# Patient Record
Sex: FEMALE | Race: BLACK OR AFRICAN AMERICAN | NOT HISPANIC OR LATINO
[De-identification: names, ages, dates, MRNs, and addresses within clinical notes are randomized per-mention and may not be internally consistent; named-entity substitution may affect disease eponyms.]

---

## 2024-05-13 ENCOUNTER — APPOINTMENT (OUTPATIENT)
Dept: NEUROSURGERY | Facility: HOSPITAL | Age: 64
End: 2024-05-13

## 2024-05-14 ENCOUNTER — EMERGENCY (EMERGENCY)
Facility: HOSPITAL | Age: 64
LOS: 1 days | Discharge: ANOTHER HOSPITAL | End: 2024-05-14
Attending: STUDENT IN AN ORGANIZED HEALTH CARE EDUCATION/TRAINING PROGRAM
Payer: MEDICAID

## 2024-05-14 VITALS
DIASTOLIC BLOOD PRESSURE: 90 MMHG | TEMPERATURE: 98 F | HEART RATE: 112 BPM | SYSTOLIC BLOOD PRESSURE: 149 MMHG | RESPIRATION RATE: 16 BRPM | OXYGEN SATURATION: 98 % | WEIGHT: 192.9 LBS | HEIGHT: 66 IN

## 2024-05-14 LAB
ALBUMIN SERPL ELPH-MCNC: 3.2 G/DL — LOW (ref 3.5–5)
ALP SERPL-CCNC: 133 U/L — HIGH (ref 40–120)
ALT FLD-CCNC: 104 U/L DA — HIGH (ref 10–60)
AMMONIA BLD-MCNC: 36 UMOL/L — HIGH (ref 11–32)
ANION GAP SERPL CALC-SCNC: 7 MMOL/L — SIGNIFICANT CHANGE UP (ref 5–17)
ANISOCYTOSIS BLD QL: SLIGHT — SIGNIFICANT CHANGE UP
APPEARANCE UR: ABNORMAL
APTT BLD: 31.4 SEC — SIGNIFICANT CHANGE UP (ref 24.5–35.6)
AST SERPL-CCNC: 38 U/L — SIGNIFICANT CHANGE UP (ref 10–40)
BACTERIA # UR AUTO: ABNORMAL /HPF
BASOPHILS # BLD AUTO: 0 K/UL — SIGNIFICANT CHANGE UP (ref 0–0.2)
BASOPHILS NFR BLD AUTO: 0 % — SIGNIFICANT CHANGE UP (ref 0–2)
BILIRUB SERPL-MCNC: 0.7 MG/DL — SIGNIFICANT CHANGE UP (ref 0.2–1.2)
BILIRUB UR-MCNC: NEGATIVE — SIGNIFICANT CHANGE UP
BUN SERPL-MCNC: 13 MG/DL — SIGNIFICANT CHANGE UP (ref 7–18)
CALCIUM SERPL-MCNC: 9.8 MG/DL — SIGNIFICANT CHANGE UP (ref 8.4–10.5)
CHLORIDE SERPL-SCNC: 98 MMOL/L — SIGNIFICANT CHANGE UP (ref 96–108)
CO2 SERPL-SCNC: 23 MMOL/L — SIGNIFICANT CHANGE UP (ref 22–31)
COLOR SPEC: YELLOW — SIGNIFICANT CHANGE UP
CREAT SERPL-MCNC: 0.54 MG/DL — SIGNIFICANT CHANGE UP (ref 0.5–1.3)
DIFF PNL FLD: NEGATIVE — SIGNIFICANT CHANGE UP
EGFR: 104 ML/MIN/1.73M2 — SIGNIFICANT CHANGE UP
EOSINOPHIL # BLD AUTO: 0 K/UL — SIGNIFICANT CHANGE UP (ref 0–0.5)
EOSINOPHIL NFR BLD AUTO: 0 % — SIGNIFICANT CHANGE UP (ref 0–6)
EPI CELLS # UR: PRESENT
GLUCOSE SERPL-MCNC: 113 MG/DL — HIGH (ref 70–99)
GLUCOSE UR QL: NEGATIVE MG/DL — SIGNIFICANT CHANGE UP
HCT VFR BLD CALC: 40.5 % — SIGNIFICANT CHANGE UP (ref 34.5–45)
HGB BLD-MCNC: 13.9 G/DL — SIGNIFICANT CHANGE UP (ref 11.5–15.5)
HYPOCHROMIA BLD QL: SLIGHT — SIGNIFICANT CHANGE UP
INR BLD: 1.23 RATIO — HIGH (ref 0.85–1.18)
KETONES UR-MCNC: 15 MG/DL
LACTATE SERPL-SCNC: 1 MMOL/L — SIGNIFICANT CHANGE UP (ref 0.7–2)
LEUKOCYTE ESTERASE UR-ACNC: NEGATIVE — SIGNIFICANT CHANGE UP
LYMPHOCYTES # BLD AUTO: 2.63 K/UL — SIGNIFICANT CHANGE UP (ref 1–3.3)
LYMPHOCYTES # BLD AUTO: 20 % — SIGNIFICANT CHANGE UP (ref 13–44)
MANUAL SMEAR VERIFICATION: SIGNIFICANT CHANGE UP
MCHC RBC-ENTMCNC: 27.6 PG — SIGNIFICANT CHANGE UP (ref 27–34)
MCHC RBC-ENTMCNC: 34.3 GM/DL — SIGNIFICANT CHANGE UP (ref 32–36)
MCV RBC AUTO: 80.4 FL — SIGNIFICANT CHANGE UP (ref 80–100)
MICROCYTES BLD QL: SLIGHT — SIGNIFICANT CHANGE UP
MONOCYTES # BLD AUTO: 1.05 K/UL — HIGH (ref 0–0.9)
MONOCYTES NFR BLD AUTO: 8 % — SIGNIFICANT CHANGE UP (ref 2–14)
NEUTROPHILS # BLD AUTO: 9.47 K/UL — HIGH (ref 1.8–7.4)
NEUTROPHILS NFR BLD AUTO: 72 % — SIGNIFICANT CHANGE UP (ref 43–77)
NITRITE UR-MCNC: NEGATIVE — SIGNIFICANT CHANGE UP
NRBC # BLD: 0 /100 WBCS — SIGNIFICANT CHANGE UP (ref 0–0)
PH UR: 6.5 — SIGNIFICANT CHANGE UP (ref 5–8)
PLAT MORPH BLD: NORMAL — SIGNIFICANT CHANGE UP
PLATELET # BLD AUTO: 402 K/UL — HIGH (ref 150–400)
POIKILOCYTOSIS BLD QL AUTO: SLIGHT — SIGNIFICANT CHANGE UP
POTASSIUM SERPL-MCNC: 3.6 MMOL/L — SIGNIFICANT CHANGE UP (ref 3.5–5.3)
POTASSIUM SERPL-SCNC: 3.6 MMOL/L — SIGNIFICANT CHANGE UP (ref 3.5–5.3)
PROT SERPL-MCNC: 8 G/DL — SIGNIFICANT CHANGE UP (ref 6–8.3)
PROT UR-MCNC: ABNORMAL MG/DL
PROTHROM AB SERPL-ACNC: 13.9 SEC — HIGH (ref 9.5–13)
RBC # BLD: 5.04 M/UL — SIGNIFICANT CHANGE UP (ref 3.8–5.2)
RBC # FLD: 11.9 % — SIGNIFICANT CHANGE UP (ref 10.3–14.5)
RBC BLD AUTO: ABNORMAL
RBC CASTS # UR COMP ASSIST: 1 /HPF — SIGNIFICANT CHANGE UP (ref 0–4)
SODIUM SERPL-SCNC: 128 MMOL/L — LOW (ref 135–145)
SP GR SPEC: 1.02 — SIGNIFICANT CHANGE UP (ref 1–1.03)
SPHEROCYTES BLD QL SMEAR: SLIGHT — SIGNIFICANT CHANGE UP
UROBILINOGEN FLD QL: 4 MG/DL (ref 0.2–1)
WBC # BLD: 13.15 K/UL — HIGH (ref 3.8–10.5)
WBC # FLD AUTO: 13.15 K/UL — HIGH (ref 3.8–10.5)
WBC UR QL: 3 /HPF — SIGNIFICANT CHANGE UP (ref 0–5)

## 2024-05-14 PROCEDURE — 71045 X-RAY EXAM CHEST 1 VIEW: CPT | Mod: 26

## 2024-05-14 PROCEDURE — 99285 EMERGENCY DEPT VISIT HI MDM: CPT

## 2024-05-14 PROCEDURE — 70450 CT HEAD/BRAIN W/O DYE: CPT | Mod: 26,MC

## 2024-05-14 PROCEDURE — 70450 CT HEAD/BRAIN W/O DYE: CPT | Mod: 26,MC,77

## 2024-05-14 PROCEDURE — 70498 CT ANGIOGRAPHY NECK: CPT | Mod: 26,MC

## 2024-05-14 PROCEDURE — 70496 CT ANGIOGRAPHY HEAD: CPT | Mod: 26,MC

## 2024-05-14 RX ORDER — HALOPERIDOL DECANOATE 100 MG/ML
2.5 INJECTION INTRAMUSCULAR ONCE
Refills: 0 | Status: COMPLETED | OUTPATIENT
Start: 2024-05-14 | End: 2024-05-14

## 2024-05-14 RX ORDER — SODIUM CHLORIDE 9 MG/ML
1000 INJECTION, SOLUTION INTRAVENOUS ONCE
Refills: 0 | Status: COMPLETED | OUTPATIENT
Start: 2024-05-14 | End: 2024-05-14

## 2024-05-14 RX ORDER — LEVETIRACETAM 250 MG/1
1000 TABLET, FILM COATED ORAL ONCE
Refills: 0 | Status: COMPLETED | OUTPATIENT
Start: 2024-05-14 | End: 2024-05-14

## 2024-05-14 RX ORDER — CEFTRIAXONE 500 MG/1
1000 INJECTION, POWDER, FOR SOLUTION INTRAMUSCULAR; INTRAVENOUS ONCE
Refills: 0 | Status: COMPLETED | OUTPATIENT
Start: 2024-05-14 | End: 2024-05-14

## 2024-05-14 RX ADMIN — Medication 1 MILLIGRAM(S): at 17:12

## 2024-05-14 RX ADMIN — HALOPERIDOL DECANOATE 2.5 MILLIGRAM(S): 100 INJECTION INTRAMUSCULAR at 18:22

## 2024-05-14 RX ADMIN — Medication 1 MILLIGRAM(S): at 20:32

## 2024-05-14 RX ADMIN — LEVETIRACETAM 400 MILLIGRAM(S): 250 TABLET, FILM COATED ORAL at 20:05

## 2024-05-14 RX ADMIN — SODIUM CHLORIDE 1000 MILLILITER(S): 9 INJECTION, SOLUTION INTRAVENOUS at 15:17

## 2024-05-14 RX ADMIN — Medication 1 MILLIGRAM(S): at 17:47

## 2024-05-14 RX ADMIN — CEFTRIAXONE 100 MILLIGRAM(S): 500 INJECTION, POWDER, FOR SOLUTION INTRAMUSCULAR; INTRAVENOUS at 16:04

## 2024-05-14 NOTE — ED ADULT NURSE NOTE - CHIEF COMPLAINT QUOTE
sent from [facility] for generalized weakness with decrease in PO intake, taking cephalexin for UTI.

## 2024-05-14 NOTE — ED PROVIDER NOTE - PHYSICAL EXAMINATION
CONSTITUTIONAL: non-toxic, well appearing  SKIN: no rash, no petechiae.  EYES: PERRL, EOMI, pink conjunctiva, anicteric  ENT: tongue and uvular midline, no exudates, moist mucous membranes  NECK: Supple; no meningismus, no JVD  CARD: RRR, no murmurs, equal radial pulses bilaterally 2+  RESP: CTAB, no respiratory distress  ABD: Soft, non-tender, non-distended, no peritoneal signs, no CVA tenderness  EXT: Normal ROM x4. No edema.   NEURO: Alert, oriented to person only. Neuro exam nonfocal, equal strength bilaterally, resting tremor present.   PSYCH: Cooperative, appropriate.

## 2024-05-14 NOTE — ED ADULT NURSE NOTE - OBJECTIVE STATEMENT
Patient present from facility for generalized weakness and ability to do ADLS. Patient alert and awake but confused

## 2024-05-14 NOTE — ED PROVIDER NOTE - OBJECTIVE STATEMENT
History limited from patient's secondary to mental status, history obtained from chart review and collateral.    64-year-old female with past medical history hypertension, seizure disorder on Keppra, alcoholic cirrhosis, tremors, osteoarthritis presents with generalized weakness.  Per nursing facility notes, patient with generalized weakness, currently on cephalexin for urinary tract infection, patient declining in ADLs, no abdominal pain, no shortness of breath, no difficulty breathing, no fever, no chills, no headache, no seizure, sent to the emergency department for further evaluation.  Patient denies any complaints at this time, currently alert and oriented to person only.  Unable to obtain further history.

## 2024-05-14 NOTE — ED PROVIDER NOTE - CLINICAL SUMMARY MEDICAL DECISION MAKING FREE TEXT BOX
Amadou: 64-year-old female with past medical history hypertension, seizure disorder on Keppra, alcoholic cirrhosis, tremors, osteoarthritis presents with generalized weakness.  Per nursing facility notes, patient with generalized weakness, currently on cephalexin for urinary tract infection, patient declining in ADLs, no abdominal pain, no shortness of breath, no difficulty breathing, no fever, no chills, no headache, no seizure, sent to the emergency department for further evaluation.  Patient denies any complaints at this time, currently alert and oriented to person only.  Unable to obtain further history. Physical exam per above. No focal neuro deficits appreciated. Abdomen nontender. Suspect infectious/metabolic etiology, will obtain labs, imaging, provide supportive treatment, may require admission.

## 2024-05-14 NOTE — ED ADULT NURSE NOTE - NSFALLHARMRISKINTERV_ED_ALL_ED
Assistance OOB with selected safe patient handling equipment if applicable/Assistance with ambulation/Communicate risk of Fall with Harm to all staff, patient, and family/Monitor gait and stability/Provide visual cue: red socks, yellow wristband, yellow gown, etc/Reinforce activity limits and safety measures with patient and family/Bed in lowest position, wheels locked, appropriate side rails in place/Call bell, personal items and telephone in reach/Instruct patient to call for assistance before getting out of bed/chair/stretcher/Non-slip footwear applied when patient is off stretcher/[place] to call system/Physically safe environment - no spills, clutter or unnecessary equipment/Purposeful Proactive Rounding/Room/bathroom lighting operational, light cord in reach

## 2024-05-15 ENCOUNTER — INPATIENT (INPATIENT)
Facility: HOSPITAL | Age: 64
LOS: 18 days | Discharge: NURSING, REHAB OR LONG-TERM CARE | DRG: 66 | End: 2024-06-03
Attending: INTERNAL MEDICINE | Admitting: STUDENT IN AN ORGANIZED HEALTH CARE EDUCATION/TRAINING PROGRAM
Payer: MEDICAID

## 2024-05-15 VITALS
SYSTOLIC BLOOD PRESSURE: 149 MMHG | HEART RATE: 88 BPM | RESPIRATION RATE: 16 BRPM | DIASTOLIC BLOOD PRESSURE: 105 MMHG | TEMPERATURE: 99 F | OXYGEN SATURATION: 96 %

## 2024-05-15 VITALS — SYSTOLIC BLOOD PRESSURE: 150 MMHG | DIASTOLIC BLOOD PRESSURE: 83 MMHG

## 2024-05-15 DIAGNOSIS — I60.9 NONTRAUMATIC SUBARACHNOID HEMORRHAGE, UNSPECIFIED: ICD-10-CM

## 2024-05-15 DIAGNOSIS — R13.10 DYSPHAGIA, UNSPECIFIED: ICD-10-CM

## 2024-05-15 LAB
A1C WITH ESTIMATED AVERAGE GLUCOSE RESULT: 6.1 % — HIGH (ref 4–5.6)
ALBUMIN SERPL ELPH-MCNC: 3.3 G/DL — SIGNIFICANT CHANGE UP (ref 3.3–5)
ALP SERPL-CCNC: 117 U/L — SIGNIFICANT CHANGE UP (ref 40–120)
ALT FLD-CCNC: 64 U/L — HIGH (ref 10–45)
ANION GAP SERPL CALC-SCNC: 14 MMOL/L — SIGNIFICANT CHANGE UP (ref 5–17)
ANION GAP SERPL CALC-SCNC: 9 MMOL/L — SIGNIFICANT CHANGE UP (ref 5–17)
APPEARANCE UR: CLEAR — SIGNIFICANT CHANGE UP
APTT BLD: 30.5 SEC — SIGNIFICANT CHANGE UP (ref 24.5–35.6)
AST SERPL-CCNC: 31 U/L — SIGNIFICANT CHANGE UP (ref 10–40)
BACTERIA # UR AUTO: NEGATIVE /HPF — SIGNIFICANT CHANGE UP
BILIRUB SERPL-MCNC: 0.5 MG/DL — SIGNIFICANT CHANGE UP (ref 0.2–1.2)
BILIRUB UR-MCNC: NEGATIVE — SIGNIFICANT CHANGE UP
BUN SERPL-MCNC: 12 MG/DL — SIGNIFICANT CHANGE UP (ref 7–23)
BUN SERPL-MCNC: 13 MG/DL — SIGNIFICANT CHANGE UP (ref 7–23)
CALCIUM SERPL-MCNC: 8.6 MG/DL — SIGNIFICANT CHANGE UP (ref 8.4–10.5)
CALCIUM SERPL-MCNC: 9.2 MG/DL — SIGNIFICANT CHANGE UP (ref 8.4–10.5)
CAST: 3 /LPF — SIGNIFICANT CHANGE UP (ref 0–4)
CHLORIDE SERPL-SCNC: 102 MMOL/L — SIGNIFICANT CHANGE UP (ref 96–108)
CHLORIDE SERPL-SCNC: 96 MMOL/L — SIGNIFICANT CHANGE UP (ref 96–108)
CHLORIDE UR-SCNC: 190 MMOL/L — SIGNIFICANT CHANGE UP
CHOLEST SERPL-MCNC: 119 MG/DL — SIGNIFICANT CHANGE UP
CO2 SERPL-SCNC: 21 MMOL/L — LOW (ref 22–31)
CO2 SERPL-SCNC: 22 MMOL/L — SIGNIFICANT CHANGE UP (ref 22–31)
COLOR SPEC: YELLOW — SIGNIFICANT CHANGE UP
CREAT ?TM UR-MCNC: 36 MG/DL — SIGNIFICANT CHANGE UP
CREAT SERPL-MCNC: 0.46 MG/DL — LOW (ref 0.5–1.3)
CREAT SERPL-MCNC: 0.55 MG/DL — SIGNIFICANT CHANGE UP (ref 0.5–1.3)
DIFF PNL FLD: ABNORMAL
EGFR: 104 ML/MIN/1.73M2 — SIGNIFICANT CHANGE UP
EGFR: 108 ML/MIN/1.73M2 — SIGNIFICANT CHANGE UP
ERYTHROCYTE [SEDIMENTATION RATE] IN BLOOD: 120 MM/HR — HIGH (ref 0–20)
ESTIMATED AVERAGE GLUCOSE: 128 MG/DL — HIGH (ref 68–114)
GLUCOSE SERPL-MCNC: 111 MG/DL — HIGH (ref 70–99)
GLUCOSE SERPL-MCNC: 121 MG/DL — HIGH (ref 70–99)
GLUCOSE UR QL: NEGATIVE MG/DL — SIGNIFICANT CHANGE UP
HCT VFR BLD CALC: 38.6 % — SIGNIFICANT CHANGE UP (ref 34.5–45)
HDLC SERPL-MCNC: 28 MG/DL — LOW
HGB BLD-MCNC: 13.4 G/DL — SIGNIFICANT CHANGE UP (ref 11.5–15.5)
INR BLD: 1.33 RATIO — HIGH (ref 0.85–1.18)
KETONES UR-MCNC: ABNORMAL MG/DL
LEUKOCYTE ESTERASE UR-ACNC: ABNORMAL
LIPID PNL WITH DIRECT LDL SERPL: 74 MG/DL — SIGNIFICANT CHANGE UP
MAGNESIUM SERPL-MCNC: 2 MG/DL — SIGNIFICANT CHANGE UP (ref 1.6–2.6)
MAGNESIUM SERPL-MCNC: 2.1 MG/DL — SIGNIFICANT CHANGE UP (ref 1.6–2.6)
MCHC RBC-ENTMCNC: 27.9 PG — SIGNIFICANT CHANGE UP (ref 27–34)
MCHC RBC-ENTMCNC: 34.7 GM/DL — SIGNIFICANT CHANGE UP (ref 32–36)
MCV RBC AUTO: 80.2 FL — SIGNIFICANT CHANGE UP (ref 80–100)
NITRITE UR-MCNC: NEGATIVE — SIGNIFICANT CHANGE UP
NON HDL CHOLESTEROL: 91 MG/DL — SIGNIFICANT CHANGE UP
NRBC # BLD: 0 /100 WBCS — SIGNIFICANT CHANGE UP (ref 0–0)
OSMOLALITY SERPL: 275 MOSMOL/KG — LOW (ref 280–301)
PA ADP PRP-ACNC: 271 PRU — SIGNIFICANT CHANGE UP (ref 194–417)
PH UR: 6.5 — SIGNIFICANT CHANGE UP (ref 5–8)
PHOSPHATE SERPL-MCNC: 2.5 MG/DL — SIGNIFICANT CHANGE UP (ref 2.5–4.5)
PHOSPHATE SERPL-MCNC: 3.3 MG/DL — SIGNIFICANT CHANGE UP (ref 2.5–4.5)
PLATELET # BLD AUTO: 392 K/UL — SIGNIFICANT CHANGE UP (ref 150–400)
PLATELET MAPPING ACTF MAX AMPLITUDE: >30 MM — HIGH (ref 2–19)
PLATELET MAPPING ADP MAXIMUM AMPLITUDE: 58.2 MM — SIGNIFICANT CHANGE UP (ref 45–69)
PLATELET MAPPING ADP PERCENT INHIBITION: SIGNIFICANT CHANGE UP % (ref 0–17)
PLATELET MAPPING ARACHIDONIC ACID INHIBITION: SIGNIFICANT CHANGE UP % (ref 0–11)
PLATELET MAPPING HKH MAXIMUM AMPLITUDE: >71 MM — HIGH (ref 53–68)
PLATELET RESPONSE ASPIRIN RESULT: 652 ARU — SIGNIFICANT CHANGE UP
POTASSIUM SERPL-MCNC: 3.7 MMOL/L — SIGNIFICANT CHANGE UP (ref 3.5–5.3)
POTASSIUM SERPL-MCNC: 4.4 MMOL/L — SIGNIFICANT CHANGE UP (ref 3.5–5.3)
POTASSIUM SERPL-SCNC: 3.7 MMOL/L — SIGNIFICANT CHANGE UP (ref 3.5–5.3)
POTASSIUM SERPL-SCNC: 4.4 MMOL/L — SIGNIFICANT CHANGE UP (ref 3.5–5.3)
POTASSIUM UR-SCNC: 26 MMOL/L — SIGNIFICANT CHANGE UP
PROT SERPL-MCNC: 7.2 G/DL — SIGNIFICANT CHANGE UP (ref 6–8.3)
PROT UR-MCNC: SIGNIFICANT CHANGE UP MG/DL
PROTHROM AB SERPL-ACNC: 13.9 SEC — HIGH (ref 9.5–13)
RAPIDTEG MAXIMUM AMPLITUDE: 73.5 MM — HIGH (ref 52–70)
RBC # BLD: 4.81 M/UL — SIGNIFICANT CHANGE UP (ref 3.8–5.2)
RBC # FLD: 12.2 % — SIGNIFICANT CHANGE UP (ref 10.3–14.5)
RBC CASTS # UR COMP ASSIST: 40 /HPF — HIGH (ref 0–4)
SODIUM SERPL-SCNC: 132 MMOL/L — LOW (ref 135–145)
SODIUM SERPL-SCNC: 132 MMOL/L — LOW (ref 135–145)
SODIUM UR-SCNC: 196 MMOL/L — SIGNIFICANT CHANGE UP
SP GR SPEC: 1.02 — SIGNIFICANT CHANGE UP (ref 1–1.03)
SQUAMOUS # UR AUTO: 1 /HPF — SIGNIFICANT CHANGE UP (ref 0–5)
TEG FUNCTIONAL FIBRINOGEN: 47.6 MM — HIGH (ref 15–32)
TEG LY30 (LYSIS): 0.3 % — SIGNIFICANT CHANGE UP (ref 0–2.6)
TEG REACTION TIME: 6.2 MIN — SIGNIFICANT CHANGE UP (ref 4.6–9.1)
TRIGL SERPL-MCNC: 81 MG/DL — SIGNIFICANT CHANGE UP
TSH SERPL-MCNC: 2.8 UIU/ML — SIGNIFICANT CHANGE UP (ref 0.27–4.2)
UROBILINOGEN FLD QL: 4 MG/DL (ref 0.2–1)
WBC # BLD: 12.81 K/UL — HIGH (ref 3.8–10.5)
WBC # FLD AUTO: 12.81 K/UL — HIGH (ref 3.8–10.5)
WBC UR QL: 7 /HPF — HIGH (ref 0–5)

## 2024-05-15 PROCEDURE — 93005 ELECTROCARDIOGRAM TRACING: CPT

## 2024-05-15 PROCEDURE — 99291 CRITICAL CARE FIRST HOUR: CPT

## 2024-05-15 PROCEDURE — 36620 INSERTION CATHETER ARTERY: CPT

## 2024-05-15 PROCEDURE — 73590 X-RAY EXAM OF LOWER LEG: CPT | Mod: 26,50

## 2024-05-15 PROCEDURE — 71045 X-RAY EXAM CHEST 1 VIEW: CPT | Mod: 26,77

## 2024-05-15 PROCEDURE — 82962 GLUCOSE BLOOD TEST: CPT

## 2024-05-15 PROCEDURE — 96375 TX/PRO/DX INJ NEW DRUG ADDON: CPT | Mod: XU

## 2024-05-15 PROCEDURE — 85730 THROMBOPLASTIN TIME PARTIAL: CPT

## 2024-05-15 PROCEDURE — 87040 BLOOD CULTURE FOR BACTERIA: CPT

## 2024-05-15 PROCEDURE — 70496 CT ANGIOGRAPHY HEAD: CPT | Mod: MC

## 2024-05-15 PROCEDURE — 72170 X-RAY EXAM OF PELVIS: CPT | Mod: 26

## 2024-05-15 PROCEDURE — 74018 RADEX ABDOMEN 1 VIEW: CPT | Mod: 26

## 2024-05-15 PROCEDURE — 73090 X-RAY EXAM OF FOREARM: CPT | Mod: 26,50

## 2024-05-15 PROCEDURE — ZZZZZ: CPT

## 2024-05-15 PROCEDURE — 70450 CT HEAD/BRAIN W/O DYE: CPT | Mod: MC

## 2024-05-15 PROCEDURE — 70498 CT ANGIOGRAPHY NECK: CPT | Mod: MC

## 2024-05-15 PROCEDURE — 85610 PROTHROMBIN TIME: CPT

## 2024-05-15 PROCEDURE — 93010 ELECTROCARDIOGRAM REPORT: CPT

## 2024-05-15 PROCEDURE — 71045 X-RAY EXAM CHEST 1 VIEW: CPT

## 2024-05-15 PROCEDURE — 70450 CT HEAD/BRAIN W/O DYE: CPT | Mod: 26,77

## 2024-05-15 PROCEDURE — 96372 THER/PROPH/DIAG INJ SC/IM: CPT | Mod: XU

## 2024-05-15 PROCEDURE — 71045 X-RAY EXAM CHEST 1 VIEW: CPT | Mod: 26

## 2024-05-15 PROCEDURE — 36415 COLL VENOUS BLD VENIPUNCTURE: CPT

## 2024-05-15 PROCEDURE — 73551 X-RAY EXAM OF FEMUR 1: CPT | Mod: 26,50

## 2024-05-15 PROCEDURE — 83605 ASSAY OF LACTIC ACID: CPT

## 2024-05-15 PROCEDURE — 80053 COMPREHEN METABOLIC PANEL: CPT

## 2024-05-15 PROCEDURE — 96374 THER/PROPH/DIAG INJ IV PUSH: CPT | Mod: XU

## 2024-05-15 PROCEDURE — 73060 X-RAY EXAM OF HUMERUS: CPT | Mod: 26

## 2024-05-15 PROCEDURE — 70450 CT HEAD/BRAIN W/O DYE: CPT | Mod: 26

## 2024-05-15 PROCEDURE — 96376 TX/PRO/DX INJ SAME DRUG ADON: CPT | Mod: XU

## 2024-05-15 PROCEDURE — 93970 EXTREMITY STUDY: CPT | Mod: 26

## 2024-05-15 PROCEDURE — 82140 ASSAY OF AMMONIA: CPT

## 2024-05-15 PROCEDURE — 85025 COMPLETE CBC W/AUTO DIFF WBC: CPT

## 2024-05-15 PROCEDURE — 81001 URINALYSIS AUTO W/SCOPE: CPT

## 2024-05-15 PROCEDURE — 99285 EMERGENCY DEPT VISIT HI MDM: CPT | Mod: 25

## 2024-05-15 RX ORDER — CHLORHEXIDINE GLUCONATE 213 G/1000ML
1 SOLUTION TOPICAL DAILY
Refills: 0 | Status: DISCONTINUED | OUTPATIENT
Start: 2024-05-15 | End: 2024-05-18

## 2024-05-15 RX ORDER — ACETAMINOPHEN 500 MG
1000 TABLET ORAL ONCE
Refills: 0 | Status: COMPLETED | OUTPATIENT
Start: 2024-05-15 | End: 2024-05-15

## 2024-05-15 RX ORDER — FOLIC ACID 0.8 MG
1 TABLET ORAL DAILY
Refills: 0 | Status: DISCONTINUED | OUTPATIENT
Start: 2024-05-15 | End: 2024-06-02

## 2024-05-15 RX ORDER — METOPROLOL TARTRATE 50 MG
5 TABLET ORAL EVERY 6 HOURS
Refills: 0 | Status: DISCONTINUED | OUTPATIENT
Start: 2024-05-15 | End: 2024-05-18

## 2024-05-15 RX ORDER — SODIUM CHLORIDE 5 G/100ML
1000 INJECTION, SOLUTION INTRAVENOUS
Refills: 0 | Status: DISCONTINUED | OUTPATIENT
Start: 2024-05-15 | End: 2024-05-17

## 2024-05-15 RX ORDER — SODIUM CHLORIDE 9 MG/ML
1000 INJECTION INTRAMUSCULAR; INTRAVENOUS; SUBCUTANEOUS
Refills: 0 | Status: DISCONTINUED | OUTPATIENT
Start: 2024-05-15 | End: 2024-05-15

## 2024-05-15 RX ORDER — ATORVASTATIN CALCIUM 80 MG/1
40 TABLET, FILM COATED ORAL AT BEDTIME
Refills: 0 | Status: DISCONTINUED | OUTPATIENT
Start: 2024-05-15 | End: 2024-05-17

## 2024-05-15 RX ORDER — NALTREXONE HYDROCHLORIDE 50 MG/1
1 TABLET, FILM COATED ORAL
Refills: 0 | DISCHARGE

## 2024-05-15 RX ORDER — NICARDIPINE HYDROCHLORIDE 30 MG/1
5 CAPSULE, EXTENDED RELEASE ORAL
Qty: 40 | Refills: 0 | Status: DISCONTINUED | OUTPATIENT
Start: 2024-05-15 | End: 2024-05-16

## 2024-05-15 RX ORDER — SENNA PLUS 8.6 MG/1
2 TABLET ORAL AT BEDTIME
Refills: 0 | Status: DISCONTINUED | OUTPATIENT
Start: 2024-05-15 | End: 2024-06-02

## 2024-05-15 RX ORDER — LATANOPROST 0.05 MG/ML
1 SOLUTION/ DROPS OPHTHALMIC; TOPICAL AT BEDTIME
Refills: 0 | Status: DISCONTINUED | OUTPATIENT
Start: 2024-05-15 | End: 2024-06-03

## 2024-05-15 RX ORDER — THIAMINE MONONITRATE (VIT B1) 100 MG
500 TABLET ORAL EVERY 8 HOURS
Refills: 0 | Status: DISCONTINUED | OUTPATIENT
Start: 2024-05-15 | End: 2024-05-16

## 2024-05-15 RX ORDER — NIMODIPINE 60 MG/10ML
60 SOLUTION ORAL EVERY 4 HOURS
Refills: 0 | Status: DISCONTINUED | OUTPATIENT
Start: 2024-05-15 | End: 2024-05-17

## 2024-05-15 RX ORDER — MIRTAZAPINE 45 MG/1
1 TABLET, ORALLY DISINTEGRATING ORAL
Refills: 0 | DISCHARGE

## 2024-05-15 RX ORDER — POTASSIUM CHLORIDE 20 MEQ
10 PACKET (EA) ORAL
Refills: 0 | Status: DISCONTINUED | OUTPATIENT
Start: 2024-05-15 | End: 2024-05-15

## 2024-05-15 RX ORDER — LEVETIRACETAM 250 MG/1
1 TABLET, FILM COATED ORAL
Refills: 0 | DISCHARGE

## 2024-05-15 RX ORDER — ATORVASTATIN CALCIUM 80 MG/1
1 TABLET, FILM COATED ORAL
Refills: 0 | DISCHARGE

## 2024-05-15 RX ORDER — LEVETIRACETAM 250 MG/1
750 TABLET, FILM COATED ORAL EVERY 12 HOURS
Refills: 0 | Status: DISCONTINUED | OUTPATIENT
Start: 2024-05-15 | End: 2024-05-22

## 2024-05-15 RX ORDER — DEXMEDETOMIDINE HYDROCHLORIDE IN 0.9% SODIUM CHLORIDE 4 UG/ML
0.3 INJECTION INTRAVENOUS
Qty: 200 | Refills: 0 | Status: DISCONTINUED | OUTPATIENT
Start: 2024-05-15 | End: 2024-05-15

## 2024-05-15 RX ORDER — LEVETIRACETAM 250 MG/1
750 TABLET, FILM COATED ORAL EVERY 12 HOURS
Refills: 0 | Status: DISCONTINUED | OUTPATIENT
Start: 2024-05-15 | End: 2024-05-15

## 2024-05-15 RX ORDER — POLYETHYLENE GLYCOL 3350 17 G/17G
17 POWDER, FOR SOLUTION ORAL EVERY 12 HOURS
Refills: 0 | Status: DISCONTINUED | OUTPATIENT
Start: 2024-05-15 | End: 2024-06-02

## 2024-05-15 RX ORDER — SODIUM CHLORIDE 9 MG/ML
1000 INJECTION INTRAMUSCULAR; INTRAVENOUS; SUBCUTANEOUS ONCE
Refills: 0 | Status: COMPLETED | OUTPATIENT
Start: 2024-05-15 | End: 2024-05-15

## 2024-05-15 RX ORDER — NICARDIPINE HYDROCHLORIDE 30 MG/1
5 CAPSULE, EXTENDED RELEASE ORAL
Qty: 40 | Refills: 0 | Status: DISCONTINUED | OUTPATIENT
Start: 2024-05-15 | End: 2024-05-15

## 2024-05-15 RX ORDER — BRIMONIDINE TARTRATE 2 MG/MG
1 SOLUTION/ DROPS OPHTHALMIC EVERY 12 HOURS
Refills: 0 | Status: DISCONTINUED | OUTPATIENT
Start: 2024-05-15 | End: 2024-06-03

## 2024-05-15 RX ORDER — LABETALOL HCL 100 MG
20 TABLET ORAL ONCE
Refills: 0 | Status: COMPLETED | OUTPATIENT
Start: 2024-05-15 | End: 2024-05-15

## 2024-05-15 RX ORDER — SODIUM CHLORIDE 5 G/100ML
1000 INJECTION, SOLUTION INTRAVENOUS
Refills: 0 | Status: DISCONTINUED | OUTPATIENT
Start: 2024-05-15 | End: 2024-05-15

## 2024-05-15 RX ORDER — POTASSIUM CHLORIDE 20 MEQ
10 PACKET (EA) ORAL
Refills: 0 | Status: COMPLETED | OUTPATIENT
Start: 2024-05-15 | End: 2024-05-15

## 2024-05-15 RX ADMIN — Medication 105 MILLIGRAM(S): at 21:07

## 2024-05-15 RX ADMIN — NICARDIPINE HYDROCHLORIDE 25 MG/HR: 30 CAPSULE, EXTENDED RELEASE ORAL at 07:22

## 2024-05-15 RX ADMIN — Medication 100 MILLIEQUIVALENT(S): at 05:16

## 2024-05-15 RX ADMIN — Medication 105 MILLIGRAM(S): at 05:19

## 2024-05-15 RX ADMIN — Medication 100 MILLIEQUIVALENT(S): at 06:26

## 2024-05-15 RX ADMIN — Medication 5 MILLIGRAM(S): at 22:07

## 2024-05-15 RX ADMIN — Medication 5 MILLIGRAM(S): at 10:18

## 2024-05-15 RX ADMIN — LEVETIRACETAM 400 MILLIGRAM(S): 250 TABLET, FILM COATED ORAL at 05:19

## 2024-05-15 RX ADMIN — Medication 5 MILLIGRAM(S): at 15:24

## 2024-05-15 RX ADMIN — Medication 100 MILLIEQUIVALENT(S): at 08:49

## 2024-05-15 RX ADMIN — LATANOPROST 1 DROP(S): 0.05 SOLUTION/ DROPS OPHTHALMIC; TOPICAL at 21:14

## 2024-05-15 RX ADMIN — Medication 105 MILLIGRAM(S): at 13:20

## 2024-05-15 RX ADMIN — DEXMEDETOMIDINE HYDROCHLORIDE IN 0.9% SODIUM CHLORIDE 5.6 MICROGRAM(S)/KG/HR: 4 INJECTION INTRAVENOUS at 13:46

## 2024-05-15 RX ADMIN — SODIUM CHLORIDE 1000 MILLILITER(S): 9 INJECTION INTRAMUSCULAR; INTRAVENOUS; SUBCUTANEOUS at 17:26

## 2024-05-15 RX ADMIN — BRIMONIDINE TARTRATE 1 DROP(S): 2 SOLUTION/ DROPS OPHTHALMIC at 17:14

## 2024-05-15 RX ADMIN — Medication 20 MILLIGRAM(S): at 01:30

## 2024-05-15 RX ADMIN — Medication 400 MILLIGRAM(S): at 20:25

## 2024-05-15 RX ADMIN — SODIUM CHLORIDE 75 MILLILITER(S): 9 INJECTION INTRAMUSCULAR; INTRAVENOUS; SUBCUTANEOUS at 20:46

## 2024-05-15 RX ADMIN — Medication 5 MILLIGRAM(S): at 03:47

## 2024-05-15 RX ADMIN — BRIMONIDINE TARTRATE 1 DROP(S): 2 SOLUTION/ DROPS OPHTHALMIC at 05:23

## 2024-05-15 RX ADMIN — CHLORHEXIDINE GLUCONATE 1 APPLICATION(S): 213 SOLUTION TOPICAL at 12:26

## 2024-05-15 RX ADMIN — SODIUM CHLORIDE 75 MILLILITER(S): 9 INJECTION INTRAMUSCULAR; INTRAVENOUS; SUBCUTANEOUS at 04:47

## 2024-05-15 RX ADMIN — SODIUM CHLORIDE 75 MILLILITER(S): 9 INJECTION INTRAMUSCULAR; INTRAVENOUS; SUBCUTANEOUS at 12:25

## 2024-05-15 RX ADMIN — LEVETIRACETAM 400 MILLIGRAM(S): 250 TABLET, FILM COATED ORAL at 17:14

## 2024-05-15 RX ADMIN — NICARDIPINE HYDROCHLORIDE 25 MG/HR: 30 CAPSULE, EXTENDED RELEASE ORAL at 05:12

## 2024-05-15 NOTE — PATIENT PROFILE ADULT - FUNCTIONAL SCREEN CURRENT LEVEL: SWALLOWING (IF SCORE 2 OR MORE FOR ANY ITEM, CONSULT REHAB SERVICES), MLM)
unable to assess due to neuro exam unable to assess due to neuro exam/2 = difficulty swallowing liquids/foods

## 2024-05-15 NOTE — OCCUPATIONAL THERAPY INITIAL EVALUATION ADULT - RANGE OF MOTION EXAMINATION, UPPER EXTREMITY
bilateral UE Passive ROM was WFL  (within functional limits) pt with +b/l tremors of BUE and head/neck/bilateral UE Active ROM was WFL  (within functional limits)/bilateral UE Passive ROM was WFL  (within functional limits)

## 2024-05-15 NOTE — PATIENT PROFILE ADULT - FUNCTIONAL ASSESSMENT - BASIC MOBILITY 6.
Not able to assess (calculate score using AMPAC averaging method) 1-calculated by average /Not able to assess (calculate score using [facility] averaging method)

## 2024-05-15 NOTE — PROGRESS NOTE ADULT - ASSESSMENT
ASSESSMENT/PLAN: 64 YOF, admitted with increase lethargy from her baseline (wheelchair bound), with bilateral convexity SAH and IVH.  Imaging thus ar revealed right vertebral artery aneurysm and diffuse narrowing in the anterior and posterior vasculature    NEURO:  Neurocheck q1  MRI/MRA with and without contrast today - rule out RCVS, rule out vasculitis  Possible diagnostic cerebral angiogram  Hx of Seizure: Levetiracetam 750mg BID  Pain: PRN analgesics  Activity: [] OOB as tolerated [X] Bedrest [] PT [] OT [] PMNR    PULM:  Incentive spirometry, Room air    CV:  SBP goal 100 - 140  On cardene drip  Right radial all  TTE-p    GI  NPO  Bowel regimen    RENAL:  Fluids: 2% @ 50ml/hRENAL  #HypoNa  Likely Chronic +/- CSW/HypoVolemia  - Maintain Normothermia with hypertonic IVF, q8h BMP    ID  #?UTI  - OSH UA clear  - Trend Fever/WBC  - Continue Keflex for 3 day course    DVT Ppx: SCD. HOLD SQL     ASSESSMENT/PLAN: 64 YOF, admitted with increase lethargy from her baseline (wheelchair bound), with bilateral convexity SAH and IVH.  Imaging thus ar revealed right vertebral artery aneurysm and diffuse narrowing in the anterior and posterior vasculature    NEURO:  Neurocheck q1  MRI/MRA with and without contrast today - rule out RCVS, rule out vasculitis  Possible diagnostic cerebral angiogram  Hx of Seizure: Levetiracetam 750mg BID  Pain: PRN analgesics  Activity: [] OOB as tolerated [X] Bedrest [] PT [] OT [] PMNR    PULM:  Incentive spirometry, Room air    CV:  SBP goal 100 - 140  On cardene drip  Right radial all  TTE-p    GI  NPO  Bowel regimen    RENAL:  Fluids: 2% @ 50ml/hRENAL  #HypoNa  Likely Chronic +/- CSW/HypoVolemia  - Maintain Normothermia with hypertonic IVF, q8h BMP    ID  #?UTI  - OSH UA clear  - Trend Fever/WBC  - Send blood and Urine clx. Rule out mycotic aneurysm-related SAH    DVT Ppx: SCD. HOLD SQL     ASSESSMENT/PLAN: 64 YOF, admitted with increase lethargy from her baseline (wheelchair bound), with bilateral convexity SAH and IVH.  Imaging thus ar revealed right vertebral artery aneurysm and diffuse narrowing in the anterior and posterior vasculature    NEURO:  Neurocheck q1  MRI/MRA with and without contrast today - rule out RCVS, rule out vasculitis  Possible diagnostic cerebral angiogram  Hx of Seizure: Levetiracetam 750mg BID  Pain: PRN analgesics  Activity: [] OOB as tolerated [X] Bedrest [] PT [] OT [] PMNR    PULM:  Incentive spirometry, NC 2 L    CV:  SBP goal 100 - 140  On cardene drip  TTE-p, EKG-p    GI  NPO  Bowel regimen    RENAL:  NS @ 50. Na goal 135 - 140  BMP at 4 PM  #HypoNa  Likely Chronic +/- HypoVolemia  - Maintain Normothermia with hypertonic IVF, q8h BMP    ID  #?UTI  - OSH UA clear  - Trend Fever/WBC  - Stop Ancef. monitor off Abx  - Send blood and Urine clx. Rule out mycotic aneurysm-related SAH    DVT Ppx: SCD. HOLD SQL  LED-p

## 2024-05-15 NOTE — OCCUPATIONAL THERAPY INITIAL EVALUATION ADULT - BED MOBILITY TRAINING, PT EVAL
GOAL: Pt will require mod A with all bed mobility tasks within 4 weeks to increase ability to engage in functional mobility tasks.

## 2024-05-15 NOTE — PROGRESS NOTE ADULT - ASSESSMENT
64F vasculopath with sudden nontraumatic SAH iso transient AMS, now at apparent baseline    Patient is critically ill due to the following diagnoses:  aneurysmal SAH  Cerebral Vasculopathy  Vertebrobasilar Insufficiency  Encephalopathy    PLAN  NEURO  #SAH  Unclear etiology, aneurysmal vasculopathy v inflammatory vasculitis/PRES  - DSA today  - SBP <140 until DSA given possible unsecured az  - Vasospasm management (Nimodipine, TCDs) if aneurysm identified    #AMS  Seizure v subclinical infarcts from vasculopathy (vertebrobasilar insufficiency v vasculitis) v Metabolic  - Home Keppra, Naltrexone  - HOLD home Mirtazepine  - MRI, MRA  - ESR/CRP  - Lipid Panel, A1c  - Consider LP if Imaging and/or Inflammatory markers consistent with Vasculitis  - Home Thiamine    SBP <140, Cont home Metoprolol  SpO2 >94%  NPO for DSA  Glucose <180    #?UTI  - OSH UA clear  - Trend Fever/WBC  - Continue Keflex for 3 day course    DVT Ppx: SCD. HOLD SQL    My full attention was spent providing medically necessary critical care to the patient with details documented in my note above.   Critical care time spent examining patient, reviewing vitals, labs, medications, imaging and discussing with the team goals of care   The combined critical care time provided to the patient was 60 minutes  This time does not include bedside procedures that are documented separately.   64F vasculopath with sudden nontraumatic SAH iso transient AMS, now at apparent baseline    Patient is critically ill due to the following diagnoses:  aneurysmal SAH  Cerebral Vasculopathy  Vertebrobasilar Insufficiency  Encephalopathy    PLAN  NEURO  #SAH  Unclear etiology, aneurysmal vasculopathy v inflammatory vasculitis/PRES  - DSA today  - SBP <140 until DSA given possible unsecured az  - Vasospasm management (Nimodipine, TCDs) if aneurysm identified    #AMS  Seizure v subclinical infarcts from vasculopathy (vertebrobasilar insufficiency v vasculitis) v Metabolic  - Home Keppra, Naltrexone  - HOLD home Mirtazepine  - MRI, MRA  - ESR/CRP  - Ammonia  - Lipid Panel, A1c  - Consider LP if Imaging and/or Inflammatory markers consistent with Vasculitis  - Home Thiamine    SBP <140, Cont home Metoprolol  SpO2 >94%  NPO for DSA  Glucose <180    RENAL  #HypoNa  Likely Chronic +/- CSW/HypoVolemia  - Maintain Normothermia with hypertonic IVF, q8h BMP    ID  #?UTI  - OSH UA clear  - Trend Fever/WBC  - Continue Keflex for 3 day course    DVT Ppx: SCD. HOLD SQL    My full attention was spent providing medically necessary critical care to the patient with details documented in my note above.   Critical care time spent examining patient, reviewing vitals, labs, medications, imaging and discussing with the team goals of care   The combined critical care time provided to the patient was 60 minutes  This time does not include bedside procedures that are documented separately.

## 2024-05-15 NOTE — CHART NOTE - NSCHARTNOTEFT_GEN_A_CORE
CAPRINI SCORE [CLOT] Score on Admission for     AGE RELATED RISK FACTORS                                                       MOBILITY RELATED FACTORS  [ ] Age 41-60 years                                            (1 Point)                  [ ] Bed rest                                                        (1 Point)  [x ] Age: 61-74 years                                           (2 Points)                 [ ] Plaster cast                                                   (2 Points)  [ ] Age= 75 years                                              (3 Points)                 [ ] Bed bound for more than 72 hours                 (2 Points)    DISEASE RELATED RISK FACTORS                                               GENDER SPECIFIC FACTORS  [ ] Edema in the lower extremities                       (1 Point)                  [ ] Pregnancy                                                     (1 Point)  [ ] Varicose veins                                               (1 Point)                  [ ] Post-partum < 6 weeks                                   (1 Point)             [ ] BMI > 25 Kg/m2                                            (1 Point)                  [ ] Hormonal therapy  or oral contraception          (1 Point)                 [ ] Sepsis (in the previous month)                        (1 Point)                  [ ] History of pregnancy complications                 (1 point)  [ ] Pneumonia or serious lung disease                                               [ ] Unexplained or recurrent                     (1 Point)           (in the previous month)                               (1 Point)  [ ] Abnormal pulmonary function test                     (1 Point)                 SURGERY RELATED RISK FACTORS (include planned surgeries)  [ ] Acute myocardial infarction                              (1 Point)                 [ ]  Section                                             (1 Point)  [ ] Congestive heart failure (in the previous month)  (1 Point)         [ ] Minor surgery                                                  (1 Point)   [ ] Inflammatory bowel disease                             (1 Point)                 [ ] Arthroscopic surgery                                        (2 Points)  [ ] Central venous access                                      (2 Points)                [ ] General surgery lasting more than 45 minutes   (2 Points)       [ ] Stroke (in the previous month)                          (5 Points)               [ ] Elective arthroplasty                                         (5 Points)            [ ] current or past malignancy                              (2 Points)                                                                                                       HEMATOLOGY RELATED FACTORS                                                 TRAUMA RELATED RISK FACTORS  [ ] Prior episodes of VTE                                     (3 Points)                [ ] Fracture of the hip, pelvis, or leg                       (5 Points)  [ ] Positive family history for VTE                         (3 Points)                 [ ] Acute spinal cord injury (in the previous month)  (5 Points)  [ ] Prothrombin 48135 A                                     (3 Points)                 [ ] Paralysis  (less than 1 month)                             (5 Points)  [ ] Factor V Leiden                                             (3 Points)                  [ ] Multiple Trauma within 1 month                        (5 Points)  [ ] Lupus anticoagulants                                     (3 Points)                                                           [ ] Anticardiolipin antibodies                               (3 Points)                                                       [ ] High homocysteine in the blood                      (3 Points)                                             [ ] Other congenital or acquired thrombophilia      (3 Points)                                                [ ] Heparin induced thrombocytopenia                  (3 Points)                                          Total Score [     2     ]    Risk:  Very low 0   Low 1 to 2   Moderate 3 to 4   High =5       VTE Prophylasix Recommednations:  [x ] mechanical pneumatic compression devices                                      [ ] contraindicated: _____________________  [ ] chemo prophylasix                                                                                   [x ] contraindicated _____________________    **** HIGH LIKELIHOOD DVT PRESENT ON ADMISSION  [ ] (please order LE dopplers within 24 hours of admission)

## 2024-05-15 NOTE — PROGRESS NOTE ADULT - SUBJECTIVE AND OBJECTIVE BOX
64F pmh Dementia (Polysubstance Abuse, resides in NH) with dyskinesias, HTN, szs on Keppra, alcoholic cirrhosis, tremors, OA presented from [facility] to  w/ AMS & generalized weakness. 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 Started on Keflex for UTI at Nursing Home. Per NH [place] independent in ADLs, conversant but forgetful and confused, wheelchair bound.    24hr EVENTS:  5/15 - Admitted to NSICU    ROS: [ ]  Unable to assess due to mental status   All other systems negative    -----------------------------------------------------------------------------------------------------------------------------------------------------------------------------------    PHYSICAL EXAM:  General: Calm, oriented to person, hospital, and purpose  HEENT: MMM  Neuro:  -Mental status- Fluent, follows commands  -CN- PERRL 3mm, EOMI, tongue midline, face symmetric  - Extremities equal in strength and sensation    CV: RRR  Pulm: clear to auscultation  Abd: Soft, nontender, nondistended  Ext: no noted edema in lower ext  Skin: warm, dry    -----------------------------------------------------------------------------------------------------------------------------------------------------------------------------------  ICU Vital Signs Last 24 Hrs  T(C): 37.1 (15 May 2024 03:02), Max: 37.1 (15 May 2024 03:02)  T(F): 98.7 (15 May 2024 03:02), Max: 98.7 (15 May 2024 03:02)  HR: 91 (15 May 2024 03:02) (88 - 98)  BP: 176/96 (15 May 2024 03:02) (145/107 - 176/96)  BP(mean): 130 (15 May 2024 03:02) (120 - 130)  ABP: --  ABP(mean): --  RR: 19 (15 May 2024 03:02) (16 - 19)  SpO2: 96% (15 May 2024 03:02) (96% - 98%)    O2 Parameters below as of 15 May 2024 03:02  Patient On (Oxygen Delivery Method): room air            I&O's Summary      MEDICATIONS  (STANDING):  atorvastatin 40 milliGRAM(s) Oral at bedtime  brimonidine 0.2% Ophthalmic Solution 1 Drop(s) Both EYES every 12 hours  chlorhexidine 4% Liquid 1 Application(s) Topical daily  folic acid 1 milliGRAM(s) Oral daily  latanoprost 0.005% Ophthalmic Solution 1 Drop(s) Both EYES at bedtime  levETIRAcetam   Injectable 750 milliGRAM(s) IV Push every 12 hours  metoprolol tartrate Injectable 5 milliGRAM(s) IV Push every 6 hours  niMODipine 60 milliGRAM(s) Oral every 4 hours  polyethylene glycol 3350 17 Gram(s) Oral every 12 hours  senna 2 Tablet(s) Oral at bedtime  sodium chloride 0.9%. 1000 milliLiter(s) (75 mL/Hr) IV Continuous <Continuous>  thiamine IVPB 500 milliGRAM(s) IV Intermittent every 8 hours      RESPIRATORY:        IMAGING:   Recent imaging studies were reviewed.    LAB RESULTS:

## 2024-05-15 NOTE — OCCUPATIONAL THERAPY INITIAL EVALUATION ADULT - GENERAL OBSERVATIONS, REHAB EVAL
Anamaria Salgado MD at 1/31/2018 12:05 PM     Status: Signed      Blood pressure is good. Continue her amlodipine.   Â   Also please notify that her stress test this morning was normal. Thank you pt received supine in bed +ICU monitor, +a-line, +BUE wrist restraints, +[place], +IV

## 2024-05-15 NOTE — ED PROVIDER NOTE - CLINICAL SUMMARY MEDICAL DECISION MAKING FREE TEXT BOX
see neurosurgical note - transferred for Neurosx for FH Cintia Ugarte MD - Attending Physician: Pt here with progressive weakness, presented from NH to [facility] and found to have bilateral SAH. Transferred here for NS eval. Awake, interactive, slowed but following commands. D/w NSG re: plan for admission

## 2024-05-15 NOTE — OCCUPATIONAL THERAPY INITIAL EVALUATION ADULT - MANUAL MUSCLE TESTING RESULTS, REHAB EVAL
not formally assessed 2* poor command follow however pt spontaneously moving all extremities/not tested due to

## 2024-05-15 NOTE — PROGRESS NOTE ADULT - SUBJECTIVE AND OBJECTIVE BOX
NSICU NIGHT PROGRESS NOTE     Please see neurosurgery resident H&P for HPI.     12h events   not able to place ngtube with ENT scope     Exam   sleepy, oriented on self, PEERL, L facial UMN, BUE 5/5, BLE 2/2    VITALS:   Vital Signs Last 24 Hrs  T(C): 36.7 (15 May 2024 19:00), Max: 37.7 (15 May 2024 01:14)  T(F): 98.1 (15 May 2024 19:00), Max: 99.9 (15 May 2024 01:14)  HR: 99 (15 May 2024 21:00) (73 - 139)  BP: 112/72 (15 May 2024 21:00) (112/70 - 176/96)  BP(mean): 88 (15 May 2024 21:00) (85 - 130)  RR: 15 (15 May 2024 21:00) (15 - 47)  SpO2: 98% (15 May 2024 21:00) (94% - 100%)    Parameters below as of 15 May 2024 19:00  Patient On (Oxygen Delivery Method): room air      CAPILLARY BLOOD GLUCOSE        I&O's Summary    14 May 2024 07:01  -  15 May 2024 07:00  --------------------------------------------------------  IN: 637.5 mL / OUT: 200 mL / NET: 437.5 mL    15 May 2024 07:01  -  15 May 2024 21:20  --------------------------------------------------------  IN: 2604.3 mL / OUT: 450 mL / NET: 2154.3 mL        Respiratory:        LABS:                        13.4   12.81 )-----------( 392      ( 15 May 2024 03:53 )             38.6     05-15    132<L>  |  102  |  13  ----------------------------<  111<H>  4.4   |  21<L>  |  0.55        MEDICATION LEVELS:   Ammonia, Serum: 36 umol/L (05-14 @ 13:30)    IVF FLUIDS/MEDICATIONS:   MEDICATIONS  (STANDING):  atorvastatin 40 milliGRAM(s) Oral at bedtime  brimonidine 0.2% Ophthalmic Solution 1 Drop(s) Both EYES every 12 hours  chlorhexidine 4% Liquid 1 Application(s) Topical daily  folic acid 1 milliGRAM(s) Oral daily  latanoprost 0.005% Ophthalmic Solution 1 Drop(s) Both EYES at bedtime  levETIRAcetam  IVPB 750 milliGRAM(s) IV Intermittent every 12 hours  metoprolol tartrate Injectable 5 milliGRAM(s) IV Push every 6 hours  niCARdipine Infusion 5 mG/Hr (25 mL/Hr) IV Continuous <Continuous>  niMODipine 60 milliGRAM(s) Oral every 4 hours  polyethylene glycol 3350 17 Gram(s) Oral every 12 hours  senna 2 Tablet(s) Oral at bedtime  sodium chloride 0.9%. 1000 milliLiter(s) (75 mL/Hr) IV Continuous <Continuous>  thiamine IVPB 500 milliGRAM(s) IV Intermittent every 8 hours    MEDICATIONS  (PRN):

## 2024-05-15 NOTE — OCCUPATIONAL THERAPY INITIAL EVALUATION ADULT - RANGE OF MOTION EXAMINATION, LOWER EXTREMITY
bilateral LE Passive ROM was WFL  (within functional limits) bilateral LE Active ROM was WFL  (within functional limits)/bilateral LE Passive ROM was WFL  (within functional limits)

## 2024-05-15 NOTE — PHYSICAL THERAPY INITIAL EVALUATION ADULT - PERTINENT HX OF CURRENT PROBLEM, REHAB EVAL
64F, pmh HTN, szs on Keppra, alcoholic cirrhosis, tremors, OA presented from [facility] to OSH w/ generalized weakness found to have b/l sulcal SAH and small occipital IVH. 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 She was recently started on Keflex for UTI. Plts 400, Coags wnl. , AST/ALT = 38/104. WBC 13. Afebrile. Per NH Baseline independent in ADLs, conversant but forgetful and confused, wheelchair bound.  -5/15 CT HEAD: Grossly stable biparietal subarachnoid and bilateral posterior horn intraventricular hemorrhage

## 2024-05-15 NOTE — ED ADULT TRIAGE NOTE - SPO2 (%)
Refill request for mortazapine and generic namenda received via escribe. Chart reviewed, and noted that patient was in patient 3/24/17, and upon discharge placed in rehab facility. Called daughter Andre, who stated that patient only was at rehab facility for a short time, and is now at home with them. Daughter states patient doing well at home. Instructed daughter to schedule a follow up appointment with Dr Wells at earliest convenience,call office with further questions or concerns.   96

## 2024-05-15 NOTE — PATIENT PROFILE ADULT - NSPROHMSYMPCOND_GEN_A_NUR
unable to assess due to neuro exam
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  Patient states that he had a seton placed which was supposed to be removed before New Year's [place] however had follow-up with his colorectal surgeon (associated with [facility]) who states it was not ready to come out.  Patient states for the past 3 days he has had increased purulent discharge and increased malodor.  Also reports some chills.  Concerned that there is a developing infection or abscess recurrence.  States he is having normal output into his colostomy.  No fever.

## 2024-05-15 NOTE — ED PROVIDER NOTE - OBJECTIVE STATEMENT
64-year-old female with past medical history hypertension, seizure disorder on Keppra, alcoholic cirrhosis, tremors, osteoarthritis presents with generalized weakness.  Per nursing facility notes, patient with generalized weakness, currently on cephalexin for urinary tract infection, patient declining in ADLs, no abdominal pain, no shortness of breath, no difficulty breathing, no fever, no chills, no headache, no seizure, sent to the emergency department for further evaluation.  Patient denies any complaints at this time, currently alert and oriented to person only.  Unable to obtain further history. 65yo F with PMH of HTN, seizure disorder on Keppra, alcoholic cirrhosis, tremors, osteoarthritis transferred for SAH from . Presented from [facility] to  for generalized weakness.  Per nursing facility notes, patient with generalized weakness, currently on cephalexin for urinary tract infection, patient declining in ADLs. Pt found to have SAH with R 5mm anerusym transferred for [facility] eval. Pt has no acute complaints but states she feels 'off' but cannot explain. States no HA, CP, abd pain or diff breathing. Unable to obtain further history. 63yo F with PMH of HTN, seizure disorder on Keppra, alcoholic cirrhosis, tremors, osteoarthritis transferred for SAH from . Presented from [facility] to  for generalized weakness.  Per nursing facility notes, patient with generalized weakness, currently on cephalexin for urinary tract infection, patient declining in ADLs. Pt found to have SAH with R 5mm aneurusym transferred for [facility] eval. Pt has no acute complaints but states she feels 'off' but cannot explain. States no HA, CP, abd pain or diff breathing. Unable to obtain further history.

## 2024-05-15 NOTE — CONSULT NOTE ADULT - ASSESSMENT
64F, pmh HTN, szs on Keppra, alcoholic cirrhosis, tremors, OA presented from [facility] to OSH w/ generalized weakness found to have b/l sulcal SAH and small occipital IVH. 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 She was recently started on Keflex for UTI. ENT consulted for ng tube placement. on exam, pt is agitated, right nare with friable mucosa but no active bleeding or discharge. clementofotto and taran jarquin attempted to be placed under visualization using indirect laryngoscope. however both met resistance at around 50cm, unable to advance further.

## 2024-05-15 NOTE — H&P ADULT - ASSESSMENT
Mera Burton  64F, pmh HTN, szs on Keppra, alcoholic cirrhosis, tremors, OA presented from [facility] [place] to OSH w/ generalized weakness found to have b/l sulcal SAH and small occipital IVH. 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 She was recently started on Keflex for UTI. Plts 400, Coags wnl. , AST/ALT = 38/104. WBC 13. Afebrile. Exam: lethargic appearing, EOV, FC, AO3, PERRL, not compliant with EOMs, BUE 5/5, BLE 2/5.    -Admit to NSCU for SAH protocol  -Pre op/NPO for possible angio/embo  -Repeat CT now  -A-line, -140  -Keppra 500 BID  -Electrolyte correction  -TEG  -MRI/MRA/NOVA if possible before angio    Nursing facility wasn't able to give much information but sounds like today she was more confused/lethargic than usual  Baseline: wheelchair bound, intermittently confused, can feed herself   -Emergency contact: Baljinder Barkley (Sister, [phone]) - did not answer phone-

## 2024-05-15 NOTE — PROGRESS NOTE ADULT - SUBJECTIVE AND OBJECTIVE BOX
64F pmh Dementia (Polysubstance Abuse, resides in NH) with dyskinesias, HTN, szs on Keppra, alcoholic cirrhosis, tremors, OA presented from [facility] to  w/ AMS & generalized weakness. 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 Started on Keflex for UTI at Nursing Home. Per NH Basline independent in ADLs, conversant but forgetful and confused, wheelchair bound.    HOSPITAL COURSE:  5/15 transferred to [facility] for workup of nontraumatic convexity SAH    Admission Scores  GCS: 14 HH: 2 MF: 2 NIHSS: RASS: CAM-ICU: ICH:    24 hour Events:    Allergies    No Known Allergies    Intolerances        REVIEW OF SYSTEMS: [X] Unable to Assess due to neurologic exam [ ] All ROS addressed below are non-contributory, except:  Neuro: [ ] Headache [ ] Back pain [ ] Numbness [ ] Weakness [ ] Ataxia [ ] Dizziness [ ] Aphasia [ ] Dysarthria [ ] Visual disturbance  Resp: [ ] Shortness of breath/dyspnea, [ ] Orthopnea [ ] Cough  CV: [ ] Chest pain [ ] Palpitation [ ] Lightheadedness [ ] Syncope  Renal: [ ] Thirst [ ] Edema  GI: [ ] Nausea [ ] Emesis [ ] Abdominal pain [ ] Constipation [ ] Diarrhea  Hem: [ ] Hematemesis [ ] bright red blood per rectum  ID: [ ] Fever [ ] Chills [ ] Dysuria  ENT: [ ] Rhinorrhea      DEVICES:  [ ] Restraints [ ] ET tube [ ] central line [ ] arterial line [ ] lang [ ] NGT/OGT [ ] EVD [ ] LD [ ] STANISLAV/HMV [ ] Trach [ ] PEG [ ] Chest Tube    VITALS:  Vital Signs Last 24 Hrs  T(C): 37.7 (15 May 2024 01:14), Max: 37.7 (15 May 2024 01:14)  T(F): 99.9 (15 May 2024 01:14), Max: 99.9 (15 May 2024 01:14)  HR: 116 (15 May 2024 01:14) (73 - 116)  BP: 150/83 (15 May 2024 01:45) (149/90 - 164/109)  BP(mean): 105 (15 May 2024 01:45) (105 - 122)  RR: 16 (15 May 2024 01:14) (16 - 16)  SpO2: 98% (15 May 2024 01:14) (98% - 100%)    Parameters below as of 15 May 2024 01:14  Patient On (Oxygen Delivery Method): room air      CAPILLARY BLOOD GLUCOSE      POCT Blood Glucose.: 125 mg/dL (14 May 2024 12:41)      LABS:    13.9  13.15 )-----------( 402 ( 14 May 2024 13:30 )  40.5    05-14    128<L> | 98 | 13  ----------------------------< 113<H>  3.6 | 23 | 0.54      MEDICATION LEVELS:  Ammonia, Serum: 36 umol/L (05-14 @ 13:30)    IMAGING:  < from: CT Head No Cont (05.14.24 @ 19:08) >  Similar scattered bilateral sulcal subarachnoid hemorrhage.    Similar small hemorrhage layering in the occipital horns.    No parenchymal hemorrhage or brain edema.    < end of copied text >    < from: CT Angio Head w/ IV Cont (05.14.24 @ 20:58) >  Fusiform aneurysm involving the right intracranial V4 segment measuring  up to 5 mm in diameter.    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    Symmetric severe stenosis involving the bilateral middle cerebral  arteries, and to a lesser extent the anterior cerebral arteries. Findings  are indeterminate in etiology, although may represent underlying CNS  vasculitis/vasculopathy. Further evaluation by MRI brain/MRA recommended.    Stable appearance of subarachnoid hemorrhage with intraventricular  extension, suboptimally evaluated on the current exam. Repeat noncontrast  head CT recommended to assess stability.    < end of copied text >      EXAMINATION:  PHYSICAL EXAM:    Neurological: Opens eyes to voice, alert oriented to person, place and time, Following Simple Commands, PERRL, EOMI, No Gaze Preference, BUE 5/5, BLE 2/5    Pulmonary: Clear to Auscultation, No rales, No rhonchi, No wheezes    Cardiovascular: S1, S2, Regular rate and rhythm    Gastrointestinal: Soft, Non-tender, Non-distended    Extremities: No calf tenderness    Incision: dry and clean 64F pmh Dementia (Polysubstance Abuse, resides in NH) with dyskinesias, HTN, szs on Keppra, alcoholic cirrhosis, tremors, OA presented from [facility] to  w/ AMS & generalized weakness. 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 Started on Keflex for UTI at Nursing Home. Per NH Basline independent in ADLs, conversant but forgetful and confused, wheelchair bound.    HOSPITAL COURSE:  5/15 transferred to [facility] for workup of nontraumatic convexity SAH    Admission Scores  GCS: 14 HH: 2 MF: 2 NIHSS: RASS: CAM-ICU: ICH:    24 hour Events:    Allergies    No Known Allergies    Intolerances        REVIEW OF SYSTEMS: [X] Unable to Assess due to neurologic exam [ ] All ROS addressed below are non-contributory, except:  Neuro: [ ] Headache [ ] Back pain [ ] Numbness [ ] Weakness [ ] Ataxia [ ] Dizziness [ ] Aphasia [ ] Dysarthria [ ] Visual disturbance  Resp: [ ] Shortness of breath/dyspnea, [ ] Orthopnea [ ] Cough  CV: [ ] Chest pain [ ] Palpitation [ ] Lightheadedness [ ] Syncope  Renal: [ ] Thirst [ ] Edema  GI: [ ] Nausea [ ] Emesis [ ] Abdominal pain [ ] Constipation [ ] Diarrhea  Hem: [ ] Hematemesis [ ] bright red blood per rectum  ID: [ ] Fever [ ] Chills [ ] Dysuria  ENT: [ ] Rhinorrhea      DEVICES:  [ ] Restraints [ ] ET tube [ ] central line [ ] arterial line [ ] lang [ ] NGT/OGT [ ] EVD [ ] LD [ ] STANISLAV/HMV [ ] Trach [ ] PEG [ ] Chest Tube    VITALS:  Vital Signs Last 24 Hrs  T(C): 37.7 (15 May 2024 01:14), Max: 37.7 (15 May 2024 01:14)  T(F): 99.9 (15 May 2024 01:14), Max: 99.9 (15 May 2024 01:14)  HR: 116 (15 May 2024 01:14) (73 - 116)  BP: 150/83 (15 May 2024 01:45) (149/90 - 164/109)  BP(mean): 105 (15 May 2024 01:45) (105 - 122)  RR: 16 (15 May 2024 01:14) (16 - 16)  SpO2: 98% (15 May 2024 01:14) (98% - 100%)    Parameters below as of 15 May 2024 01:14  Patient On (Oxygen Delivery Method): room air      CAPILLARY BLOOD GLUCOSE      POCT Blood Glucose.: 125 mg/dL (14 May 2024 12:41)      LABS:                          13.4   12.81 )-----------( 392      ( 15 May 2024 03:53 )             38.6     05-14    128<L> | 98 | 13  ----------------------------< 113<H>  3.6 | 23 | 0.54      MEDICATION LEVELS:  Ammonia, Serum: 36 umol/L (05-14 @ 13:30)    IMAGING:  < from: CT Head No Cont (05.14.24 @ 19:08) >  Similar scattered bilateral sulcal subarachnoid hemorrhage.    Similar small hemorrhage layering in the occipital horns.    No parenchymal hemorrhage or brain edema.    < end of copied text >    < from: CT Angio Head w/ IV Cont (05.14.24 @ 20:58) >  Fusiform aneurysm involving the right intracranial V4 segment measuring  up to 5 mm in diameter.    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    Symmetric severe stenosis involving the bilateral middle cerebral  arteries, and to a lesser extent the anterior cerebral arteries. Findings  are indeterminate in etiology, although may represent underlying CNS  vasculitis/vasculopathy. Further evaluation by MRI brain/MRA recommended.    Stable appearance of subarachnoid hemorrhage with intraventricular  extension, suboptimally evaluated on the current exam. Repeat noncontrast  head CT recommended to assess stability.    < end of copied text >      EXAMINATION:  PHYSICAL EXAM:    Neurological: Opens eyes to voice, alert oriented to person, place and time, Following Simple Commands, PERRL, EOMI, No Gaze Preference, BUE 5/5, BLE 2/5    Pulmonary: Clear to Auscultation, No rales, No rhonchi, No wheezes    Cardiovascular: S1, S2, Regular rate and rhythm    Gastrointestinal: Soft, Non-tender, Non-distended    Extremities: No calf tenderness    Incision: dry and clean

## 2024-05-15 NOTE — PHYSICAL THERAPY INITIAL EVALUATION ADULT - GENERAL OBSERVATIONS, REHAB EVAL
Patient encountered supine in bed, NAD, +restraints. +all, +PIV, +tele and +pulse ox No assistance needed

## 2024-05-15 NOTE — PATIENT PROFILE ADULT - FUNCTIONAL SCREEN CURRENT LEVEL: COMMUNICATION, MLM
unable to assess due to neuro exam unable to assess due to neuro exam/2 = difficulty understanding and speaking (not related to language barrier)

## 2024-05-15 NOTE — ED PROVIDER NOTE - PHYSICAL EXAMINATION
CONSTITUTIONAL: NAD  SKIN: Warm dry  HEAD: NCAT  ENT: MMM  NECK: Supple  CARD: RRR  RESP: CTAB  ABD: S/NT no R/G  EXT: no LE edema  NEURO: poor follow to instructions but moving extremities, pupils equal, sensory grossly intact, some lethargy noted but protecting airway and easily arousible   PSYCH: unclear baseline but AAOx3 and cooperative    *** Please see neurosurgical note for formal eval CONSTITUTIONAL: NAD  SKIN: Warm dry  HEAD: NCAT  ENT: MMM  NECK: Supple  CARD: RRR  RESP: CTAB  ABD: S/NT no R/G  EXT: no LE edema  NEURO: poor follow to instructions but moving extremities, pupils equal, sensory grossly intact, some lethargy noted but protecting airway and easily arousible   PSYCH: unclear baseline but AAOx3 and cooperative

## 2024-05-15 NOTE — OCCUPATIONAL THERAPY INITIAL EVALUATION ADULT - TRANSFER TRAINING, PT EVAL
GOAL: Pt will require mod A with all functional transfers in 4 weeks to increase participation in functional mobility tasks.

## 2024-05-15 NOTE — OCCUPATIONAL THERAPY INITIAL EVALUATION ADULT - BALANCE TRAINING, PT EVAL
GOAL: pt will increase sitting/standing balance by 1/2 grade within 4 weeks in order to increase ability to participate in ADLs and functional tasks.

## 2024-05-15 NOTE — PATIENT PROFILE ADULT - FALL HARM RISK - HARM RISK INTERVENTIONS

## 2024-05-15 NOTE — OCCUPATIONAL THERAPY INITIAL EVALUATION ADULT - FINE MOTOR COORDINATION, RIGHT HAND THUMB/FINGER OPPOSITION SKILLS, OT EVAL
Contacted [employer] and informed that pt's application is being processed and a determination should be made in 24-48 hours.  Contacted Marisol, pt's daughter to update.  Marisol, expressed many concerns she has had in the last week.  Marisol stated she asked for a home care referral a week ago and still has not heard from home care and was told today that home care was not ordered.  Marisol stated she feels the clinic's \"responsiveness and communication is terrible.\"  She continued to state that she feels the clinic is \"annoyed\" with her calling, but she is very concerned about her father's health.  He has really declined. Marisol said she had a virtual visit last Wednesday as well as today and still has many unanswered questions. Marisol bought a hospital bed herself for the patient as he is unable to get up without Marisol and her sister assisting him. Marisol asked if Xgeva could be given at home since patient is not able to ambulate.  If Marisol has to, they will pay for pt to transfer to the clinic via ambulance for his Xgeva. Marisol is frustrated with how things have been handled for the last week as well as with the social issues with patient and his spouse.    not appropriate to test

## 2024-05-15 NOTE — CONSULT NOTE ADULT - PROBLEM SELECTOR RECOMMENDATION 9
unable to place ng tube under visualization with laryngoscope  consider abdominal imaging/GI consult r/o obstruction  Call with questions or concerns [phone]

## 2024-05-15 NOTE — PATIENT PROFILE ADULT - NSPROGENPREVTRANSF_GEN_A_NUR
unable to assess due to neuro exam unable to assess due to neuro exam/no history of blood product transfusion

## 2024-05-15 NOTE — ED ADULT NURSE NOTE - OBJECTIVE STATEMENT
64YF PMHX of HTN, seizure disorder on keppra, alcoholic cirrhosis, tremors, RAMSEY presents to the ED as a transfer from [place] for SAH. per EMS, pt resides at the [facility] for UTI (on cephalexin) and had decreased PO intake so EMS was called. at [place], CT H resulted with 64YF PMHX of HTN, seizure disorder on keppra, alcoholic cirrhosis, tremors, RAMSEY presents to the ED as a transfer from [place] for SAH. per EMS, pt resides at the [facility] and was getting treated for UTI (on cephalexin) and due to decreased PO intake (no recent falls or trauma reported), EMS was called. at [place], CT resulted with subarachnoid hemorrhage w/ R 5mm aneurysm. per EMS, pt was given haloperidol, ativan, and labetalol

## 2024-05-15 NOTE — PATIENT PROFILE ADULT - FUNCTIONAL ASSESSMENT - DAILY ACTIVITY 3.
Per Dr. Keshawn aviles to dispense Doxazosin 2 mg.  Pharmacy called and notified.  Spoke with pt advised monitor BP.     1 = Total assistance

## 2024-05-15 NOTE — PROGRESS NOTE ADULT - ASSESSMENT
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    PLAN     N  # SAH: plan for angio Friday   nimodipine, TCD  #pmhx seizure cont home keppra, fu serum keppra (compliance)   #Pain: Tylenol and oxycodone  #Activity: [] OOB as tolerated [x] Bedrest [] PT [] OT [] PMNR    CV   #-140mmHg off nicardipine gtt   #TTE     P   RA     R   #Strict I+Os   #hyponatremia unkown chronicity, cont 2%, fu serum osm, bmp, ulystes     GI   #Diet: npo failed slp   #Senna miralax  Last BM pta     ID  afebrile  hx from outside hospital with possible UTI: bcx, ucx pending, UA unremarkable, monitor off antibiotics    E  Goal euglycemia (-180)  #a1c 6.1, ISS     H  #DVT ppx:   SCDs  Chemoppx held for now  LED no dvt      #CODE STATUS: FULL CODE     #DISPOSITION: ICU    #CRITICAL

## 2024-05-15 NOTE — ED ADULT NURSE NOTE - NSSEPSISNEWALTERMENTAL_ED_A_ED
COVID testing ordered today. You will receive a call to schedule that appointment.     Please quarantine until you receive your results.     If the results are negative, you need to be symptom free for 24 hours before ending quarantine.     If results are positive, you need to quarantine for 10 days from start of symptoms AND you need to be fever free (without the use of fever reducing medications) for 24 hours before ending quarantine.     If results are positive, asymptomatic household contacts will need to quarantine for 14 days.     If you develop symptoms such as difficulty breathing, please go to ER.     No

## 2024-05-15 NOTE — OCCUPATIONAL THERAPY INITIAL EVALUATION ADULT - LIVES WITH, PROFILE
pt poor historian however as per chart review, pt comes from nursing home and was wheelchair bound at baseline

## 2024-05-15 NOTE — OCCUPATIONAL THERAPY INITIAL EVALUATION ADULT - PERTINENT HX OF CURRENT PROBLEM, REHAB EVAL
64F, pmh HTN, szs on Keppra, alcoholic cirrhosis, tremors, OA presented from [facility] to OSH w/ generalized weakness found to have b/l sulcal SAH and small occipital IVH. 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 She was recently started on Keflex for UTI. Plts 400, Coags wnl. , AST/ALT = 38/104. WBC 13. Afebrile. Per NH Baseline independent in ADLs, conversant but forgetful and confused, wheelchair bound.  -5/15 CT HEAD: Grossly stable biparietal subarachnoid and bilateral posterior horn intraventricular hemorrhage

## 2024-05-15 NOTE — H&P ADULT - HISTORY OF PRESENT ILLNESS
Mera Burton  64F, pmh HTN, szs on Keppra, alcoholic cirrhosis, tremors, OA presented from [facility] to OSH w/ generalized weakness found to have b/l sulcal SAH and small occipital IVH. 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 She was recently started on Keflex for UTI. Plts 400, Coags wnl. , AST/ALT = 38/104. WBC 13. Afebrile. Exam: lethargic appearing, EOV, FC, AO3, PERRL, not compliant with EOMs, BUE 5/5, BLE 2/5.

## 2024-05-15 NOTE — SPEECH LANGUAGE PATHOLOGY EVALUATION - SLP DIAGNOSIS
Consult received and appreciated. Chart reviewed. x2 attempts made for speech-language evaluation, pt unavailable. D/w ACP Jayashree, will f/u 5/16 as medically appropriate.

## 2024-05-15 NOTE — PHYSICAL THERAPY INITIAL EVALUATION ADULT - ADDITIONAL COMMENTS
Patient Education        DASH Diet: Care Instructions  Your Care Instructions     The DASH diet is an eating plan that can help lower your blood pressure. DASH stands for Dietary Approaches to Stop Hypertension. Hypertension is high blood pressure. The DASH diet focuses on eating foods that are high in calcium, potassium, and magnesium. These nutrients can lower blood pressure. The foods that are highest in these nutrients are fruits, vegetables, low-fat dairy products, nuts, seeds, and legumes. But taking calcium, potassium, and magnesium supplements instead of eating foods that are high in those nutrients does not have the same effect. The DASH diet also includes whole grains, fish, and poultry. The DASH diet is one of several lifestyle changes your doctor may recommend to lower your high blood pressure. Your doctor may also want you to decrease the amount of sodium in your diet. Lowering sodium while following the DASH diet can lower blood pressure even further than just the DASH diet alone. Follow-up care is a key part of your treatment and safety. Be sure to make and go to all appointments, and call your doctor if you are having problems. It's also a good idea to know your test results and keep a list of the medicines you take. How can you care for yourself at home? Following the DASH diet  · Eat 4 to 5 servings of fruit each day. A serving is 1 medium-sized piece of fruit, ½ cup chopped or canned fruit, 1/4 cup dried fruit, or 4 ounces (½ cup) of fruit juice. Choose fruit more often than fruit juice. · Eat 4 to 5 servings of vegetables each day. A serving is 1 cup of lettuce or raw leafy vegetables, ½ cup of chopped or cooked vegetables, or 4 ounces (½ cup) of vegetable juice. Choose vegetables more often than vegetable juice. · Get 2 to 3 servings of low-fat and fat-free dairy each day. A serving is 8 ounces of milk, 1 cup of yogurt, or 1 ½ ounces of cheese. · Eat 6 to 8 servings of grains each day. A serving is 1 slice of bread, 1 ounce of dry cereal, or ½ cup of cooked rice, pasta, or cooked cereal. Try to choose whole-grain products as much as possible. · Limit lean meat, poultry, and fish to 2 servings each day. A serving is 3 ounces, about the size of a deck of cards. · Eat 4 to 5 servings of nuts, seeds, and legumes (cooked dried beans, lentils, and split peas) each week. A serving is 1/3 cup of nuts, 2 tablespoons of seeds, or ½ cup of cooked beans or peas. · Limit fats and oils to 2 to 3 servings each day. A serving is 1 teaspoon of vegetable oil or 2 tablespoons of salad dressing. · Limit sweets and added sugars to 5 servings or less a week. A serving is 1 tablespoon jelly or jam, ½ cup sorbet, or 1 cup of lemonade. · Eat less than 2,300 milligrams (mg) of sodium a day. If you limit your sodium to 1,500 mg a day, you can lower your blood pressure even more. · Be aware that all of these are the suggested number of servings for people who eat 1,800 to 2,000 calories a day. Your recommended number of servings may be different if you need more or fewer calories. Tips for success  · Start small. Do not try to make dramatic changes to your diet all at once. You might feel that you are missing out on your favorite foods and then be more likely to not follow the plan. Make small changes, and stick with them. Once those changes become habit, add a few more changes. · Try some of the following:  ? Make it a goal to eat a fruit or vegetable at every meal and at snacks. This will make it easy to get the recommended amount of fruits and vegetables each day. ? Try yogurt topped with fruit and nuts for a snack or healthy dessert. ? Add lettuce, tomato, cucumber, and onion to sandwiches. ? Combine a ready-made pizza crust with low-fat mozzarella cheese and lots of vegetable toppings. Try using tomatoes, squash, spinach, broccoli, carrots, cauliflower, and onions. ?  Have a variety of cut-up vegetables with a low-fat dip as an appetizer instead of chips and dip. ? Sprinkle sunflower seeds or chopped almonds over salads. Or try adding chopped walnuts or almonds to cooked vegetables. ? Try some vegetarian meals using beans and peas. Add garbanzo or kidney beans to salads. Make burritos and tacos with mashed harper beans or black beans. Where can you learn more? Go to https://Multistat.[employer]. org and sign in to your [employer] account. Enter B563 in the GIS Cloud box to learn more about \"DASH Diet: Care Instructions. \"     If you do not have an account, please click on the \"Sign Up Now\" link. Current as of: April 29, 2021               Content Version: 13.0  © 3697-3840 Healthwise, Incorporated. Care instructions adapted under license by [facility] ([facility]). If you have questions about a medical condition or this instruction, always ask your healthcare professional. [address] any warranty or liability for your use of this information. Patient is poor historian, lethargic however per EMR patient is from a nursing home and wheelchair bound.

## 2024-05-15 NOTE — CONSULT NOTE ADULT - SUBJECTIVE AND OBJECTIVE BOX
CC: ng tube placement    HPI: 64F, pmh HTN, szs on Keppra, alcoholic cirrhosis, tremors, OA presented from [facility] to OSH w/ generalized weakness found to have b/l sulcal SAH and small occipital IVH. 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 She was recently started on Keflex for UTI. ENT consulted for ng tube placement. Unable to obtain further history to due pt's clinical condition.       PAST MEDICAL & SURGICAL HISTORY:  Cirrhosis      Seizure        Allergies    No Known Allergies    Intolerances      MEDICATIONS  (STANDING):  atorvastatin 40 milliGRAM(s) Oral at bedtime  brimonidine 0.2% Ophthalmic Solution 1 Drop(s) Both EYES every 12 hours  chlorhexidine 4% Liquid 1 Application(s) Topical daily  folic acid 1 milliGRAM(s) Oral daily  latanoprost 0.005% Ophthalmic Solution 1 Drop(s) Both EYES at bedtime  levETIRAcetam  IVPB 750 milliGRAM(s) IV Intermittent every 12 hours  metoprolol tartrate Injectable 5 milliGRAM(s) IV Push every 6 hours  niCARdipine Infusion 5 mG/Hr (25 mL/Hr) IV Continuous <Continuous>  niMODipine 60 milliGRAM(s) Oral every 4 hours  polyethylene glycol 3350 17 Gram(s) Oral every 12 hours  senna 2 Tablet(s) Oral at bedtime  sodium chloride 2% + sodium acetate 50:50 1000 milliLiter(s) (50 mL/Hr) IV Continuous <Continuous>  thiamine IVPB 500 milliGRAM(s) IV Intermittent every 8 hours    MEDICATIONS  (PRN):      Social History:  · Substance use  No    Tobacco Screening:  · Core Measure Site  No      Family history: unknown    ROS:   Unable to obtain due to pts clinical condition.     Vital Signs Last 24 Hrs  T(C): 36.3 (15 May 2024 23:00), Max: 37.7 (15 May 2024 01:14)  T(F): 97.4 (15 May 2024 23:00), Max: 99.9 (15 May 2024 01:14)  HR: 79 (15 May 2024 23:00) (79 - 139)  BP: 112/74 (15 May 2024 23:00) (112/70 - 176/96)  BP(mean): 88 (15 May 2024 23:00) (85 - 130)  RR: 12 (15 May 2024 23:00) (12 - 47)  SpO2: 98% (15 May 2024 23:00) (94% - 100%)    Parameters below as of 15 May 2024 23:00  Patient On (Oxygen Delivery Method): room air                              13.4   12.81 )-----------( 392      ( 15 May 2024 03:53 )             38.6    05-15    132<L>  |  102  |  13  ----------------------------<  111<H>  4.4   |  21<L>  |  0.55    Ca    8.6      15 May 2024 15:18  Phos  2.5     05-15  Mg     2.0     05-15    TPro  7.2  /  Alb  3.3  /  TBili  0.5  /  DBili  x   /  AST  31  /  ALT  64<H>  /  AlkPhos  117  05-15   PT/INR - ( 15 May 2024 03:53 )   PT: 13.9 sec;   INR: 1.33 ratio         PTT - ( 15 May 2024 03:53 )  PTT:30.5 sec    PHYSICAL EXAM:  Gen: NAD, agitated  Skin: No rashes, bruises, or lesions  Head: Normocephalic, Atraumatic  Face: no edema, erythema, or fluctuance. Parotid glands soft without mass  Eyes: no scleral injection  Nose: b/l nares no active bleeding or discharge.  Mouth: No Stridor / Drooling / Trismus.  Mucosa moist, tongue/uvula midline, oropharynx clear  Neck: Flat, supple, no lymphadenopathy, trachea midline, no masses  Lymphatic: No lymphadenopathy  Resp: breathing easily, no stridor  CV: no peripheral edema/cyanosis  GI: nondistended   Peripheral vascular: no JVD or edema  Neuro: unable to assess        Procedure: NGT placement  Diagnosis: dysphagia  Keofeed and salem sump attempted to be advanced through left nare under visualization using indirect laryngoscope. Tip of tube visualized in pyriform sinus. Pt asked to swallow. Tube advanced through esophageal inlet however both met resistance at around 50cm, unable to advance further.

## 2024-05-16 LAB
ALBUMIN SERPL ELPH-MCNC: 3.3 G/DL — SIGNIFICANT CHANGE UP (ref 3.3–5)
ALBUMIN SERPL ELPH-MCNC: 3.4 G/DL — SIGNIFICANT CHANGE UP (ref 3.3–5)
ALBUMIN SERPL ELPH-MCNC: 3.5 G/DL — SIGNIFICANT CHANGE UP (ref 3.3–5)
ALP SERPL-CCNC: 113 U/L — SIGNIFICANT CHANGE UP (ref 40–120)
ALP SERPL-CCNC: 114 U/L — SIGNIFICANT CHANGE UP (ref 40–120)
ALP SERPL-CCNC: 124 U/L — HIGH (ref 40–120)
ALT FLD-CCNC: 50 U/L — HIGH (ref 10–45)
ALT FLD-CCNC: 54 U/L — HIGH (ref 10–45)
ALT FLD-CCNC: 56 U/L — HIGH (ref 10–45)
AMMONIA BLD-MCNC: 26 UMOL/L — SIGNIFICANT CHANGE UP (ref 11–55)
ANION GAP SERPL CALC-SCNC: 11 MMOL/L — SIGNIFICANT CHANGE UP (ref 5–17)
ANION GAP SERPL CALC-SCNC: 13 MMOL/L — SIGNIFICANT CHANGE UP (ref 5–17)
ANION GAP SERPL CALC-SCNC: 13 MMOL/L — SIGNIFICANT CHANGE UP (ref 5–17)
AST SERPL-CCNC: 22 U/L — SIGNIFICANT CHANGE UP (ref 10–40)
AST SERPL-CCNC: 24 U/L — SIGNIFICANT CHANGE UP (ref 10–40)
AST SERPL-CCNC: 26 U/L — SIGNIFICANT CHANGE UP (ref 10–40)
BILIRUB SERPL-MCNC: 0.5 MG/DL — SIGNIFICANT CHANGE UP (ref 0.2–1.2)
BILIRUB SERPL-MCNC: 0.5 MG/DL — SIGNIFICANT CHANGE UP (ref 0.2–1.2)
BILIRUB SERPL-MCNC: 0.6 MG/DL — SIGNIFICANT CHANGE UP (ref 0.2–1.2)
BILIRUB SERPL-MCNC: 0.6 MG/DL — SIGNIFICANT CHANGE UP (ref 0.2–1.2)
BUN SERPL-MCNC: 7 MG/DL — SIGNIFICANT CHANGE UP (ref 7–23)
BUN SERPL-MCNC: 7 MG/DL — SIGNIFICANT CHANGE UP (ref 7–23)
BUN SERPL-MCNC: 8 MG/DL — SIGNIFICANT CHANGE UP (ref 7–23)
CALCIUM SERPL-MCNC: 8.3 MG/DL — LOW (ref 8.4–10.5)
CALCIUM SERPL-MCNC: 8.6 MG/DL — SIGNIFICANT CHANGE UP (ref 8.4–10.5)
CALCIUM SERPL-MCNC: 8.7 MG/DL — SIGNIFICANT CHANGE UP (ref 8.4–10.5)
CHLORIDE SERPL-SCNC: 100 MMOL/L — SIGNIFICANT CHANGE UP (ref 96–108)
CHLORIDE SERPL-SCNC: 102 MMOL/L — SIGNIFICANT CHANGE UP (ref 96–108)
CHLORIDE SERPL-SCNC: 99 MMOL/L — SIGNIFICANT CHANGE UP (ref 96–108)
CO2 SERPL-SCNC: 21 MMOL/L — LOW (ref 22–31)
CO2 SERPL-SCNC: 21 MMOL/L — LOW (ref 22–31)
CO2 SERPL-SCNC: 24 MMOL/L — SIGNIFICANT CHANGE UP (ref 22–31)
CREAT SERPL-MCNC: 0.39 MG/DL — LOW (ref 0.5–1.3)
CREAT SERPL-MCNC: 0.39 MG/DL — LOW (ref 0.5–1.3)
CREAT SERPL-MCNC: 0.46 MG/DL — LOW (ref 0.5–1.3)
CREAT SERPL-MCNC: 0.47 MG/DL — LOW (ref 0.5–1.3)
CULTURE RESULTS: SIGNIFICANT CHANGE UP
EGFR: 108 ML/MIN/1.73M2 — SIGNIFICANT CHANGE UP
EGFR: 108 ML/MIN/1.73M2 — SIGNIFICANT CHANGE UP
EGFR: 113 ML/MIN/1.73M2 — SIGNIFICANT CHANGE UP
EGFR: 113 ML/MIN/1.73M2 — SIGNIFICANT CHANGE UP
GAS PNL BLDA: SIGNIFICANT CHANGE UP
GLUCOSE SERPL-MCNC: 102 MG/DL — HIGH (ref 70–99)
GLUCOSE SERPL-MCNC: 109 MG/DL — HIGH (ref 70–99)
GLUCOSE SERPL-MCNC: 99 MG/DL — SIGNIFICANT CHANGE UP (ref 70–99)
INR BLD: 1.42 RATIO — HIGH (ref 0.85–1.18)
MAGNESIUM SERPL-MCNC: 1.9 MG/DL — SIGNIFICANT CHANGE UP (ref 1.6–2.6)
MAGNESIUM SERPL-MCNC: 2.1 MG/DL — SIGNIFICANT CHANGE UP (ref 1.6–2.6)
MAGNESIUM SERPL-MCNC: 2.2 MG/DL — SIGNIFICANT CHANGE UP (ref 1.6–2.6)
MAGNESIUM UR-MCNC: 3.6 MG/DL — SIGNIFICANT CHANGE UP
MELD SCORE WITH DIALYSIS: 26 POINTS — SIGNIFICANT CHANGE UP
MELD SCORE WITHOUT DIALYSIS: 10 POINTS — SIGNIFICANT CHANGE UP
MRSA PCR RESULT.: SIGNIFICANT CHANGE UP
PHOSPHATE 24H UR-MCNC: 36.8 MG/DL — SIGNIFICANT CHANGE UP
PHOSPHATE SERPL-MCNC: 2.1 MG/DL — LOW (ref 2.5–4.5)
PHOSPHATE SERPL-MCNC: 2.1 MG/DL — LOW (ref 2.5–4.5)
PHOSPHATE SERPL-MCNC: 2.3 MG/DL — LOW (ref 2.5–4.5)
POTASSIUM SERPL-MCNC: 3.4 MMOL/L — LOW (ref 3.5–5.3)
POTASSIUM SERPL-MCNC: 3.6 MMOL/L — SIGNIFICANT CHANGE UP (ref 3.5–5.3)
POTASSIUM SERPL-MCNC: 3.9 MMOL/L — SIGNIFICANT CHANGE UP (ref 3.5–5.3)
POTASSIUM SERPL-SCNC: 3.4 MMOL/L — LOW (ref 3.5–5.3)
POTASSIUM SERPL-SCNC: 3.6 MMOL/L — SIGNIFICANT CHANGE UP (ref 3.5–5.3)
POTASSIUM SERPL-SCNC: 3.9 MMOL/L — SIGNIFICANT CHANGE UP (ref 3.5–5.3)
PROT SERPL-MCNC: 6.9 G/DL — SIGNIFICANT CHANGE UP (ref 6–8.3)
PROT SERPL-MCNC: 6.9 G/DL — SIGNIFICANT CHANGE UP (ref 6–8.3)
PROT SERPL-MCNC: 7.4 G/DL — SIGNIFICANT CHANGE UP (ref 6–8.3)
PROTHROM AB SERPL-ACNC: 15.5 SEC — HIGH (ref 9.5–13)
S AUREUS DNA NOSE QL NAA+PROBE: SIGNIFICANT CHANGE UP
SODIUM SERPL-SCNC: 132 MMOL/L — LOW (ref 135–145)
SODIUM SERPL-SCNC: 134 MMOL/L — LOW (ref 135–145)
SODIUM SERPL-SCNC: 136 MMOL/L — SIGNIFICANT CHANGE UP (ref 135–145)
SODIUM SERPL-SCNC: 136 MMOL/L — SIGNIFICANT CHANGE UP (ref 135–145)
SPECIMEN SOURCE: SIGNIFICANT CHANGE UP
UUN UR-MCNC: 399 MG/DL — SIGNIFICANT CHANGE UP

## 2024-05-16 PROCEDURE — 70551 MRI BRAIN STEM W/O DYE: CPT | Mod: 26

## 2024-05-16 PROCEDURE — 95720 EEG PHY/QHP EA INCR W/VEEG: CPT

## 2024-05-16 PROCEDURE — 99291 CRITICAL CARE FIRST HOUR: CPT

## 2024-05-16 PROCEDURE — 71250 CT THORAX DX C-: CPT | Mod: 26

## 2024-05-16 PROCEDURE — 74176 CT ABD & PELVIS W/O CONTRAST: CPT | Mod: 26

## 2024-05-16 PROCEDURE — 70544 MR ANGIOGRAPHY HEAD W/O DYE: CPT | Mod: 26,59

## 2024-05-16 RX ORDER — FENTANYL CITRATE 50 UG/ML
25 INJECTION INTRAVENOUS ONCE
Refills: 0 | Status: COMPLETED | OUTPATIENT
Start: 2024-05-16 | End: 2024-05-16

## 2024-05-16 RX ORDER — PHENOBARBITAL 60 MG
135 TABLET ORAL ONCE
Refills: 0 | Status: DISCONTINUED | OUTPATIENT
Start: 2024-05-16 | End: 2024-05-16

## 2024-05-16 RX ORDER — NALTREXONE HYDROCHLORIDE 50 MG/1
50 TABLET, FILM COATED ORAL DAILY
Refills: 0 | Status: DISCONTINUED | OUTPATIENT
Start: 2024-05-16 | End: 2024-05-16

## 2024-05-16 RX ORDER — MAGNESIUM SULFATE 500 MG/ML
1 VIAL (ML) INJECTION ONCE
Refills: 0 | Status: COMPLETED | OUTPATIENT
Start: 2024-05-16 | End: 2024-05-16

## 2024-05-16 RX ORDER — POTASSIUM CHLORIDE 20 MEQ
10 PACKET (EA) ORAL
Refills: 0 | Status: COMPLETED | OUTPATIENT
Start: 2024-05-16 | End: 2024-05-16

## 2024-05-16 RX ORDER — HYDRALAZINE HCL 50 MG
10 TABLET ORAL ONCE
Refills: 0 | Status: COMPLETED | OUTPATIENT
Start: 2024-05-16 | End: 2024-05-16

## 2024-05-16 RX ORDER — THIAMINE MONONITRATE (VIT B1) 100 MG
500 TABLET ORAL DAILY
Refills: 0 | Status: COMPLETED | OUTPATIENT
Start: 2024-05-17 | End: 2024-05-19

## 2024-05-16 RX ORDER — DEXMEDETOMIDINE HYDROCHLORIDE IN 0.9% SODIUM CHLORIDE 4 UG/ML
0.2 INJECTION INTRAVENOUS
Qty: 200 | Refills: 0 | Status: DISCONTINUED | OUTPATIENT
Start: 2024-05-16 | End: 2024-05-19

## 2024-05-16 RX ORDER — MIRTAZAPINE 45 MG/1
15 TABLET, ORALLY DISINTEGRATING ORAL AT BEDTIME
Refills: 0 | Status: DISCONTINUED | OUTPATIENT
Start: 2024-05-16 | End: 2024-06-02

## 2024-05-16 RX ORDER — PHENOBARBITAL 60 MG
130 TABLET ORAL ONCE
Refills: 0 | Status: DISCONTINUED | OUTPATIENT
Start: 2024-05-16 | End: 2024-05-16

## 2024-05-16 RX ORDER — MAGNESIUM SULFATE 500 MG/ML
2 VIAL (ML) INJECTION ONCE
Refills: 0 | Status: DISCONTINUED | OUTPATIENT
Start: 2024-05-16 | End: 2024-05-16

## 2024-05-16 RX ADMIN — Medication 105 MILLIGRAM(S): at 05:12

## 2024-05-16 RX ADMIN — SODIUM CHLORIDE 50 MILLILITER(S): 5 INJECTION, SOLUTION INTRAVENOUS at 07:05

## 2024-05-16 RX ADMIN — Medication 5 MILLIGRAM(S): at 04:10

## 2024-05-16 RX ADMIN — Medication 100 MILLIEQUIVALENT(S): at 22:43

## 2024-05-16 RX ADMIN — Medication 100 MILLIEQUIVALENT(S): at 21:41

## 2024-05-16 RX ADMIN — Medication 100 GRAM(S): at 11:00

## 2024-05-16 RX ADMIN — BRIMONIDINE TARTRATE 1 DROP(S): 2 SOLUTION/ DROPS OPHTHALMIC at 17:34

## 2024-05-16 RX ADMIN — Medication 100 MILLIEQUIVALENT(S): at 17:43

## 2024-05-16 RX ADMIN — Medication 5 MILLIGRAM(S): at 09:44

## 2024-05-16 RX ADMIN — Medication 10 MILLIGRAM(S): at 10:21

## 2024-05-16 RX ADMIN — BRIMONIDINE TARTRATE 1 DROP(S): 2 SOLUTION/ DROPS OPHTHALMIC at 05:12

## 2024-05-16 RX ADMIN — Medication 1 MILLIGRAM(S): at 14:35

## 2024-05-16 RX ADMIN — SODIUM CHLORIDE 70 MILLILITER(S): 5 INJECTION, SOLUTION INTRAVENOUS at 22:42

## 2024-05-16 RX ADMIN — DEXMEDETOMIDINE HYDROCHLORIDE IN 0.9% SODIUM CHLORIDE 3.74 MICROGRAM(S)/KG/HR: 4 INJECTION INTRAVENOUS at 22:41

## 2024-05-16 RX ADMIN — Medication 5 MILLIGRAM(S): at 22:41

## 2024-05-16 RX ADMIN — SODIUM CHLORIDE 70 MILLILITER(S): 5 INJECTION, SOLUTION INTRAVENOUS at 17:34

## 2024-05-16 RX ADMIN — LEVETIRACETAM 400 MILLIGRAM(S): 250 TABLET, FILM COATED ORAL at 17:34

## 2024-05-16 RX ADMIN — Medication 130 MILLIGRAM(S): at 11:25

## 2024-05-16 RX ADMIN — LATANOPROST 1 DROP(S): 0.05 SOLUTION/ DROPS OPHTHALMIC; TOPICAL at 22:42

## 2024-05-16 RX ADMIN — Medication 85 MILLIMOLE(S): at 12:03

## 2024-05-16 RX ADMIN — CHLORHEXIDINE GLUCONATE 1 APPLICATION(S): 213 SOLUTION TOPICAL at 12:26

## 2024-05-16 RX ADMIN — LEVETIRACETAM 400 MILLIGRAM(S): 250 TABLET, FILM COATED ORAL at 05:11

## 2024-05-16 RX ADMIN — SODIUM CHLORIDE 50 MILLILITER(S): 5 INJECTION, SOLUTION INTRAVENOUS at 01:04

## 2024-05-16 RX ADMIN — DEXMEDETOMIDINE HYDROCHLORIDE IN 0.9% SODIUM CHLORIDE 3.74 MICROGRAM(S)/KG/HR: 4 INJECTION INTRAVENOUS at 13:16

## 2024-05-16 NOTE — CONSULT NOTE ADULT - TIME BILLING
- Review of records, telemetry, vital signs and daily labs.   - General and cardiovascular physical examination.  - Generation of cardiovascular treatment plan.  - Coordination of care.      Patient was seen and examined by me on 5/16/24,interim events noted,labs and radiology studies reviewed.  Jayy Rubin MD,FACC.  [address] [place], [place] [id number].  [phone]

## 2024-05-16 NOTE — DIETITIAN INITIAL EVALUATION ADULT - ADD RECOMMEND
1) Will continue to monitor weight, labs, skin, GI status and diet 2) Continue thiamin, Multivitamin and folic acid due to PMHx of alcoholic cirrhosis

## 2024-05-16 NOTE — DIETITIAN INITIAL EVALUATION ADULT - REASON INDICATOR FOR ASSESSMENT
length of stay. information obtained from electronic medical record, team during interdisciplinary rounds

## 2024-05-16 NOTE — PROGRESS NOTE ADULT - ASSESSMENT
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    PLAN     N  # SAH: plan for angio Friday   nimodipine, TCD  #pmhx seizure cont home keppra, fu serum keppra (compliance)   #Pain: Tylenol and oxycodone  #Activity: [] OOB as tolerated [x] Bedrest [] PT [] OT [] PMNR    CV   #-140mmHg off nicardipine gtt   #TTE     P   RA     R   #Strict I+Os   #hyponatremia unkown chronicity, cont 2%, fu serum osm, bmp, ulystes     GI   #Diet: npo failed slp   #Senna miralax  Last BM pta     ID  afebrile  hx from outside hospital with possible UTI: bcx, ucx pending, UA unremarkable, monitor off antibiotics    E  Goal euglycemia (-180)  #a1c 6.1, ISS     H  #DVT ppx:   SCDs  Chemoppx held for now  LED no dvt      #CODE STATUS: FULL CODE     #DISPOSITION: ICU    #CRITICAL  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    PLAN     N  # SAH: plan for angio Friday   nimodipine, TCD  #pmhx seizure cont home keppra  #Pain: Tylenol and oxycodone  #Activity: [] OOB as tolerated [x] Bedrest [] PT [] OT [] PMNR    CV   #-140mmHg off nicardipine gtt   #TTE     P   RA     R   #Strict I+Os   #hyponatremia unkown chronicity, cont 2%, fu serum osm, bmp, ulystes     GI   #Diet: npo failed slp   #Senna miralax  Last BM pta     ID  afebrile  hx from outside hospital with possible UTI: bcx, ucx pending, UA unremarkable, monitor off antibiotics    E  Goal euglycemia (-180)  #a1c 6.1, ISS     H  #DVT ppx:   SCDs  Chemoppx held for now  LED no dvt      #CODE STATUS: FULL CODE     #DISPOSITION: ICU    #CRITICAL

## 2024-05-16 NOTE — CONSULT NOTE ADULT - SUBJECTIVE AND OBJECTIVE BOX
PATIENT SEEN AND EXAMINED BY MYNOR GUAN M.D. ON :- 5/16/24  DATE OF SERVICE:       5/16/24      Interim events noted,Labs ,Radiological studies and Cardiology tests reviewed .    Patient is a 64y old  Female who presents with a chief complaint of SAH (16 May 2024 21:18)      HPI:  Mera Burton  64F, pmh HTN, szs on Keppra, alcoholic cirrhosis, tremors, OA presented from [facility] to OSH w/ generalized weakness found to have b/l sulcal SAH and small occipital IVH. 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 She was recently started on Keflex for UTI. Plts 400, Coags wnl. , AST/ALT = 38/104. WBC 13. Afebrile. Exam: lethargic appearing, EOV, FC, AO3, PERRL, not compliant with EOMs, BUE 5/5, BLE 2/5. (15 May 2024 03:22)      PAST MEDICAL & SURGICAL HISTORY:  Cirrhosis      Seizure          PREVIOUS DIAGNOSTIC TESTING:      ECHO  FINDINGS:    STRESS  FINDINGS:    CATHETERIZATION  FINDINGS:    MEDICATIONS  (STANDING):  atorvastatin 40 milliGRAM(s) Oral at bedtime  brimonidine 0.2% Ophthalmic Solution 1 Drop(s) Both EYES every 12 hours  chlorhexidine 4% Liquid 1 Application(s) Topical daily  dexMEDEtomidine Infusion 0.2 MICROgram(s)/kG/Hr (3.74 mL/Hr) IV Continuous <Continuous>  folic acid 1 milliGRAM(s) Oral daily  latanoprost 0.005% Ophthalmic Solution 1 Drop(s) Both EYES at bedtime  levETIRAcetam  IVPB 750 milliGRAM(s) IV Intermittent every 12 hours  metoprolol tartrate Injectable 5 milliGRAM(s) IV Push every 6 hours  mirtazapine 15 milliGRAM(s) Oral at bedtime  multivitamin 1 Tablet(s) Oral daily  niMODipine 60 milliGRAM(s) Oral every 4 hours  polyethylene glycol 3350 17 Gram(s) Oral every 12 hours  potassium chloride  10 mEq/100 mL IVPB 10 milliEquivalent(s) IV Intermittent every 1 hour  senna 2 Tablet(s) Oral at bedtime  sodium chloride 2% + sodium acetate 50:50 1000 milliLiter(s) (70 mL/Hr) IV Continuous <Continuous>  thiamine IVPB 500 milliGRAM(s) IV Intermittent daily    MEDICATIONS  (PRN):      FAMILY HISTORY:      SOCIAL HISTORY:    CIGARETTES:    ALCOHOL:    REVIEW OF SYSTEMS:  CONSTITUTIONAL: No fever, weight loss, or fatigue  EYES: No eye pain, visual disturbances, or discharge  ENMT:  No difficulty hearing, tinnitus, vertigo; No sinus or throat pain  NECK: No pain or stiffness  RESPIRATORY: No cough, wheezing, chills or hemoptysis; No shortness of breath  CARDIOVASCULAR: No chest pain, palpitations, dizziness, or leg swelling  GASTROINTESTINAL: No abdominal or epigastric pain. No nausea, vomiting, or hematemesis; No diarrhea or constipation. No melena or hematochezia.  GENITOURINARY: No dysuria, frequency, hematuria, or incontinence  NEUROLOGICAL: No headaches, memory loss, loss of strength, numbness, or tremors  SKIN: No itching, burning, rashes, or lesions   LYMPH NODES: No enlarged glands  ENDOCRINE: No heat or cold intolerance; No hair loss  MUSCULOSKELETAL: No joint pain or swelling; No muscle, back, or extremity pain  PSYCHIATRIC: No depression, anxiety, mood swings, or difficulty sleeping  HEME/LYMPH: No easy bruising, or bleeding gums  ALLERY AND IMMUNOLOGIC: No hives or eczema    Vital Signs Last 24 Hrs  T(C): 36.7 (16 May 2024 19:00), Max: 36.7 (16 May 2024 03:00)  T(F): 98.1 (16 May 2024 19:00), Max: 98.1 (16 May 2024 11:00)  HR: 95 (16 May 2024 21:00) (78 - 122)  BP: 116/77 (16 May 2024 21:00) (103/69 - 167/88)  BP(mean): 90 (16 May 2024 21:00) (77 - 118)  RR: 12 (16 May 2024 21:00) (12 - 61)  SpO2: 98% (16 May 2024 21:00) (97% - 100%)    Parameters below as of 16 May 2024 19:00  Patient On (Oxygen Delivery Method): room air          PHYSICAL EXAM:  GENERAL: NAD, well-groomed, well-developed  HEAD:  Atraumatic, Normocephalic  EYES: EOMI, PERRLA, conjunctiva and sclera clear  ENMT: No tonsillar erythema, exudates, or enlargement; Moist mucous membranes, Good dentition, No lesions  NECK: Supple, No JVD, Normal thyroid  NERVOUS SYSTEM:  Alert & Oriented X3, Good concentration; Motor Strength 5/5 B/L upper and lower extremities; DTRs 2+ intact and symmetric  CHEST/LUNG: Clear to percussion bilaterally; No rales, rhonchi, wheezing, or rubs  HEART: Regular rate and rhythm; No murmurs, rubs, or gallops  ABDOMEN: Soft, Nontender, Nondistended; Bowel sounds present  EXTREMITIES:  2+ Peripheral Pulses, No clubbing, cyanosis, or edema  LYMPH: No lymphadenopathy noted  SKIN: No rashes or lesions      INTERPRETATION OF TELEMETRY:    ECG:    [facility]:     LABS:                        13.4   12.81 )-----------( 392      ( 15 May 2024 03:53 )             38.6     05-16    136  |  102  |  7   ----------------------------<  99  3.9   |  21<L>  |  0.47<L>    Ca    8.6      16 May 2024 21:25  Phos  2.1     05-16  Mg     2.1     05-16    TPro  6.9  /  Alb  3.4  /  TBili  0.5  /  DBili  x   /  AST  22  /  ALT  50<H>  /  AlkPhos  114  05-16        PT/INR - ( 16 May 2024 06:09 )   PT: 15.5 sec;   INR: 1.42 ratio         PTT - ( 15 May 2024 03:53 )  PTT:30.5 sec  Urinalysis Basic - ( 16 May 2024 21:25 )    Color: x / Appearance: x / SG: x / pH: x  Gluc: 99 mg/dL / Ketone: x  / Bili: x / Urobili: x   Blood: x / Protein: x / Nitrite: x   Leuk Esterase: x / RBC: x / WBC x   Sq Epi: x / Non Sq Epi: x / Bacteria: x      Lipid Panel:   I&O's Summary    15 May 2024 07:01  -  16 May 2024 07:00  --------------------------------------------------------  IN: 3229.3 mL / OUT: 1200 mL / NET: 2029.3 mL    16 May 2024 07:01  -  16 May 2024 22:09  --------------------------------------------------------  IN: 1775.1 mL / OUT: 1400 mL / NET: 375.1 mL        RADIOLOGY & ADDITIONAL STUDIES:

## 2024-05-16 NOTE — DIETITIAN INITIAL EVALUATION ADULT - OTHER INFO
-phosphorus low this morning and being replenished   -Hgb A1c 6.1%, pre-DM level. no noted history of DM.

## 2024-05-16 NOTE — DIETITIAN INITIAL EVALUATION ADULT - PERTINENT LABORATORY DATA
05-16    134<L>  |  100  |  8   ----------------------------<  102<H>  3.6   |  21<L>  |  0.39<L>    Ca    8.3<L>      16 May 2024 06:09  Phos  2.1     05-16  Mg     1.9     05-16    TPro  6.9  /  Alb  3.3  /  TBili  0.6  /  DBili  x   /  AST  24  /  ALT  54<H>  /  AlkPhos  113  05-16  A1C with Estimated Average Glucose Result: 6.1 % (05-15-24 @ 03:51)   05-16    134<L>  |  100  |  8   ----------------------------<  102<H>  3.6   |  21<L>  |  0.39<L>    Ca    8.3<L>      16 May 2024 06:09  Phos  2.1     05-16  Mg     1.9     05-16    TPro  6.9  /  Alb  3.3  /  TBili  0.6  /  DBili  x   /  AST  24  /  ALT  54<H>  /  AlkPhos  113  05-16  A1C with Estimated Average Glucose Result: 6.1 % (05-15-24 @ 03:51)

## 2024-05-16 NOTE — PROGRESS NOTE ADULT - ASSESSMENT
ASSESSMENT/PLAN: 64 YOF, admitted with increase lethargy from her baseline (wheelchair bound), with bilateral convexity SAH and IVH.  Imaging thus ar revealed right vertebral artery aneurysm and diffuse narrowing in the anterior and posterior vasculature    NEURO:  Neurocheck q1  Waiting radiology clearance for MRI/MRA (bilateral femoral stents) - rule out RCVS, rule out vasculitis  Possible diagnostic cerebral angiogram  Hx of Seizure: Levetiracetam 750mg BID  Pain: PRN analgesics  Activity: [] OOB as tolerated [X] Bedrest [] PT [] OT [] PMNR    PULM:  Incentive spirometry, NC 2 L    CV:  SBP goal 100 - 140  On cardene drip  TTE-p, EKG-p    GI  NPO  Bowel regimen    RENAL:  2% @ 50. Na goal 135 - 140  BMP at 4 PM  #HypoNa  Likely Chronic +/- HypoVolemia  - Maintain Normothermia with hypertonic IVF, q8h BMP    ID  #?UTI  - OSH UA clear  - Trend Fever/WBC  - Stop Ancef. monitor off Abx  - Send blood and Urine clx. Rule out mycotic aneurysm-related SAH    DVT Ppx: SCD. HOLD SQL  5/15 LED-negative   Consider starting chemoprophylaxis     ASSESSMENT/PLAN: 64 YOF, admitted with increase lethargy from her baseline (wheelchair bound), with bilateral convexity SAH and IVH.  Imaging thus ar revealed right vertebral artery aneurysm and diffuse narrowing in the anterior and posterior vasculature    NEURO:  Neurocheck q1  Waiting radiology clearance for MRI/MRA (bilateral femoral stents) - rule out RCVS, rule out vasculitis  Possible diagnostic cerebral angiogram  Hx of Seizure: Levetiracetam 750mg BID  Pain: PRN analgesics  Activity: [] OOB as tolerated [X] Bedrest [] PT [] OT [] PMNR    PULM:  Incentive spirometry, NC 2 L    CV:  SBP goal 100 - 140  On cardene drip  TTE-p, EKG-p    GI  NPO  Unable to insert NG tube. Consult GI for endoscopy guided placement  Bowel regimen    RENAL:  2% @ 70. Na goal 135 - 140  BMP at 4 PM  #HypoNa  Likely Chronic +/- HypoVolemia  - Maintain Normothermia with hypertonic IVF, q8h BMP    ID  #?UTI  - OSH UA clear  - Trend Fever/WBC  - Stop Ancef. monitor off Abx  - Send blood and Urine clx. Rule out mycotic aneurysm-related SAH    DVT Ppx: SCD. HOLD SQL  5/15 LED-negative   Consider starting chemoprophylaxis

## 2024-05-16 NOTE — PROGRESS NOTE ADULT - SUBJECTIVE AND OBJECTIVE BOX
Patient seen and examined at bedside.    --Anticoagulation--    T(C): 36.3 (05-15-24 @ 23:00), Max: 37.7 (05-15-24 @ 01:14)  HR: 79 (05-15-24 @ 23:00) (79 - 139)  BP: 112/74 (05-15-24 @ 23:00) (112/70 - 176/96)  RR: 12 (05-15-24 @ 23:00) (12 - 47)  SpO2: 98% (05-15-24 @ 23:00) (94% - 100%)  Wt(kg): --    Exam: Arousable, Ox1 (self), L facial, PERRL, BUE 5/5, BLE 2/5, intermittently follows commands, baseline tremor

## 2024-05-16 NOTE — DIETITIAN INITIAL EVALUATION ADULT - PERTINENT MEDS FT
MEDICATIONS  (STANDING):  atorvastatin 40 milliGRAM(s) Oral at bedtime  brimonidine 0.2% Ophthalmic Solution 1 Drop(s) Both EYES every 12 hours  chlorhexidine 4% Liquid 1 Application(s) Topical daily  folic acid 1 milliGRAM(s) Oral daily  latanoprost 0.005% Ophthalmic Solution 1 Drop(s) Both EYES at bedtime  levETIRAcetam  IVPB 750 milliGRAM(s) IV Intermittent every 12 hours  metoprolol tartrate Injectable 5 milliGRAM(s) IV Push every 6 hours  multivitamin 1 Tablet(s) Oral daily  niMODipine 60 milliGRAM(s) Oral every 4 hours  PHENobarbital Injectable 130 milliGRAM(s) IV Push once  polyethylene glycol 3350 17 Gram(s) Oral every 12 hours  senna 2 Tablet(s) Oral at bedtime  sodium chloride 2% + sodium acetate 50:50 1000 milliLiter(s) (70 mL/Hr) IV Continuous <Continuous>  sodium phosphate 30 milliMole(s)/500 mL IVPB 30 milliMole(s) IV Intermittent once    MEDICATIONS  (PRN):

## 2024-05-16 NOTE — PROGRESS NOTE ADULT - ASSESSMENT
Mera Burton  64F, pmh HTN, szs on Keppra, alcoholic cirrhosis, tremors, OA presented from [facility] to OSH w/ generalized weakness found to have b/l sulcal SAH and small occipital IVH. 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 She was recently started on Keflex for UTI. Plts 400, Coags wnl. , AST/ALT = 38/104. WBC 13. Afebrile. Exam: lethargic appearing, EOV, FC, AO3, PERRL, not compliant with EOMs, BUE 5/5, BLE 2/5.    Angio Fri 5/17 with Dr. Arceo  TTE-p  BMP q 6   S/S-p  Monitor off ABx        Nursing facility wasn't able to give much information but sounds like today she was more confused/lethargic than usual  Baseline: wheelchair bound, intermittently confused, can feed herself   -Emergency contact: Baljinder Barkley (Sister, [phone]) - did not answer phone-

## 2024-05-16 NOTE — DIETITIAN INITIAL EVALUATION ADULT - ORAL INTAKE PTA/DIET HISTORY
visited pt at bedside this morning. lethargic and disoriented. unable to obtain diet recall at this time. From [facility]. no known allergies noted in electronic medical record.

## 2024-05-16 NOTE — PROGRESS NOTE ADULT - SUBJECTIVE AND OBJECTIVE BOX
64F pmh Dementia (Polysubstance Abuse, resides in NH) with dyskinesias, HTN, szs on Keppra, alcoholic cirrhosis, tremors, OA presented from [facility] to  w/ AMS & generalized weakness. 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 Started on Keflex for UTI at Nursing Home. Per NH [facility] independent in ADLs, conversant but forgetful and confused, wheelchair bound.    HOSPITAL COURSE:  5/15 transferred to [facility] for workup of nontraumatic convexity SAH  5/16 Stable exam. Possible MRI today    Admission Scores  GCS: 14 HH: 2 MF: 2 NIHSS: RASS: CAM-ICU: ICH:    24 hour Events:    Allergies    No Known Allergies    Intolerances        REVIEW OF SYSTEMS: [X] Unable to Assess due to neurologic exam [ ] All ROS addressed below are non-contributory, except:  Neuro: [ ] Headache [ ] Back pain [ ] Numbness [ ] Weakness [ ] Ataxia [ ] Dizziness [ ] Aphasia [ ] Dysarthria [ ] Visual disturbance  Resp: [ ] Shortness of breath/dyspnea, [ ] Orthopnea [ ] Cough  CV: [ ] Chest pain [ ] Palpitation [ ] Lightheadedness [ ] Syncope  Renal: [ ] Thirst [ ] Edema  GI: [ ] Nausea [ ] Emesis [ ] Abdominal pain [ ] Constipation [ ] Diarrhea  Hem: [ ] Hematemesis [ ] bright red blood per rectum  ID: [ ] Fever [ ] Chills [ ] Dysuria  ENT: [ ] Rhinorrhea      DEVICES:  [ ] Restraints [ ] ET tube [ ] central line [ ] arterial line [ ] lang [ ] NGT/OGT [ ] EVD [ ] LD [ ] STANISLAV/HMV [ ] Trach [ ] PEG [ ] Chest Tube    VITALS:  Vital Signs Last 24 Hrs  T(C): 36.7 (05-16-24 @ 03:00), Max: 37 (05-15-24 @ 15:00)  HR: 98 (05-16-24 @ 06:00) (78 - 139)  BP: 135/81 (05-16-24 @ 06:00) (110/72 - 157/76)  BP(mean): 97 (05-16-24 @ 06:00) (83 - 113)  RR: 16 (05-16-24 @ 06:00) (12 - 47)  SpO2: 97% (05-16-24 @ 06:00) (94% - 100%)    atorvastatin 40 milliGRAM(s) Oral at bedtime  brimonidine 0.2% Ophthalmic Solution 1 Drop(s) Both EYES every 12 hours  chlorhexidine 4% Liquid 1 Application(s) Topical daily  folic acid 1 milliGRAM(s) Oral daily  latanoprost 0.005% Ophthalmic Solution 1 Drop(s) Both EYES at bedtime  levETIRAcetam  IVPB 750 milliGRAM(s) IV Intermittent every 12 hours  metoprolol tartrate Injectable 5 milliGRAM(s) IV Push every 6 hours  niCARdipine Infusion 5 mG/Hr IV Continuous <Continuous>  niMODipine 60 milliGRAM(s) Oral every 4 hours  polyethylene glycol 3350 17 Gram(s) Oral every 12 hours  senna 2 Tablet(s) Oral at bedtime  sodium chloride 2% + sodium acetate 50:50 1000 milliLiter(s) IV Continuous <Continuous>  thiamine IVPB 500 milliGRAM(s) IV Intermittent every 8 hours        05-14-24 @ 07:01  -  05-15-24 @ 07:00  --------------------------------------------------------  IN: 637.5 mL / OUT: 200 mL / NET: 437.5 mL    05-15-24 @ 07:01  -  05-16-24 @ 06:57  --------------------------------------------------------  IN: 3179.3 mL / OUT: 1200 mL / NET: 1979.3 mL                          13.4   12.81 )-----------( 392      ( 15 May 2024 03:53 )             38.6   05-16    134<L>  |  100  |  8   ----------------------------<  102<H>  3.6   |  21<L>  |  0.39<L>      MEDICATION LEVELS:  Ammonia, Serum: 36 umol/L (05-14 @ 13:30)    IMAGING:  < from: CT Head No Cont (05.14.24 @ 19:08) >  Similar scattered bilateral sulcal subarachnoid hemorrhage.    Similar small hemorrhage layering in the occipital horns.    No parenchymal hemorrhage or brain edema.    < end of copied text >    < from: CT Angio Head w/ IV Cont (05.14.24 @ 20:58) >  Fusiform aneurysm involving the right intracranial V4 segment measuring  up to 5 mm in diameter.    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    Symmetric severe stenosis involving the bilateral middle cerebral  arteries, and to a lesser extent the anterior cerebral arteries. Findings  are indeterminate in etiology, although may represent underlying CNS  vasculitis/vasculopathy. Further evaluation by MRI brain/MRA recommended.    Stable appearance of subarachnoid hemorrhage with intraventricular  extension, suboptimally evaluated on the current exam. Repeat noncontrast  head CT recommended to assess stability.    < end of copied text >      EXAMINATION:  PHYSICAL EXAM:    Neurological: Opens eyes to voice, alert oriented to person, place and time, Following Simple Commands, PERRL, EOMI, No Gaze Preference, BUE 5/5, BLE 2/5    Pulmonary: Clear to Auscultation, No rales, No rhonchi, No wheezes    Cardiovascular: S1, S2, Regular rate and rhythm    Gastrointestinal: Soft, Non-tender, Non-distended    Extremities: No calf tenderness    Incision: dry and clean

## 2024-05-16 NOTE — SPEECH LANGUAGE PATHOLOGY EVALUATION - SLP DIAGNOSIS
SLP attempted to see Pt for speech-language evaluation. Per d/w RN, Pt is lethargy 2/2 recent administration of phenobarbital. This service will follow-up at a later time. SLP attempted to see Pt for speech-language evaluation. Per d/w RN, Pt is lethargic 2/2 recent administration of phenobarbital. This service will follow-up at a later time.

## 2024-05-16 NOTE — PROGRESS NOTE ADULT - SUBJECTIVE AND OBJECTIVE BOX
NSICU NIGHT PROGRESS NOTE     Please see neurosurgery resident H&P for HPI.     12h events   pending CTabdomen     Exam   sleepy, oriented on self, PEERL, L facial UMN, BUE 5/5, BLE 2/2    VITALS:   Vital Signs Last 24 Hrs  T(C): 36.7 (16 May 2024 19:00), Max: 36.7 (16 May 2024 03:00)  T(F): 98.1 (16 May 2024 19:00), Max: 98.1 (16 May 2024 11:00)  HR: 95 (16 May 2024 21:00) (78 - 122)  BP: 116/77 (16 May 2024 21:00) (103/69 - 167/88)  BP(mean): 90 (16 May 2024 21:00) (77 - 118)  RR: 12 (16 May 2024 21:00) (12 - 61)  SpO2: 98% (16 May 2024 21:00) (97% - 100%)    Parameters below as of 16 May 2024 19:00  Patient On (Oxygen Delivery Method): room air      CAPILLARY BLOOD GLUCOSE      POCT Blood Glucose.: 100 mg/dL (16 May 2024 13:55)    I&O's Summary    15 May 2024 07:01  -  16 May 2024 07:00  --------------------------------------------------------  IN: 3229.3 mL / OUT: 1200 mL / NET: 2029.3 mL    16 May 2024 07:01  -  16 May 2024 21:23  --------------------------------------------------------  IN: 1775.1 mL / OUT: 1400 mL / NET: 375.1 mL        Respiratory:    ABG - ( 16 May 2024 14:13 )  pH, Arterial: 7.47  pH, Blood: x     /  pCO2: 32    /  pO2: 130   / HCO3: 23    / Base Excess: 0.2   /  SaO2: 99.0                LABS:                        13.4   12.81 )-----------( 392      ( 15 May 2024 03:53 )             38.6     05-16    136  |  99  |  7   ----------------------------<  109<H>  3.4<L>   |  24  |  0.46<L>        MEDICATION LEVELS:   Ammonia, Serum: 26 umol/L (05-16 @ 15:24)  Ammonia, Serum: 36 umol/L (05-14 @ 13:30)    IVF FLUIDS/MEDICATIONS:   MEDICATIONS  (STANDING):  atorvastatin 40 milliGRAM(s) Oral at bedtime  brimonidine 0.2% Ophthalmic Solution 1 Drop(s) Both EYES every 12 hours  chlorhexidine 4% Liquid 1 Application(s) Topical daily  dexMEDEtomidine Infusion 0.2 MICROgram(s)/kG/Hr (3.74 mL/Hr) IV Continuous <Continuous>  folic acid 1 milliGRAM(s) Oral daily  latanoprost 0.005% Ophthalmic Solution 1 Drop(s) Both EYES at bedtime  levETIRAcetam  IVPB 750 milliGRAM(s) IV Intermittent every 12 hours  metoprolol tartrate Injectable 5 milliGRAM(s) IV Push every 6 hours  mirtazapine 15 milliGRAM(s) Oral at bedtime  multivitamin 1 Tablet(s) Oral daily  niMODipine 60 milliGRAM(s) Oral every 4 hours  polyethylene glycol 3350 17 Gram(s) Oral every 12 hours  potassium chloride  10 mEq/100 mL IVPB 10 milliEquivalent(s) IV Intermittent every 1 hour  senna 2 Tablet(s) Oral at bedtime  sodium chloride 2% + sodium acetate 50:50 1000 milliLiter(s) (70 mL/Hr) IV Continuous <Continuous>  thiamine IVPB 500 milliGRAM(s) IV Intermittent daily    MEDICATIONS  (PRN):

## 2024-05-16 NOTE — CHART NOTE - NSCHARTNOTEFT_GEN_A_CORE
EEG preliminary read (not final) on the initial recording hour(s) = x 0.5    No seizures recorded.  Moderate to severe slowing noted, nonspecific.  Final report to follow tomorrow morning after completion of study.    [facility] EEG Reading Room Ph#: [phone]  Epilepsy Answering Service after 5PM and before 8:30AM: Ph#: [phone]

## 2024-05-16 NOTE — DIETITIAN INITIAL EVALUATION ADULT - ENTERAL
When able to initiated EN, recommend: Jevity 1.5 to start at 30ml/hr and increase by 10ml every 4 hours until Goal rate of 55ml/hr x 24 hours met and tolerated. This will provide pt with 1980kcal (26.5kcal/kg) and 84g protein (~1.5g/kg). Defer free water flush to team.

## 2024-05-16 NOTE — CONSULT NOTE ADULT - ASSESSMENT
ASSESSMENT/PLAN: 64 YOF, admitted with increase lethargy from her baseline (wheelchair bound), with bilateral convexity SAH and IVH.  Imaging thus ar revealed right vertebral artery aneurysm and diffuse narrowing in the anterior and posterior vasculature    NEURO:  Neurocheck q1  Waiting radiology clearance for MRI/MRA (bilateral femoral stents) - rule out RCVS, rule out vasculitis  Possible diagnostic cerebral angiogram  Hx of Seizure: Levetiracetam 750mg BID  Pain: PRN analgesics  Activity: [] OOB as tolerated [X] Bedrest [] PT [] OT [] PMNR    PULM:  Incentive spirometry, NC 2 L    CV:  SBP goal 100 - 140  On cardene drip  TTE-p, EKG-p    GI  NPO  Unable to insert NG tube. Consult GI for endoscopy guided placement  Bowel regimen    RENAL:  2% @ 70. Na goal 135 - 140  BMP at 4 PM  #HypoNa  Likely Chronic +/- HypoVolemia  - Maintain Normothermia with hypertonic IVF, q8h BMP    ID  #?[email or web address]  - OSH UA clear  - Trend Fever/WBC  - Stop Ancef. monitor off Abx  - Send blood and Urine clx. Rule out mycotic aneurysm-related SAH    DVT Ppx: SCD. HOLD SQL  5/15 LED-negative   Consider starting chemoprophylaxis

## 2024-05-17 ENCOUNTER — RESULT REVIEW (OUTPATIENT)
Age: 64
End: 2024-05-17

## 2024-05-17 LAB
ADD ON TEST-SPECIMEN IN LAB: SIGNIFICANT CHANGE UP
ALBUMIN SERPL ELPH-MCNC: 3.2 G/DL — LOW (ref 3.3–5)
ALBUMIN SERPL ELPH-MCNC: 3.3 G/DL — SIGNIFICANT CHANGE UP (ref 3.3–5)
ALBUMIN SERPL ELPH-MCNC: 3.4 G/DL — SIGNIFICANT CHANGE UP (ref 3.3–5)
ALP SERPL-CCNC: 110 U/L — SIGNIFICANT CHANGE UP (ref 40–120)
ALP SERPL-CCNC: 126 U/L — HIGH (ref 40–120)
ALP SERPL-CCNC: 126 U/L — HIGH (ref 40–120)
ALT FLD-CCNC: 49 U/L — HIGH (ref 10–45)
ALT FLD-CCNC: 52 U/L — HIGH (ref 10–45)
ALT FLD-CCNC: 61 U/L — HIGH (ref 10–45)
ANION GAP SERPL CALC-SCNC: 11 MMOL/L — SIGNIFICANT CHANGE UP (ref 5–17)
ANION GAP SERPL CALC-SCNC: 13 MMOL/L — SIGNIFICANT CHANGE UP (ref 5–17)
ANION GAP SERPL CALC-SCNC: 15 MMOL/L — SIGNIFICANT CHANGE UP (ref 5–17)
APTT BLD: 29.8 SEC — SIGNIFICANT CHANGE UP (ref 24.5–35.6)
AST SERPL-CCNC: 23 U/L — SIGNIFICANT CHANGE UP (ref 10–40)
AST SERPL-CCNC: 24 U/L — SIGNIFICANT CHANGE UP (ref 10–40)
AST SERPL-CCNC: 35 U/L — SIGNIFICANT CHANGE UP (ref 10–40)
BASOPHILS # BLD AUTO: 0.02 K/UL — SIGNIFICANT CHANGE UP (ref 0–0.2)
BASOPHILS NFR BLD AUTO: 0.2 % — SIGNIFICANT CHANGE UP (ref 0–2)
BILIRUB SERPL-MCNC: 0.3 MG/DL — SIGNIFICANT CHANGE UP (ref 0.2–1.2)
BILIRUB SERPL-MCNC: 0.4 MG/DL — SIGNIFICANT CHANGE UP (ref 0.2–1.2)
BILIRUB SERPL-MCNC: 0.6 MG/DL — SIGNIFICANT CHANGE UP (ref 0.2–1.2)
BLD GP AB SCN SERPL QL: NEGATIVE — SIGNIFICANT CHANGE UP
BUN SERPL-MCNC: 6 MG/DL — LOW (ref 7–23)
BUN SERPL-MCNC: 7 MG/DL — SIGNIFICANT CHANGE UP (ref 7–23)
BUN SERPL-MCNC: 8 MG/DL — SIGNIFICANT CHANGE UP (ref 7–23)
CALCIUM SERPL-MCNC: 8.3 MG/DL — LOW (ref 8.4–10.5)
CALCIUM SERPL-MCNC: 8.4 MG/DL — SIGNIFICANT CHANGE UP (ref 8.4–10.5)
CALCIUM SERPL-MCNC: 8.5 MG/DL — SIGNIFICANT CHANGE UP (ref 8.4–10.5)
CHLORIDE SERPL-SCNC: 102 MMOL/L — SIGNIFICANT CHANGE UP (ref 96–108)
CHLORIDE SERPL-SCNC: 104 MMOL/L — SIGNIFICANT CHANGE UP (ref 96–108)
CHLORIDE SERPL-SCNC: 108 MMOL/L — SIGNIFICANT CHANGE UP (ref 96–108)
CO2 SERPL-SCNC: 18 MMOL/L — LOW (ref 22–31)
CO2 SERPL-SCNC: 21 MMOL/L — LOW (ref 22–31)
CO2 SERPL-SCNC: 22 MMOL/L — SIGNIFICANT CHANGE UP (ref 22–31)
CREAT SERPL-MCNC: 0.4 MG/DL — LOW (ref 0.5–1.3)
CREAT SERPL-MCNC: 0.41 MG/DL — LOW (ref 0.5–1.3)
CREAT SERPL-MCNC: 0.43 MG/DL — LOW (ref 0.5–1.3)
EGFR: 110 ML/MIN/1.73M2 — SIGNIFICANT CHANGE UP
EGFR: 111 ML/MIN/1.73M2 — SIGNIFICANT CHANGE UP
EGFR: 112 ML/MIN/1.73M2 — SIGNIFICANT CHANGE UP
EOSINOPHIL # BLD AUTO: 0.05 K/UL — SIGNIFICANT CHANGE UP (ref 0–0.5)
EOSINOPHIL NFR BLD AUTO: 0.5 % — SIGNIFICANT CHANGE UP (ref 0–6)
GLUCOSE SERPL-MCNC: 102 MG/DL — HIGH (ref 70–99)
GLUCOSE SERPL-MCNC: 117 MG/DL — HIGH (ref 70–99)
GLUCOSE SERPL-MCNC: 182 MG/DL — HIGH (ref 70–99)
HCT VFR BLD CALC: 30.9 % — LOW (ref 34.5–45)
HGB BLD-MCNC: 10.4 G/DL — LOW (ref 11.5–15.5)
IMM GRANULOCYTES NFR BLD AUTO: 0.5 % — SIGNIFICANT CHANGE UP (ref 0–0.9)
INR BLD: 1.33 RATIO — HIGH (ref 0.85–1.18)
LYMPHOCYTES # BLD AUTO: 1.8 K/UL — SIGNIFICANT CHANGE UP (ref 1–3.3)
LYMPHOCYTES # BLD AUTO: 18.8 % — SIGNIFICANT CHANGE UP (ref 13–44)
MAGNESIUM SERPL-MCNC: 2 MG/DL — SIGNIFICANT CHANGE UP (ref 1.6–2.6)
MAGNESIUM SERPL-MCNC: 2.1 MG/DL — SIGNIFICANT CHANGE UP (ref 1.6–2.6)
MCHC RBC-ENTMCNC: 27.7 PG — SIGNIFICANT CHANGE UP (ref 27–34)
MCHC RBC-ENTMCNC: 33.7 GM/DL — SIGNIFICANT CHANGE UP (ref 32–36)
MCV RBC AUTO: 82.4 FL — SIGNIFICANT CHANGE UP (ref 80–100)
MONOCYTES # BLD AUTO: 0.92 K/UL — HIGH (ref 0–0.9)
MONOCYTES NFR BLD AUTO: 9.6 % — SIGNIFICANT CHANGE UP (ref 2–14)
NEUTROPHILS # BLD AUTO: 6.76 K/UL — SIGNIFICANT CHANGE UP (ref 1.8–7.4)
NEUTROPHILS NFR BLD AUTO: 70.4 % — SIGNIFICANT CHANGE UP (ref 43–77)
NRBC # BLD: 0 /100 WBCS — SIGNIFICANT CHANGE UP (ref 0–0)
PHOSPHATE SERPL-MCNC: 2.6 MG/DL — SIGNIFICANT CHANGE UP (ref 2.5–4.5)
PHOSPHATE SERPL-MCNC: 4 MG/DL — SIGNIFICANT CHANGE UP (ref 2.5–4.5)
PLATELET # BLD AUTO: 359 K/UL — SIGNIFICANT CHANGE UP (ref 150–400)
POTASSIUM SERPL-MCNC: 3.4 MMOL/L — LOW (ref 3.5–5.3)
POTASSIUM SERPL-MCNC: 3.8 MMOL/L — SIGNIFICANT CHANGE UP (ref 3.5–5.3)
POTASSIUM SERPL-MCNC: 4.2 MMOL/L — SIGNIFICANT CHANGE UP (ref 3.5–5.3)
POTASSIUM SERPL-SCNC: 3.4 MMOL/L — LOW (ref 3.5–5.3)
POTASSIUM SERPL-SCNC: 3.8 MMOL/L — SIGNIFICANT CHANGE UP (ref 3.5–5.3)
POTASSIUM SERPL-SCNC: 4.2 MMOL/L — SIGNIFICANT CHANGE UP (ref 3.5–5.3)
PROT SERPL-MCNC: 6.8 G/DL — SIGNIFICANT CHANGE UP (ref 6–8.3)
PROT SERPL-MCNC: 6.9 G/DL — SIGNIFICANT CHANGE UP (ref 6–8.3)
PROT SERPL-MCNC: 7 G/DL — SIGNIFICANT CHANGE UP (ref 6–8.3)
PROTHROM AB SERPL-ACNC: 13.9 SEC — HIGH (ref 9.5–13)
RBC # BLD: 3.75 M/UL — LOW (ref 3.8–5.2)
RBC # FLD: 12.4 % — SIGNIFICANT CHANGE UP (ref 10.3–14.5)
RH IG SCN BLD-IMP: POSITIVE — SIGNIFICANT CHANGE UP
SODIUM SERPL-SCNC: 136 MMOL/L — SIGNIFICANT CHANGE UP (ref 135–145)
SODIUM SERPL-SCNC: 137 MMOL/L — SIGNIFICANT CHANGE UP (ref 135–145)
SODIUM SERPL-SCNC: 141 MMOL/L — SIGNIFICANT CHANGE UP (ref 135–145)
WBC # BLD: 9.6 K/UL — SIGNIFICANT CHANGE UP (ref 3.8–10.5)
WBC # FLD AUTO: 9.6 K/UL — SIGNIFICANT CHANGE UP (ref 3.8–10.5)

## 2024-05-17 PROCEDURE — 71045 X-RAY EXAM CHEST 1 VIEW: CPT | Mod: 26,76

## 2024-05-17 PROCEDURE — 70496 CT ANGIOGRAPHY HEAD: CPT | Mod: 26

## 2024-05-17 PROCEDURE — 93010 ELECTROCARDIOGRAM REPORT: CPT

## 2024-05-17 PROCEDURE — 70450 CT HEAD/BRAIN W/O DYE: CPT | Mod: 26,59

## 2024-05-17 PROCEDURE — 93306 TTE W/DOPPLER COMPLETE: CPT | Mod: 26

## 2024-05-17 PROCEDURE — 0042T: CPT

## 2024-05-17 PROCEDURE — 61651 EVASC PRLNG ADMN RX AGNT ADD: CPT

## 2024-05-17 PROCEDURE — 61650 EVASC PRLNG ADMN RX AGNT 1ST: CPT

## 2024-05-17 PROCEDURE — 95720 EEG PHY/QHP EA INCR W/VEEG: CPT

## 2024-05-17 PROCEDURE — 99291 CRITICAL CARE FIRST HOUR: CPT

## 2024-05-17 RX ORDER — CHLORHEXIDINE GLUCONATE 213 G/1000ML
1 SOLUTION TOPICAL
Refills: 0 | Status: DISCONTINUED | OUTPATIENT
Start: 2024-05-17 | End: 2024-05-25

## 2024-05-17 RX ORDER — SODIUM CHLORIDE 9 MG/ML
10 INJECTION INTRAMUSCULAR; INTRAVENOUS; SUBCUTANEOUS
Refills: 0 | Status: DISCONTINUED | OUTPATIENT
Start: 2024-05-17 | End: 2024-05-26

## 2024-05-17 RX ORDER — PHENYLEPHRINE HYDROCHLORIDE 10 MG/ML
1.5 INJECTION INTRAVENOUS
Qty: 40 | Refills: 0 | Status: DISCONTINUED | OUTPATIENT
Start: 2024-05-17 | End: 2024-05-19

## 2024-05-17 RX ORDER — POTASSIUM CHLORIDE 20 MEQ
40 PACKET (EA) ORAL EVERY 4 HOURS
Refills: 0 | Status: COMPLETED | OUTPATIENT
Start: 2024-05-17 | End: 2024-05-17

## 2024-05-17 RX ORDER — PROPOFOL 10 MG/ML
20 INJECTION, EMULSION INTRAVENOUS
Qty: 500 | Refills: 0 | Status: DISCONTINUED | OUTPATIENT
Start: 2024-05-17 | End: 2024-05-17

## 2024-05-17 RX ORDER — SODIUM CHLORIDE 9 MG/ML
500 INJECTION, SOLUTION INTRAVENOUS ONCE
Refills: 0 | Status: COMPLETED | OUTPATIENT
Start: 2024-05-17 | End: 2024-05-17

## 2024-05-17 RX ORDER — POTASSIUM CHLORIDE 20 MEQ
10 PACKET (EA) ORAL
Refills: 0 | Status: COMPLETED | OUTPATIENT
Start: 2024-05-17 | End: 2024-05-17

## 2024-05-17 RX ORDER — NIMODIPINE 60 MG/10ML
60 SOLUTION ORAL EVERY 4 HOURS
Refills: 0 | Status: DISCONTINUED | OUTPATIENT
Start: 2024-05-17 | End: 2024-05-18

## 2024-05-17 RX ORDER — SODIUM CHLORIDE 5 G/100ML
1000 INJECTION, SOLUTION INTRAVENOUS
Refills: 0 | Status: DISCONTINUED | OUTPATIENT
Start: 2024-05-17 | End: 2024-05-18

## 2024-05-17 RX ORDER — OXYCODONE HYDROCHLORIDE 5 MG/1
10 TABLET ORAL EVERY 4 HOURS
Refills: 0 | Status: DISCONTINUED | OUTPATIENT
Start: 2024-05-17 | End: 2024-05-19

## 2024-05-17 RX ORDER — OXYCODONE HYDROCHLORIDE 5 MG/1
5 TABLET ORAL EVERY 4 HOURS
Refills: 0 | Status: DISCONTINUED | OUTPATIENT
Start: 2024-05-17 | End: 2024-05-19

## 2024-05-17 RX ORDER — CHLORHEXIDINE GLUCONATE 213 G/1000ML
15 SOLUTION TOPICAL EVERY 12 HOURS
Refills: 0 | Status: DISCONTINUED | OUTPATIENT
Start: 2024-05-17 | End: 2024-05-19

## 2024-05-17 RX ORDER — SODIUM CHLORIDE 9 MG/ML
1000 INJECTION, SOLUTION INTRAVENOUS
Refills: 0 | Status: DISCONTINUED | OUTPATIENT
Start: 2024-05-17 | End: 2024-05-18

## 2024-05-17 RX ADMIN — PHENYLEPHRINE HYDROCHLORIDE 42 MICROGRAM(S)/KG/MIN: 10 INJECTION INTRAVENOUS at 13:20

## 2024-05-17 RX ADMIN — BRIMONIDINE TARTRATE 1 DROP(S): 2 SOLUTION/ DROPS OPHTHALMIC at 06:13

## 2024-05-17 RX ADMIN — MIRTAZAPINE 15 MILLIGRAM(S): 45 TABLET, ORALLY DISINTEGRATING ORAL at 22:22

## 2024-05-17 RX ADMIN — Medication 5 MILLIGRAM(S): at 06:12

## 2024-05-17 RX ADMIN — Medication 105 MILLIGRAM(S): at 17:40

## 2024-05-17 RX ADMIN — LEVETIRACETAM 400 MILLIGRAM(S): 250 TABLET, FILM COATED ORAL at 06:13

## 2024-05-17 RX ADMIN — PHENYLEPHRINE HYDROCHLORIDE 42 MICROGRAM(S)/KG/MIN: 10 INJECTION INTRAVENOUS at 19:24

## 2024-05-17 RX ADMIN — CHLORHEXIDINE GLUCONATE 15 MILLILITER(S): 213 SOLUTION TOPICAL at 17:40

## 2024-05-17 RX ADMIN — Medication 40 MILLIEQUIVALENT(S): at 18:21

## 2024-05-17 RX ADMIN — ATORVASTATIN CALCIUM 40 MILLIGRAM(S): 80 TABLET, FILM COATED ORAL at 22:22

## 2024-05-17 RX ADMIN — DEXMEDETOMIDINE HYDROCHLORIDE IN 0.9% SODIUM CHLORIDE 3.74 MICROGRAM(S)/KG/HR: 4 INJECTION INTRAVENOUS at 15:00

## 2024-05-17 RX ADMIN — DEXMEDETOMIDINE HYDROCHLORIDE IN 0.9% SODIUM CHLORIDE 3.74 MICROGRAM(S)/KG/HR: 4 INJECTION INTRAVENOUS at 19:24

## 2024-05-17 RX ADMIN — SODIUM CHLORIDE 1000 MILLILITER(S): 9 INJECTION, SOLUTION INTRAVENOUS at 15:00

## 2024-05-17 RX ADMIN — SODIUM CHLORIDE 1000 MILLILITER(S): 9 INJECTION, SOLUTION INTRAVENOUS at 13:45

## 2024-05-17 RX ADMIN — LATANOPROST 1 DROP(S): 0.05 SOLUTION/ DROPS OPHTHALMIC; TOPICAL at 22:30

## 2024-05-17 RX ADMIN — Medication 85 MILLIMOLE(S): at 00:28

## 2024-05-17 RX ADMIN — SENNA PLUS 2 TABLET(S): 8.6 TABLET ORAL at 22:34

## 2024-05-17 RX ADMIN — LEVETIRACETAM 400 MILLIGRAM(S): 250 TABLET, FILM COATED ORAL at 17:40

## 2024-05-17 RX ADMIN — BRIMONIDINE TARTRATE 1 DROP(S): 2 SOLUTION/ DROPS OPHTHALMIC at 17:41

## 2024-05-17 RX ADMIN — CHLORHEXIDINE GLUCONATE 1 APPLICATION(S): 213 SOLUTION TOPICAL at 17:40

## 2024-05-17 RX ADMIN — SODIUM CHLORIDE 70 MILLILITER(S): 5 INJECTION, SOLUTION INTRAVENOUS at 19:24

## 2024-05-17 NOTE — PROGRESS NOTE ADULT - ASSESSMENT
ASSESSMENT/PLAN: 64 YOF, admitted with increase lethargy from her baseline (wheelchair bound), with bilateral convexity SAH and IVH.  Imaging thus ar revealed right vertebral artery aneurysm and diffuse narrowing in the anterior and posterior vasculature    NEURO:  Neurocheck q1  MRI showed diminished intracranial flow with right MCA territory infarct.  Diagnostic cerebral angiogram today  Hx of Seizure: Levetiracetam 750mg BID  Pain: PRN analgesics  Activity: [] OOB as tolerated [X] Bedrest [] PT [] OT [] PMNR    PULM:  Incentive spirometry, NC 2 L    CV:  SBP goal 100 - 140  If angiogram rules out a ruptured aneurysm, will proceed with permissive / induced hypertension  TTE-p, EKG-p    GI  NPO  Unable to insert NG tube. Consult GI for endoscopy guided placement  Bowel regimen    RENAL:  2% @ 70. Na goal 135 - 140  BMP at 4 PM  #HypoNa  Maintain Normothermia with hypertonic IVF, q12h BMP    ID  - Afebrile, admission cultures negative.    DVT Ppx: SCD. HOLD SQL  5/15 LED-negative   Consider starting chemoprophylaxis

## 2024-05-17 NOTE — PROGRESS NOTE ADULT - SUBJECTIVE AND OBJECTIVE BOX
NSICU NIGHT PROGRESS NOTE     12h events   s/p intubation for angio today     Exam   intubated, eyes closed, opens to noxious, PEERL, L facial UMN, BUE 5/5, BLE 2/2    VITALS:   Vital Signs Last 24 Hrs  T(C): 37.5 (17 May 2024 19:00), Max: 37.5 (17 May 2024 19:00)  T(F): 99.5 (17 May 2024 19:00), Max: 99.5 (17 May 2024 19:00)  HR: 63 (17 May 2024 22:00) (60 - 124)  BP: 138/75 (17 May 2024 07:55) (107/74 - 147/77)  BP(mean): 99 (17 May 2024 07:55) (85 - 106)  RR: 15 (17 May 2024 22:00) (10 - 41)  SpO2: 100% (17 May 2024 22:00) (93% - 100%)    Parameters below as of 17 May 2024 19:00  Patient On (Oxygen Delivery Method): ventilator    O2 Concentration (%): 40  CAPILLARY BLOOD GLUCOSE        I&O's Summary    16 May 2024 07:01  -  17 May 2024 07:00  --------------------------------------------------------  IN: 2930.7 mL / OUT: 1950 mL / NET: 980.7 mL    17 May 2024 07:01  -  17 May 2024 22:36  --------------------------------------------------------  IN: 2811.6 mL / OUT: 2550 mL / NET: 261.6 mL        Respiratory:  Mode: AC/ CMV (Assist Control/ Continuous Mandatory Ventilation)  RR (machine): 16  TV (machine): 400  FiO2: 40  PEEP: 5  ITime: 1  MAP: 8  PIP: 15    ABG - ( 16 May 2024 14:13 )  pH, Arterial: 7.47  pH, Blood: x     /  pCO2: 32    /  pO2: 130   / HCO3: 23    / Base Excess: 0.2   /  SaO2: 99.0                LABS:                        10.4   9.60  )-----------( 359      ( 17 May 2024 03:34 )             30.9     05-17    137  |  104  |  7   ----------------------------<  182<H>  3.4<L>   |  18<L>  |  0.40<L>        MEDICATION LEVELS:   Ammonia, Serum: 26 umol/L (05-16 @ 15:24)  Ammonia, Serum: 36 umol/L (05-14 @ 13:30)    IVF FLUIDS/MEDICATIONS:   MEDICATIONS  (STANDING):  atorvastatin 40 milliGRAM(s) Oral at bedtime  brimonidine 0.2% Ophthalmic Solution 1 Drop(s) Both EYES every 12 hours  chlorhexidine 0.12% Liquid 15 milliLiter(s) Oral Mucosa every 12 hours  chlorhexidine 4% Liquid 1 Application(s) Topical <User Schedule>  chlorhexidine 4% Liquid 1 Application(s) Topical daily  dexMEDEtomidine Infusion 0.2 MICROgram(s)/kG/Hr (3.74 mL/Hr) IV Continuous <Continuous>  folic acid 1 milliGRAM(s) Oral daily  latanoprost 0.005% Ophthalmic Solution 1 Drop(s) Both EYES at bedtime  levETIRAcetam  IVPB 750 milliGRAM(s) IV Intermittent every 12 hours  metoprolol tartrate Injectable 5 milliGRAM(s) IV Push every 6 hours  mirtazapine 15 milliGRAM(s) Oral at bedtime  multiple electrolytes Injection Type 1 1000 milliLiter(s) (1000 mL/Hr) IV Continuous <Continuous>  multivitamin 1 Tablet(s) Oral daily  niMODipine Oral Solution 60 milliGRAM(s) Oral every 4 hours  phenylephrine    Infusion 1.5 MICROgram(s)/kG/Min (42 mL/Hr) IV Continuous <Continuous>  polyethylene glycol 3350 17 Gram(s) Oral every 12 hours  senna 2 Tablet(s) Oral at bedtime  sodium chloride 2% + sodium acetate 50:50 1000 milliLiter(s) (70 mL/Hr) IV Continuous <Continuous>  thiamine IVPB 500 milliGRAM(s) IV Intermittent daily    MEDICATIONS  (PRN):  sodium chloride 0.9% lock flush 10 milliLiter(s) IV Push every 1 hour PRN Pre/post blood products, medications, blood draw, and to maintain line patency       NSICU NIGHT PROGRESS NOTE     12h events   s/p intubation for angio today - found spasms - kept intubated off propofol, on precedex and cheryl     Exam   intubated, eyes closed, opens to noxious, peerl, gaze midline, LUE localizes, RUE WD to noxious, BLE WD to AG     VITALS:   Vital Signs Last 24 Hrs  T(C): 37.5 (17 May 2024 19:00), Max: 37.5 (17 May 2024 19:00)  T(F): 99.5 (17 May 2024 19:00), Max: 99.5 (17 May 2024 19:00)  HR: 63 (17 May 2024 22:00) (60 - 124)  BP: 138/75 (17 May 2024 07:55) (107/74 - 147/77)  BP(mean): 99 (17 May 2024 07:55) (85 - 106)  RR: 15 (17 May 2024 22:00) (10 - 41)  SpO2: 100% (17 May 2024 22:00) (93% - 100%)    Parameters below as of 17 May 2024 19:00  Patient On (Oxygen Delivery Method): ventilator    O2 Concentration (%): 40  CAPILLARY BLOOD GLUCOSE        I&O's Summary    16 May 2024 07:01  -  17 May 2024 07:00  --------------------------------------------------------  IN: 2930.7 mL / OUT: 1950 mL / NET: 980.7 mL    17 May 2024 07:01  -  17 May 2024 22:36  --------------------------------------------------------  IN: 2811.6 mL / OUT: 2550 mL / NET: 261.6 mL        Respiratory:  Mode: AC/ CMV (Assist Control/ Continuous Mandatory Ventilation)  RR (machine): 16  TV (machine): 400  FiO2: 40  PEEP: 5  ITime: 1  MAP: 8  PIP: 15    ABG - ( 16 May 2024 14:13 )  pH, Arterial: 7.47  pH, Blood: x     /  pCO2: 32    /  pO2: 130   / HCO3: 23    / Base Excess: 0.2   /  SaO2: 99.0                LABS:                        10.4   9.60  )-----------( 359      ( 17 May 2024 03:34 )             30.9     05-17    137  |  104  |  7   ----------------------------<  182<H>  3.4<L>   |  18<L>  |  0.40<L>        MEDICATION LEVELS:   Ammonia, Serum: 26 umol/L (05-16 @ 15:24)  Ammonia, Serum: 36 umol/L (05-14 @ 13:30)    IVF FLUIDS/MEDICATIONS:   MEDICATIONS  (STANDING):  atorvastatin 40 milliGRAM(s) Oral at bedtime  brimonidine 0.2% Ophthalmic Solution 1 Drop(s) Both EYES every 12 hours  chlorhexidine 0.12% Liquid 15 milliLiter(s) Oral Mucosa every 12 hours  chlorhexidine 4% Liquid 1 Application(s) Topical <User Schedule>  chlorhexidine 4% Liquid 1 Application(s) Topical daily  dexMEDEtomidine Infusion 0.2 MICROgram(s)/kG/Hr (3.74 mL/Hr) IV Continuous <Continuous>  folic acid 1 milliGRAM(s) Oral daily  latanoprost 0.005% Ophthalmic Solution 1 Drop(s) Both EYES at bedtime  levETIRAcetam  IVPB 750 milliGRAM(s) IV Intermittent every 12 hours  metoprolol tartrate Injectable 5 milliGRAM(s) IV Push every 6 hours  mirtazapine 15 milliGRAM(s) Oral at bedtime  multiple electrolytes Injection Type 1 1000 milliLiter(s) (1000 mL/Hr) IV Continuous <Continuous>  multivitamin 1 Tablet(s) Oral daily  niMODipine Oral Solution 60 milliGRAM(s) Oral every 4 hours  phenylephrine    Infusion 1.5 MICROgram(s)/kG/Min (42 mL/Hr) IV Continuous <Continuous>  polyethylene glycol 3350 17 Gram(s) Oral every 12 hours  senna 2 Tablet(s) Oral at bedtime  sodium chloride 2% + sodium acetate 50:50 1000 milliLiter(s) (70 mL/Hr) IV Continuous <Continuous>  thiamine IVPB 500 milliGRAM(s) IV Intermittent daily    MEDICATIONS  (PRN):  sodium chloride 0.9% lock flush 10 milliLiter(s) IV Push every 1 hour PRN Pre/post blood products, medications, blood draw, and to maintain line patency

## 2024-05-17 NOTE — PROGRESS NOTE ADULT - ASSESSMENT
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    PLAN     N  # SAH: s/p angio with vasospasme   nimodipine, TCD  #pmhx seizure cont home keppra  #Pain: Tylenol and oxycodone  #Activity: [] OOB as tolerated [x] Bedrest [] PT [] OT [] PMNR    CV   #-140mmHg off nicardipine gtt   #TTE     P   #respiratory failure, continue current vent setting     R   #Strict I+Os   #hyponatremia unkown chronicity, cont 2%, fu serum osm, bmp, ulystes     GI   #Diet: npo failed slp   #Senna miralax  Last BM pta     ID  afebrile  hx from outside hospital with possible UTI: bcx, ucx pending, UA unremarkable, monitor off antibiotics    E  Goal euglycemia (-180)  #a1c 6.1, ISS     H  #DVT ppx:   SCDs  Chemoppx held for now  LED no dvt      #CODE STATUS: FULL CODE     #DISPOSITION: ICU    #CRITICAL  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    PLAN     N  # SAH: s/p angio with vasospasme   nimodipine, TCD  #AIS R temporo occipital:   MRI wo when possible   aspirin 81mg after 24h   held statin because of LFT   #pmhx seizure cont home keppra  #Pain: Tylenol and oxycodone  #Activity: [] OOB as tolerated [x] Bedrest [] PT [] OT [] PMNR    CV   #-180mmHg off nicardipine gtt   #TTE     P   #respiratory failure, continue current vent setting   sbt   abg    R   #Strict I+Os   #on 2% with resolved hyponatremia     GI   #Diet: npo   #Senna miralax  Last BM pta     ID  hypothermic now resolved   hx from outside hospital with possible UTI: bcx, ucx pending, UA unremarkable, monitor off antibiotics    E  Goal euglycemia (-180)  #a1c 6.1, ISS     H  #DVT ppx:   SCDs  Chemoppx held for now  LED no dvt       #CODE STATUS: FULL CODE     #DISPOSITION: ICU    #CRITICAL

## 2024-05-17 NOTE — PROGRESS NOTE ADULT - ASSESSMENT
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    PLAN     N  # SAH: s/p angio with vasospasme   nimodipine, TCD  #pmhx seizure cont home keppra  #Pain: Tylenol and oxycodone  #Activity: [] OOB as tolerated [x] Bedrest [] PT [] OT [] PMNR    CV   #-140mmHg off nicardipine gtt   #TTE     P   #respiratory failure, continue current vent setting     R   #Strict I+Os   #hyponatremia unkown chronicity, cont 2%, fu serum osm, bmp, ulystes     GI   #Diet: npo failed slp   #Senna miralax  Last BM pta     ID  afebrile  hx from outside hospital with possible UTI: bcx, ucx pending, UA unremarkable, monitor off antibiotics    E  Goal euglycemia (-180)  #a1c 6.1, ISS     H  #DVT ppx:   SCDs  Chemoppx held for now  LED no dvt      #CODE STATUS: FULL CODE     #DISPOSITION: ICU    #CRITICAL

## 2024-05-17 NOTE — CHART NOTE - NSCHARTNOTEFT_GEN_A_CORE
64 YOF, admitted with increase lethargy from her baseline (wheelchair bound), with bilateral convexity SAH and IVH. Imaging thus ar revealed right vertebral artery aneurysm and diffuse narrowing in the anterior and posterior vasculature.     Interventional Neuro Radiology  Pre-Procedure Note    HPI:  65 y/o F, pmh HTN, szs on Keppra, alcoholic cirrhosis, tremors, OA presented from [facility] to OSH w/ generalized weakness found to have b/l sulcal SAH and small occipital IVH. 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 She was recently started on Keflex for UTI. Plts 400, Coags wnl. , AST/ALT = 38/104. WBC 13. Afebrile. Exam: lethargic appearing, EOV, FC, AO3, PERRL, not compliant with EOMs, BUE 5/5, BLE 2/5. (15 May 2024 03:22)      Neuro Exam: Awake and alert, oriented x3, fluent, normal naming and repetition, follows 3 step commands. Extraocular movements intact, no nystagmus, visual fields full, face symmetric, tongue midline. No drift, 5/5 power x 4 extremities. Normal sensation to LT. Normal finger-to-nose and rapid alternating movements.    PAST MEDICAL & SURGICAL HISTORY:  Cirrhosis      Seizure          Social History:   Denies tobacco use    FAMILY HISTORY:    No pertinent family history    Allergies: No Known Allergies      Current Medications: atorvastatin 40 milliGRAM(s) Oral at bedtime  brimonidine 0.2% Ophthalmic Solution 1 Drop(s) Both EYES every 12 hours  chlorhexidine 4% Liquid 1 Application(s) Topical daily  dexMEDEtomidine Infusion 0.2 MICROgram(s)/kG/Hr IV Continuous <Continuous>  folic acid 1 milliGRAM(s) Oral daily  latanoprost 0.005% Ophthalmic Solution 1 Drop(s) Both EYES at bedtime  levETIRAcetam  IVPB 750 milliGRAM(s) IV Intermittent every 12 hours  metoprolol tartrate Injectable 5 milliGRAM(s) IV Push every 6 hours  mirtazapine 15 milliGRAM(s) Oral at bedtime  multivitamin 1 Tablet(s) Oral daily  niMODipine 60 milliGRAM(s) Oral every 4 hours  polyethylene glycol 3350 17 Gram(s) Oral every 12 hours  potassium chloride  10 mEq/100 mL IVPB 10 milliEquivalent(s) IV Intermittent every 1 hour  senna 2 Tablet(s) Oral at bedtime  sodium chloride 2% + sodium acetate 50:50 1000 milliLiter(s) IV Continuous <Continuous>  thiamine IVPB 500 milliGRAM(s) IV Intermittent daily      Labs:                         10.4   9.60  )-----------( 359      ( 17 May 2024 03:34 )             30.9       05-17    136  |  102  |  8   ----------------------------<  102<H>  3.8   |  21<L>  |  0.41<L>    Ca    8.4      17 May 2024 03:34  Phos  2.1     05-16  Mg     2.1     05-16    TPro  6.8  /  Alb  3.2<L>  /  TBili  0.4  /  DBili  x   /  AST  23  /  ALT  49<H>  /  AlkPhos  110  05-17        HCG:     Blood Bank: 05-17-24  B  --  Positive      Assessment/Plan:   This is a 64y ____ hand dominant Female  presents with ______. Patient presents to neuro-IR for selective cerebral angiography. Procedure/ risks/ benefits/ goals/ alternatives were explained. Risks include but are not limited to stroke/ vessel injury/ hemorrhage/ groin hematoma. All questions answered. Informed consent obtained from patient____. Consent placed in chart. Interventional Neuro Radiology  Pre-Procedure Note    HPI:  65 y/o F, pmh HTN, szs on Keppra, alcoholic cirrhosis, tremors, OA presented from [facility] to OSH w/ generalized weakness found to have b/l sulcal SAH and small occipital IVH. 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  Pt was admitted with increase lethargy from her baseline (wheelchair bound), with bilateral convexity SAH and IVH. Imaging revealed right vertebral artery aneurysm and diffuse narrowing in the anterior and posterior vasculature.     Neuro Exam: Lethargic and unarousable with noxious stimulus, spontaneous movements periodically seen of UE and LE    PAST MEDICAL & SURGICAL HISTORY:  Cirrhosis  Seizure    Allergies: No Known Allergies    Current Medications: atorvastatin 40 milliGRAM(s) Oral at bedtime  brimonidine 0.2% Ophthalmic Solution 1 Drop(s) Both EYES every 12 hours  chlorhexidine 4% Liquid 1 Application(s) Topical daily  dexMEDEtomidine Infusion 0.2 MICROgram(s)/kG/Hr IV Continuous <Continuous>  folic acid 1 milliGRAM(s) Oral daily  latanoprost 0.005% Ophthalmic Solution 1 Drop(s) Both EYES at bedtime  levETIRAcetam  IVPB 750 milliGRAM(s) IV Intermittent every 12 hours  metoprolol tartrate Injectable 5 milliGRAM(s) IV Push every 6 hours  mirtazapine 15 milliGRAM(s) Oral at bedtime  multivitamin 1 Tablet(s) Oral daily  niMODipine 60 milliGRAM(s) Oral every 4 hours  polyethylene glycol 3350 17 Gram(s) Oral every 12 hours  potassium chloride  10 mEq/100 mL IVPB 10 milliEquivalent(s) IV Intermittent every 1 hour  senna 2 Tablet(s) Oral at bedtime  sodium chloride 2% + sodium acetate 50:50 1000 milliLiter(s) IV Continuous <Continuous>  thiamine IVPB 500 milliGRAM(s) IV Intermittent daily      Labs:                         10.4   9.60  )-----------( 359      ( 17 May 2024 03:34 )             30.9       05-17    136  |  102  |  8   ----------------------------<  102<H>  3.8   |  21<L>  |  0.41<L>    Ca    8.4      17 May 2024 03:34  Phos  2.1     05-16  Mg     2.1     05-16    TPro  6.8  /  Alb  3.2<L>  /  TBili  0.4  /  DBili  x   /  AST  23  /  ALT  49<H>  /  AlkPhos  110  05-17    Activated Partial Thromboplastin Time in AM (05.17.24 @ 03:34)    Activated Partial Thromboplastin Time: 29.8:   Prothrombin Time and INR, Plasma in AM (05.17.24 @ 03:34)    Prothrombin Time, Plasma: 13.9 sec    INR: 1.33:       Blood Bank: 05-17-24  B  --  Positive      Assessment/Plan:   This is a 64y Female  presents with bilateral convexity SAH and IVH imaging shows right vertebral artery aneurysm and diffuse narrowing in the anterior and posterior vasculature. Patient presents to neuro-IR for selective cerebral angiography with possible vascular intervention. Procedure/ risks/ benefits/ goals/ alternatives were explained. Risks include but are not limited to stroke/ vessel injury/ hemorrhage/ groin hematoma. All questions answered. Informed consent obtained and placed in chart.

## 2024-05-17 NOTE — CHART NOTE - NSCHARTNOTEFT_GEN_A_CORE
EEG preliminary read (not final) on the initial recording hour(s) = till this current time    No seizures recorded.  Moderate slowing noted, nonspecific.  Final report to follow tomorrow morning after completion of study.    [facility] EEG Reading Room Ph#: [phone]  Epilepsy Answering Service after 5PM and before 8:30AM: Ph#: [phone]

## 2024-05-17 NOTE — PROGRESS NOTE ADULT - SUBJECTIVE AND OBJECTIVE BOX
PATIENT SEEN AND EXAMINED BY MYNOR GUAN M.D. ON :- 5/17/24  DATE OF SERVICE: 5/17/24            Interim events noted,Labs ,Radiological studies and Cardiology tests reviewed .    Patient is a 64y old  Female who presents with a chief complaint of SAH (17 May 2024 22:34)      HPI:  Mera Burton  64F, pmh HTN, szs on Keppra, alcoholic cirrhosis, tremors, OA presented from [facility] to OSH w/ generalized weakness found to have b/l sulcal SAH and small occipital IVH. 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 She was recently started on Keflex for UTI. Plts 400, Coags wnl. , AST/ALT = 38/104. WBC 13. Afebrile. Exam: lethargic appearing, EOV, FC, AO3, PERRL, not compliant with EOMs, BUE 5/5, BLE 2/5. (15 May 2024 03:22)      PAST MEDICAL & SURGICAL HISTORY:  Cirrhosis      Seizure          PREVIOUS DIAGNOSTIC TESTING:      ECHO  FINDINGS:    STRESS  FINDINGS:    CATHETERIZATION  FINDINGS:    MEDICATIONS  (STANDING):  atorvastatin 40 milliGRAM(s) Oral at bedtime  brimonidine 0.2% Ophthalmic Solution 1 Drop(s) Both EYES every 12 hours  chlorhexidine 0.12% Liquid 15 milliLiter(s) Oral Mucosa every 12 hours  chlorhexidine 4% Liquid 1 Application(s) Topical <User Schedule>  chlorhexidine 4% Liquid 1 Application(s) Topical daily  dexMEDEtomidine Infusion 0.2 MICROgram(s)/kG/Hr (3.74 mL/Hr) IV Continuous <Continuous>  folic acid 1 milliGRAM(s) Oral daily  latanoprost 0.005% Ophthalmic Solution 1 Drop(s) Both EYES at bedtime  levETIRAcetam  IVPB 750 milliGRAM(s) IV Intermittent every 12 hours  metoprolol tartrate Injectable 5 milliGRAM(s) IV Push every 6 hours  mirtazapine 15 milliGRAM(s) Oral at bedtime  multiple electrolytes Injection Type 1 1000 milliLiter(s) (1000 mL/Hr) IV Continuous <Continuous>  multivitamin 1 Tablet(s) Oral daily  niMODipine Oral Solution 60 milliGRAM(s) Oral every 4 hours  phenylephrine    Infusion 1.5 MICROgram(s)/kG/Min (42 mL/Hr) IV Continuous <Continuous>  polyethylene glycol 3350 17 Gram(s) Oral every 12 hours  senna 2 Tablet(s) Oral at bedtime  sodium chloride 2% + sodium acetate 50:50 1000 milliLiter(s) (70 mL/Hr) IV Continuous <Continuous>  thiamine IVPB 500 milliGRAM(s) IV Intermittent daily    MEDICATIONS  (PRN):  sodium chloride 0.9% lock flush 10 milliLiter(s) IV Push every 1 hour PRN Pre/post blood products, medications, blood draw, and to maintain line patency      FAMILY HISTORY:      SOCIAL HISTORY:    CIGARETTES:    ALCOHOL:    REVIEW OF SYSTEMS:  CONSTITUTIONAL: No fever, weight loss, or fatigue  EYES: No eye pain, visual disturbances, or discharge  ENMT:  No difficulty hearing, tinnitus, vertigo; No sinus or throat pain  NECK: No pain or stiffness  RESPIRATORY: No cough, wheezing, chills or hemoptysis; No shortness of breath  CARDIOVASCULAR: No chest pain, palpitations, dizziness, or leg swelling  GASTROINTESTINAL: No abdominal or epigastric pain. No nausea, vomiting, or hematemesis; No diarrhea or constipation. No melena or hematochezia.  GENITOURINARY: No dysuria, frequency, hematuria, or incontinence  NEUROLOGICAL: No headaches, memory loss, loss of strength, numbness, or tremors  SKIN: No itching, burning, rashes, or lesions   LYMPH NODES: No enlarged glands  ENDOCRINE: No heat or cold intolerance; No hair loss  MUSCULOSKELETAL: No joint pain or swelling; No muscle, back, or extremity pain  PSYCHIATRIC: No depression, anxiety, mood swings, or difficulty sleeping  HEME/LYMPH: No easy bruising, or bleeding gums  ALLERY AND IMMUNOLOGIC: No hives or eczema    Vital Signs Last 24 Hrs  T(C): 37.5 (17 May 2024 19:00), Max: 37.5 (17 May 2024 19:00)  T(F): 99.5 (17 May 2024 19:00), Max: 99.5 (17 May 2024 19:00)  HR: 63 (17 May 2024 22:00) (60 - 124)  BP: 138/75 (17 May 2024 07:55) (107/74 - 147/77)  BP(mean): 99 (17 May 2024 07:55) (85 - 106)  RR: 15 (17 May 2024 22:00) (10 - 41)  SpO2: 100% (17 May 2024 22:00) (93% - 100%)    Parameters below as of 17 May 2024 19:00  Patient On (Oxygen Delivery Method): ventilator    O2 Concentration (%): 40      PHYSICAL EXAM:  GENERAL: NAD, well-groomed, well-developed  HEAD:  Atraumatic, Normocephalic  EYES: EOMI, PERRLA, conjunctiva and sclera clear  ENMT: No tonsillar erythema, exudates, or enlargement; Moist mucous membranes, Good dentition, No lesions  NECK: Supple, No JVD, Normal thyroid  NERVOUS SYSTEM:  Alert & Oriented X3, Good concentration; Motor Strength 5/5 B/L upper and lower extremities; DTRs 2+ intact and symmetric  CHEST/LUNG: Clear to percussion bilaterally; No rales, rhonchi, wheezing, or rubs  HEART: Regular rate and rhythm; No murmurs, rubs, or gallops  ABDOMEN: Soft, Nontender, Nondistended; Bowel sounds present  EXTREMITIES:  2+ Peripheral Pulses, No clubbing, cyanosis, or edema  LYMPH: No lymphadenopathy noted  SKIN: No rashes or lesions      INTERPRETATION OF TELEMETRY:    ECG:    CHADSVASC:     LABS:                        10.4   9.60  )-----------( 359      ( 17 May 2024 03:34 )             30.9     05-17    137  |  104  |  7   ----------------------------<  182<H>  3.4<L>   |  18<L>  |  0.40<L>    Ca    8.3<L>      17 May 2024 13:37  Phos  4.0     05-17  Mg     2.0     05-17    TPro  7.0  /  Alb  3.4  /  TBili  0.6  /  DBili  x   /  AST  24  /  ALT  52<H>  /  AlkPhos  126<H>  05-17        PT/INR - ( 17 May 2024 03:34 )   PT: 13.9 sec;   INR: 1.33 ratio         PTT - ( 17 May 2024 03:34 )  PTT:29.8 sec  Urinalysis Basic - ( 17 May 2024 13:37 )    Color: x / Appearance: x / SG: x / pH: x  Gluc: 182 mg/dL / Ketone: x  / Bili: x / Urobili: x   Blood: x / Protein: x / Nitrite: x   Leuk Esterase: x / RBC: x / WBC x   Sq Epi: x / Non Sq Epi: x / Bacteria: x      Lipid Panel:   I&O's Summary    16 May 2024 07:01  -  17 May 2024 07:00  --------------------------------------------------------  IN: 2930.7 mL / OUT: 1950 mL / NET: 980.7 mL    17 May 2024 07:01  -  17 May 2024 22:38  --------------------------------------------------------  IN: 2811.6 mL / OUT: 2550 mL / NET: 261.6 mL        RADIOLOGY & ADDITIONAL STUDIES:

## 2024-05-17 NOTE — EEG REPORT - NS EEG TEXT BOX
ALPESH ASHLEY [facility]-[id number]     Study Date: 	05- 1921-0800	05-17-24  Duration in hours:  x 10 hr    --------------------------------------------------------------------------------------------------  History:  CC/ HPI Patient is a 64y old  Female who presents with a chief complaint of SAH (17 May 2024 06:55)    MEDICATIONS  (STANDING):  atorvastatin 40 milliGRAM(s) Oral at bedtime  brimonidine 0.2% Ophthalmic Solution 1 Drop(s) Both EYES every 12 hours  chlorhexidine 4% Liquid 1 Application(s) Topical daily  dexMEDEtomidine Infusion 0.2 MICROgram(s)/kG/Hr (3.74 mL/Hr) IV Continuous <Continuous>  folic acid 1 milliGRAM(s) Oral daily  latanoprost 0.005% Ophthalmic Solution 1 Drop(s) Both EYES at bedtime  levETIRAcetam  IVPB 750 milliGRAM(s) IV Intermittent every 12 hours  metoprolol tartrate Injectable 5 milliGRAM(s) IV Push every 6 hours  mirtazapine 15 milliGRAM(s) Oral at bedtime  multivitamin 1 Tablet(s) Oral daily  niMODipine 60 milliGRAM(s) Oral every 4 hours  polyethylene glycol 3350 17 Gram(s) Oral every 12 hours  potassium chloride  10 mEq/100 mL IVPB 10 milliEquivalent(s) IV Intermittent every 1 hour  senna 2 Tablet(s) Oral at bedtime  sodium chloride 2% + sodium acetate 50:50 1000 milliLiter(s) (70 mL/Hr) IV Continuous <Continuous>  thiamine IVPB 500 milliGRAM(s) IV Intermittent daily    --------------------------------------------------------------------------------------------------  Study Interpretation:    [Abbreviation Key:  PDR=alpha rhythm/posterior dominant rhythm. A-P=anterior posterior.  Amplitude: ‘very low’:<20; ‘low’:20-49; ‘medium’:; ‘high’:>150uV.  Persistence for periodic/rhythmic patterns (% of epoch) ‘rare’:<1%; ‘occasional’:1-10%; ‘frequent’:10-50%; ‘abundant’:50-90%; ‘continuous’:>90%.  Persistence for sporadic discharges: ‘rare’:<1/hr; ‘occasional’:1/min-1/hr; ‘frequent’:>1/min; ‘abundant’:>1/10 sec.  RPP=rhythmic and periodic patterns; GRDA=generalized rhythmic delta activity; FIRDA=frontal intermittent GRDA; LRDA=lateralized rhythmic delta activity; TIRDA=temporal intermittent rhythmic delta activity;  LPD=PLED=lateralized periodic discharges; GPD=generalized periodic discharges; BIPDs =bilateral independent periodic discharges; Mf=multifocal; SIRPDs=stimulus induced rhythmic, periodic, or ictal appearing discharges; BIRDs=brief potentially ictal rhythmic discharges >4 Hz, lasting .5-10s; PFA (paroxysmal bursts >13 Hz or =8 Hz <10s).  Modifiers: +F=with fast component; +S=with spike component; +R=with rhythmic component.  S-B=burst suppression pattern.  Max=maximal. N1-drowsy; N2-stage II sleep; N3-slow wave sleep. SSS/BETS=small sharp spikes/benign epileptiform transients of sleep. HV=hyperventilation; PS=photic stimulation]    FINDINGS:      Background:  Continuity: continuous  Symmetry: symmetric  PDR: Absent  Reactivity: present  Voltage: normal (between 20-150uV)  Anterior Posterior Gradient: Absent  Other background findings: none  Breach: absent    Background Slowing:  Generalized slowing: diffuse irregular delta and theta activity.  Focal slowing: none was present.    State Changes:   -Drowsiness noted with increased slowing, attenuation of fast activity, vertex transients.  -N2 sleep transients were not recorded.    Sporadic Epileptiform Discharges:    None    Rhythmic and Periodic Patterns (RPPs):  None     Electrographic and Electroclinical seizures:  None    Other Clinical Events:  Various periods with increased muscle/movement artifacts with nonrhythmic appearing right hand movements, or bilateral LE/head movements without correlating clear EEG abnormality.    Activation Procedures:   -Hyperventilation was not performed.    -Photic stimulation was not performed.     Artifacts:  Frequent myogenic and movement artifacts were noted.    ECG:  The heart rate on single channel ECG was predominantly between 60-70 BPM.    EEG Classification / Summary:  Abnormal EEG study  Moderate generalized background slowing.  Frequent myogenic and movement artifacts were present.    -----------------------------------------------------------------------------------------------------    Clinical Impression:  Moderate diffuse/multi-focal cerebral dysfunction, not specific as to etiology.  There were no epileptiform abnormalities or seizures recorded.    This is a preliminary report pending attending review and attestation.    Sissy Segal MD  Fellow, [facility]      -------------------------------------------------------------------------------------------------------  [facility] EEG Reading Room Ph#: [phone]  Epilepsy Answering Service after 5PM and before 8:30AM: Ph#: [phone]   [facility] ASH[facility]-[id number]     Study Date: 5/16/24 19:21 - 5/17/24 08:00  Duration: 10 hr 4 min    --------------------------------------------------------------------------------------------------  History:  CC/ HPI Patient is a 64y old  Female who presents with a chief complaint of SAH (17 May 2024 06:55)    MEDICATIONS  (STANDING):  atorvastatin 40 milliGRAM(s) Oral at bedtime  brimonidine 0.2% Ophthalmic Solution 1 Drop(s) Both EYES every 12 hours  chlorhexidine 4% Liquid 1 Application(s) Topical daily  dexMEDEtomidine Infusion 0.2 MICROgram(s)/kG/Hr (3.74 mL/Hr) IV Continuous <Continuous>  folic acid 1 milliGRAM(s) Oral daily  latanoprost 0.005% Ophthalmic Solution 1 Drop(s) Both EYES at bedtime  levETIRAcetam  IVPB 750 milliGRAM(s) IV Intermittent every 12 hours  metoprolol tartrate Injectable 5 milliGRAM(s) IV Push every 6 hours  mirtazapine 15 milliGRAM(s) Oral at bedtime  multivitamin 1 Tablet(s) Oral daily  niMODipine 60 milliGRAM(s) Oral every 4 hours  polyethylene glycol 3350 17 Gram(s) Oral every 12 hours  potassium chloride  10 mEq/100 mL IVPB 10 milliEquivalent(s) IV Intermittent every 1 hour  senna 2 Tablet(s) Oral at bedtime  sodium chloride 2% + sodium acetate 50:50 1000 milliLiter(s) (70 mL/Hr) IV Continuous <Continuous>  thiamine IVPB 500 milliGRAM(s) IV Intermittent daily    --------------------------------------------------------------------------------------------------  Study Interpretation:    [Abbreviation Key:  PDR=alpha rhythm/posterior dominant rhythm. A-P=anterior posterior.  Amplitude: ‘very low’:<20; ‘low’:20-49; ‘medium’:; ‘high’:>150uV.  Persistence for periodic/rhythmic patterns (% of epoch) ‘rare’:<1%; ‘occasional’:1-10%; ‘frequent’:10-50%; ‘abundant’:50-90%; ‘continuous’:>90%.  Persistence for sporadic discharges: ‘rare’:<1/hr; ‘occasional’:1/min-1/hr; ‘frequent’:>1/min; ‘abundant’:>1/10 sec.  RPP=rhythmic and periodic patterns; GRDA=generalized rhythmic delta activity; FIRDA=frontal intermittent GRDA; LRDA=lateralized rhythmic delta activity; TIRDA=temporal intermittent rhythmic delta activity;  LPD=PLED=lateralized periodic discharges; GPD=generalized periodic discharges; BIPDs =bilateral independent periodic discharges; Mf=multifocal; SIRPDs=stimulus induced rhythmic, periodic, or ictal appearing discharges; BIRDs=brief potentially ictal rhythmic discharges >4 Hz, lasting .5-10s; PFA (paroxysmal bursts >13 Hz or =8 Hz <10s).  Modifiers: +F=with fast component; +S=with spike component; +R=with rhythmic component.  S-B=burst suppression pattern.  Max=maximal. N1-drowsy; N2-stage II sleep; N3-slow wave sleep. SSS/BETS=small sharp spikes/benign epileptiform transients of sleep. HV=hyperventilation; PS=photic stimulation]    FINDINGS:      Background:  Continuity: continuous  Symmetry: symmetric  PDR: Absent  Voltage: normal  Anterior Posterior Gradient: Absent  Other background findings: Predominant background consists of diffuse polymorphic delta slowing with intermittent theta/alpha and frequent GRDA at 1.5 Hz.  Breach: absent    Background Slowing:  Generalized slowing: as above  Focal slowing: none    State Changes:   Drowsiness is characterized by fragmentation, attenuation, and slowing of the background activity.  No normal stage 2 sleep.    Sporadic Epileptiform Discharges:    None    Rhythmic and Periodic Patterns (RPPs):  None     Electrographic and Electroclinical seizures:  None    Other Clinical Events:  Various periods with increased muscle/movement artifacts with nonrhythmic intermittent right hand movements or twitching/trembling, or bilateral LE/head movements, or grimacing of left face, without EEG correlate. Interpretation at times limited by diffuse muscle artifact.    Activation Procedures:   -Hyperventilation was not performed.    -Photic stimulation was not performed.     Artifacts:  Frequent myogenic and movement artifacts are present.    Single-lead EKG: Regular rhythm. At times limited by artifact.    EEG Classification / Summary:  Abnormal video-EEG in the awake, drowsy states.  Moderate generalized background slowing.  No epileptiform abnormalities.  Frequent myogenic and movement artifacts are present.    Clinical Impression:  Moderate diffuse/multi-focal cerebral dysfunction, not specific as to etiology.  No epileptiform abnormalities or seizures.        -------------------------------------------------------------------------------------------------------    Sissy Segal MD  Fellow, [facility] [place]    Marguerite Eastman MD  Attending Physician, [facility] EEG Reading Room Ph#: [phone]  Epilepsy Answering Service after 5PM and before 8:30AM: Ph#: [phone]

## 2024-05-17 NOTE — CHART NOTE - NSCHARTNOTEFT_GEN_A_CORE
Interventional Neuro- Radiology   Procedure Note      Procedure: Selective Cerebral Angiography with vasospasm treatment  Pre- Procedure Diagnosis: bilateral convexity SAH and IVH  Post- Procedure Diagnosis: bilateral convexity SAH and IVH    : Dr. Danyell Brooks  Fellow: MD Dr. Marii Shafer MD Dr. White, MD   Physician Assistant: Micheal Grove PA-C    RN:  Tech:    Anesthesia: (MAC)   (general anesthesia)    I/Os:  Fluids:  Zamarripa:  Contrast:  Estimated Blood Loss: <10cc    Preliminary Report:  Under MAC/ general anesthesia, using a ___Fr short/long sheath to the right/ left/ bilateral groin examination of left vertebral artery/ left internal carotid artery/ left external carotid artery/ right vertebral artery/ right internal carotid artery/ right external carotid artery via selective cerebral angiography demonstrates ________. ( Official note to follow).    Patient tolerated procedure well, vital signs stable, hemodynamically stable, no change in neurological status compared to baseline. Results discussed with neurosurgery/ patient and their family. Groin sheath d/c'ed, manual compression held to hemostasis, no active bleeding, no hematoma, Vascade applied, quick clot and safeguard balloon dressing applied at _____h. Patient transferred to PACU/ NSCU/ IR recovery for further care/ monitoring.     Vascular access site:  Ultrasound guidance- yes  Fluoroscopy before procedure- yes   Fluoroscopy to confirm correct needle position after puncture and before advancing sheath- yes  Femoral artery angiography before sheath removal- yes  Closure device used- Vascade/ Celt  Closure device appropriately deployed- yes  Manual pressure- held for 10minutes until hemostasis, no active bleeding or hematoma  Safeguard dressing applied- yes, applied at ___h. Interventional Neuro- Radiology   Procedure Note      Procedure: Selective Cerebral Angiography with vasospasm treatment  Pre- Procedure Diagnosis: bilateral convexity SAH and IVH  Post- Procedure Diagnosis: bilateral convexity SAH and IVH    : Dr. Danyell Brooks  Fellow: Dr. London Mascorro  Physician Assistant: Micheal Grove PA-C    RN: Ginette Ford  Tech: Andre Carmona    Anesthesia: (general anesthesia) Dr. Ha Ford    I/Os:  Fluids: 1500 cc  Zamarripa: 300 cc  Contrast: 80 cc  Estimated Blood Loss: <10cc    Preliminary Report:  Under general anesthesia, using a ___Fr short/long sheath to the right/ left/ bilateral groin examination of left vertebral artery/ left internal carotid artery/ left external carotid artery/ right vertebral artery/ right internal carotid artery/ right external carotid artery via selective cerebral angiography demonstrates ________. ( Official note to follow).    ERIKA - milrinone 8 mg verapamil 5 mg  R Vert- milrinone 8 mg verapamil 5 mg  LICA - milrinone 8 mg verapamil 5 mg  L Vert - milrinone 8 mg verapamil 5 mg    Patient tolerated procedure well, vital signs stable, hemodynamically stable, no change in neurological status compared to baseline. Results discussed with neurosurgery/ patient and their family. Groin sheath d/c'ed, manual compression held to hemostasis, no active bleeding, no hematoma, Vascade applied, quick clot and safeguard balloon dressing applied at _____h. Patient transferred to PACU/ NSCU/ IR recovery for further care/ monitoring.     Vascular access site:  Ultrasound guidance- yes  Fluoroscopy before procedure- yes   Fluoroscopy to confirm correct needle position after puncture and before advancing sheath- yes  Femoral artery angiography before sheath removal- yes  Closure device used- Vascade/ Celt  Closure device appropriately deployed- yes  Manual pressure- held for 10minutes until hemostasis, no active bleeding or hematoma  Safeguard dressing applied- yes, applied at ___h. Interventional Neuro- Radiology   Procedure Note      Procedure: Selective Cerebral Angiography with vasospasm treatment  Pre- Procedure Diagnosis: bilateral convexity SAH and IVH  Post- Procedure Diagnosis: bilateral convexity SAH and IVH s/p IA verapamil and milrinone    : Dr. Danyell Brooks  Fellow: Dr. London Mascorro  Physician Assistant: Micheal Grove PA-C    RN: Ginette Ford  Tech: Andre Carmona    Anesthesia: (general anesthesia) Dr. Ha Jimenez     I/Os:  Fluids: 1500 cc  Zamarripa: 300 cc  Contrast: 88 cc  Estimated Blood Loss: <10cc    Preliminary Report:  Under general anesthesia, using a 5 Fr long sheath to the right groin examination of left vertebral artery/ left internal carotid artery/ right vertebral artery/ right internal carotid artery/ via selective cerebral angiography demonstrates severe vasospasm of bilateral anterior and posterior circulation. Improved after IA verapamil and milrinone.     ERIKA - milrinone 8 mg verapamil 5 mg  R Vert- milrinone 8 mg verapamil 5 mg  LICA - milrinone 8 mg verapamil 5 mg  L Vert - milrinone 8 mg verapamil 5 mg    Official dictated report to follow    Patient tolerated procedure well, vital signs stable, hemodynamically stable. Patient was kept intubated after the procedure as per Anesthesia team. Results discussed with neurosurgery and patient's family. Groin sheath d/c'ed, manual compression held to hemostasis, no active bleeding, no hematoma, Vascade applied, quick clot and safeguard balloon dressing applied at 11:00 hours. Patient transferred back to NSCU for further care/ monitoring.     Vascular access site:  Ultrasound guidance- yes  Fluoroscopy before procedure- yes   Fluoroscopy to confirm correct needle position after puncture and before advancing sheath- yes  Femoral artery angiography before sheath removal- yes  Closure device used- yes  Closure device appropriately deployed- yes  Manual pressure- held for 10 minutes until hemostasis, no active bleeding or hematoma  Safeguard dressing applied- yes, applied at 11:00 hours

## 2024-05-17 NOTE — PROGRESS NOTE ADULT - SUBJECTIVE AND OBJECTIVE BOX
64F pmh Dementia (Polysubstance Abuse, resides in NH) with dyskinesias, HTN, szs on Keppra, alcoholic cirrhosis, tremors, OA presented from [facility] to  w/ AMS & generalized weakness. 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 Started on Keflex for UTI at Nursing Home. Per NH [facility] independent in ADLs, conversant but forgetful and confused, wheelchair bound.    HOSPITAL COURSE:  5/15 transferred to [facility] for workup of nontraumatic convexity SAH  5/16 Stable exam. Possible MRI today  5/17 Worsening exam. CT/CTA showed severe vasospasm with right temporo-occipital infarct. Angiogram today    Admission Scores  GCS: 14 HH: 2 MF: 2 NIHSS: RASS: CAM-ICU: ICH:    24 hour Events:    Allergies    No Known Allergies    Intolerances      REVIEW OF SYSTEMS: [X] Unable to Assess due to neurologic exam [ ] All ROS addressed below are non-contributory, except:  Neuro: [ ] Headache [ ] Back pain [ ] Numbness [ ] Weakness [ ] Ataxia [ ] Dizziness [ ] Aphasia [ ] Dysarthria [ ] Visual disturbance  Resp: [ ] Shortness of breath/dyspnea, [ ] Orthopnea [ ] Cough  CV: [ ] Chest pain [ ] Palpitation [ ] Lightheadedness [ ] Syncope  Renal: [ ] Thirst [ ] Edema  GI: [ ] Nausea [ ] Emesis [ ] Abdominal pain [ ] Constipation [ ] Diarrhea  Hem: [ ] Hematemesis [ ] bright red blood per rectum  ID: [ ] Fever [ ] Chills [ ] Dysuria  ENT: [ ] Rhinorrhea      DEVICES:  [ ] Restraints [ ] ET tube [ ] central line [ ] arterial line [ ] lang [ ] NGT/OGT [ ] EVD [ ] LD [ ] STANISLAV/HMV [ ] Trach [ ] PEG [ ] Chest Tube    VITALS:  Vital Signs Last 24 Hrs  T(C): 36.7 (05-17-24 @ 03:00), Max: 36.7 (05-16-24 @ 11:00)  HR: 97 (05-17-24 @ 06:00) (81 - 122)  BP: 138/75 (05-17-24 @ 06:00) (103/69 - 167/88)  BP(mean): 99 (05-17-24 @ 06:00) (77 - 118)  RR: 20 (05-17-24 @ 06:00) (12 - 61)  SpO2: 99% (05-17-24 @ 06:00) (97% - 100%)    atorvastatin 40 milliGRAM(s) Oral at bedtime  brimonidine 0.2% Ophthalmic Solution 1 Drop(s) Both EYES every 12 hours  chlorhexidine 4% Liquid 1 Application(s) Topical daily  dexMEDEtomidine Infusion 0.2 MICROgram(s)/kG/Hr IV Continuous <Continuous>  folic acid 1 milliGRAM(s) Oral daily  latanoprost 0.005% Ophthalmic Solution 1 Drop(s) Both EYES at bedtime  levETIRAcetam  IVPB 750 milliGRAM(s) IV Intermittent every 12 hours  metoprolol tartrate Injectable 5 milliGRAM(s) IV Push every 6 hours  mirtazapine 15 milliGRAM(s) Oral at bedtime  multivitamin 1 Tablet(s) Oral daily  niMODipine 60 milliGRAM(s) Oral every 4 hours  polyethylene glycol 3350 17 Gram(s) Oral every 12 hours  potassium chloride  10 mEq/100 mL IVPB 10 milliEquivalent(s) IV Intermittent every 1 hour  senna 2 Tablet(s) Oral at bedtime  sodium chloride 2% + sodium acetate 50:50 1000 milliLiter(s) IV Continuous <Continuous>  thiamine IVPB 500 milliGRAM(s) IV Intermittent daily        05-16-24 @ 07:01  -  05-17-24 @ 07:00  --------------------------------------------------------  IN: 2777.4 mL / OUT: 1950 mL / NET: 827.4 mL    < from: MR Head No Cont (05.16.24 @ 17:01) >  Ventricular and sulcal prominence is consistent with mild to moderate   atrophy. There is diffuse hemosiderin staining overlying the cerebral   convexity bilaterally the occipital horns and the cerebellar folia.   Diffusion restriction in the right posterior temporal occipital region is   suggestive of an acute infarct in the right middle cerebral artery   territory. There is also diffusion restriction in the left insular   cortex. There is a small right temporal occipital subdural hematoma and   is bright signal intensity on the T2 FLAIR images in the   parieto-occipital sulci which is likely related to additional   subarachnoid hemorrhage. Mild ventricular prominence is identified with   prominence of the temporal horns. There are slightly prominent superior   ophthalmic veins.    3-D axial noncontrast MRA were performed on the cervical and intracranial   vessels, respectively. Intravascular flow quantification was performed   using gated 2D phase contrast MR, imaged perpendicular to the vessel   axis.  Images were post processed NOVA software and a NOVA flow study   report is available.    As on the CTA, there was there is severe bilateral distal vertebral   artery stenosis as well as severe mid to distal basilar artery stenosis.   There is bilateral middle cerebral artery M1 stenosis. There are small   anterior cerebral arteries bilaterally. Artery is identified as are the  A2 segments. Diffuse cerebral arteries are visualized. The left posterior   cerebral artery appears to be supplied by a prominent posterior   communicating artery.    Diminished intracranial flow is identified in the anterior and posterior   circulation.    < end of copied text >        EXAMINATION:  PHYSICAL EXAM:    Neurological: Lethargic, eyes open to noxious, SHEMAR, not FC BUE localizes, BLE 2/5    Pulmonary: Clear to Auscultation, No rales, No rhonchi, No wheezes    Cardiovascular: S1, S2, Regular rate and rhythm    Gastrointestinal: Soft, Non-tender, Non-distended    Extremities: No calf tenderness    Incision: dry and clean

## 2024-05-17 NOTE — PRE-ANESTHESIA EVALUATION ADULT - NSANTHADDINFOFT_GEN_ALL_CORE
Interventional Radiology requesting GETA.  All R/B/A d/w fellow including prolonged postoperative intubation

## 2024-05-18 LAB
ANION GAP SERPL CALC-SCNC: 13 MMOL/L — SIGNIFICANT CHANGE UP (ref 5–17)
ANION GAP SERPL CALC-SCNC: 14 MMOL/L — SIGNIFICANT CHANGE UP (ref 5–17)
ANION GAP SERPL CALC-SCNC: 14 MMOL/L — SIGNIFICANT CHANGE UP (ref 5–17)
BASE EXCESS BLDA CALC-SCNC: -1.7 MMOL/L — SIGNIFICANT CHANGE UP (ref -2–3)
BASOPHILS # BLD AUTO: 0 K/UL — SIGNIFICANT CHANGE UP (ref 0–0.2)
BASOPHILS NFR BLD AUTO: 0 % — SIGNIFICANT CHANGE UP (ref 0–2)
BUN SERPL-MCNC: 5 MG/DL — LOW (ref 7–23)
BUN SERPL-MCNC: 5 MG/DL — LOW (ref 7–23)
BUN SERPL-MCNC: <4 MG/DL — LOW (ref 7–23)
BURR CELLS BLD QL SMEAR: PRESENT — SIGNIFICANT CHANGE UP
CALCIUM SERPL-MCNC: 8.9 MG/DL — SIGNIFICANT CHANGE UP (ref 8.4–10.5)
CALCIUM SERPL-MCNC: 9.1 MG/DL — SIGNIFICANT CHANGE UP (ref 8.4–10.5)
CALCIUM SERPL-MCNC: 9.4 MG/DL — SIGNIFICANT CHANGE UP (ref 8.4–10.5)
CHLORIDE SERPL-SCNC: 103 MMOL/L — SIGNIFICANT CHANGE UP (ref 96–108)
CHLORIDE SERPL-SCNC: 106 MMOL/L — SIGNIFICANT CHANGE UP (ref 96–108)
CHLORIDE SERPL-SCNC: 108 MMOL/L — SIGNIFICANT CHANGE UP (ref 96–108)
CO2 BLDA-SCNC: 24 MMOL/L — SIGNIFICANT CHANGE UP (ref 19–24)
CO2 SERPL-SCNC: 20 MMOL/L — LOW (ref 22–31)
CO2 SERPL-SCNC: 21 MMOL/L — LOW (ref 22–31)
CO2 SERPL-SCNC: 23 MMOL/L — SIGNIFICANT CHANGE UP (ref 22–31)
CREAT SERPL-MCNC: 0.41 MG/DL — LOW (ref 0.5–1.3)
CREAT SERPL-MCNC: 0.41 MG/DL — LOW (ref 0.5–1.3)
CREAT SERPL-MCNC: 0.45 MG/DL — LOW (ref 0.5–1.3)
EGFR: 109 ML/MIN/1.73M2 — SIGNIFICANT CHANGE UP
EGFR: 111 ML/MIN/1.73M2 — SIGNIFICANT CHANGE UP
EGFR: 111 ML/MIN/1.73M2 — SIGNIFICANT CHANGE UP
ELLIPTOCYTES BLD QL SMEAR: SLIGHT — SIGNIFICANT CHANGE UP
EOSINOPHIL # BLD AUTO: 0 K/UL — SIGNIFICANT CHANGE UP (ref 0–0.5)
EOSINOPHIL NFR BLD AUTO: 0 % — SIGNIFICANT CHANGE UP (ref 0–6)
GAS PNL BLDA: SIGNIFICANT CHANGE UP
GAS PNL BLDA: SIGNIFICANT CHANGE UP
GLUCOSE SERPL-MCNC: 124 MG/DL — HIGH (ref 70–99)
GLUCOSE SERPL-MCNC: 132 MG/DL — HIGH (ref 70–99)
GLUCOSE SERPL-MCNC: 134 MG/DL — HIGH (ref 70–99)
HCO3 BLDA-SCNC: 23 MMOL/L — SIGNIFICANT CHANGE UP (ref 21–28)
HCT VFR BLD CALC: 30 % — LOW (ref 34.5–45)
HGB BLD-MCNC: 10.2 G/DL — LOW (ref 11.5–15.5)
LYMPHOCYTES # BLD AUTO: 12.3 % — LOW (ref 13–44)
LYMPHOCYTES # BLD AUTO: 2.09 K/UL — SIGNIFICANT CHANGE UP (ref 1–3.3)
MAGNESIUM SERPL-MCNC: 1.9 MG/DL — SIGNIFICANT CHANGE UP (ref 1.6–2.6)
MAGNESIUM SERPL-MCNC: 2 MG/DL — SIGNIFICANT CHANGE UP (ref 1.6–2.6)
MAGNESIUM SERPL-MCNC: 2.1 MG/DL — SIGNIFICANT CHANGE UP (ref 1.6–2.6)
MANUAL SMEAR VERIFICATION: SIGNIFICANT CHANGE UP
MCHC RBC-ENTMCNC: 27.6 PG — SIGNIFICANT CHANGE UP (ref 27–34)
MCHC RBC-ENTMCNC: 34 GM/DL — SIGNIFICANT CHANGE UP (ref 32–36)
MCV RBC AUTO: 81.1 FL — SIGNIFICANT CHANGE UP (ref 80–100)
MONOCYTES # BLD AUTO: 1.63 K/UL — HIGH (ref 0–0.9)
MONOCYTES NFR BLD AUTO: 9.6 % — SIGNIFICANT CHANGE UP (ref 2–14)
NEUTROPHILS # BLD AUTO: 13.27 K/UL — HIGH (ref 1.8–7.4)
NEUTROPHILS NFR BLD AUTO: 78.1 % — HIGH (ref 43–77)
PCO2 BLDA: 38 MMHG — SIGNIFICANT CHANGE UP (ref 32–45)
PH BLDA: 7.39 — SIGNIFICANT CHANGE UP (ref 7.35–7.45)
PHOSPHATE SERPL-MCNC: 2.2 MG/DL — LOW (ref 2.5–4.5)
PHOSPHATE SERPL-MCNC: 2.8 MG/DL — SIGNIFICANT CHANGE UP (ref 2.5–4.5)
PHOSPHATE SERPL-MCNC: 3.1 MG/DL — SIGNIFICANT CHANGE UP (ref 2.5–4.5)
PLAT MORPH BLD: NORMAL — SIGNIFICANT CHANGE UP
PLATELET # BLD AUTO: 450 K/UL — HIGH (ref 150–400)
PO2 BLDA: 199 MMHG — HIGH (ref 83–108)
POIKILOCYTOSIS BLD QL AUTO: SLIGHT — SIGNIFICANT CHANGE UP
POTASSIUM SERPL-MCNC: 3.5 MMOL/L — SIGNIFICANT CHANGE UP (ref 3.5–5.3)
POTASSIUM SERPL-MCNC: 4.1 MMOL/L — SIGNIFICANT CHANGE UP (ref 3.5–5.3)
POTASSIUM SERPL-MCNC: 4.2 MMOL/L — SIGNIFICANT CHANGE UP (ref 3.5–5.3)
POTASSIUM SERPL-SCNC: 3.5 MMOL/L — SIGNIFICANT CHANGE UP (ref 3.5–5.3)
POTASSIUM SERPL-SCNC: 4.1 MMOL/L — SIGNIFICANT CHANGE UP (ref 3.5–5.3)
POTASSIUM SERPL-SCNC: 4.2 MMOL/L — SIGNIFICANT CHANGE UP (ref 3.5–5.3)
RBC # BLD: 3.7 M/UL — LOW (ref 3.8–5.2)
RBC # FLD: 12.8 % — SIGNIFICANT CHANGE UP (ref 10.3–14.5)
RBC BLD AUTO: SIGNIFICANT CHANGE UP
SAO2 % BLDA: 99.9 % — HIGH (ref 94–98)
SODIUM SERPL-SCNC: 138 MMOL/L — SIGNIFICANT CHANGE UP (ref 135–145)
SODIUM SERPL-SCNC: 142 MMOL/L — SIGNIFICANT CHANGE UP (ref 135–145)
SODIUM SERPL-SCNC: 142 MMOL/L — SIGNIFICANT CHANGE UP (ref 135–145)
WBC # BLD: 16.99 K/UL — HIGH (ref 3.8–10.5)
WBC # FLD AUTO: 16.99 K/UL — HIGH (ref 3.8–10.5)

## 2024-05-18 PROCEDURE — 95718 EEG PHYS/QHP 2-12 HR W/VEEG: CPT

## 2024-05-18 PROCEDURE — 99291 CRITICAL CARE FIRST HOUR: CPT

## 2024-05-18 PROCEDURE — 70450 CT HEAD/BRAIN W/O DYE: CPT | Mod: 26

## 2024-05-18 RX ORDER — POTASSIUM CHLORIDE 20 MEQ
40 PACKET (EA) ORAL ONCE
Refills: 0 | Status: COMPLETED | OUTPATIENT
Start: 2024-05-18 | End: 2024-05-18

## 2024-05-18 RX ORDER — PANTOPRAZOLE SODIUM 20 MG/1
40 TABLET, DELAYED RELEASE ORAL DAILY
Refills: 0 | Status: DISCONTINUED | OUTPATIENT
Start: 2024-05-18 | End: 2024-05-22

## 2024-05-18 RX ORDER — ENOXAPARIN SODIUM 100 MG/ML
40 INJECTION SUBCUTANEOUS
Refills: 0 | Status: DISCONTINUED | OUTPATIENT
Start: 2024-05-18 | End: 2024-06-03

## 2024-05-18 RX ORDER — DEXAMETHASONE 0.5 MG/5ML
8 ELIXIR ORAL ONCE
Refills: 0 | Status: COMPLETED | OUTPATIENT
Start: 2024-05-18 | End: 2024-05-18

## 2024-05-18 RX ORDER — SODIUM CHLORIDE 9 MG/ML
1000 INJECTION, SOLUTION INTRAVENOUS ONCE
Refills: 0 | Status: COMPLETED | OUTPATIENT
Start: 2024-05-18 | End: 2024-05-18

## 2024-05-18 RX ORDER — FENTANYL CITRATE 50 UG/ML
25 INJECTION INTRAVENOUS ONCE
Refills: 0 | Status: DISCONTINUED | OUTPATIENT
Start: 2024-05-18 | End: 2024-05-18

## 2024-05-18 RX ORDER — SODIUM CHLORIDE 9 MG/ML
1000 INJECTION INTRAMUSCULAR; INTRAVENOUS; SUBCUTANEOUS ONCE
Refills: 0 | Status: DISCONTINUED | OUTPATIENT
Start: 2024-05-18 | End: 2024-05-18

## 2024-05-18 RX ORDER — SODIUM CHLORIDE 9 MG/ML
1000 INJECTION INTRAMUSCULAR; INTRAVENOUS; SUBCUTANEOUS
Refills: 0 | Status: DISCONTINUED | OUTPATIENT
Start: 2024-05-18 | End: 2024-05-23

## 2024-05-18 RX ORDER — MAGNESIUM SULFATE 500 MG/ML
1 VIAL (ML) INJECTION ONCE
Refills: 0 | Status: COMPLETED | OUTPATIENT
Start: 2024-05-18 | End: 2024-05-19

## 2024-05-18 RX ORDER — POTASSIUM PHOSPHATE, MONOBASIC POTASSIUM PHOSPHATE, DIBASIC 236; 224 MG/ML; MG/ML
15 INJECTION, SOLUTION INTRAVENOUS ONCE
Refills: 0 | Status: DISCONTINUED | OUTPATIENT
Start: 2024-05-18 | End: 2024-05-18

## 2024-05-18 RX ADMIN — CHLORHEXIDINE GLUCONATE 15 MILLILITER(S): 213 SOLUTION TOPICAL at 05:18

## 2024-05-18 RX ADMIN — CHLORHEXIDINE GLUCONATE 15 MILLILITER(S): 213 SOLUTION TOPICAL at 17:08

## 2024-05-18 RX ADMIN — SODIUM CHLORIDE 70 MILLILITER(S): 9 INJECTION INTRAMUSCULAR; INTRAVENOUS; SUBCUTANEOUS at 19:14

## 2024-05-18 RX ADMIN — CHLORHEXIDINE GLUCONATE 1 APPLICATION(S): 213 SOLUTION TOPICAL at 05:18

## 2024-05-18 RX ADMIN — NIMODIPINE 60 MILLIGRAM(S): 60 SOLUTION ORAL at 02:53

## 2024-05-18 RX ADMIN — SODIUM CHLORIDE 70 MILLILITER(S): 9 INJECTION INTRAMUSCULAR; INTRAVENOUS; SUBCUTANEOUS at 10:00

## 2024-05-18 RX ADMIN — Medication 85 MILLIMOLE(S): at 05:21

## 2024-05-18 RX ADMIN — LATANOPROST 1 DROP(S): 0.05 SOLUTION/ DROPS OPHTHALMIC; TOPICAL at 21:56

## 2024-05-18 RX ADMIN — Medication 105 MILLIGRAM(S): at 12:16

## 2024-05-18 RX ADMIN — Medication 40 MILLIEQUIVALENT(S): at 11:11

## 2024-05-18 RX ADMIN — PANTOPRAZOLE SODIUM 40 MILLIGRAM(S): 20 TABLET, DELAYED RELEASE ORAL at 11:12

## 2024-05-18 RX ADMIN — Medication 1 TABLET(S): at 12:16

## 2024-05-18 RX ADMIN — BRIMONIDINE TARTRATE 1 DROP(S): 2 SOLUTION/ DROPS OPHTHALMIC at 05:19

## 2024-05-18 RX ADMIN — FENTANYL CITRATE 25 MICROGRAM(S): 50 INJECTION INTRAVENOUS at 08:21

## 2024-05-18 RX ADMIN — SODIUM CHLORIDE 1000 MILLILITER(S): 9 INJECTION, SOLUTION INTRAVENOUS at 21:35

## 2024-05-18 RX ADMIN — LEVETIRACETAM 400 MILLIGRAM(S): 250 TABLET, FILM COATED ORAL at 05:19

## 2024-05-18 RX ADMIN — PHENYLEPHRINE HYDROCHLORIDE 42 MICROGRAM(S)/KG/MIN: 10 INJECTION INTRAVENOUS at 19:12

## 2024-05-18 RX ADMIN — Medication 101.6 MILLIGRAM(S): at 11:21

## 2024-05-18 RX ADMIN — BRIMONIDINE TARTRATE 1 DROP(S): 2 SOLUTION/ DROPS OPHTHALMIC at 17:14

## 2024-05-18 RX ADMIN — FENTANYL CITRATE 25 MICROGRAM(S): 50 INJECTION INTRAVENOUS at 09:00

## 2024-05-18 RX ADMIN — ENOXAPARIN SODIUM 40 MILLIGRAM(S): 100 INJECTION SUBCUTANEOUS at 17:07

## 2024-05-18 RX ADMIN — LEVETIRACETAM 400 MILLIGRAM(S): 250 TABLET, FILM COATED ORAL at 17:07

## 2024-05-18 RX ADMIN — CHLORHEXIDINE GLUCONATE 1 APPLICATION(S): 213 SOLUTION TOPICAL at 17:07

## 2024-05-18 RX ADMIN — Medication 1 MILLIGRAM(S): at 12:16

## 2024-05-18 NOTE — AIRWAY REMOVAL NOTE  ADULT & PEDS - ARTIFICAL AIRWAY REMOVAL COMMENTS
Written order for extubation verified. The patient was identified by full name and birth date compared to the identification band. Present during the procedure was Dr. Andre, no Stridor noted.

## 2024-05-18 NOTE — PROGRESS NOTE ADULT - CRITICAL CARE ATTENDING COMMENT
SAH, unknow etiology, angio did not show cerebral aneurysm per neuroIC but  severe vasospasm of bilateral anterior and posterior circulation. Improved after IA verapamil and milrinone. MRI showed small subdural ollection, diffuse hemosiderin staingn  of occipital cortex   video EEG neg for seizure, follow up EEG, keppra 750 mg BID was on it as outpatient ,  neuro checks q 1 hr, CT head showed large right occipital temp ischemic stroke, stable on today CT pedniung offical read,  would consult neurology, input on etiology of SAH, will get MRI cervical spine, SBP goal 140-180 mmhg given severe CVS, on phenyephrine, keep euvolemia , nimodipine discontinued ,  2 %   50 ml/hr, NS 70 ml/hr, BMP q 8 hr , acute rtespiratory faiklure, CPAP wean to extubation  , decadron 8 mg x 1 weak cuff leak, ABG in 2 hours, NPO for now, has OG, lovenox 40 mg sc qhs     critical at risk of stroke, brain edema

## 2024-05-18 NOTE — CONSULT NOTE ADULT - ASSESSMENT
ASSESSMENT   64 year old female with past medical history significant for HTN, seizure disorder on Keppra 750 mg BID, alcoholic cirrhosis, tremors, osteoarthritis transferred for SAH from [place](5/15). [facility].     IMPRESSION   SAH with vasospasm.    RECOMMENDATION incomplete  [] f/u MRI brain w/ IV contrast + MRI c-spine w/w/o IV contrast  [] BP regimen per NSCU/NSGY  [] continue home keppra 750 mg BID      Patient to be seen by team and attending. Note finalized upon attending attestation.  ASSESSMENT   64 year old female with past medical history significant for HTN, seizure disorder on Keppra 750 mg BID, alcoholic cirrhosis, tremors, osteoarthritis transferred for SAH from [place](5/15). [facility].     IMPRESSION   SAH with vasospasm, etiology unclear, does not appear to be traumatic. Elevated inflammatory markers, consider further workup for vasculitis    RECOMMENDATION   [] f/u MRI brain w/ IV contrast + MRI c-spine w/w/o IV contrast  [] TIMOTHY, antiphospholipid Ab, ANCA, serum C3, C4, cryoglobulin, SPEP, UPEP, quant immunglobulin levels, Ab SSA/SSB, dsDNA  [] consider LP if no medical contraindications, to rule out infectious or malignant etiologies  [] continue home keppra 750 mg BID    Discussed with on call stroke fellow Dr. Linton under supervision of Dr. Mckinney. Patient to be seen by team and attending.   Note and recommendations finalized upon attending attestation.  ASSESSMENT   64 year old female with past medical history significant for HTN, seizure disorder on Keppra 750 mg BID, alcoholic cirrhosis, tremors, osteoarthritis transferred for SAH from [place](5/15). [facility].     IMPRESSION   SAH with vasospasm, etiology unclear, does not appear to be traumatic. Elevated inflammatory markers, consider further workup for vasculitis    RECOMMENDATION   [] f/u MRI brain w/ IV contrast + MRI c-spine w/w/o IV contrast  [] TIMOTHY, antiphospholipid Ab, ANCA, serum C3, C4, cryoglobulin, SPEP, UPEP, quant immunglobulin levels, Ab SSA/SSB, dsDNA  [] consider LP if no medical contraindications, to rule out infectious or malignant etiologies  [] continue home keppra 750 mg BID    Discussed with on call stroke fellow Dr. Linton under supervision of Dr. Mckinney.   Patient to be seen by team and attending in AM.   Note and recommendations finalized upon attending attestation.

## 2024-05-18 NOTE — CONSULT NOTE ADULT - NSCONSULTADDITIONALINFOA_GEN_ALL_CORE
Neurovascular Fellow Attestation    64F with b/l sulcal nontraumatic SAH, HH2, MF2.  Initially presented to [place] from NH with generalized weakness and transferred to [facility] on 5/15 for further interventional management. Found to have 5mm fusiform right V4 aneurysm and angiogram on 5/17 with vasospasm.  and CRP 71; risk factors for DCI in the setting of aneurysmal SAH. PMH of HTN, seizures on keppra, alcoholic cirrhosis, tremors, polysubstance abuse, OA. CTH from 5/17 with right Temporo-parietal hypodensity but obscured by motion artifact and not adequately seen on repeat CTH on 5/18. I would recommend repeat MRI wwo contrast to evaluate for new ischemic strokes likely secondary to delayed cerebral ischemia in the setting of aneurysmal SAH. Consider TCD’s and repeat angiogram. Would recommend further management as per neurosurgery, neuro intervention, and neuro ICU.     Royer Garibay  Neurovascular Fellow

## 2024-05-18 NOTE — CONSULT NOTE ADULT - ATTENDING COMMENTS
I reviewed available diagnostic studies, and reviewed images personally. I agree with resident's history, exam, orders placed, and plan of care. Thirty minutes of critical care time was spent in caring for this patient who has concern of sudden and clinically significant deterioration requiring a high level of physician preparedness, management of brain supporting interventions, and frequent physician assessments. This excludes any time spent on separate procedures or teaching.  Medical issues needing to be addressed include: cerebrla ischemia with cytotoxic edema, Nontraumatic SAH, cerebral artery vasospasm, EtOH and drug abuse including cocaine and other stimulants, HTN, Szs. Noted to have on vessel imaging 5mm fusiform right V4 aneurysm and multifocal stenosis, angiogram on 5/17 showed these are likely vasospasms. Given her significant drug use hx, SAH w vasospasm and associated cerebral ischemia / delayed cerebral ischemia likely. MRI wwo to start, f up TCD for vasospasms, cont nimodipine, trend ESR/CRP. Pt's exam rappears non-focal, agitated and non-cooperative. All extremities moving antigrav spontaneously. Awake and alert. EEG results as above. Service provided on 5/19/2024.

## 2024-05-18 NOTE — PROGRESS NOTE ADULT - SUBJECTIVE AND OBJECTIVE BOX
PATIENT SEEN AND EXAMINED BY MYNOR GUAN M.D. ON :- 5/18/24  DATE OF SERVICE:     5/18/24        Interim events noted,Labs ,Radiological studies and Cardiology tests reviewed .    Patient is a 64y old  Female who presents with a chief complaint of SAH (18 May 2024 20:46)      HPI:  Mera Burton  64F, pmh HTN, szs on Keppra, alcoholic cirrhosis, tremors, OA presented from [facility] to OSH w/ generalized weakness found to have b/l sulcal SAH and small occipital IVH. 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 She was recently started on Keflex for UTI. Plts 400, Coags wnl. , AST/ALT = 38/104. WBC 13. Afebrile. Exam: lethargic appearing, EOV, FC, AO3, PERRL, not compliant with EOMs, BUE 5/5, BLE 2/5. (15 May 2024 03:22)      PAST MEDICAL & SURGICAL HISTORY:  Cirrhosis      Seizure          PREVIOUS DIAGNOSTIC TESTING:      ECHO  FINDINGS:    STRESS  FINDINGS:    CATHETERIZATION  FINDINGS:    MEDICATIONS  (STANDING):  brimonidine 0.2% Ophthalmic Solution 1 Drop(s) Both EYES every 12 hours  chlorhexidine 0.12% Liquid 15 milliLiter(s) Oral Mucosa every 12 hours  chlorhexidine 4% Liquid 1 Application(s) Topical <User Schedule>  dexMEDEtomidine Infusion 0.2 MICROgram(s)/kG/Hr (3.74 mL/Hr) IV Continuous <Continuous>  enoxaparin Injectable 40 milliGRAM(s) SubCutaneous <User Schedule>  folic acid 1 milliGRAM(s) Oral daily  latanoprost 0.005% Ophthalmic Solution 1 Drop(s) Both EYES at bedtime  levETIRAcetam  IVPB 750 milliGRAM(s) IV Intermittent every 12 hours  mirtazapine 15 milliGRAM(s) Oral at bedtime  multivitamin 1 Tablet(s) Oral daily  pantoprazole  Injectable 40 milliGRAM(s) IV Push daily  phenylephrine    Infusion 1.5 MICROgram(s)/kG/Min (42 mL/Hr) IV Continuous <Continuous>  polyethylene glycol 3350 17 Gram(s) Oral every 12 hours  senna 2 Tablet(s) Oral at bedtime  sodium chloride 0.9%. 1000 milliLiter(s) (70 mL/Hr) IV Continuous <Continuous>  thiamine IVPB 500 milliGRAM(s) IV Intermittent daily    MEDICATIONS  (PRN):  oxyCODONE    IR 5 milliGRAM(s) Oral every 4 hours PRN Moderate Pain (4 - 6)  oxyCODONE    IR 10 milliGRAM(s) Oral every 4 hours PRN Severe Pain (7 - 10)  sodium chloride 0.9% lock flush 10 milliLiter(s) IV Push every 1 hour PRN Pre/post blood products, medications, blood draw, and to maintain line patency      FAMILY HISTORY:      SOCIAL HISTORY:    CIGARETTES:    ALCOHOL:    REVIEW OF SYSTEMS:  CONSTITUTIONAL: No fever, weight loss, or fatigue  EYES: No eye pain, visual disturbances, or discharge  ENMT:  No difficulty hearing, tinnitus, vertigo; No sinus or throat pain  NECK: No pain or stiffness  RESPIRATORY: No cough, wheezing, chills or hemoptysis; No shortness of breath  CARDIOVASCULAR: No chest pain, palpitations, dizziness, or leg swelling  GASTROINTESTINAL: No abdominal or epigastric pain. No nausea, vomiting, or hematemesis; No diarrhea or constipation. No melena or hematochezia.  GENITOURINARY: No dysuria, frequency, hematuria, or incontinence  NEUROLOGICAL: No headaches, memory loss, loss of strength, numbness, or tremors  SKIN: No itching, burning, rashes, or lesions   LYMPH NODES: No enlarged glands  ENDOCRINE: No heat or cold intolerance; No hair loss  MUSCULOSKELETAL: No joint pain or swelling; No muscle, back, or extremity pain  PSYCHIATRIC: No depression, anxiety, mood swings, or difficulty sleeping  HEME/LYMPH: No easy bruising, or bleeding gums  ALLERY AND IMMUNOLOGIC: No hives or eczema    Vital Signs Last 24 Hrs  T(C): 36.9 (18 May 2024 19:00), Max: 37.3 (17 May 2024 23:00)  T(F): 98.4 (18 May 2024 19:00), Max: 99.1 (17 May 2024 23:00)  HR: 70 (18 May 2024 19:30) (52 - 102)  BP: 147/70 (18 May 2024 03:20) (147/70 - 147/70)  BP(mean): 100 (18 May 2024 03:20) (100 - 100)  RR: 17 (18 May 2024 19:30) (10 - 71)  SpO2: 99% (18 May 2024 19:30) (98% - 100%)    Parameters below as of 18 May 2024 19:00  Patient On (Oxygen Delivery Method): face tent          PHYSICAL EXAM:  GENERAL: NAD, well-groomed, well-developed  HEAD:  Atraumatic, Normocephalic  EYES: EOMI, PERRLA, conjunctiva and sclera clear  ENMT: No tonsillar erythema, exudates, or enlargement; Moist mucous membranes, Good dentition, No lesions  NECK: Supple, No JVD, Normal thyroid  NERVOUS SYSTEM:  Alert & Oriented X3, Good concentration; Motor Strength 5/5 B/L upper and lower extremities; DTRs 2+ intact and symmetric  CHEST/LUNG: Clear to percussion bilaterally; No rales, rhonchi, wheezing, or rubs  HEART: Regular rate and rhythm; No murmurs, rubs, or gallops  ABDOMEN: Soft, Nontender, Nondistended; Bowel sounds present  EXTREMITIES:  2+ Peripheral Pulses, No clubbing, cyanosis, or edema  LYMPH: No lymphadenopathy noted  SKIN: No rashes or lesions      INTERPRETATION OF TELEMETRY:    ECG:    [facility]:     LABS:                        10.2   16.99 )-----------( 450      ( 18 May 2024 03:40 )             30.0     05-18    142  |  108  |  <4<L>  ----------------------------<  124<H>  4.1   |  20<L>  |  0.41<L>    Ca    8.9      18 May 2024 13:42  Phos  3.1     05-18  Mg     2.0     05-18    TPro  6.9  /  Alb  3.3  /  TBili  0.3  /  DBili  x   /  AST  35  /  ALT  61<H>  /  AlkPhos  126<H>  05-17        PT/INR - ( 17 May 2024 03:34 )   PT: 13.9 sec;   INR: 1.33 ratio         PTT - ( 17 May 2024 03:34 )  PTT:29.8 sec  Urinalysis Basic - ( 18 May 2024 13:42 )    Color: x / Appearance: x / SG: x / pH: x  Gluc: 124 mg/dL / Ketone: x  / Bili: x / Urobili: x   Blood: x / Protein: x / Nitrite: x   Leuk Esterase: x / RBC: x / WBC x   Sq Epi: x / Non Sq Epi: x / Bacteria: x      Lipid Panel:   I&O's Summary    17 May 2024 07:01  -  18 May 2024 07:00  --------------------------------------------------------  IN: 4790.2 mL / OUT: 4440 mL / NET: 350.2 mL    18 May 2024 07:01  -  18 May 2024 22:25  --------------------------------------------------------  IN: 2192.6 mL / OUT: 3855 mL / NET: -1662.4 mL        RADIOLOGY & ADDITIONAL STUDIES:

## 2024-05-18 NOTE — PROGRESS NOTE ADULT - SUBJECTIVE AND OBJECTIVE BOX
64F pmh Dementia (Polysubstance Abuse, resides in NH) with dyskinesias, HTN, szs on Keppra, alcoholic cirrhosis, tremors, OA presented from [facility] to  w/ AMS & generalized weakness. 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 Started on Keflex for UTI at Nursing Home. Per NH [facility] independent in ADLs, conversant but forgetful and confused, wheelchair bound.    HOSPITAL COURSE:  5/15 transferred to [facility] for workup of nontraumatic convexity SAH  5/16 Stable exam. Possible MRI today  5/17 Worsening exam. CT/CTA showed severe vasospasm with right temporo-occipital infarct. Angiogram today    Admission Scores  GCS: 14 HH: 2 MF: 2 NIHSS: RASS: CAM-ICU: ICH:    24 hour Events:    Allergies    No Known Allergies    Intolerances      REVIEW OF SYSTEMS: [X] Unable to Assess due to neurologic exam [ ] All ROS addressed below are non-contributory, except:  Neuro: [ ] Headache [ ] Back pain [ ] Numbness [ ] Weakness [ ] Ataxia [ ] Dizziness [ ] Aphasia [ ] Dysarthria [ ] Visual disturbance  Resp: [ ] Shortness of breath/dyspnea, [ ] Orthopnea [ ] Cough  CV: [ ] Chest pain [ ] Palpitation [ ] Lightheadedness [ ] Syncope  Renal: [ ] Thirst [ ] Edema  GI: [ ] Nausea [ ] Emesis [ ] Abdominal pain [ ] Constipation [ ] Diarrhea  Hem: [ ] Hematemesis [ ] bright red blood per rectum  ID: [ ] Fever [ ] Chills [ ] Dysuria  ENT: [ ] Rhinorrhea      DEVICES:  [ ] Restraints [ ] ET tube [ ] central line [ ] arterial line [ ] lang [ ] NGT/OGT [ ] EVD [ ] LD [ ] STANISLAV/HMV [ ] Trach [ ] PEG [ ] Chest Tube    VITALS:  Vital Signs Last 24 Hrs  T(C): 37.2 (05-18-24 @ 07:00), Max: 37.5 (05-17-24 @ 19:00)  HR: 76 (05-18-24 @ 09:30) (54 - 124)  BP: 147/70 (05-18-24 @ 03:20) (147/70 - 147/70)  BP(mean): 100 (05-18-24 @ 03:20) (100 - 100)  RR: 17 (05-18-24 @ 09:30) (10 - 41)  SpO2: 100% (05-18-24 @ 09:30) (93% - 100%)    brimonidine 0.2% Ophthalmic Solution 1 Drop(s) Both EYES every 12 hours  chlorhexidine 0.12% Liquid 15 milliLiter(s) Oral Mucosa every 12 hours  chlorhexidine 4% Liquid 1 Application(s) Topical <User Schedule>  chlorhexidine 4% Liquid 1 Application(s) Topical daily  dexMEDEtomidine Infusion 0.2 MICROgram(s)/kG/Hr IV Continuous <Continuous>  folic acid 1 milliGRAM(s) Oral daily  latanoprost 0.005% Ophthalmic Solution 1 Drop(s) Both EYES at bedtime  levETIRAcetam  IVPB 750 milliGRAM(s) IV Intermittent every 12 hours  mirtazapine 15 milliGRAM(s) Oral at bedtime  multivitamin 1 Tablet(s) Oral daily  oxyCODONE    IR 5 milliGRAM(s) Oral every 4 hours PRN  oxyCODONE    IR 10 milliGRAM(s) Oral every 4 hours PRN  phenylephrine    Infusion 1.5 MICROgram(s)/kG/Min IV Continuous <Continuous>  polyethylene glycol 3350 17 Gram(s) Oral every 12 hours  senna 2 Tablet(s) Oral at bedtime  sodium chloride 0.9% lock flush 10 milliLiter(s) IV Push every 1 hour PRN  sodium chloride 2% + sodium acetate 50:50 1000 milliLiter(s) IV Continuous <Continuous>  thiamine IVPB 500 milliGRAM(s) IV Intermittent daily      05-17-24 @ 07:01  -  05-18-24 @ 07:00  --------------------------------------------------------  IN: 4790.2 mL / OUT: 4440 mL / NET: 350.2 mL    05-18-24 @ 07:01  -  05-18-24 @ 09:47  --------------------------------------------------------  IN: 442 mL / OUT: 625 mL / NET: -183 mL                          10.2   16.99 )-----------( 450      ( 18 May 2024 03:40 )             30.0   05-18    142  |  106  |  5<L>  ----------------------------<  134<H>  3.5   |  23  |  0.45<L>      EXAMINATION:  PHYSICAL EXAM:    Neurological: Intubated, eyes open spontaneously, head and bilateral tremors, SHEMAR, not FC BUE localizes, BLE 2/5    Pulmonary: Clear to Auscultation, No rales, No rhonchi, No wheezes    Cardiovascular: S1, S2, Regular rate and rhythm    Gastrointestinal: Soft, Non-tender, Non-distended    Extremities: No calf tenderness    Incision: dry and clean

## 2024-05-18 NOTE — PROGRESS NOTE ADULT - SUBJECTIVE AND OBJECTIVE BOX
NSICU NIGHT PROGRESS NOTE     12h events   extubated, off precedex     Exam   extubated, eyes opens, gaze midline, UE AG, not following simple commands, LE in plan of bed    VITALS:   Vital Signs Last 24 Hrs  T(C): 36.9 (18 May 2024 19:00), Max: 37.3 (17 May 2024 23:00)  T(F): 98.4 (18 May 2024 19:00), Max: 99.1 (17 May 2024 23:00)  HR: 70 (18 May 2024 19:30) (52 - 102)  BP: 147/70 (18 May 2024 03:20) (147/70 - 147/70)  BP(mean): 100 (18 May 2024 03:20) (100 - 100)  RR: 17 (18 May 2024 19:30) (10 - 71)  SpO2: 99% (18 May 2024 19:30) (98% - 100%)    Parameters below as of 18 May 2024 19:00  Patient On (Oxygen Delivery Method): face tent      CAPILLARY BLOOD GLUCOSE        I&O's Summary    17 May 2024 07:01  -  18 May 2024 07:00  --------------------------------------------------------  IN: 4790.2 mL / OUT: 4440 mL / NET: 350.2 mL    18 May 2024 07:01  -  18 May 2024 20:48  --------------------------------------------------------  IN: 2192.6 mL / OUT: 3855 mL / NET: -1662.4 mL        Respiratory:  Mode: CPAP with PS  FiO2: 40  PEEP: 5  PS: 5  MAP: 7  PIP: 11    ABG - ( 18 May 2024 13:36 )  pH, Arterial: 7.39  pH, Blood: x     /  pCO2: 38    /  pO2: 199   / HCO3: 23    / Base Excess: -1.7  /  SaO2: 99.9                LABS:                        10.2   16.99 )-----------( 450      ( 18 May 2024 03:40 )             30.0     05-18    142  |  108  |  <4<L>  ----------------------------<  124<H>  4.1   |  20<L>  |  0.41<L>        MEDICATION LEVELS:   Ammonia, Serum: 26 umol/L (05-16 @ 15:24)  Ammonia, Serum: 36 umol/L (05-14 @ 13:30)    IVF FLUIDS/MEDICATIONS:   MEDICATIONS  (STANDING):  brimonidine 0.2% Ophthalmic Solution 1 Drop(s) Both EYES every 12 hours  chlorhexidine 0.12% Liquid 15 milliLiter(s) Oral Mucosa every 12 hours  chlorhexidine 4% Liquid 1 Application(s) Topical <User Schedule>  chlorhexidine 4% Liquid 1 Application(s) Topical daily  dexMEDEtomidine Infusion 0.2 MICROgram(s)/kG/Hr (3.74 mL/Hr) IV Continuous <Continuous>  enoxaparin Injectable 40 milliGRAM(s) SubCutaneous <User Schedule>  folic acid 1 milliGRAM(s) Oral daily  latanoprost 0.005% Ophthalmic Solution 1 Drop(s) Both EYES at bedtime  levETIRAcetam  IVPB 750 milliGRAM(s) IV Intermittent every 12 hours  mirtazapine 15 milliGRAM(s) Oral at bedtime  multivitamin 1 Tablet(s) Oral daily  pantoprazole  Injectable 40 milliGRAM(s) IV Push daily  phenylephrine    Infusion 1.5 MICROgram(s)/kG/Min (42 mL/Hr) IV Continuous <Continuous>  polyethylene glycol 3350 17 Gram(s) Oral every 12 hours  senna 2 Tablet(s) Oral at bedtime  sodium chloride 0.9%. 1000 milliLiter(s) (70 mL/Hr) IV Continuous <Continuous>  thiamine IVPB 500 milliGRAM(s) IV Intermittent daily    MEDICATIONS  (PRN):  oxyCODONE    IR 5 milliGRAM(s) Oral every 4 hours PRN Moderate Pain (4 - 6)  oxyCODONE    IR 10 milliGRAM(s) Oral every 4 hours PRN Severe Pain (7 - 10)  sodium chloride 0.9% lock flush 10 milliLiter(s) IV Push every 1 hour PRN Pre/post blood products, medications, blood draw, and to maintain line patency

## 2024-05-18 NOTE — CONSULT NOTE ADULT - SUBJECTIVE AND OBJECTIVE BOX
Neurology - Consult Note    Spectra: [phone] ([facility]), [phone] ([facility])    HPI: 64 year old female with past medical history significant for HTN, seizure disorder on Keppra 750 mg BID, alcoholic cirrhosis, tremors, osteoarthritis transferred for SAH from [place](5/15). [facility]rology [place] [facility] [place] [facility].     Patient originally presented [facility] for generalized weakness. Patient found to have bilateral sulcal SAH and small occipital interventricular hemorrhage. 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 Cerebral angiography (5/17) demonstrated severe vasospasm of bilateral anterior and posterior circulation. Improved after IA verapamil and milrinone.     EEG preformed shows diffuse slowing, frequent myogenic movements, and occasional Occasional generalized rhythmic delta activity (GRDA) but no seizures. Neurology consulted (5/18) for acute infarcts in right parietal, temporal, occipital lobes thought to be secondary to vasospasm.    Vitals: /70 HR 70 temp 98.4, O2 99  Labs: WBC 9->17, A1c 6.1, LDL 74  Pending MRI c-spine w/w/o IV contrast and MR brain w/ IV contrast to evaluate SAH  MRS: 2-3  NIHSS: 18 - arouses to minor stimulation, 0 questions right, performs 0 tasks, complete hemianopia,, drift 2+ throught all extremities, global aphasia    Per chart review: Baseline independent in ADLs, conversant but forgetful and confused, wheelchair bound    5/16 MRI brain imaging as follows:  IMPRESSION: Tiny right temporal occipital subdural collection without   mass effect. Diffuse hemosiderin staining in the supra and infratentorial   regions and occipital horns with slightly prominent ventricles. Markedly   diminished intracranial flow due to severe distal vertebral artery   stenosis, mid basilar stenosis and bilateral M1 stenosis.    5/17 CT head w/o contrast  CT head:   Large acute infarct in the right posterior temporal and occipital lobes   are reidentified, as seen on prior MRI brain.  No secondary mass effect   or midline shift. Minimal layering hemorrhage in the occipital horns is   reidentified. Previously visualized abnormal signal within thesulci of   the bilateral cerebri, not as well visualized on this exam secondary to   streak artifacts. Additionally, evaluation of the parenchyma of the   superior portion of the bilateral cerebri is limited by streak artifact   from surrounding wires.    CTA brain:  Severe long segment narrowing extending from the terminus of the right   ICA and involving the entire M1 segment of the right MCA, appearing   worsened in the region of the right ICA terminus is compared to the prior   exam of 5/14/2024. Right M2 branches appear unremarkable.    High-grade stenosis of the distal supraclinoid segment of the left ICA   extending into the terminus of the left ICA and into the M1 segment of   the left middle cerebral artery appears worsened in the region of the   distal supraclinoid and terminus of the left ICA as compared to the prior   exam.    Stenoses of the distal left intradural vertebral artery after the left   PICA takeoff.    High-grade stenosis of the distal intradural segment of the right   vertebral artery, extending into the basilar artery with severe long   segment stenosis of the basilar artery.    Bilateral posterior communicating arteries supplying the posterior   cerebral arteries that remain patent, however small in caliber.    CT Perfusion:  10 mL of core infarct in the right posterior temporal lobe and occipital   lobe. Extensive areas of T. Max greater than 6 seconds throughout the   bilateral cerebri in cerebelli, may reflect diffuse hypoperfusion versus   artifact.        Review of Systems:    NEUROLOGICAL: +As stated in HPI above  All other review of systems is negative unless indicated above.    Allergies:  No Known Allergies      PMHx/PSHx/Family Hx: As above, otherwise see below   Cirrhosis    Seizure        Social Hx:  Never smoker; no current use of tobacco, alcohol, or illicit drugs  Lives with ***  Occupation ***  Baseline functional status is ***    Vitals:  T(C): 36.9 (05-18-24 @ 19:00), Max: 37.3 (05-17-24 @ 23:00)  HR: 70 (05-18-24 @ 19:30) (52 - 102)  BP: 147/70 (05-18-24 @ 03:20) (147/70 - 147/70)  RR: 17 (05-18-24 @ 19:30) (10 - 71)  SpO2: 99% (05-18-24 @ 19:30) (98% - 100%)    Physical Examination: INCOMPLETE  General - non-toxic appearing male/female in no acute distress  Neurologic Exam:  Mental status - Awake, Alert, Oriented to person, place, and time. Speech fluent, repetition and naming intact. Follows simple and complex commands.  Cranial nerves - PERRLA (4mm -> 3mm b/l), VFF, EOMI, face sensation (V1-V3) intact b/l, facial strength intact without asymmetry b/l, hearing intact b/l, palate with symmetric elevation,  sternocleidomastiod 5/5 strength b/l, tongue midline on protrusion with full lateral movement  Motor - Normal bulk and tone throughout. No pronator drift.  Strength testing            Deltoid      Biceps      Triceps     Wrist Extension    Wrist Flexion     Interossei         R            5                 5               5                     5                              5                        5                 5  L             5                 5               5                     5                              5                        5                 5              Hip Flexion    Hip Extension    Knee Flexion    Knee Extension    Dorsiflexion    Plantar Flexion  R              5                         5                       5                           5                            5                          5  L              5                         5                        5                           5                            5                          5  Sensation - Light touch/temperature OR pain/vibration intact throughout  DTR's -             Biceps      Triceps     Brachioradialis      Patellar    Ankle    Toes/plantar response  R             2+             2+                  2+                       2+            2+                 Down  L              2+             2+                 2+                        2+           2+                 Down    Coordination - Finger to Nose intact b/l. No tremors appreciated  Gait and station - Normal casual gait. Romberg (-)    Labs:                        10.2   16.99 )-----------( 450      ( 18 May 2024 03:40 )             30.0     05-18    142  |  108  |  <4<L>  ----------------------------<  124<H>  4.1   |  20<L>  |  0.41<L>    Ca    8.9      18 May 2024 13:42  Phos  3.1     05-18  Mg     2.0     05-18    TPro  6.9  /  Alb  3.3  /  TBili  0.3  /  DBili  x   /  AST  35  /  ALT  61<H>  /  AlkPhos  126<H>  05-17    CAPILLARY BLOOD GLUCOSE        LIVER FUNCTIONS - ( 17 May 2024 22:43 )  Alb: 3.3 g/dL / Pro: 6.9 g/dL / ALK PHOS: 126 U/L / ALT: 61 U/L / AST: 35 U/L / GGT: x             PT/INR - ( 17 May 2024 03:34 )   PT: 13.9 sec;   INR: 1.33 ratio         PTT - ( 17 May 2024 03:34 )  PTT:29.8 sec    Radiology:  CT Head No Cont:  (18 May 2024 10:33)  CT Head No Cont:  (17 May 2024 05:06)  MR Head No Cont:  (16 May 2024 17:01)  CT Head No Cont:  (15 May 2024 10:34)  CT Head No Cont:  (15 May 2024 02:44)  CT Head No Cont:  (14 May 2024 19:08)  CT Head No Cont:  (14 May 2024 16:23)     Neurology - Consult Note    Spectra: [phone] ([facility]), [phone] ([facility])    HPI: 64 year old female with past medical history significant for HTN, seizure disorder on Keppra 750 mg BID, alcoholic cirrhosis, tremors, osteoarthritis transferred for SAH from [place](5/15). [facility].     Patient originally presented from [facility] for generalized weakness. Patient found to have bilateral sulcal SAH and small occipital interventricular hemorrhage. 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 Cerebral angiography (5/17) demonstrated severe vasospasm of bilateral anterior and posterior circulation. Improved after IA verapamil and milrinone.     EEG preformed shows diffuse slowing, frequent myogenic movements, and occasional Occasional generalized rhythmic delta activity (GRDA) but no seizures. Neurology consulted (5/18) for acute infarcts in right parietal, temporal, occipital lobes thought to be secondary to vasospasm.    Vitals: /70 HR 70 temp 98.4, O2 99  Labs: WBC 9->17, A1c 6.1, LDL 74  Pending MRI c-spine w/w/o IV contrast and MR brain w/ IV contrast to evaluate SAH  MRS: 2-3  NIHSS: 17 - arouses to minor stimulation, 0 questions right, performs 0 tasks, complete hemianopia,, drift 2+ throught all extremities, aphasic (receptive and some expressive)    Per chart review: Baseline independent in ADLs, conversant but forgetful and confused, wheelchair bound    5/16 MRI brain imaging as follows:  IMPRESSION: Tiny right temporal occipital subdural collection without   mass effect. Diffuse hemosiderin staining in the supra and infratentorial   regions and occipital horns with slightly prominent ventricles. Markedly   diminished intracranial flow due to severe distal vertebral artery   stenosis, mid basilar stenosis and bilateral M1 stenosis.    5/17 CT head w/o contrast  CT head:   Large acute infarct in the right posterior temporal and occipital lobes   are reidentified, as seen on prior MRI brain.  No secondary mass effect   or midline shift. Minimal layering hemorrhage in the occipital horns is   reidentified. Previously visualized abnormal signal within thesulci of   the bilateral cerebri, not as well visualized on this exam secondary to   streak artifacts. Additionally, evaluation of the parenchyma of the   superior portion of the bilateral cerebri is limited by streak artifact   from surrounding wires.    CTA brain:  Severe long segment narrowing extending from the terminus of the right   ICA and involving the entire M1 segment of the right MCA, appearing   worsened in the region of the right ICA terminus is compared to the prior   exam of 5/14/2024. Right M2 branches appear unremarkable.    High-grade stenosis of the distal supraclinoid segment of the left ICA   extending into the terminus of the left ICA and into the M1 segment of   the left middle cerebral artery appears worsened in the region of the   distal supraclinoid and terminus of the left ICA as compared to the prior   exam.    Stenoses of the distal left intradural vertebral artery after the left   PICA takeoff.    High-grade stenosis of the distal intradural segment of the right   vertebral artery, extending into the basilar artery with severe long   segment stenosis of the basilar artery.    Bilateral posterior communicating arteries supplying the posterior   cerebral arteries that remain patent, however small in caliber.    CT Perfusion:  10 mL of core infarct in the right posterior temporal lobe and occipital   lobe. Extensive areas of T. Max greater than 6 seconds throughout the   bilateral cerebri in cerebelli, may reflect diffuse hypoperfusion versus   artifact.        Review of Systems:    NEUROLOGICAL: +As stated in HPI above  All other review of systems is negative unless indicated above.    Allergies:  No Known Allergies      PMHx/PSHx/Family Hx: As above, otherwise see below   Cirrhosis    Seizure        Social Hx:  as above    Vitals:  T(C): 36.9 (05-18-24 @ 19:00), Max: 37.3 (05-17-24 @ 23:00)  HR: 70 (05-18-24 @ 19:30) (52 - 102)  BP: 147/70 (05-18-24 @ 03:20) (147/70 - 147/70)  RR: 17 (05-18-24 @ 19:30) (10 - 71)  SpO2: 99% (05-18-24 @ 19:30) (98% - 100%)    Physical Examination:   General - non-toxic appearing female, no notable speech output  Neurologic Exam:  Mental status - Awake, FLAKO orientation given mentation, no clear speech output, does not follw simple or complex commands  Cranial nerves - difficult to asesss pupil reactivity, no BTT b/l, resists eye opening b/l. No overt facial asymmetry.  Motor - Normal bulk throughout, moving all extremities spontaneously but does not follow commands. All extremities drop to bed within 10 seconds in upper and 5 seconds in lower  Sensation - Moves in response to nox stimuli throuhgout all extremities  DTR's - deferred  Coordination -flako  Gait and station - flako    Labs:                        10.2   16.99 )-----------( 450      ( 18 May 2024 03:40 )             30.0     05-18    142  |  108  |  <4<L>  ----------------------------<  124<H>  4.1   |  20<L>  |  0.41<L>    Ca    8.9      18 May 2024 13:42  Phos  3.1     05-18  Mg     2.0     05-18    TPro  6.9  /  Alb  3.3  /  TBili  0.3  /  DBili  x   /  AST  35  /  ALT  61<H>  /  AlkPhos  126<H>  05-17    CAPILLARY BLOOD GLUCOSE        LIVER FUNCTIONS - ( 17 May 2024 22:43 )  Alb: 3.3 g/dL / Pro: 6.9 g/dL / ALK PHOS: 126 U/L / ALT: 61 U/L / AST: 35 U/L / GGT: x             PT/INR - ( 17 May 2024 03:34 )   PT: 13.9 sec;   INR: 1.33 ratio         PTT - ( 17 May 2024 03:34 )  PTT:29.8 sec    Radiology:  CT Head No Cont:  (18 May 2024 10:33)  CT Head No Cont:  (17 May 2024 05:06)  MR Head No Cont:  (16 May 2024 17:01)  CT Head No Cont:  (15 May 2024 10:34)  CT Head No Cont:  (15 May 2024 02:44)  CT Head No Cont:  (14 May 2024 19:08)  [facility] 5/18  The examination is motion limited. Infarcts seen in the right parietal, temporal and occipital lobes on 05/17/2024 are not well visualized on the current exam due to motion artifact.    CTH 5/17  Large acute infarct in the right posterior temporal and occipital lobes are reidentified, as seen on prior MRI brain. No secondary mass effect or midline shift. Minimal layering hemorrhage in the occipital horns is reidentified. Previously visualized abnormal signal within the sulci of the bilateral cerebri, not as well visualized on this exam secondary to streak artifacts. Additionally, evaluation of the parenchyma of the superior portion of the bilateral cerebri is limited by streak artifact from surrounding wires.    CTA brain:  Severe long segment narrowing extending from the terminus of the right ICA and involving the entire M1 segment of the right MCA, appearing worsened in the region of the right ICA terminus is compared to the prior exam of 5/14/2024. Right M2 branches appear unremarkable.    High-grade stenosis of the distal supraclinoid segment of the left ICA extending into the terminus of the left ICA and into the M1 segment of the left middle cerebral artery appears worsened in the region of the distal supraclinoid and terminus of the left ICA as compared to the prior exam.    Stenoses of the distal left intradural vertebral artery after the left PICA takeoff.    High-grade stenosis of the distal intradural segment of the right vertebral artery, extending into the basilar artery with severe long segment stenosis of the basilar artery.    Bilateral posterior communicating arteries supplying the posterior cerebral arteries that remain patent, however small in caliber.    CT Perfusion:  10 mL of core infarct in the right posterior temporal lobe and occipital lobe. Extensive areas of T. Max greater than 6 seconds throughout the bilateral cerebri in cerebelli, may reflect diffuse hypoperfusion versus artifact.    MRI/A brain (5/16)  Tiny right temporal occipital subdural collection without mass effect. Diffuse hemosiderin staining in the supra and infratentorial regions and occipital horns with slightly prominent ventricles. Markedly diminished intracranial flow due to severe distal vertebral artery stenosis, mid basilar stenosis and bilateral M1 stenosis.  CT Head No Cont:  (14 May 2024 16:23)     Neurology - Consult Note    Spectra: [phone] ([facility]), [phone] ([facility])    HPI: 64 year old female with past medical history significant for HTN, seizure disorder on Keppra 750 mg BID, alcoholic cirrhosis, tremors, osteoarthritis transferred for SAH from [place](5/15). [facility] workup ?vasculitis vs RCVS?.    Patient originally presented from [facility] for generalized weakness. Patient found to have bilateral sulcal SAH and small occipital interventricular hemorrhage. 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 Cerebral angiography (5/17) demonstrated severe vasospasm of bilateral anterior and posterior circulation. Improved after IA verapamil and milrinone.     EEG preformed shows diffuse slowing, frequent myogenic movements, and occasional Occasional generalized rhythmic delta activity (GRDA) but no seizures. Neurology consulted (5/18) for acute infarcts in right parietal, temporal, occipital lobes thought to be secondary to vasospasm.    Vitals: /70 HR 70 temp 98.4, O2 99  Labs: WBC 9->17, A1c 6.1, LDL 74, CRP 61,   Pending MRI c-spine w/w/o IV contrast and MR brain w/ IV contrast to evaluate SAH  MRS: 2-3  NIHSS: 17 - arouses to minor stimulation, 0 questions right, performs 0 tasks, complete hemianopia,, drift 2+ throught all extremities, aphasic (receptive and some expressive)      Per chart review: Baseline independent in ADLs, conversant but forgetful and confused, wheelchair bound    5/16 MRI brain imaging as follows:  IMPRESSION: Tiny right temporal occipital subdural collection without   mass effect. Diffuse hemosiderin staining in the supra and infratentorial   regions and occipital horns with slightly prominent ventricles. Markedly   diminished intracranial flow due to severe distal vertebral artery   stenosis, mid basilar stenosis and bilateral M1 stenosis.    5/17 CT head w/o contrast  CT head:   Large acute infarct in the right posterior temporal and occipital lobes   are reidentified, as seen on prior MRI brain.  No secondary mass effect   or midline shift. Minimal layering hemorrhage in the occipital horns is   reidentified. Previously visualized abnormal signal within thesulci of   the bilateral cerebri, not as well visualized on this exam secondary to   streak artifacts. Additionally, evaluation of the parenchyma of the   superior portion of the bilateral cerebri is limited by streak artifact   from surrounding wires.    CTA brain:  Severe long segment narrowing extending from the terminus of the right   ICA and involving the entire M1 segment of the right MCA, appearing   worsened in the region of the right ICA terminus is compared to the prior   exam of 5/14/2024. Right M2 branches appear unremarkable.    High-grade stenosis of the distal supraclinoid segment of the left ICA   extending into the terminus of the left ICA and into the M1 segment of   the left middle cerebral artery appears worsened in the region of the   distal supraclinoid and terminus of the left ICA as compared to the prior   exam.    Stenoses of the distal left intradural vertebral artery after the left   PICA takeoff.    High-grade stenosis of the distal intradural segment of the right   vertebral artery, extending into the basilar artery with severe long   segment stenosis of the basilar artery.    Bilateral posterior communicating arteries supplying the posterior   cerebral arteries that remain patent, however small in caliber.    CT Perfusion:  10 mL of core infarct in the right posterior temporal lobe and occipital   lobe. Extensive areas of T. Max greater than 6 seconds throughout the   bilateral cerebri in cerebelli, may reflect diffuse hypoperfusion versus   artifact.        Review of Systems:    NEUROLOGICAL: +As stated in HPI above  All other review of systems is negative unless indicated above.    Allergies:  No Known Allergies      PMHx/PSHx/Family Hx: As above, otherwise see below   Cirrhosis    Seizure        Social Hx:  as above    Vitals:  T(C): 36.9 (05-18-24 @ 19:00), Max: 37.3 (05-17-24 @ 23:00)  HR: 70 (05-18-24 @ 19:30) (52 - 102)  BP: 147/70 (05-18-24 @ 03:20) (147/70 - 147/70)  RR: 17 (05-18-24 @ 19:30) (10 - 71)  SpO2: 99% (05-18-24 @ 19:30) (98% - 100%)    Physical Examination:   General - non-toxic appearing female, no notable speech output  Neurologic Exam:  Mental status - Awake, appears to intermittently track examiner, FLAKO orientation given mentation, no clear speech output, does not follw simple or complex commands  Cranial nerves - difficult to asesss pupil reactivity, no BTT b/l, resists eye opening b/l. No overt facial asymmetry.  Motor - Normal bulk throughout, moving all extremities spontaneously but does not follow commands. All extremities drop to bed within 10 seconds in upper and 5 seconds in lower  Sensation - Moves in response to nox stimuli throughout all extremities  DTR's - deferred  Coordination -flako  Gait and station - flako    Labs:                        10.2   16.99 )-----------( 450      ( 18 May 2024 03:40 )             30.0     05-18    142  |  108  |  <4<L>  ----------------------------<  124<H>  4.1   |  20<L>  |  0.41<L>    Ca    8.9      18 May 2024 13:42  Phos  3.1     05-18  Mg     2.0     05-18    TPro  6.9  /  Alb  3.3  /  TBili  0.3  /  DBili  x   /  AST  35  /  ALT  61<H>  /  AlkPhos  126<H>  05-17    CAPILLARY BLOOD GLUCOSE        LIVER FUNCTIONS - ( 17 May 2024 22:43 )  Alb: 3.3 g/dL / Pro: 6.9 g/dL / ALK PHOS: 126 U/L / ALT: 61 U/L / AST: 35 U/L / GGT: x             PT/INR - ( 17 May 2024 03:34 )   PT: 13.9 sec;   INR: 1.33 ratio         PTT - ( 17 May 2024 03:34 )  PTT:29.8 sec    Radiology:  CT Head No Cont:  (18 May 2024 10:33)  CT Head No Cont:  (17 May 2024 05:06)  MR Head No Cont:  (16 May 2024 17:01)  CT Head No Cont:  (15 May 2024 10:34)  CT Head No Cont:  (15 May 2024 02:44)  CT Head No Cont:  (14 May 2024 19:08)  [facility] 5/18  The examination is motion limited. Infarcts seen in the right parietal, temporal and occipital lobes on 05/17/2024 are not well visualized on the current exam due to motion artifact.    CT 5/17  Large acute infarct in the right posterior temporal and occipital lobes are reidentified, as seen on prior MRI brain. No secondary mass effect or midline shift. Minimal layering hemorrhage in the occipital horns is reidentified. Previously visualized abnormal signal within the sulci of the bilateral cerebri, not as well visualized on this exam secondary to streak artifacts. Additionally, evaluation of the parenchyma of the superior portion of the bilateral cerebri is limited by streak artifact from surrounding wires.    CTA brain:  Severe long segment narrowing extending from the terminus of the right ICA and involving the entire M1 segment of the right MCA, appearing worsened in the region of the right ICA terminus is compared to the prior exam of 5/14/2024. Right M2 branches appear unremarkable.    High-grade stenosis of the distal supraclinoid segment of the left ICA extending into the terminus of the left ICA and into the M1 segment of the left middle cerebral artery appears worsened in the region of the distal supraclinoid and terminus of the left ICA as compared to the prior exam.    Stenoses of the distal left intradural vertebral artery after the left PICA takeoff.    High-grade stenosis of the distal intradural segment of the right vertebral artery, extending into the basilar artery with severe long segment stenosis of the basilar artery.    Bilateral posterior communicating arteries supplying the posterior cerebral arteries that remain patent, however small in caliber.    CT Perfusion:  10 mL of core infarct in the right posterior temporal lobe and occipital lobe. Extensive areas of T. Max greater than 6 seconds throughout the bilateral cerebri in cerebelli, may reflect diffuse hypoperfusion versus artifact.    MRI/A brain (5/16)  Tiny right temporal occipital subdural collection without mass effect. Diffuse hemosiderin staining in the supra and infratentorial regions and occipital horns with slightly prominent ventricles. Markedly diminished intracranial flow due to severe distal vertebral artery stenosis, mid basilar stenosis and bilateral M1 stenosis.  CT Head No Cont:  (14 May 2024 16:23)

## 2024-05-18 NOTE — EEG REPORT - NS EEG TEXT BOX
REPORT OF CONTINUOUS VIDEO EEG      [facility]: [address] [facility] [address], [place] [address], Phone: [phone]  [facility]: [address], [place] [address], Phone: [phone]  [facility]: [address] [place] [address], Phone: [phone]    Patient Name: Mera Burton    Age: 64 year, : 1960  Su: -[facility] Bed 9      Study Date: 2024   Start Time: 8:00:16 AM      End Date: 2024         End Time: 08:00:04 AM     Study Duration: 19 h 43 m    Study Information:    EEG Recording Technique:  The patient underwent continuous Video-EEG monitoring, using Telemetry System hardware on the XLTek Digital System. EEG and video data were stored on a computer hard drive with important events saved in digital archive files. The material was reviewed by a physician (electroencephalographer / epileptologist) on a daily basis. Kike and seizure detection algorithms were utilized and reviewed. An EEG Technician attended to the patient, and was available throughout daytime work hours.  The epilepsy center neurologist was available in person or on call 24-hours per day.    EEG Placement and Labeling of Electrodes:  The EEG was performed utilizing 20 channel referential EEG connections (coronal over temporal over parasagittal montage) using all standard 10-20 electrode placements with EKG, with additional electrodes placed in the inferior temporal region using the modified 10-10 montage electrode placements for elective admissions, or if deemed necessary. Recording was at a sampling rate of 256 samples per second per channel. Time synchronized digital video recording was done simultaneously with EEG recording. A low light infrared camera was used for low light recording.     History: -  64 year old Female is here to rule out seizures    Medication  No Data.    Interpretation:    [[[Abbreviation Key:  PDR=alpha rhythm/posterior dominant rhythm. A-P=anterior posterior.  Amplitude: ‘very low’:<20; ‘low’:20-49; ‘medium’:; ‘high’:>150uV.  Persistence for periodic/rhythmic patterns (% of epoch) ‘rare’:<1%; ‘occasional’:1-10%; ‘frequent’:10-50%; ‘abundant’:50-90%; ‘continuous’:>90%.  Persistence for sporadic discharges: ‘rare’:<1/hr; ‘occasional’:1/min-1/hr; ‘frequent’:>1/min; ‘abundant’:>1/10 sec.  RPP=rhythmic and periodic patterns; GRDA=generalized rhythmic delta activity; FIRDA=frontal intermittent GRDA; LRDA=lateralized rhythmic delta activity; TIRDA=temporal intermittent rhythmic delta activity;  LPD=PLED=lateralized periodic discharges; GPD=generalized periodic discharges; BIPDs =bilateral independent periodic discharges; Mf=multifocal; SIRPDs=stimulus induced rhythmic, periodic, or ictal appearing discharges; BIRDs=brief potentially ictal rhythmic discharges >4 Hz, lasting .5-10s; PFA (paroxysmal bursts >13 Hz or =8 Hz <10s).  Modifiers: +F=with fast component; +S=with spike component; +R=with rhythmic component.  S-B=burst suppression pattern.  Max=maximal. N1-drowsy; N2-stage II sleep; N3-slow wave sleep. SSS/BETS=small sharp spikes/benign epileptiform transients of sleep. HV=hyperventilation; PS=photic stimulation]]]      Daily EEG Visual Analysis    FINDINGS:      Background:  Symmetry: symmetric  Continuous: continuous  PDR: absent  Reactivity: present  Voltage: normal, [defined typically between 20-150uV]  Anterior Posterior Gradient: absent  Breach: absent    Background Slowing:  Generalized slowing: present. Background is diffusely slow consisting of polymorphic delta theta activity up to 6  Hz with superimposed alpha/beta frequency    Focal slowing: none was present.    State Changes:   -N2 sleep transients with symmetric spindles and poorly formed K-complexes.    Sporadic Epileptiform Discharges:   None    Rhythmic and Periodic Patterns (RPPs):  Occasional generalized rhythmic delta activity (GRDA)    Electrographic and Electroclinical seizures:  None    Other Clinical Events:  None    Activation Procedures:   -Hyperventilation was not performed.    -Photic stimulation was not performed.      Artifacts:  Intermittent myogenic and movement artifacts were noted.    ECG:  Sinus tachycardia    EEG Summary / Classification:  Abnormal EEG in the encephalopathic.  •	Background slowing-moderate  -          GRDA    EEG Impression / Clinical Correlate:  Abnormal prolonged EEG study due to Moderate diffuse cerebral dysfunction that is not specific in etiology    No epileptic discharges recorded.  No seizures recorded.  •	    ________________________________________    MAGNOLIA Kwok  Attending Physician, [facility]    ------------------------------------  EEG Reading Room: [phone]  On Call Service After Hours: [phone]        REPORT OF CONTINUOUS VIDEO EEG      [facility]: [address] [facility] [address], [place] [address], Phone: [phone]  [facility]: [address] [place] [address], [place] [address], Phone: [phone]  [facility]: [address] [place] [address], Phone: [phone]    Patient Name: Mera Burton    Age: 64 year, : 1960  Su: -[facility] Bed 9      Study Date: 2024   Start Time: 8:00:16 AM      End Date: 2024         End Time: 08:00:04 AM     Study Duration: 19 h 43 m. EEG is off from 08:00-14:00 and 02:23-08:00    Study Information:    EEG Recording Technique:  The patient underwent continuous Video-EEG monitoring, using Telemetry System hardware on the XLTek Digital System. EEG and video data were stored on a computer hard drive with important events saved in digital archive files. The material was reviewed by a physician (electroencephalographer / epileptologist) on a daily basis. Kike and seizure detection algorithms were utilized and reviewed. An EEG Technician attended to the patient, and was available throughout daytime work hours.  The epilepsy center neurologist was available in person or on call 24-hours per day.    EEG Placement and Labeling of Electrodes:  The EEG was performed utilizing 20 channel referential EEG connections (coronal over temporal over parasagittal montage) using all standard 10-20 electrode placements with EKG, with additional electrodes placed in the inferior temporal region using the modified 10-10 montage electrode placements for elective admissions, or if deemed necessary. Recording was at a sampling rate of 256 samples per second per channel. Time synchronized digital video recording was done simultaneously with EEG recording. A low light infrared camera was used for low light recording.     History: -  64 year old Female is here to rule out seizures    Medication  No Data.    Interpretation:    [[[Abbreviation Key:  PDR=alpha rhythm/posterior dominant rhythm. A-P=anterior posterior.  Amplitude: ‘very low’:<20; ‘low’:20-49; ‘medium’:; ‘high’:>150uV.  Persistence for periodic/rhythmic patterns (% of epoch) ‘rare’:<1%; ‘occasional’:1-10%; ‘frequent’:10-50%; ‘abundant’:50-90%; ‘continuous’:>90%.  Persistence for sporadic discharges: ‘rare’:<1/hr; ‘occasional’:1/min-1/hr; ‘frequent’:>1/min; ‘abundant’:>1/10 sec.  RPP=rhythmic and periodic patterns; GRDA=generalized rhythmic delta activity; FIRDA=frontal intermittent GRDA; LRDA=lateralized rhythmic delta activity; TIRDA=temporal intermittent rhythmic delta activity;  LPD=PLED=lateralized periodic discharges; GPD=generalized periodic discharges; BIPDs =bilateral independent periodic discharges; Mf=multifocal; SIRPDs=stimulus induced rhythmic, periodic, or ictal appearing discharges; BIRDs=brief potentially ictal rhythmic discharges >4 Hz, lasting .5-10s; PFA (paroxysmal bursts >13 Hz or =8 Hz <10s).  Modifiers: +F=with fast component; +S=with spike component; +R=with rhythmic component.  S-B=burst suppression pattern.  Max=maximal. N1-drowsy; N2-stage II sleep; N3-slow wave sleep. SSS/BETS=small sharp spikes/benign epileptiform transients of sleep. HV=hyperventilation; PS=photic stimulation]]]      Daily EEG Visual Analysis    FINDINGS:      Background:  Symmetry: symmetric  Continuous: continuous  PDR: absent  Reactivity: present  Voltage: normal, [defined typically between 20-150uV]  Anterior Posterior Gradient: absent  Breach: absent    Background Slowing:  Generalized slowing: present. Background is diffusely slow consisting of polymorphic delta theta activity up to 6  Hz with superimposed alpha/beta frequency    Focal slowing: none was present.    State Changes:   -N2 sleep transients with symmetric spindles and poorly formed K-complexes.    Sporadic Epileptiform Discharges:   None    Rhythmic and Periodic Patterns (RPPs):  Occasional generalized rhythmic delta activity (GRDA)    Electrographic and Electroclinical seizures:  None    Other Clinical Events:  None    Activation Procedures:   -Hyperventilation was not performed.    -Photic stimulation was not performed.      Artifacts:  Intermittent myogenic and movement artifacts were noted.    ECG:  Sinus tachycardia    EEG Summary / Classification:  Abnormal EEG in the encephalopathic.  •	Background slowing-moderate  -          GRDA    EEG Impression / Clinical Correlate:  Abnormal prolonged EEG study due to Moderate diffuse cerebral dysfunction that is not specific in etiology    No epileptic discharges recorded.  No seizures recorded.  •	    ________________________________________    MAGNOLIA Kwok  Attending Physician, [facility] [place]    ------------------------------------  EEG Reading Room: [phone]  On Call Service After Hours: [phone]        REPORT OF CONTINUOUS VIDEO EEG      [facility]: [address] [facility] [address], [place] [address], Phone: [phone]  [facility]: [address] [place] [address], [place] [address], Phone: [phone]  [facility]: [address] [place] [address], Phone: [phone]    Patient Name: Mera Burton    Age: 64 year, : 1960  Su: -[address] [facility] Bed 9      Study Date: 2024   Start Time: 8:00:16 AM      End Date: 2024         End Time: 14:34    Study Duration: 26 h 17 m. EEG is off from 08:00-14:00, and 02:23-10:42    Study Information:    EEG Recording Technique:  The patient underwent continuous Video-EEG monitoring, using Telemetry System hardware on the XLTek Digital System. EEG and video data were stored on a computer hard drive with important events saved in digital archive files. The material was reviewed by a physician (electroencephalographer / epileptologist) on a daily basis. Kike and seizure detection algorithms were utilized and reviewed. An EEG Technician attended to the patient, and was available throughout daytime work hours.  The epilepsy center neurologist was available in person or on call 24-hours per day.    EEG Placement and Labeling of Electrodes:  The EEG was performed utilizing 20 channel referential EEG connections (coronal over temporal over parasagittal montage) using all standard 10-20 electrode placements with EKG, with additional electrodes placed in the inferior temporal region using the modified 10-10 montage electrode placements for elective admissions, or if deemed necessary. Recording was at a sampling rate of 256 samples per second per channel. Time synchronized digital video recording was done simultaneously with EEG recording. A low light infrared camera was used for low light recording.     History: -  64 year old Female is here to rule out seizures    Medication  No Data.    Interpretation:    [[[Abbreviation Key:  PDR=alpha rhythm/posterior dominant rhythm. A-P=anterior posterior.  Amplitude: ‘very low’:<20; ‘low’:20-49; ‘medium’:; ‘high’:>150uV.  Persistence for periodic/rhythmic patterns (% of epoch) ‘rare’:<1%; ‘occasional’:1-10%; ‘frequent’:10-50%; ‘abundant’:50-90%; ‘continuous’:>90%.  Persistence for sporadic discharges: ‘rare’:<1/hr; ‘occasional’:1/min-1/hr; ‘frequent’:>1/min; ‘abundant’:>1/10 sec.  RPP=rhythmic and periodic patterns; GRDA=generalized rhythmic delta activity; FIRDA=frontal intermittent GRDA; LRDA=lateralized rhythmic delta activity; TIRDA=temporal intermittent rhythmic delta activity;  LPD=PLED=lateralized periodic discharges; GPD=generalized periodic discharges; BIPDs =bilateral independent periodic discharges; Mf=multifocal; SIRPDs=stimulus induced rhythmic, periodic, or ictal appearing discharges; BIRDs=brief potentially ictal rhythmic discharges >4 Hz, lasting .5-10s; PFA (paroxysmal bursts >13 Hz or =8 Hz <10s).  Modifiers: +F=with fast component; +S=with spike component; +R=with rhythmic component.  S-B=burst suppression pattern.  Max=maximal. N1-drowsy; N2-stage II sleep; N3-slow wave sleep. SSS/BETS=small sharp spikes/benign epileptiform transients of sleep. HV=hyperventilation; PS=photic stimulation]]]      Daily EEG Visual Analysis    FINDINGS:      Background:  Symmetry: symmetric  Continuous: continuous  PDR: absent  Reactivity: present  Voltage: normal, [defined typically between 20-150uV]  Anterior Posterior Gradient: absent  Breach: absent    Background Slowing:  Generalized slowing: present. Background is diffusely slow consisting of polymorphic delta theta activity up to 6  Hz with superimposed alpha/beta frequency    Focal slowing: none was present.    State Changes:   -N2 sleep transients with symmetric spindles and poorly formed K-complexes.    Sporadic Epileptiform Discharges:   None    Rhythmic and Periodic Patterns (RPPs):  Occasional generalized rhythmic delta activity (GRDA)    Electrographic and Electroclinical seizures:  None    Other Clinical Events:  None    Activation Procedures:   -Hyperventilation was not performed.    -Photic stimulation was not performed.      Artifacts:  Intermittent myogenic and movement artifacts were noted.    ECG:  Sinus tachycardia    EEG Summary / Classification:  Abnormal EEG in the encephalopathic.  •	Background slowing-moderate  -          GRDA    EEG Impression / Clinical Correlate:  Abnormal prolonged EEG study due to Moderate diffuse cerebral dysfunction that is not specific in etiology    No epileptic discharges recorded.  No seizures recorded.  •	    ________________________________________    MAGNOLIA Kwok  Attending Physician, [facility]    ------------------------------------  EEG Reading Room: [phone]  On Call Service After Hours: [phone]

## 2024-05-18 NOTE — PROGRESS NOTE ADULT - ASSESSMENT
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    PLAN     N  # SAH: s/p angio with vasospasme   nimodipine, TCD  #AIS R temporo occipital: probably secondary to vasospasm  Neurocheck q1  Consult stroke neurology  MRI Brain and C-spine with contras  Keep off nimodipine while on pressors    #pmhx seizure cont home keppra 750mg BID  #Pain: Tylenol and oxycodone  #Activity: [] OOB as tolerated [x] Bedrest [] PT [] OT [] PMNR    CV   #-180mmHg on phenylnephrine   #TTE     P   SBT, possible extubation today  minimal secretions   Decadron 8mg once for concern of laryngal edema    R   #Strict I+Os   #Switch 2% to NS 70 ml/hour  BMP q8    GI   #Diet: npo   #Senna miralax  PPI while intubated  Last BM 5/18    ID  hypothermic now resolved   hx from outside hospital with possible UTI: bcx, ucx pending, UA unremarkable, monitor off antibiotics    E  Goal euglycemia (-180)  #a1c 6.1, ISS     H  #DVT ppx:   SCDs  Start Lovenox 40mg QD  LED /5/15 no dvt

## 2024-05-18 NOTE — PROGRESS NOTE ADULT - ASSESSMENT
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    PLAN     N  # SAH: s/p angio with vasospasme   nimodipine, TCD  #AIS R temporo occipital:   MRI wo when possible   aspirin 81mg after 24h   held statin because of LFT   #pmhx seizure cont home keppra  #Pain: Tylenol and oxycodone  #Activity: [] OOB as tolerated [x] Bedrest [] PT [] OT [] PMNR    CV   #-200mmHg on cheryl   #TTE grossly normal cardiac function     P   #respiratory failure  extubated face tent    R   #Strict I+Os   #cont NS, goal euvolemic     GI   #Diet: npo   #Senna miralax  Last BM pta     ID  hypothermic now resolved   monitor off antibiotics    E  Goal euglycemia (-180)  #a1c 6.1, ISS     H  #DVT ppx:   SCDs  Chemoppx  LED no dvt       #CODE STATUS: FULL CODE     #DISPOSITION: ICU    #CRITICAL

## 2024-05-18 NOTE — PROGRESS NOTE ADULT - SUBJECTIVE AND OBJECTIVE BOX
Patient seen and examined at bedside.    --Anticoagulation--    T(C): 37.3 (05-17-24 @ 23:00), Max: 37.5 (05-17-24 @ 19:00)  HR: 61 (05-18-24 @ 01:00) (60 - 124)  BP: 138/75 (05-17-24 @ 07:55) (107/74 - 138/75)  RR: 15 (05-18-24 @ 01:00) (10 - 41)  SpO2: 100% (05-18-24 @ 01:00) (93% - 100%)  Wt(kg): --    Exam: intubated, BAM, pupils 3R sluggish, midline gaze,  BUE localize, BLE wd briskly

## 2024-05-18 NOTE — PROGRESS NOTE ADULT - ASSESSMENT
Mera Burton  64F, pmh HTN, szs on Keppra, alcoholic cirrhosis, tremors, OA presented from [facility] to OSH w/ generalized weakness found to have b/l sulcal SAH and small occipital IVH. 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 She was recently started on Keflex for UTI. Plts 400, Coags wnl. , AST/ALT = 38/104. WBC 13. Afebrile. Exam on admission: lethargic appearing, EOV, FC, AO3, PERRL, not compliant with EOMs, BUE 5/5, BLE 2/5.    Now s/p 5/17 Angio: severe b/l anterior and posterior cerebral vasospasm (given IA verapamil and milrinone)    CPAP as tolerated   wean to extubate in AM   [facility] protocol  BMP q8

## 2024-05-19 LAB
ANION GAP SERPL CALC-SCNC: 15 MMOL/L — SIGNIFICANT CHANGE UP (ref 5–17)
BUN SERPL-MCNC: 5 MG/DL — LOW (ref 7–23)
CALCIUM SERPL-MCNC: 9.4 MG/DL — SIGNIFICANT CHANGE UP (ref 8.4–10.5)
CHLORIDE SERPL-SCNC: 105 MMOL/L — SIGNIFICANT CHANGE UP (ref 96–108)
CO2 SERPL-SCNC: 20 MMOL/L — LOW (ref 22–31)
CREAT SERPL-MCNC: 0.4 MG/DL — LOW (ref 0.5–1.3)
CRP SERPL-MCNC: 72 MG/L — HIGH (ref 0–4)
CULTURE RESULTS: SIGNIFICANT CHANGE UP
CULTURE RESULTS: SIGNIFICANT CHANGE UP
EGFR: 112 ML/MIN/1.73M2 — SIGNIFICANT CHANGE UP
ERYTHROCYTE [SEDIMENTATION RATE] IN BLOOD: 104 MM/HR — HIGH (ref 0–20)
GLUCOSE SERPL-MCNC: 106 MG/DL — HIGH (ref 70–99)
MAGNESIUM SERPL-MCNC: 2.3 MG/DL — SIGNIFICANT CHANGE UP (ref 1.6–2.6)
PHOSPHATE SERPL-MCNC: 2.1 MG/DL — LOW (ref 2.5–4.5)
POTASSIUM SERPL-MCNC: 3.8 MMOL/L — SIGNIFICANT CHANGE UP (ref 3.5–5.3)
POTASSIUM SERPL-SCNC: 3.8 MMOL/L — SIGNIFICANT CHANGE UP (ref 3.5–5.3)
RHEUMATOID FACT SERPL-ACNC: <10 IU/ML — SIGNIFICANT CHANGE UP (ref 0–13)
SODIUM SERPL-SCNC: 140 MMOL/L — SIGNIFICANT CHANGE UP (ref 135–145)
SPECIMEN SOURCE: SIGNIFICANT CHANGE UP
SPECIMEN SOURCE: SIGNIFICANT CHANGE UP

## 2024-05-19 PROCEDURE — 99291 CRITICAL CARE FIRST HOUR: CPT

## 2024-05-19 RX ORDER — POTASSIUM CHLORIDE 20 MEQ
40 PACKET (EA) ORAL ONCE
Refills: 0 | Status: COMPLETED | OUTPATIENT
Start: 2024-05-19 | End: 2024-05-19

## 2024-05-19 RX ORDER — SODIUM CHLORIDE 9 MG/ML
1000 INJECTION INTRAMUSCULAR; INTRAVENOUS; SUBCUTANEOUS ONCE
Refills: 0 | Status: COMPLETED | OUTPATIENT
Start: 2024-05-19 | End: 2024-05-19

## 2024-05-19 RX ORDER — NOREPINEPHRINE BITARTRATE/D5W 8 MG/250ML
0.05 PLASTIC BAG, INJECTION (ML) INTRAVENOUS
Qty: 8 | Refills: 0 | Status: DISCONTINUED | OUTPATIENT
Start: 2024-05-19 | End: 2024-05-23

## 2024-05-19 RX ADMIN — Medication 7 MICROGRAM(S)/KG/MIN: at 10:15

## 2024-05-19 RX ADMIN — Medication 63.75 MILLIMOLE(S): at 06:02

## 2024-05-19 RX ADMIN — Medication 1 MILLIGRAM(S): at 12:42

## 2024-05-19 RX ADMIN — PANTOPRAZOLE SODIUM 40 MILLIGRAM(S): 20 TABLET, DELAYED RELEASE ORAL at 12:42

## 2024-05-19 RX ADMIN — SODIUM CHLORIDE 1000 MILLILITER(S): 9 INJECTION INTRAMUSCULAR; INTRAVENOUS; SUBCUTANEOUS at 10:14

## 2024-05-19 RX ADMIN — SODIUM CHLORIDE 70 MILLILITER(S): 9 INJECTION INTRAMUSCULAR; INTRAVENOUS; SUBCUTANEOUS at 19:08

## 2024-05-19 RX ADMIN — Medication 40 MILLIEQUIVALENT(S): at 21:45

## 2024-05-19 RX ADMIN — Medication 105 MILLIGRAM(S): at 13:45

## 2024-05-19 RX ADMIN — LEVETIRACETAM 400 MILLIGRAM(S): 250 TABLET, FILM COATED ORAL at 05:12

## 2024-05-19 RX ADMIN — SODIUM CHLORIDE 1000 MILLILITER(S): 9 INJECTION INTRAMUSCULAR; INTRAVENOUS; SUBCUTANEOUS at 21:45

## 2024-05-19 RX ADMIN — ENOXAPARIN SODIUM 40 MILLIGRAM(S): 100 INJECTION SUBCUTANEOUS at 17:08

## 2024-05-19 RX ADMIN — LEVETIRACETAM 400 MILLIGRAM(S): 250 TABLET, FILM COATED ORAL at 17:08

## 2024-05-19 RX ADMIN — LATANOPROST 1 DROP(S): 0.05 SOLUTION/ DROPS OPHTHALMIC; TOPICAL at 21:11

## 2024-05-19 RX ADMIN — Medication 7 MICROGRAM(S)/KG/MIN: at 19:08

## 2024-05-19 RX ADMIN — CHLORHEXIDINE GLUCONATE 1 APPLICATION(S): 213 SOLUTION TOPICAL at 05:13

## 2024-05-19 RX ADMIN — MIRTAZAPINE 15 MILLIGRAM(S): 45 TABLET, ORALLY DISINTEGRATING ORAL at 21:11

## 2024-05-19 RX ADMIN — Medication 100 GRAM(S): at 03:47

## 2024-05-19 RX ADMIN — BRIMONIDINE TARTRATE 1 DROP(S): 2 SOLUTION/ DROPS OPHTHALMIC at 05:13

## 2024-05-19 RX ADMIN — BRIMONIDINE TARTRATE 1 DROP(S): 2 SOLUTION/ DROPS OPHTHALMIC at 17:10

## 2024-05-19 RX ADMIN — SODIUM CHLORIDE 1000 MILLILITER(S): 9 INJECTION INTRAMUSCULAR; INTRAVENOUS; SUBCUTANEOUS at 03:19

## 2024-05-19 RX ADMIN — Medication 1 TABLET(S): at 12:42

## 2024-05-19 RX ADMIN — CHLORHEXIDINE GLUCONATE 15 MILLILITER(S): 213 SOLUTION TOPICAL at 05:13

## 2024-05-19 NOTE — PROGRESS NOTE ADULT - ASSESSMENT
Plan:   CIWA protocol  BMP q8  MRI brain/C/s wwo -p   Fu TCDs, closely monitor spasm  Extubated  on Ponce sbp 140-200

## 2024-05-19 NOTE — PROGRESS NOTE ADULT - SUBJECTIVE AND OBJECTIVE BOX
NSCU ATTENDING -- ADDITIONAL PROGRESS NOTE    Nighttime rounds were performed -- please refer to earlier Progress Note for HPI details.    T(C): 36.7 (05-19-24 @ 19:00), Max: 36.9 (05-18-24 @ 23:00)  HR: 64 (05-19-24 @ 20:00) (47 - 93)  BP: --  RR: 20 (05-19-24 @ 20:00) (12 - 42)  SpO2: 99% (05-19-24 @ 20:00) (96% - 100%)  Wt(kg): --    Relevant labwork and imaging reviewed.    angio negative SAH.  no EVD.  MRI neuro axis pending.

## 2024-05-19 NOTE — PROGRESS NOTE ADULT - ASSESSMENT
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    PLAN     N  # SAH: s/p angio with vasospasm   nimodipine, TCD  #AIS R temporo occipital:   MRI wo when possible   aspirin 81mg after 24h   held statin because of LFT   #pmhx seizure cont home keppra  #Pain: Tylenol and oxycodone  #Activity: PT OT    CV   #-200mmHg on cheryl   #TTE grossly normal cardiac function     P   #respiratory failure  extubated face tent    R   #Strict I+Os   #cont NS, goal euvolemic     GI   #Diet: ngtube start feeds  #Senna miralax  Last BM pta     ID  #hypothermic now resolved   monitor off antibiotics    E  Goal euglycemia (-180)  #a1c 6.1, ISS     H  #DVT ppx:   SCDs  Chemoppx  LED no dvt       #CODE STATUS: FULL CODE     #DISPOSITION: ICU    #CRITICAL  ASSESSMENT   SAH HH2 MF2 , angio no vascular malformation but diffuse CVS s/p angiographic CCB   unsure etiology of SAH   r/o vasculitis TIMOTHY, antiphospholipid Ab, ANCA, serum C3, C4, cryoglobulin, SPEP, UPEP, quant immunglobulin levels, Ab SSA/SSB, dsDNA  PLAN   neuro checks q 1 hr   daily TCD   MRI brain post contrast, MRI cervical w contrast   #AIS R temporo occipital   #pmhx seizure cont home keppra 750 mg BID   according brother and sister in law, she might be taking drugs and alcohol abuse   precedex as needed   #Pain: Tylenol   #Activity: PT OT    CV   #-200mmHg on cheryl   daily troponin   #TTE grossly normal cardiac function     P   extubated yesterday   RA     R   #Strict I+Os   #cont NS,  will give 1 L ns BOLUS TARGET EUVOLEMIA  NS 70 ml/hr     GI   #Diet: swallow eval, if fails, will need NG   #Senna miralax  Last BM today     ID  afebrile   monitor off antibiotics    E  Goal euglycemia (-180)  #a1c 6.1, ISS     H  #DVT ppx:   SCDs  lovenox 40 mg sc qhs   LED no dvt       #CODE STATUS: FULL CODE     #DISPOSITION: ICU    #CRITICAL     40 minutes  ASSESSMENT   SAH HH2 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 , angio no vascular malformation but diffuse CVS s/p angiographic CCB   unsure etiology of SAH   r/o vasculitis TIMOTHY, antiphospholipid Ab, ANCA, serum C3, C4, cryoglobulin, SPEP, UPEP, quant immunglobulin levels, Ab SSA/SSB, dsDNA  PLAN   neuro checks q 1 hr   daily TCD   MRI brain post contrast, MRI cervical w contrast   #AIS R temporo occipital   #pmhx seizure cont home keppra 750 mg BID   according brother and sister in law, she might be taking drugs and alcohol abuse   #Pain: Tylenol   #Activity: PT OT    CV   #-200mmHg on cheryl   daily troponin   #TTE grossly normal cardiac function   sinus bradycardia change cheryl to levophed     P   extubated yesterday   RA     R   #Strict I+Os   #cont NS,  will give 1 L ns BOLUS TARGET EUVOLEMIA  NS 70 ml/hr     GI   #Diet: swallow eval, if fails, will need NG   #Senna miralax  Last BM today     ID  afebrile   monitor off antibiotics    E  Goal euglycemia (-180)  #a1c 6.1, ISS     H  #DVT ppx:   SCDs  lovenox 40 mg sc qhs   LED no dvt       #CODE STATUS: FULL CODE     #DISPOSITION: ICU    #CRITICAL     40 minutes

## 2024-05-19 NOTE — PROGRESS NOTE ADULT - ASSESSMENT
ASSESSMENT   SAH HH2 MF2 , angio no vascular malformation but diffuse CVS s/p angiographic CCB   unsure etiology of SAH   r/o vasculitis TIMOTHY, antiphospholipid Ab, ANCA, serum C3, C4, cryoglobulin, SPEP, UPEP, quant immunglobulin levels, Ab SSA/SSB, dsDNA  PLAN   neuro checks q 1 hr   daily TCD   MRI brain post contrast, MRI cervical w contrast   #AIS R temporo occipital   #pmhx seizure cont home keppra 750 mg BID   according brother and sister in law, she might be taking drugs and alcohol abuse   #Pain: Tylenol   #Activity: PT OT    CV   #-200mmHg on cheryl   daily troponin   #TTE grossly normal cardiac function   sinus bradycardia change cheryl to levophed     P   extubated yesterday   RA     R   #Strict I+Os   #cont NS,  will give 1 L ns BOLUS TARGET EUVOLEMIA  NS 70 ml/hr     GI   #Diet: swallow eval, if fails, will need NG   #Senna miralax  Last BM today     ID  afebrile   monitor off antibiotics    E  Goal euglycemia (-180)  #a1c 6.1, ISS     H  #DVT ppx:   SCDs  lovenox 40 mg sc qhs   LED no dvt       #CODE STATUS: FULL CODE     #DISPOSITION: ICU    #CRITICAL     40 minutes

## 2024-05-19 NOTE — PROGRESS NOTE ADULT - SUBJECTIVE AND OBJECTIVE BOX
NSICU NIGHT PROGRESS NOTE     12h events   JANEEN overnight    Exam   alert awake, eyes open, gaze midline, UE AG, not following simple commands, LE in plan of bed    VITALS:   Vital Signs Last 24 Hrs  T(C): 36.9 (19 May 2024 03:00), Max: 37.2 (18 May 2024 07:00)  T(F): 98.4 (19 May 2024 03:00), Max: 99 (18 May 2024 07:00)  HR: 47 (19 May 2024 06:15) (47 - 86)  BP: --  BP(mean): --  RR: 15 (19 May 2024 06:15) (10 - 71)  SpO2: 98% (19 May 2024 06:15) (97% - 100%)    Parameters below as of 18 May 2024 19:00  Patient On (Oxygen Delivery Method): face tent      CAPILLARY BLOOD GLUCOSE        I&O's Summary    17 May 2024 07:01  -  18 May 2024 07:00  --------------------------------------------------------  IN: 4790.2 mL / OUT: 4440 mL / NET: 350.2 mL    18 May 2024 07:01  -  19 May 2024 06:51  --------------------------------------------------------  IN: 4652.6 mL / OUT: 6630 mL / NET: -1977.4 mL        Respiratory:  Mode: CPAP with PS  FiO2: 40  PEEP: 5  PS: 5  MAP: 7  PIP: 11    ABG - ( 18 May 2024 13:36 )  pH, Arterial: 7.39  pH, Blood: x     /  pCO2: 38    /  pO2: 199   / HCO3: 23    / Base Excess: -1.7  /  SaO2: 99.9                LABS:                        10.2   16.99 )-----------( 450      ( 18 May 2024 03:40 )             30.0     05-19    140  |  105  |  5<L>  ----------------------------<  106<H>  3.8   |  20<L>  |  0.40<L>        MEDICATION LEVELS:   Ammonia, Serum: 26 umol/L (05-16 @ 15:24)  Ammonia, Serum: 36 umol/L (05-14 @ 13:30)    IVF FLUIDS/MEDICATIONS:   MEDICATIONS  (STANDING):  brimonidine 0.2% Ophthalmic Solution 1 Drop(s) Both EYES every 12 hours  chlorhexidine 4% Liquid 1 Application(s) Topical <User Schedule>  dexMEDEtomidine Infusion 0.2 MICROgram(s)/kG/Hr (3.74 mL/Hr) IV Continuous <Continuous>  enoxaparin Injectable 40 milliGRAM(s) SubCutaneous <User Schedule>  folic acid 1 milliGRAM(s) Oral daily  latanoprost 0.005% Ophthalmic Solution 1 Drop(s) Both EYES at bedtime  levETIRAcetam  IVPB 750 milliGRAM(s) IV Intermittent every 12 hours  mirtazapine 15 milliGRAM(s) Oral at bedtime  multivitamin 1 Tablet(s) Oral daily  pantoprazole  Injectable 40 milliGRAM(s) IV Push daily  phenylephrine    Infusion 1.5 MICROgram(s)/kG/Min (42 mL/Hr) IV Continuous <Continuous>  polyethylene glycol 3350 17 Gram(s) Oral every 12 hours  senna 2 Tablet(s) Oral at bedtime  sodium chloride 0.9%. 1000 milliLiter(s) (70 mL/Hr) IV Continuous <Continuous>  thiamine IVPB 500 milliGRAM(s) IV Intermittent daily    MEDICATIONS  (PRN):  oxyCODONE    IR 5 milliGRAM(s) Oral every 4 hours PRN Moderate Pain (4 - 6)  oxyCODONE    IR 10 milliGRAM(s) Oral every 4 hours PRN Severe Pain (7 - 10)  sodium chloride 0.9% lock flush 10 milliLiter(s) IV Push every 1 hour PRN Pre/post blood products, medications, blood draw, and to maintain line patency       NSICU DAY PROGRESS NOTE     12h events   JANEEN overnight    Exam   alert awake, eyes open, gaze midline, UE AG, not following simple commands, LE in plan of bed    VITALS:   Vital Signs Last 24 Hrs  T(C): 36.9 (19 May 2024 03:00), Max: 37.2 (18 May 2024 07:00)  T(F): 98.4 (19 May 2024 03:00), Max: 99 (18 May 2024 07:00)  HR: 47 (19 May 2024 06:15) (47 - 86)  BP: --  BP(mean): --  RR: 15 (19 May 2024 06:15) (10 - 71)  SpO2: 98% (19 May 2024 06:15) (97% - 100%)    Parameters below as of 18 May 2024 19:00  Patient On (Oxygen Delivery Method): face tent      CAPILLARY BLOOD GLUCOSE        I&O's Summary    17 May 2024 07:01  -  18 May 2024 07:00  --------------------------------------------------------  IN: 4790.2 mL / OUT: 4440 mL / NET: 350.2 mL    18 May 2024 07:01  -  19 May 2024 06:51  --------------------------------------------------------  IN: 4652.6 mL / OUT: 6630 mL / NET: -1977.4 mL        Respiratory:  Mode: CPAP with PS  FiO2: 40  PEEP: 5  PS: 5  MAP: 7  PIP: 11    ABG - ( 18 May 2024 13:36 )  pH, Arterial: 7.39  pH, Blood: x     /  pCO2: 38    /  pO2: 199   / HCO3: 23    / Base Excess: -1.7  /  SaO2: 99.9                LABS:                        10.2   16.99 )-----------( 450      ( 18 May 2024 03:40 )             30.0     05-19    140  |  105  |  5<L>  ----------------------------<  106<H>  3.8   |  20<L>  |  0.40<L>        MEDICATION LEVELS:   Ammonia, Serum: 26 umol/L (05-16 @ 15:24)  Ammonia, Serum: 36 umol/L (05-14 @ 13:30)    IVF FLUIDS/MEDICATIONS:   MEDICATIONS  (STANDING):  brimonidine 0.2% Ophthalmic Solution 1 Drop(s) Both EYES every 12 hours  chlorhexidine 4% Liquid 1 Application(s) Topical <User Schedule>  dexMEDEtomidine Infusion 0.2 MICROgram(s)/kG/Hr (3.74 mL/Hr) IV Continuous <Continuous>  enoxaparin Injectable 40 milliGRAM(s) SubCutaneous <User Schedule>  folic acid 1 milliGRAM(s) Oral daily  latanoprost 0.005% Ophthalmic Solution 1 Drop(s) Both EYES at bedtime  levETIRAcetam  IVPB 750 milliGRAM(s) IV Intermittent every 12 hours  mirtazapine 15 milliGRAM(s) Oral at bedtime  multivitamin 1 Tablet(s) Oral daily  pantoprazole  Injectable 40 milliGRAM(s) IV Push daily  phenylephrine    Infusion 1.5 MICROgram(s)/kG/Min (42 mL/Hr) IV Continuous <Continuous>  polyethylene glycol 3350 17 Gram(s) Oral every 12 hours  senna 2 Tablet(s) Oral at bedtime  sodium chloride 0.9%. 1000 milliLiter(s) (70 mL/Hr) IV Continuous <Continuous>  thiamine IVPB 500 milliGRAM(s) IV Intermittent daily    MEDICATIONS  (PRN):  oxyCODONE    IR 5 milliGRAM(s) Oral every 4 hours PRN Moderate Pain (4 - 6)  oxyCODONE    IR 10 milliGRAM(s) Oral every 4 hours PRN Severe Pain (7 - 10)  sodium chloride 0.9% lock flush 10 milliLiter(s) IV Push every 1 hour PRN Pre/post blood products, medications, blood draw, and to maintain line patency       NSICU DAY PROGRESS NOTE     12h events   extubated   exam stable     T(C): 36.9 (05-19-24 @ 07:00), Max: 36.9 (05-18-24 @ 11:00)  HR: 55 (05-19-24 @ 08:45) (47 - 86)  BP: --  RR: 19 (05-19-24 @ 08:45) (12 - 71)  SpO2: 100% (05-19-24 @ 08:45) (97% - 100%)  05-18-24 @ 07:01  -  05-19-24 @ 07:00  --------------------------------------------------------  IN: 4652.6 mL / OUT: 6630 mL / NET: -1977.4 mL    brimonidine 0.2% Ophthalmic Solution 1 Drop(s) Both EYES every 12 hours  chlorhexidine 4% Liquid 1 Application(s) Topical <User Schedule>  dexMEDEtomidine Infusion 0.2 MICROgram(s)/kG/Hr IV Continuous <Continuous>  enoxaparin Injectable 40 milliGRAM(s) SubCutaneous <User Schedule>  folic acid 1 milliGRAM(s) Oral daily  latanoprost 0.005% Ophthalmic Solution 1 Drop(s) Both EYES at bedtime  levETIRAcetam  IVPB 750 milliGRAM(s) IV Intermittent every 12 hours  mirtazapine 15 milliGRAM(s) Oral at bedtime  multivitamin 1 Tablet(s) Oral daily  oxyCODONE    IR 10 milliGRAM(s) Oral every 4 hours PRN  oxyCODONE    IR 5 milliGRAM(s) Oral every 4 hours PRN  pantoprazole  Injectable 40 milliGRAM(s) IV Push daily  phenylephrine    Infusion 1.5 MICROgram(s)/kG/Min IV Continuous <Continuous>  polyethylene glycol 3350 17 Gram(s) Oral every 12 hours  senna 2 Tablet(s) Oral at bedtime  sodium chloride 0.9% lock flush 10 milliLiter(s) IV Push every 1 hour PRN  sodium chloride 0.9%. 1000 milliLiter(s) IV Continuous <Continuous>  thiamine IVPB 500 milliGRAM(s) IV Intermittent daily      Exam   alert awake, eyes open, gaze midline, UE AG, not following simple commands, LE in plan of bed      LABS:  Na: 140 (05-19 @ 06:18), 138 (05-18 @ 21:51), 142 (05-18 @ 13:42), 142 (05-18 @ 06:05), 141 (05-17 @ 22:43), 137 (05-17 @ 13:37), 136 (05-17 @ 03:34), 136 (05-16 @ 21:25), 136 (05-16 @ 14:14)  K: 3.8 (05-19 @ 06:18), 4.2 (05-18 @ 21:51), 4.1 (05-18 @ 13:42), 3.5 (05-18 @ 06:05), 4.2 (05-17 @ 22:43), 3.4 (05-17 @ 13:37), 3.8 (05-17 @ 03:34), 3.9 (05-16 @ 21:25), 3.4 (05-16 @ 14:14)  Cl: 105 (05-19 @ 06:18), 103 (05-18 @ 21:51), 108 (05-18 @ 13:42), 106 (05-18 @ 06:05), 108 (05-17 @ 22:43), 104 (05-17 @ 13:37), 102 (05-17 @ 03:34), 102 (05-16 @ 21:25), 99 (05-16 @ 14:14)  CO2: 20 (05-19 @ 06:18), 21 (05-18 @ 21:51), 20 (05-18 @ 13:42), 23 (05-18 @ 06:05), 22 (05-17 @ 22:43), 18 (05-17 @ 13:37), 21 (05-17 @ 03:34), 21 (05-16 @ 21:25), 24 (05-16 @ 14:14)  BUN: 5 (05-19 @ 06:18), 5 (05-18 @ 21:51), <4 (05-18 @ 13:42), 5 (05-18 @ 06:05), 6 (05-17 @ 22:43), 7 (05-17 @ 13:37), 8 (05-17 @ 03:34), 7 (05-16 @ 21:25), 7 (05-16 @ 14:14)  Cr: 0.40 (05-19 @ 06:18), 0.41 (05-18 @ 21:51), 0.41 (05-18 @ 13:42), 0.45 (05-18 @ 06:05), 0.43 (05-17 @ 22:43), 0.40 (05-17 @ 13:37), 0.41 (05-17 @ 03:34), 0.47 (05-16 @ 21:25), 0.46 (05-16 @ 14:14)  Glu: 106(05-19 @ 06:18), 132(05-18 @ 21:51), 124(05-18 @ 13:42), 134(05-18 @ 06:05), 117(05-17 @ 22:43), 182(05-17 @ 13:37), 102(05-17 @ 03:34), 99(05-16 @ 21:25), 109(05-16 @ 14:14)    Hgb: 10.2 (05-18 @ 03:40), 10.4 (05-17 @ 03:34)  Hct: 30.0 (05-18 @ 03:40), 30.9 (05-17 @ 03:34)  WBC: 16.99 (05-18 @ 03:40), 9.60 (05-17 @ 03:34)  Plt: 450 (05-18 @ 03:40), 359 (05-17 @ 03:34)    INR: 1.33 05-17-24 @ 03:34  PTT: 29.8 05-17-24 @ 03:34

## 2024-05-19 NOTE — PROGRESS NOTE ADULT - SUBJECTIVE AND OBJECTIVE BOX
Patient seen and examined at bedside.    --Anticoagulation--  enoxaparin Injectable 40 milliGRAM(s) SubCutaneous <User Schedule>    T(C): 36.9 (05-19-24 @ 03:00), Max: 37.2 (05-18-24 @ 07:00)  HR: 66 (05-19-24 @ 04:00) (49 - 90)  BP: --  RR: 18 (05-19-24 @ 04:00) (10 - 71)  SpO2: 100% (05-19-24 @ 04:00) (97% - 100%)  Wt(kg): --    Exam:  EOS, pupils 3R sluggish, midline gaze, Not FC, BL UE AG, BL LE 2/5

## 2024-05-20 LAB
ALBUMIN SERPL ELPH-MCNC: 3.5 G/DL — SIGNIFICANT CHANGE UP (ref 3.3–5)
ALP SERPL-CCNC: 137 U/L — HIGH (ref 40–120)
ALT FLD-CCNC: 59 U/L — HIGH (ref 10–45)
ANION GAP SERPL CALC-SCNC: 19 MMOL/L — HIGH (ref 5–17)
AST SERPL-CCNC: 26 U/L — SIGNIFICANT CHANGE UP (ref 10–40)
AUTO DIFF PNL BLD: NEGATIVE — SIGNIFICANT CHANGE UP
BASOPHILS # BLD AUTO: 0.04 K/UL — SIGNIFICANT CHANGE UP (ref 0–0.2)
BASOPHILS NFR BLD AUTO: 0.4 % — SIGNIFICANT CHANGE UP (ref 0–2)
BILIRUB SERPL-MCNC: 0.4 MG/DL — SIGNIFICANT CHANGE UP (ref 0.2–1.2)
BUN SERPL-MCNC: 4 MG/DL — LOW (ref 7–23)
C-ANCA SER-ACNC: NEGATIVE — SIGNIFICANT CHANGE UP
C3 SERPL-MCNC: 174 MG/DL — HIGH (ref 81–157)
C4 SERPL-MCNC: 72 MG/DL — HIGH (ref 13–39)
CALCIUM SERPL-MCNC: 9.1 MG/DL — SIGNIFICANT CHANGE UP (ref 8.4–10.5)
CHLORIDE SERPL-SCNC: 102 MMOL/L — SIGNIFICANT CHANGE UP (ref 96–108)
CO2 SERPL-SCNC: 17 MMOL/L — LOW (ref 22–31)
CREAT SERPL-MCNC: 0.42 MG/DL — LOW (ref 0.5–1.3)
CULTURE RESULTS: SIGNIFICANT CHANGE UP
CULTURE RESULTS: SIGNIFICANT CHANGE UP
EGFR: 111 ML/MIN/1.73M2 — SIGNIFICANT CHANGE UP
EOSINOPHIL # BLD AUTO: 0.05 K/UL — SIGNIFICANT CHANGE UP (ref 0–0.5)
EOSINOPHIL NFR BLD AUTO: 0.5 % — SIGNIFICANT CHANGE UP (ref 0–6)
GLUCOSE SERPL-MCNC: 117 MG/DL — HIGH (ref 70–99)
HCT VFR BLD CALC: 32.7 % — LOW (ref 34.5–45)
HGB BLD-MCNC: 11.1 G/DL — LOW (ref 11.5–15.5)
IMM GRANULOCYTES NFR BLD AUTO: 0.6 % — SIGNIFICANT CHANGE UP (ref 0–0.9)
LYMPHOCYTES # BLD AUTO: 2.89 K/UL — SIGNIFICANT CHANGE UP (ref 1–3.3)
LYMPHOCYTES # BLD AUTO: 27.6 % — SIGNIFICANT CHANGE UP (ref 13–44)
MAGNESIUM SERPL-MCNC: 1.6 MG/DL — SIGNIFICANT CHANGE UP (ref 1.6–2.6)
MCHC RBC-ENTMCNC: 28 PG — SIGNIFICANT CHANGE UP (ref 27–34)
MCHC RBC-ENTMCNC: 33.9 GM/DL — SIGNIFICANT CHANGE UP (ref 32–36)
MCV RBC AUTO: 82.4 FL — SIGNIFICANT CHANGE UP (ref 80–100)
MONOCYTES # BLD AUTO: 0.85 K/UL — SIGNIFICANT CHANGE UP (ref 0–0.9)
MONOCYTES NFR BLD AUTO: 8.1 % — SIGNIFICANT CHANGE UP (ref 2–14)
NEUTROPHILS # BLD AUTO: 6.57 K/UL — SIGNIFICANT CHANGE UP (ref 1.8–7.4)
NEUTROPHILS NFR BLD AUTO: 62.8 % — SIGNIFICANT CHANGE UP (ref 43–77)
NRBC # BLD: 0 /100 WBCS — SIGNIFICANT CHANGE UP (ref 0–0)
P-ANCA SER-ACNC: NEGATIVE — SIGNIFICANT CHANGE UP
PETH 16:0/18:1: NEGATIVE NG/ML — SIGNIFICANT CHANGE UP
PETH 16:0/18:2: NEGATIVE NG/ML — SIGNIFICANT CHANGE UP
PETH COMMENTS: SIGNIFICANT CHANGE UP
PHOSPHATE SERPL-MCNC: 2.4 MG/DL — LOW (ref 2.5–4.5)
PLATELET # BLD AUTO: 427 K/UL — HIGH (ref 150–400)
POTASSIUM SERPL-MCNC: 3.2 MMOL/L — LOW (ref 3.5–5.3)
POTASSIUM SERPL-SCNC: 3.2 MMOL/L — LOW (ref 3.5–5.3)
PROT SERPL-MCNC: 6.8 G/DL — SIGNIFICANT CHANGE UP (ref 6–8.3)
PROT SERPL-MCNC: 7.2 G/DL — SIGNIFICANT CHANGE UP (ref 6–8.3)
RBC # BLD: 3.97 M/UL — SIGNIFICANT CHANGE UP (ref 3.8–5.2)
RBC # FLD: 12.8 % — SIGNIFICANT CHANGE UP (ref 10.3–14.5)
SODIUM SERPL-SCNC: 138 MMOL/L — SIGNIFICANT CHANGE UP (ref 135–145)
SPECIMEN SOURCE: SIGNIFICANT CHANGE UP
SPECIMEN SOURCE: SIGNIFICANT CHANGE UP
TROPONIN T, HIGH SENSITIVITY RESULT: <6 NG/L — SIGNIFICANT CHANGE UP (ref 0–51)
WBC # BLD: 10.46 K/UL — SIGNIFICANT CHANGE UP (ref 3.8–10.5)
WBC # FLD AUTO: 10.46 K/UL — SIGNIFICANT CHANGE UP (ref 3.8–10.5)

## 2024-05-20 PROCEDURE — 72141 MRI NECK SPINE W/O DYE: CPT | Mod: 26

## 2024-05-20 PROCEDURE — 99291 CRITICAL CARE FIRST HOUR: CPT

## 2024-05-20 PROCEDURE — 93886 INTRACRANIAL COMPLETE STUDY: CPT | Mod: 26

## 2024-05-20 PROCEDURE — 70551 MRI BRAIN STEM W/O DYE: CPT | Mod: 26

## 2024-05-20 RX ORDER — POTASSIUM CHLORIDE 20 MEQ
40 PACKET (EA) ORAL EVERY 4 HOURS
Refills: 0 | Status: DISCONTINUED | OUTPATIENT
Start: 2024-05-20 | End: 2024-05-20

## 2024-05-20 RX ORDER — POTASSIUM CHLORIDE 20 MEQ
10 PACKET (EA) ORAL
Refills: 0 | Status: COMPLETED | OUTPATIENT
Start: 2024-05-20 | End: 2024-05-20

## 2024-05-20 RX ORDER — SODIUM CHLORIDE 9 MG/ML
500 INJECTION INTRAMUSCULAR; INTRAVENOUS; SUBCUTANEOUS ONCE
Refills: 0 | Status: COMPLETED | OUTPATIENT
Start: 2024-05-20 | End: 2024-05-20

## 2024-05-20 RX ORDER — MAGNESIUM SULFATE 500 MG/ML
2 VIAL (ML) INJECTION ONCE
Refills: 0 | Status: COMPLETED | OUTPATIENT
Start: 2024-05-20 | End: 2024-05-20

## 2024-05-20 RX ORDER — ALPRAZOLAM 0.25 MG
0.5 TABLET ORAL ONCE
Refills: 0 | Status: DISCONTINUED | OUTPATIENT
Start: 2024-05-20 | End: 2024-05-20

## 2024-05-20 RX ORDER — INSULIN LISPRO 100/ML
VIAL (ML) SUBCUTANEOUS
Refills: 0 | Status: DISCONTINUED | OUTPATIENT
Start: 2024-05-20 | End: 2024-05-26

## 2024-05-20 RX ORDER — POTASSIUM PHOSPHATE, MONOBASIC POTASSIUM PHOSPHATE, DIBASIC 236; 224 MG/ML; MG/ML
15 INJECTION, SOLUTION INTRAVENOUS ONCE
Refills: 0 | Status: COMPLETED | OUTPATIENT
Start: 2024-05-20 | End: 2024-05-20

## 2024-05-20 RX ORDER — DEXMEDETOMIDINE HYDROCHLORIDE IN 0.9% SODIUM CHLORIDE 4 UG/ML
0.3 INJECTION INTRAVENOUS
Qty: 200 | Refills: 0 | Status: DISCONTINUED | OUTPATIENT
Start: 2024-05-20 | End: 2024-05-21

## 2024-05-20 RX ADMIN — Medication 100 MILLIEQUIVALENT(S): at 19:49

## 2024-05-20 RX ADMIN — LATANOPROST 1 DROP(S): 0.05 SOLUTION/ DROPS OPHTHALMIC; TOPICAL at 21:29

## 2024-05-20 RX ADMIN — SENNA PLUS 2 TABLET(S): 8.6 TABLET ORAL at 22:52

## 2024-05-20 RX ADMIN — LEVETIRACETAM 400 MILLIGRAM(S): 250 TABLET, FILM COATED ORAL at 18:38

## 2024-05-20 RX ADMIN — Medication 7 MICROGRAM(S)/KG/MIN: at 19:02

## 2024-05-20 RX ADMIN — Medication 1 MILLIGRAM(S): at 11:34

## 2024-05-20 RX ADMIN — POTASSIUM PHOSPHATE, MONOBASIC POTASSIUM PHOSPHATE, DIBASIC 62.5 MILLIMOLE(S): 236; 224 INJECTION, SOLUTION INTRAVENOUS at 17:34

## 2024-05-20 RX ADMIN — SODIUM CHLORIDE 70 MILLILITER(S): 9 INJECTION INTRAMUSCULAR; INTRAVENOUS; SUBCUTANEOUS at 19:02

## 2024-05-20 RX ADMIN — Medication 100 MILLIEQUIVALENT(S): at 18:44

## 2024-05-20 RX ADMIN — BRIMONIDINE TARTRATE 1 DROP(S): 2 SOLUTION/ DROPS OPHTHALMIC at 05:35

## 2024-05-20 RX ADMIN — Medication 1 TABLET(S): at 11:34

## 2024-05-20 RX ADMIN — SODIUM CHLORIDE 70 MILLILITER(S): 9 INJECTION INTRAMUSCULAR; INTRAVENOUS; SUBCUTANEOUS at 17:34

## 2024-05-20 RX ADMIN — Medication 0.5 MILLIGRAM(S): at 12:45

## 2024-05-20 RX ADMIN — BRIMONIDINE TARTRATE 1 DROP(S): 2 SOLUTION/ DROPS OPHTHALMIC at 18:44

## 2024-05-20 RX ADMIN — ENOXAPARIN SODIUM 40 MILLIGRAM(S): 100 INJECTION SUBCUTANEOUS at 17:33

## 2024-05-20 RX ADMIN — LEVETIRACETAM 400 MILLIGRAM(S): 250 TABLET, FILM COATED ORAL at 05:35

## 2024-05-20 RX ADMIN — Medication 100 MILLIEQUIVALENT(S): at 17:33

## 2024-05-20 RX ADMIN — CHLORHEXIDINE GLUCONATE 1 APPLICATION(S): 213 SOLUTION TOPICAL at 05:35

## 2024-05-20 RX ADMIN — SODIUM CHLORIDE 500 MILLILITER(S): 9 INJECTION INTRAMUSCULAR; INTRAVENOUS; SUBCUTANEOUS at 17:00

## 2024-05-20 RX ADMIN — Medication 25 GRAM(S): at 23:00

## 2024-05-20 RX ADMIN — PANTOPRAZOLE SODIUM 40 MILLIGRAM(S): 20 TABLET, DELAYED RELEASE ORAL at 11:34

## 2024-05-20 RX ADMIN — MIRTAZAPINE 15 MILLIGRAM(S): 45 TABLET, ORALLY DISINTEGRATING ORAL at 21:29

## 2024-05-20 NOTE — CHART NOTE - NSCHARTNOTEFT_GEN_A_CORE
NUTRITION FOLLOW UP NOTE    PATIENT SEEN FOR: Nutrition Follow Up     SOURCE: [] Patient  [x] Current Medical Record  [x] RN  [] Family/support person at bedside  [] Patient unavailable/inappropriate  [x] Other: interdisciplinary medical team    CHART REVIEWED/EVENTS NOTED.  [] No changes to nutrition care plan to note  [x] Nutrition Status:  -S/P SAH HH2 MF2, no vascular malformation but diffuse CVS noted.   -S/P angio 5/17 noted with severe b/l anterior and posterior cerebral vasospasm; treatment given.   -Extubated 5/18.   -Workup ongoing for vasculitis.     DIET ORDER:   Diet, Pureed:   Consistent Carbohydrate {No Snacks} (CSTCHO) (05-19-24)    CURRENT DIET ORDER IS:  [] Appropriate:  [] Inadequate:  [x] Other:  -PO diet initiated 5/19. Previously NPO between 5/15-5/19. Baseline tolerance to chewing/swallowing unclear.     NUTRITION INTAKE/PROVISION:  [x] PO:  [] Enteral Nutrition:  [] Parenteral Nutrition:    ANTHROPOMETRICS:  Drug Dosing Weight  Height (cm): 162.6 (17 May 2024 07:55)  Weight (kg): 74.7 (17 May 2024 07:55)  BMI (kg/m2): 28.3 (17 May 2024 07:55)  Weights:   Daily      NUTRITIONALLY PERTINENT MEDICATIONS:  MEDICATIONS  (STANDING):  folic acid  insulin lispro (ADMELOG) corrective regimen sliding scale  multivitamin  norepinephrine Infusion  pantoprazole  Injectable  polyethylene glycol 3350  senna  sodium chloride 0.9%.     NUTRITIONALLY PERTINENT LABS:  05-20 Na138 mmol/L Glu 117 mg/dL<H> K+ 3.2 mmol/L<L> Cr  0.42 mg/dL<L> BUN 4 mg/dL<L> 05-20 Phos 2.4 mg/dL<L> 05-20 Alb 3.5 g/dL 05-15 Chol 119 mg/dL LDL --    HDL 28 mg/dL<L> Trig 81 mg/dL05-20 ALT 59 U/L<H> AST 26 U/L Alkaline Phosphatase 137 U/L<H>  05-15-24 @ 03:51 a1c 6.1    A1C with Estimated Average Glucose Result: 6.1 % (05-15-24 @ 03:51)    Finger Sticks:  POCT Blood Glucose.: 104 mg/dL (05-20 @ 11:37)    NUTRITIONALLY PERTINENT MEDICATIONS/LABS:  [x] Reviewed  [] Relevant notes on medications/labs:    EDEMA:  [x] Reviewed  [] Relevant notes:    GI/ I&O:  [x] Reviewed  [] Relevant notes:  [] Other:    SKIN:   [] No pressure injuries documented, per nursing flowsheet  [] Pressure injury previously noted  [] Change in pressure injury documentation:  [] Other:    ESTIMATED NEEDS:  [] No change:  [] Updated:  Energy:   kcal/day (xx-xx kcal/kg)  Protein:   g/day (xx-xx g/kg)  Fluid:   ml/day or [] defer to team  Based on:    NUTRITION DIAGNOSIS:  [] Prior Dx:  [] New Dx:    EDUCATION:  [] Yes:  [] Not appropriate/warranted    NUTRITION CARE PLAN:  1. Diet:  2. Supplements:  3. Multivitamin/mineral supplementation:  4:     [] Achieved - Continue current nutrition intervention(s)  [] Current medical condition precludes nutrition intervention at this time.    MONITORING AND EVALUATION:   RD remains available upon request and will follow up per protocol.    Name  Available on [facility] TEAMS NUTRITION FOLLOW UP NOTE    PATIENT SEEN FOR: Nutrition Follow Up     SOURCE: [] Patient  [x] Current Medical Record  [x] RN  [] Family/support person at bedside  [] Patient unavailable/inappropriate  [x] Other: interdisciplinary medical team    CHART REVIEWED/EVENTS NOTED.  [] No changes to nutrition care plan to note  [x] Nutrition Status:  -S/P SA[facility] HH2 MF2, no vascular malformation but diffuse CVS noted.   -S/P angio 5/17 noted with severe b/l anterior and posterior cerebral vasospasm; treatment given.   -Extubated 5/18.   -Workup ongoing for vasculitis.     DIET ORDER:   Diet, Pureed:   Consistent Carbohydrate {No Snacks} (CSTCHO) (05-19-24)    CURRENT DIET ORDER IS:  [] Appropriate:  [] Inadequate:  [x] Other:  -PO diet initiated 5/19. Previously NPO between 5/15-5/19. Baseline tolerance to chewing/swallowing unclear. Admitted from skilled nursing facility.     NUTRITION INTAKE/PROVISION:  [x] PO:  [] Enteral Nutrition:  [] Parenteral Nutrition:    ANTHROPOMETRICS:  Drug Dosing Weight  Height (cm): 162.6 (17 May 2024 07:55)  Weight (kg): 74.7 (17 May 2024 07:55)  BMI (kg/m2): 28.3 (17 May 2024 07:55)  Weights:   Daily      NUTRITIONALLY PERTINENT MEDICATIONS:  MEDICATIONS  (STANDING):  folic acid  insulin lispro (ADMELOG) corrective regimen sliding scale  multivitamin  norepinephrine Infusion  pantoprazole  Injectable  polyethylene glycol 3350  senna  sodium chloride 0.9%.     NUTRITIONALLY PERTINENT LABS:  05-20 Na138 mmol/L Glu 117 mg/dL<H> K+ 3.2 mmol/L<L> Cr  0.42 mg/dL<L> BUN 4 mg/dL<L> 05-20 Phos 2.4 mg/dL<L> 05-20 Alb 3.5 g/dL 05-15 Chol 119 mg/dL LDL --    HDL 28 mg/dL<L> Trig 81 mg/dL05-20 ALT 59 U/L<H> AST 26 U/L Alkaline Phosphatase 137 U/L<H>  05-15-24 @ 03:51 a1c 6.1    A1C with Estimated Average Glucose Result: 6.1 % (05-15-24 @ 03:51)    Finger Sticks:  POCT Blood Glucose.: 104 mg/dL (05-20 @ 11:37)    NUTRITIONALLY PERTINENT MEDICATIONS/LABS:  [x] Reviewed  [x] Relevant notes on medications/labs:  -Continues on NaCl 0.9% at 70ml/hr for hydration, electrolyte support.   -Norepinephrine infusing at 21ml/hr.   -Continues on folic acid, multivitamin.   -Prescribed ISS for glycemic control. A1c 6.1%.   -Prescribed Remeron; may act as appetite stimulant.    EDEMA:  [x] Reviewed - 1+ generalized  [] Relevant notes:    GI/ I&O:  [x] Reviewed  [x] Relevant notes: last BM (5/19): x 1; (5/18): x 3. On bowel regimen (senna, Miralax).   [] Other:    SKIN:   [x] No pressure injuries documented, per nursing flowsheet  [] Pressure injury previously noted  [] Change in pressure injury documentation:  [] Other:    ESTIMATED NEEDS:  [] No change:  [x] Updated:  Energy: 1867-2241kcal (25-30kcal/kg); based on dosing wt 74.7kg  Protein: 76-87g protein (1.4-1.6g protein/kg; based on IBW 54.4kg  Fluid:   ml/day or [x] defer to team    NUTRITION DIAGNOSIS:  [x] Prior Dx: Increased nutrient needs  [x] New Dx: Acute moderate protein calorie malnutrition related to complex clinical course deferring ability of pt to consume/receive optimal energy intake as evidenced by energy intake on average <75% x >/=7 days, 1+ edema    EDUCATION:  [] Yes:  [x] Not appropriate/warranted    NUTRITION CARE PLAN:  1. Continue Consistent Carbohydrate diet. If POCT BG WNL and/or suboptimal PO intake noted, may consider liberalizing to NO therapeutic restrictions. Defer consistency to medical team, SLP.   2. Encourage and monitor PO intake. Endeavor dietary preferences as expressed as able.  3. Recommend multivitamin (if no medication contraindications) to aid in prevention of micronutrient deficiencies.   4. RD to add diet Mighty Shakes three times daily.   5. Continue micronutrient supplementation as prescribed.   6. Malnutrition sticker placed.    [] Achieved - Continue current nutrition intervention(s)  [] Current medical condition precludes nutrition intervention at this time.    MONITORING AND EVALUATION:   RD remains available upon request and will follow up per protocol.    Savi Swan RD, available via TEAMS

## 2024-05-20 NOTE — PROGRESS NOTE ADULT - ASSESSMENT
Mear Burton  64F, pmh HTN, szs on Keppra, alcoholic cirrhosis, tremors, OA presented from [facility] to OSH w/ generalized weakness found to have b/l sulcal SAH and small occipital IVH. 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 She was recently started on Keflex for UTI. Plts 400, Coags wnl. , AST/ALT = 38/104. WBC 13. Afebrile. Exam on admission: lethargic appearing, EOV, FC, AO3, PERRL, not compliant with EOMs, BUE 5/5, BLE 2/5.    Now s/p 5/17 Angio: severe b/l anterior and posterior cerebral vasospasm (given IA verapamil and milrinone)    f/u labs for vasculitis   [facility] protocol  TCDs daily  BMP q8  MRI brain/C/s wwo in am

## 2024-05-20 NOTE — PROGRESS NOTE ADULT - SUBJECTIVE AND OBJECTIVE BOX
PATIENT SEEN AND EXAMINED BY MYNOR GUAN M.D. ON :- 5/20/24  DATE OF SERVICE:   5/20/24          Interim events noted,Labs ,Radiological studies and Cardiology tests reviewed .    Patient is a 64y old  Female who presents with a chief complaint of SAH (20 May 2024 06:22)      HPI:  Mera Burton  64F, pmh HTN, szs on Keppra, alcoholic cirrhosis, tremors, OA presented from [facility] to OSH w/ generalized weakness found to have b/l sulcal SAH and small occipital IVH. 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 She was recently started on Keflex for UTI. Plts 400, Coags wnl. , AST/ALT = 38/104. WBC 13. Afebrile. Exam: lethargic appearing, EOV, FC, AO3, PERRL, not compliant with EOMs, BUE 5/5, BLE 2/5. (15 May 2024 03:22)      PAST MEDICAL & SURGICAL HISTORY:  Cirrhosis      Seizure          PREVIOUS DIAGNOSTIC TESTING:      ECHO  FINDINGS:    STRESS  FINDINGS:    CATHETERIZATION  FINDINGS:    MEDICATIONS  (STANDING):  brimonidine 0.2% Ophthalmic Solution 1 Drop(s) Both EYES every 12 hours  chlorhexidine 4% Liquid 1 Application(s) Topical <User Schedule>  dexMEDEtomidine Infusion 0.3 MICROgram(s)/kG/Hr (5.6 mL/Hr) IV Continuous <Continuous>  enoxaparin Injectable 40 milliGRAM(s) SubCutaneous <User Schedule>  folic acid 1 milliGRAM(s) Oral daily  insulin lispro (ADMELOG) corrective regimen sliding scale   SubCutaneous three times a day before meals  latanoprost 0.005% Ophthalmic Solution 1 Drop(s) Both EYES at bedtime  levETIRAcetam  IVPB 750 milliGRAM(s) IV Intermittent every 12 hours  magnesium sulfate  IVPB 2 Gram(s) IV Intermittent once  mirtazapine 15 milliGRAM(s) Oral at bedtime  multivitamin 1 Tablet(s) Oral daily  norepinephrine Infusion 0.05 MICROgram(s)/kG/Min (7 mL/Hr) IV Continuous <Continuous>  pantoprazole  Injectable 40 milliGRAM(s) IV Push daily  polyethylene glycol 3350 17 Gram(s) Oral every 12 hours  senna 2 Tablet(s) Oral at bedtime  sodium chloride 0.9%. 1000 milliLiter(s) (70 mL/Hr) IV Continuous <Continuous>    MEDICATIONS  (PRN):  sodium chloride 0.9% lock flush 10 milliLiter(s) IV Push every 1 hour PRN Pre/post blood products, medications, blood draw, and to maintain line patency      FAMILY HISTORY:      SOCIAL HISTORY:    CIGARETTES:    ALCOHOL:    REVIEW OF SYSTEMS:  CONSTITUTIONAL: No fever, weight loss, or fatigue  EYES: No eye pain, visual disturbances, or discharge  ENMT:  No difficulty hearing, tinnitus, vertigo; No sinus or throat pain  NECK: No pain or stiffness  RESPIRATORY: No cough, wheezing, chills or hemoptysis; No shortness of breath  CARDIOVASCULAR: No chest pain, palpitations, dizziness, or leg swelling  GASTROINTESTINAL: No abdominal or epigastric pain. No nausea, vomiting, or hematemesis; No diarrhea or constipation. No melena or hematochezia.  GENITOURINARY: No dysuria, frequency, hematuria, or incontinence  NEUROLOGICAL: No headaches, memory loss, loss of strength, numbness, or tremors  SKIN: No itching, burning, rashes, or lesions   LYMPH NODES: No enlarged glands  ENDOCRINE: No heat or cold intolerance; No hair loss  MUSCULOSKELETAL: No joint pain or swelling; No muscle, back, or extremity pain  PSYCHIATRIC: No depression, anxiety, mood swings, or difficulty sleeping  HEME/LYMPH: No easy bruising, or bleeding gums  ALLERY AND IMMUNOLOGIC: No hives or eczema    Vital Signs Last 24 Hrs  T(C): 36.4 (20 May 2024 19:00), Max: 37 (20 May 2024 03:00)  T(F): 97.5 (20 May 2024 19:00), Max: 98.6 (20 May 2024 03:00)  HR: 61 (20 May 2024 19:00) (49 - 105)  BP: --  BP(mean): --  RR: 14 (20 May 2024 19:00) (10 - 50)  SpO2: 100% (20 May 2024 19:00) (91% - 100%)    Parameters below as of 20 May 2024 19:00  Patient On (Oxygen Delivery Method): room air          PHYSICAL EXAM:  GENERAL: NAD, well-groomed, well-developed  HEAD:  Atraumatic, Normocephalic  EYES: EOMI, PERRLA, conjunctiva and sclera clear  ENMT: No tonsillar erythema, exudates, or enlargement; Moist mucous membranes, Good dentition, No lesions  NECK: Supple, No JVD, Normal thyroid  NERVOUS SYSTEM:  Alert & Oriented X3, Good concentration; Motor Strength 5/5 B/L upper and lower extremities; DTRs 2+ intact and symmetric  CHEST/LUNG: Clear to percussion bilaterally; No rales, rhonchi, wheezing, or rubs  HEART: Regular rate and rhythm; No murmurs, rubs, or gallops  ABDOMEN: Soft, Nontender, Nondistended; Bowel sounds present  EXTREMITIES:  2+ Peripheral Pulses, No clubbing, cyanosis, or edema  LYMPH: No lymphadenopathy noted  SKIN: No rashes or lesions      INTERPRETATION OF TELEMETRY:    ECG:    AILYNVAS:     LABS:                        11.1   10.46 )-----------( 427      ( 20 May 2024 10:29 )             32.7     05-20    138  |  102  |  4<L>  ----------------------------<  117<H>  3.2<L>   |  17<L>  |  0.42<L>    Ca    9.1      20 May 2024 10:29  Phos  2.4     05-20  Mg     1.6     05-20    TPro  7.2  /  Alb  3.5  /  TBili  0.4  /  DBili  x   /  AST  26  /  ALT  59<H>  /  AlkPhos  137<H>  05-20          Urinalysis Basic - ( 20 May 2024 10:29 )    Color: x / Appearance: x / SG: x / pH: x  Gluc: 117 mg/dL / Ketone: x  / Bili: x / Urobili: x   Blood: x / Protein: x / Nitrite: x   Leuk Esterase: x / RBC: x / WBC x   Sq Epi: x / Non Sq Epi: x / Bacteria: x      Lipid Panel:   I&O's Summary    19 May 2024 07:01  -  20 May 2024 07:00  --------------------------------------------------------  IN: 3521 mL / OUT: 3605 mL / NET: -84 mL    20 May 2024 07:01  -  20 May 2024 22:30  --------------------------------------------------------  IN: 1986 mL / OUT: 875 mL / NET: 1111 mL        RADIOLOGY & ADDITIONAL STUDIES:

## 2024-05-20 NOTE — PROGRESS NOTE ADULT - CRITICAL CARE ATTENDING COMMENT
SAH, unknow etiology, angio did not show cerebral aneurysm per neuroIC but  severe vasospasm of bilateral anterior and posterior circulation. Improved after IA verapamil and milrinone , will get MRI brain w contrast and MRI cervical spine wwo   keppra 750 mg BID was on it as outpatient ,  neuro checks q 2 hr, CT head showed large right occipital temp ischemic stroke, neurology consulted, they thought that it could be drug related, no need to do the LP, SBP goal 140-180 mmhg given severe CVS, on NE , keep euvolemia , NS 70 ml/hr, RA, pureed diet , lovenox 40 mg sc qhs     at risk for stroke

## 2024-05-20 NOTE — PROGRESS NOTE ADULT - SUBJECTIVE AND OBJECTIVE BOX
NSICU DAY PROGRESS NOTE     12h events   extubated   exam stable     T(C): 36.9 (05-19-24 @ 07:00), Max: 36.9 (05-18-24 @ 11:00)  HR: 55 (05-19-24 @ 08:45) (47 - 86)  BP: --  RR: 19 (05-19-24 @ 08:45) (12 - 71)  SpO2: 100% (05-19-24 @ 08:45) (97% - 100%)  05-18-24 @ 07:01  -  05-19-24 @ 07:00  --------------------------------------------------------  IN: 4652.6 mL / OUT: 6630 mL / NET: -1977.4 mL    brimonidine 0.2% Ophthalmic Solution 1 Drop(s) Both EYES every 12 hours  chlorhexidine 4% Liquid 1 Application(s) Topical <User Schedule>  dexMEDEtomidine Infusion 0.2 MICROgram(s)/kG/Hr IV Continuous <Continuous>  enoxaparin Injectable 40 milliGRAM(s) SubCutaneous <User Schedule>  folic acid 1 milliGRAM(s) Oral daily  latanoprost 0.005% Ophthalmic Solution 1 Drop(s) Both EYES at bedtime  levETIRAcetam  IVPB 750 milliGRAM(s) IV Intermittent every 12 hours  mirtazapine 15 milliGRAM(s) Oral at bedtime  multivitamin 1 Tablet(s) Oral daily  oxyCODONE    IR 10 milliGRAM(s) Oral every 4 hours PRN  oxyCODONE    IR 5 milliGRAM(s) Oral every 4 hours PRN  pantoprazole  Injectable 40 milliGRAM(s) IV Push daily  phenylephrine    Infusion 1.5 MICROgram(s)/kG/Min IV Continuous <Continuous>  polyethylene glycol 3350 17 Gram(s) Oral every 12 hours  senna 2 Tablet(s) Oral at bedtime  sodium chloride 0.9% lock flush 10 milliLiter(s) IV Push every 1 hour PRN  sodium chloride 0.9%. 1000 milliLiter(s) IV Continuous <Continuous>  thiamine IVPB 500 milliGRAM(s) IV Intermittent daily      Exam   alert awake, eyes open, gaze midline, UE AG, not following simple commands, LE in plan of bed      LABS:  Na: 140 (05-19 @ 06:18), 138 (05-18 @ 21:51), 142 (05-18 @ 13:42), 142 (05-18 @ 06:05), 141 (05-17 @ 22:43), 137 (05-17 @ 13:37), 136 (05-17 @ 03:34), 136 (05-16 @ 21:25), 136 (05-16 @ 14:14)  K: 3.8 (05-19 @ 06:18), 4.2 (05-18 @ 21:51), 4.1 (05-18 @ 13:42), 3.5 (05-18 @ 06:05), 4.2 (05-17 @ 22:43), 3.4 (05-17 @ 13:37), 3.8 (05-17 @ 03:34), 3.9 (05-16 @ 21:25), 3.4 (05-16 @ 14:14)  Cl: 105 (05-19 @ 06:18), 103 (05-18 @ 21:51), 108 (05-18 @ 13:42), 106 (05-18 @ 06:05), 108 (05-17 @ 22:43), 104 (05-17 @ 13:37), 102 (05-17 @ 03:34), 102 (05-16 @ 21:25), 99 (05-16 @ 14:14)  CO2: 20 (05-19 @ 06:18), 21 (05-18 @ 21:51), 20 (05-18 @ 13:42), 23 (05-18 @ 06:05), 22 (05-17 @ 22:43), 18 (05-17 @ 13:37), 21 (05-17 @ 03:34), 21 (05-16 @ 21:25), 24 (05-16 @ 14:14)  BUN: 5 (05-19 @ 06:18), 5 (05-18 @ 21:51), <4 (05-18 @ 13:42), 5 (05-18 @ 06:05), 6 (05-17 @ 22:43), 7 (05-17 @ 13:37), 8 (05-17 @ 03:34), 7 (05-16 @ 21:25), 7 (05-16 @ 14:14)  Cr: 0.40 (05-19 @ 06:18), 0.41 (05-18 @ 21:51), 0.41 (05-18 @ 13:42), 0.45 (05-18 @ 06:05), 0.43 (05-17 @ 22:43), 0.40 (05-17 @ 13:37), 0.41 (05-17 @ 03:34), 0.47 (05-16 @ 21:25), 0.46 (05-16 @ 14:14)  Glu: 106(05-19 @ 06:18), 132(05-18 @ 21:51), 124(05-18 @ 13:42), 134(05-18 @ 06:05), 117(05-17 @ 22:43), 182(05-17 @ 13:37), 102(05-17 @ 03:34), 99(05-16 @ 21:25), 109(05-16 @ 14:14)    Hgb: 10.2 (05-18 @ 03:40), 10.4 (05-17 @ 03:34)  Hct: 30.0 (05-18 @ 03:40), 30.9 (05-17 @ 03:34)  WBC: 16.99 (05-18 @ 03:40), 9.60 (05-17 @ 03:34)  Plt: 450 (05-18 @ 03:40), 359 (05-17 @ 03:34)    INR: 1.33 05-17-24 @ 03:34  PTT: 29.8 05-17-24 @ 03:34           HOSPITAL COURSE: 64F pmh Dementia (Polysubstance Abuse, resides in NH) with dyskinesias, HTN, szs on Keppra, alcoholic cirrhosis, tremors, OA presented from [facility] to  w/ AMS & generalized weakness. 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 Started on Keflex for UTI at Nursing Home. Per NH Basline independent in ADLs, conversant but forgetful and confused, wheelchair bound.    HOSPITAL COURSE:  5/15 transferred to [facility] for workup of nontraumatic convexity SAH    Admission Scores  GCS: 14 HH: 2 MF: 2 NIHSS: RASS: CAM-ICU: ICH:      REVIEW OF SYSTEMS: [ ] Unable to Assess due to neurologic exam   [ ] All ROS addressed below are non-contributory, except:  Neuro: [ ] Headache [ ] Back pain [ ] Numbness [ ] Weakness [ ] Ataxia [ ] Dizziness [ ] Aphasia [ ] Dysarthria [ ] Visual disturbance  Resp: [ ] Shortness of breath/dyspnea, [ ] Orthopnea [ ] Cough  CV: [ ] Chest pain [ ] Palpitation [ ] Lightheadedness [ ] Syncope  Renal: [ ] Thirst [ ] Edema  GI: [ ] Nausea [ ] Emesis [ ] Abdominal pain [ ] Constipation [ ] Diarrhea  Hem: [ ] Hematemesis [ ] bright red blood per rectum  ID: [ ] Fever [ ] Chills [ ] Dysuria  ENT: [ ] Rhinorrhea      DEVICES:   [ ] Restraints [ ] ET tube [ ] central line [ ] arterial line [ ] lang [ ] NGT/OGT [ ] EVD [ ] LD [ ] STANISLAV/HMV [ ] Trach [ ] PEG [ ] Chest Tube     VITALS:   Vital Signs Last 24 Hrs  T(C): 37 (20 May 2024 07:00), Max: 37 (20 May 2024 03:00)  T(F): 98.6 (20 May 2024 07:00), Max: 98.6 (20 May 2024 03:00)  HR: 68 (20 May 2024 07:15) (51 - 103)  BP: --  BP(mean): --  RR: 20 (20 May 2024 07:15) (13 - 42)  SpO2: 100% (20 May 2024 07:15) (96% - 100%)    Parameters below as of 19 May 2024 19:00  Patient On (Oxygen Delivery Method): room air      CAPILLARY BLOOD GLUCOSE        I&O's Summary    19 May 2024 07:01  -  20 May 2024 07:00  --------------------------------------------------------  IN: 3430 mL / OUT: 3505 mL / NET: -75 mL        Respiratory:    ABG - ( 18 May 2024 13:36 )  pH, Arterial: 7.39  pH, Blood: x     /  pCO2: 38    /  pO2: 199   / HCO3: 23    / Base Excess: -1.7  /  SaO2: 99.9                LABS:    05-19    140  |  105  |  5<L>  ----------------------------<  106<H>  3.8   |  20<L>  |  0.40<L>             MEDICATION LEVELS:   Ammonia, Serum: 26 umol/L (05-16 @ 15:24)  Ammonia, Serum: 36 umol/L (05-14 @ 13:30)    IVF FLUIDS/MEDICATIONS:   MEDICATIONS  (STANDING):  brimonidine 0.2% Ophthalmic Solution 1 Drop(s) Both EYES every 12 hours  chlorhexidine 4% Liquid 1 Application(s) Topical <User Schedule>  enoxaparin Injectable 40 milliGRAM(s) SubCutaneous <User Schedule>  folic acid 1 milliGRAM(s) Oral daily  latanoprost 0.005% Ophthalmic Solution 1 Drop(s) Both EYES at bedtime  levETIRAcetam  IVPB 750 milliGRAM(s) IV Intermittent every 12 hours  mirtazapine 15 milliGRAM(s) Oral at bedtime  multivitamin 1 Tablet(s) Oral daily  norepinephrine Infusion 0.05 MICROgram(s)/kG/Min (7 mL/Hr) IV Continuous <Continuous>  pantoprazole  Injectable 40 milliGRAM(s) IV Push daily  polyethylene glycol 3350 17 Gram(s) Oral every 12 hours  senna 2 Tablet(s) Oral at bedtime  sodium chloride 0.9%. 1000 milliLiter(s) (70 mL/Hr) IV Continuous <Continuous>    MEDICATIONS  (PRN):  sodium chloride 0.9% lock flush 10 milliLiter(s) IV Push every 1 hour PRN Pre/post blood products, medications, blood draw, and to maintain line patency        IMAGING:      EXAMINATION:  PHYSICAL EXAM:    Constitutional: No Acute Distress     Neurological: alert awake, eyes open, gaze midline, UE AG, not following simple commands, LE in plane of bed    Pulmonary: Clear to Auscultation, No rales, No rhonchi, No wheezes     Cardiovascular: S1, S2, Regular rate and rhythm     Gastrointestinal: Soft, Non-tender, Non-distended     Extremities: No calf tenderness     Incision:          HOSPITAL COURSE: 64F pmh Dementia (Polysubstance Abuse, resides in NH) with dyskinesias, HTN, szs on Keppra, alcoholic cirrhosis, tremors, OA presented from [facility] to  w/ AMS & generalized weakness. 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 Started on Keflex for UTI at Nursing Home. Per NH Basline independent in ADLs, conversant but forgetful and confused, wheelchair bound.    HOSPITAL COURSE:  5/15 transferred to [facility] for workup of nontraumatic convexity SAH    Admission Scores  GCS: 14 HH: 2 MF: 2 NIHSS: RASS: CAM-ICU: ICH:      REVIEW OF SYSTEMS: [ ] Unable to Assess due to neurologic exam   [ ] All ROS addressed below are non-contributory, except:  Neuro: [ ] Headache [ ] Back pain [ ] Numbness [ ] Weakness [ ] Ataxia [ ] Dizziness [ ] Aphasia [ ] Dysarthria [ ] Visual disturbance  Resp: [ ] Shortness of breath/dyspnea, [ ] Orthopnea [ ] Cough  CV: [ ] Chest pain [ ] Palpitation [ ] Lightheadedness [ ] Syncope  Renal: [ ] Thirst [ ] Edema  GI: [ ] Nausea [ ] Emesis [ ] Abdominal pain [ ] Constipation [ ] Diarrhea  Hem: [ ] Hematemesis [ ] bright red blood per rectum  ID: [ ] Fever [ ] Chills [ ] Dysuria  ENT: [ ] Rhinorrhea      DEVICES:   [ ] Restraints [ ] ET tube [ ] central line [ ] arterial line [ ] lang [ ] NGT/OGT [ ] EVD [ ] LD [ ] STANISLAV/HMV [ ] Trach [ ] PEG [ ] Chest Tube     VITALS:   Vital Signs Last 24 Hrs  T(C): 37 (20 May 2024 07:00), Max: 37 (20 May 2024 03:00)  T(F): 98.6 (20 May 2024 07:00), Max: 98.6 (20 May 2024 03:00)  HR: 68 (20 May 2024 07:15) (51 - 103)  BP: --  BP(mean): --  RR: 20 (20 May 2024 07:15) (13 - 42)  SpO2: 100% (20 May 2024 07:15) (96% - 100%)    Parameters below as of 19 May 2024 19:00  Patient On (Oxygen Delivery Method): room air      CAPILLARY BLOOD GLUCOSE        I&O's Summary    19 May 2024 07:01  -  20 May 2024 07:00  --------------------------------------------------------  IN: 3430 mL / OUT: 3505 mL / NET: -75 mL        Respiratory:    ABG - ( 18 May 2024 13:36 )  pH, Arterial: 7.39  pH, Blood: x     /  pCO2: 38    /  pO2: 199   / HCO3: 23    / Base Excess: -1.7  /  SaO2: 99.9                LABS:    05-19    140  |  105  |  5<L>  ----------------------------<  106<H>  3.8   |  20<L>  |  0.40<L>             MEDICATION LEVELS:   Ammonia, Serum: 26 umol/L (05-16 @ 15:24)  Ammonia, Serum: 36 umol/L (05-14 @ 13:30)    IVF FLUIDS/MEDICATIONS:   MEDICATIONS  (STANDING):  brimonidine 0.2% Ophthalmic Solution 1 Drop(s) Both EYES every 12 hours  chlorhexidine 4% Liquid 1 Application(s) Topical <User Schedule>  enoxaparin Injectable 40 milliGRAM(s) SubCutaneous <User Schedule>  folic acid 1 milliGRAM(s) Oral daily  latanoprost 0.005% Ophthalmic Solution 1 Drop(s) Both EYES at bedtime  levETIRAcetam  IVPB 750 milliGRAM(s) IV Intermittent every 12 hours  mirtazapine 15 milliGRAM(s) Oral at bedtime  multivitamin 1 Tablet(s) Oral daily  norepinephrine Infusion 0.05 MICROgram(s)/kG/Min (7 mL/Hr) IV Continuous <Continuous>  pantoprazole  Injectable 40 milliGRAM(s) IV Push daily  polyethylene glycol 3350 17 Gram(s) Oral every 12 hours  senna 2 Tablet(s) Oral at bedtime  sodium chloride 0.9%. 1000 milliLiter(s) (70 mL/Hr) IV Continuous <Continuous>    MEDICATIONS  (PRN):  sodium chloride 0.9% lock flush 10 milliLiter(s) IV Push every 1 hour PRN Pre/post blood products, medications, blood draw, and to maintain line patency        IMAGING:      EXAMINATION:  PHYSICAL EXAM:    Constitutional: No Acute Distress     Neurological: A&O x 2 alert awake, eyes open, gaze midline, UE AG, not following simple commands, LE in plane of bed    Pulmonary: Clear to Auscultation, No rales, No rhonchi, No wheezes     Cardiovascular: S1, S2, Regular rate and rhythm     Gastrointestinal: Soft, Non-tender, Non-distended     Extremities: No calf tenderness     Incision:

## 2024-05-20 NOTE — PROGRESS NOTE ADULT - SUBJECTIVE AND OBJECTIVE BOX
Patient seen and examined at bedside.    --Anticoagulation--  enoxaparin Injectable 40 milliGRAM(s) SubCutaneous <User Schedule>    T(C): 36.6 (05-19-24 @ 23:00), Max: 36.9 (05-19-24 @ 03:00)  HR: 60 (05-19-24 @ 23:30) (47 - 93)  BP: --  RR: 17 (05-19-24 @ 23:30) (12 - 42)  SpO2: 99% (05-19-24 @ 23:30) (96% - 100%)  Wt(kg): --    Exam: EOS, pupils 3R sluggish, midline gaze, interm FC, BL UE AG, BL LE 2/5

## 2024-05-20 NOTE — PROGRESS NOTE ADULT - ASSESSMENT
ASSESSMENT   SAH HH2 MF2 , angio no vascular malformation but diffuse CVS s/p angiographic CCB   unsure etiology of SAH   r/o vasculitis TIMOTHY, antiphospholipid Ab, ANCA, serum C3, C4, cryoglobulin, SPEP, UPEP, quant immunglobulin levels, Ab SSA/SSB, dsDNA  PBD: 4  PLAN   neuro checks q 2 hr   daily TCD   MRI brain post contrast, MRI cervical w contrast   #AIS R temporo occipital   #pmhx seizure cont home keppra 750 mg BID   according brother and sister in law, she might be taking drugs and alcohol abuse   #Pain: Tylenol   #Activity: PT OT    CV   #-200mmHg on cheryl   daily troponin   #TTE grossly normal cardiac function   sinus bradycardia change cheryl to levophed     P   extubated 05/18  RA     R   #Strict I+Os   #cont NS,  will give 1 L ns BOLUS TARGET EUVOLEMIA  NS 70 ml/hr     GI   #Diet: Puree CCD  #Senna miralax  LBM: 05/19    ID  afebrile   monitor off antibiotics    E  Goal euglycemia (-180)  #a1c 6.1, ISS     H  #DVT ppx:   SCDs  lovenox 40 mg sc qhs   LED no dvt       #CODE STATUS: FULL CODE     #DISPOSITION: ICU    #CRITICAL     40 minutes

## 2024-05-20 NOTE — PROGRESS NOTE ADULT - ASSESSMENT
ASSESSMENT   SAH HH2 MF2 , angio no vascular malformation but diffuse CVS s/p angiographic CCB   unsure etiology of SAH   r/o vasculitis TIMOTHY, antiphospholipid Ab, ANCA, serum C3, C4, cryoglobulin, SPEP, UPEP, quant immunglobulin levels, Ab SSA/SSB, dsDNA  PLAN   neuro checks q 1 hr   daily TCD   MRI brain post contrast, MRI cervical w contrast   #AIS R temporo occipital   #pmhx seizure cont home keppra 750 mg BID   according brother and sister in law, she might be taking drugs and alcohol abuse   #Pain: Tylenol   #Activity: PT OT    CV   #-200mmHg on cheryl   daily troponin   #TTE grossly normal cardiac function   sinus bradycardia change cheryl to levophed     P   extubated yesterday   RA     R   #Strict I+Os   #cont NS,  will give 1 L ns BOLUS TARGET EUVOLEMIA  NS 70 ml/hr     GI   #Diet: swallow eval, if fails, will need NG   #Senna miralax  Last BM today     ID  afebrile   monitor off antibiotics    E  Goal euglycemia (-180)  #a1c 6.1, ISS     H  #DVT ppx:   SCDs  lovenox 40 mg sc qhs   LED no dvt       #CODE STATUS: FULL CODE     #DISPOSITION: ICU    #CRITICAL     40 minutes  ASSESSMENT   SAH HH2 MF2 , angio no vascular malformation but diffuse CVS s/p angiographic CCB   unsure etiology of SAH   r/o vasculitis TIMOTHY, antiphospholipid Ab, ANCA, serum C3, C4, cryoglobulin, SPEP, UPEP, quant immunglobulin levels, Ab SSA/SSB, dsDNA  PBD: 4  PLAN   neuro checks q 2 hr   daily TCD   MRI brain post contrast, MRI cervical w contrast   #AIS R temporo occipital   #pmhx seizure cont home keppra 750 mg BID   according brother and sister in law, she might be taking drugs and alcohol abuse   #Pain: Tylenol   #Activity: PT OT    CV   #-200mmHg on cheryl   daily troponin   #TTE grossly normal cardiac function   sinus bradycardia change cheryl to levophed     P   extubated 05/18  RA     R   #Strict I+Os   #cont NS,  will give 1 L ns BOLUS TARGET EUVOLEMIA  NS 70 ml/hr     GI   #Diet: Puree CCD  #Senna miralax  LBM: 05/19    ID  afebrile   monitor off antibiotics    E  Goal euglycemia (-180)  #a1c 6.1, ISS     H  #DVT ppx:   SCDs  lovenox 40 mg sc qhs   LED no dvt       #CODE STATUS: FULL CODE     #DISPOSITION: ICU    #CRITICAL     40 minutes

## 2024-05-20 NOTE — DIETITIAN NUTRITION RISK NOTIFICATION - MALNUTRITION EVALUATION AS DEMONSTRATED BY (ADULTS > 20 YEARS OF AGE)
Inadequate energy intake.../Fluid accumulation... Click to Modify Medication Indication on Note Save

## 2024-05-20 NOTE — PROGRESS NOTE ADULT - SUBJECTIVE AND OBJECTIVE BOX
NSCU ATTENDING -- ADDITIONAL PROGRESS NOTE    Nighttime rounds were performed -- please refer to earlier Progress Note for HPI details.    T(C): 36.4 (05-20-24 @ 19:00), Max: 37 (05-20-24 @ 03:00)  HR: 61 (05-20-24 @ 19:00) (49 - 105)  BP: --  RR: 14 (05-20-24 @ 19:00) (10 - 50)  SpO2: 100% (05-20-24 @ 19:00) (91% - 100%)  Wt(kg): --    Relevant labwork and imaging reviewed.    s/p MRI today.  NPO after midnight -- possible angio in AM.

## 2024-05-20 NOTE — CHART NOTE - NSCHARTNOTEFT_GEN_A_CORE
Transcranial doppler exam #1 (5/20/24) 11am  Technically difficult study due to poor temporal windows bilaterally.  Could not insonate  the bilateral MCA's, SARAH's and PCA's.  Official report to follow.  Michelle

## 2024-05-21 LAB
ALBUMIN SERPL ELPH-MCNC: 3.4 G/DL — SIGNIFICANT CHANGE UP (ref 3.3–5)
ALP SERPL-CCNC: 129 U/L — HIGH (ref 40–120)
ALT FLD-CCNC: 44 U/L — SIGNIFICANT CHANGE UP (ref 10–45)
ANA TITR SER: NEGATIVE — SIGNIFICANT CHANGE UP
ANION GAP SERPL CALC-SCNC: 13 MMOL/L — SIGNIFICANT CHANGE UP (ref 5–17)
APTT BLD: 34.8 SEC — SIGNIFICANT CHANGE UP (ref 24.5–35.6)
AST SERPL-CCNC: 23 U/L — SIGNIFICANT CHANGE UP (ref 10–40)
BASOPHILS # BLD AUTO: 0.02 K/UL — SIGNIFICANT CHANGE UP (ref 0–0.2)
BASOPHILS # BLD AUTO: 0.03 K/UL — SIGNIFICANT CHANGE UP (ref 0–0.2)
BASOPHILS NFR BLD AUTO: 0.2 % — SIGNIFICANT CHANGE UP (ref 0–2)
BASOPHILS NFR BLD AUTO: 0.3 % — SIGNIFICANT CHANGE UP (ref 0–2)
BILIRUB SERPL-MCNC: 0.4 MG/DL — SIGNIFICANT CHANGE UP (ref 0.2–1.2)
BUN SERPL-MCNC: <4 MG/DL — LOW (ref 7–23)
CALCIUM SERPL-MCNC: 8.7 MG/DL — SIGNIFICANT CHANGE UP (ref 8.4–10.5)
CHLORIDE SERPL-SCNC: 101 MMOL/L — SIGNIFICANT CHANGE UP (ref 96–108)
CO2 SERPL-SCNC: 22 MMOL/L — SIGNIFICANT CHANGE UP (ref 22–31)
CREAT SERPL-MCNC: 0.43 MG/DL — LOW (ref 0.5–1.3)
CREATININE, URINE RESULT: 17 MG/DL — SIGNIFICANT CHANGE UP
DSDNA AB SER-ACNC: <1 IU/ML — SIGNIFICANT CHANGE UP
EGFR: 110 ML/MIN/1.73M2 — SIGNIFICANT CHANGE UP
ENA SCL70 AB SER-ACNC: <0.2 AI — SIGNIFICANT CHANGE UP
EOSINOPHIL # BLD AUTO: 0.05 K/UL — SIGNIFICANT CHANGE UP (ref 0–0.5)
EOSINOPHIL # BLD AUTO: 0.05 K/UL — SIGNIFICANT CHANGE UP (ref 0–0.5)
EOSINOPHIL NFR BLD AUTO: 0.5 % — SIGNIFICANT CHANGE UP (ref 0–6)
EOSINOPHIL NFR BLD AUTO: 0.6 % — SIGNIFICANT CHANGE UP (ref 0–6)
GLUCOSE SERPL-MCNC: 131 MG/DL — HIGH (ref 70–99)
HCT VFR BLD CALC: 31.3 % — LOW (ref 34.5–45)
HCT VFR BLD CALC: 33.8 % — LOW (ref 34.5–45)
HGB BLD-MCNC: 10.7 G/DL — LOW (ref 11.5–15.5)
HGB BLD-MCNC: 11.4 G/DL — LOW (ref 11.5–15.5)
IMM GRANULOCYTES NFR BLD AUTO: 0.4 % — SIGNIFICANT CHANGE UP (ref 0–0.9)
IMM GRANULOCYTES NFR BLD AUTO: 0.6 % — SIGNIFICANT CHANGE UP (ref 0–0.9)
INR BLD: 1.31 RATIO — HIGH (ref 0.85–1.18)
LYMPHOCYTES # BLD AUTO: 2.58 K/UL — SIGNIFICANT CHANGE UP (ref 1–3.3)
LYMPHOCYTES # BLD AUTO: 2.71 K/UL — SIGNIFICANT CHANGE UP (ref 1–3.3)
LYMPHOCYTES # BLD AUTO: 28.7 % — SIGNIFICANT CHANGE UP (ref 13–44)
LYMPHOCYTES # BLD AUTO: 28.9 % — SIGNIFICANT CHANGE UP (ref 13–44)
MAGNESIUM SERPL-MCNC: 1.9 MG/DL — SIGNIFICANT CHANGE UP (ref 1.6–2.6)
MCHC RBC-ENTMCNC: 27.5 PG — SIGNIFICANT CHANGE UP (ref 27–34)
MCHC RBC-ENTMCNC: 27.5 PG — SIGNIFICANT CHANGE UP (ref 27–34)
MCHC RBC-ENTMCNC: 33.7 GM/DL — SIGNIFICANT CHANGE UP (ref 32–36)
MCHC RBC-ENTMCNC: 34.2 GM/DL — SIGNIFICANT CHANGE UP (ref 32–36)
MCV RBC AUTO: 80.5 FL — SIGNIFICANT CHANGE UP (ref 80–100)
MCV RBC AUTO: 81.6 FL — SIGNIFICANT CHANGE UP (ref 80–100)
MONOCYTES # BLD AUTO: 0.75 K/UL — SIGNIFICANT CHANGE UP (ref 0–0.9)
MONOCYTES # BLD AUTO: 0.81 K/UL — SIGNIFICANT CHANGE UP (ref 0–0.9)
MONOCYTES NFR BLD AUTO: 8 % — SIGNIFICANT CHANGE UP (ref 2–14)
MONOCYTES NFR BLD AUTO: 9 % — SIGNIFICANT CHANGE UP (ref 2–14)
NEUTROPHILS # BLD AUTO: 5.5 K/UL — SIGNIFICANT CHANGE UP (ref 1.8–7.4)
NEUTROPHILS # BLD AUTO: 5.78 K/UL — SIGNIFICANT CHANGE UP (ref 1.8–7.4)
NEUTROPHILS NFR BLD AUTO: 61.1 % — SIGNIFICANT CHANGE UP (ref 43–77)
NEUTROPHILS NFR BLD AUTO: 61.7 % — SIGNIFICANT CHANGE UP (ref 43–77)
NRBC # BLD: 0 /100 WBCS — SIGNIFICANT CHANGE UP (ref 0–0)
NRBC # BLD: 0 /100 WBCS — SIGNIFICANT CHANGE UP (ref 0–0)
PHOSPHATE SERPL-MCNC: 2.5 MG/DL — SIGNIFICANT CHANGE UP (ref 2.5–4.5)
PLATELET # BLD AUTO: 398 K/UL — SIGNIFICANT CHANGE UP (ref 150–400)
PLATELET # BLD AUTO: 413 K/UL — HIGH (ref 150–400)
POTASSIUM SERPL-MCNC: 3.2 MMOL/L — LOW (ref 3.5–5.3)
POTASSIUM SERPL-SCNC: 3.2 MMOL/L — LOW (ref 3.5–5.3)
PROT ?TM UR-MCNC: 10 MG/DL — SIGNIFICANT CHANGE UP (ref 0–12)
PROT SERPL-MCNC: 6.9 G/DL — SIGNIFICANT CHANGE UP (ref 6–8.3)
PROTHROM AB SERPL-ACNC: 14.3 SEC — HIGH (ref 9.5–13)
RBC # BLD: 3.89 M/UL — SIGNIFICANT CHANGE UP (ref 3.8–5.2)
RBC # BLD: 4.14 M/UL — SIGNIFICANT CHANGE UP (ref 3.8–5.2)
RBC # FLD: 12.8 % — SIGNIFICANT CHANGE UP (ref 10.3–14.5)
RBC # FLD: 13 % — SIGNIFICANT CHANGE UP (ref 10.3–14.5)
SODIUM SERPL-SCNC: 136 MMOL/L — SIGNIFICANT CHANGE UP (ref 135–145)
SSA-52 (RO52) (ENA) ANTIBODY, IGG: 3 AU/ML — SIGNIFICANT CHANGE UP (ref 0–40)
SSA-60 (RO60) (ENA) ANTIBODY, IGG: 1 AU/ML — SIGNIFICANT CHANGE UP (ref 0–40)
TROPONIN T, HIGH SENSITIVITY RESULT: <6 NG/L — SIGNIFICANT CHANGE UP (ref 0–51)
WBC # BLD: 9 K/UL — SIGNIFICANT CHANGE UP (ref 3.8–10.5)
WBC # BLD: 9.38 K/UL — SIGNIFICANT CHANGE UP (ref 3.8–10.5)
WBC # FLD AUTO: 9 K/UL — SIGNIFICANT CHANGE UP (ref 3.8–10.5)
WBC # FLD AUTO: 9.38 K/UL — SIGNIFICANT CHANGE UP (ref 3.8–10.5)

## 2024-05-21 PROCEDURE — 99291 CRITICAL CARE FIRST HOUR: CPT

## 2024-05-21 RX ORDER — SODIUM CHLORIDE 9 MG/ML
500 INJECTION INTRAMUSCULAR; INTRAVENOUS; SUBCUTANEOUS ONCE
Refills: 0 | Status: COMPLETED | OUTPATIENT
Start: 2024-05-21 | End: 2024-05-21

## 2024-05-21 RX ORDER — POTASSIUM CHLORIDE 20 MEQ
40 PACKET (EA) ORAL EVERY 4 HOURS
Refills: 0 | Status: COMPLETED | OUTPATIENT
Start: 2024-05-21 | End: 2024-05-21

## 2024-05-21 RX ADMIN — Medication 1 MILLIGRAM(S): at 17:13

## 2024-05-21 RX ADMIN — Medication 40 MILLIEQUIVALENT(S): at 21:42

## 2024-05-21 RX ADMIN — SENNA PLUS 2 TABLET(S): 8.6 TABLET ORAL at 21:42

## 2024-05-21 RX ADMIN — ENOXAPARIN SODIUM 40 MILLIGRAM(S): 100 INJECTION SUBCUTANEOUS at 19:00

## 2024-05-21 RX ADMIN — Medication 40 MILLIEQUIVALENT(S): at 19:00

## 2024-05-21 RX ADMIN — POLYETHYLENE GLYCOL 3350 17 GRAM(S): 17 POWDER, FOR SOLUTION ORAL at 06:03

## 2024-05-21 RX ADMIN — MIRTAZAPINE 15 MILLIGRAM(S): 45 TABLET, ORALLY DISINTEGRATING ORAL at 21:41

## 2024-05-21 RX ADMIN — PANTOPRAZOLE SODIUM 40 MILLIGRAM(S): 20 TABLET, DELAYED RELEASE ORAL at 17:13

## 2024-05-21 RX ADMIN — POLYETHYLENE GLYCOL 3350 17 GRAM(S): 17 POWDER, FOR SOLUTION ORAL at 18:57

## 2024-05-21 RX ADMIN — BRIMONIDINE TARTRATE 1 DROP(S): 2 SOLUTION/ DROPS OPHTHALMIC at 19:14

## 2024-05-21 RX ADMIN — LEVETIRACETAM 400 MILLIGRAM(S): 250 TABLET, FILM COATED ORAL at 05:42

## 2024-05-21 RX ADMIN — LEVETIRACETAM 400 MILLIGRAM(S): 250 TABLET, FILM COATED ORAL at 19:04

## 2024-05-21 RX ADMIN — CHLORHEXIDINE GLUCONATE 1 APPLICATION(S): 213 SOLUTION TOPICAL at 05:42

## 2024-05-21 RX ADMIN — SODIUM CHLORIDE 500 MILLILITER(S): 9 INJECTION INTRAMUSCULAR; INTRAVENOUS; SUBCUTANEOUS at 10:00

## 2024-05-21 RX ADMIN — BRIMONIDINE TARTRATE 1 DROP(S): 2 SOLUTION/ DROPS OPHTHALMIC at 05:42

## 2024-05-21 RX ADMIN — Medication 1 TABLET(S): at 17:13

## 2024-05-21 NOTE — PROGRESS NOTE ADULT - CRITICAL CARE ATTENDING COMMENT
SAH, unknow etiology, angio did not show cerebral aneurysm per neuroIC but  severe vasospasm of bilateral anterior and posterior circulation. Improved after IA verapamil and milrinone , MRI brain showed bilatreal MCA stroke and concern for vasospasm, MRI cervical spine unremarkable, poor TCD windows, SBP goal 140-180 mmhg given severe CVS, on NE , , angio planed for today, RA,  lovenox 40 mg sc qhs , NPO for angio , lovenox 40 mg sc qhs    at risk for worsening stroke

## 2024-05-21 NOTE — PROGRESS NOTE ADULT - ASSESSMENT
ASSESSMENT   SAH HH2 MF2 , angio no vascular malformation but diffuse CVS s/p angiographic CCB   unsure etiology of SAH   r/o vasculitis TIMOTHY, antiphospholipid Ab, ANCA, serum C3, C4, cryoglobulin, SPEP, UPEP, quant immunglobulin levels, Ab SSA/SSB, dsDNA  PBD: 4  PLAN   Pre-op for angio today, pending neurosgx reccs  neuro checks q 2 hr   daily TCD (poor windows)  MRI brain: b/l MCA strokes and concern for vasospasm  #AIS R temporo occipital   #pmhx seizure cont home keppra 750 mg BID   according brother and sister in law, she might be taking drugs and alcohol abuse   #Pain: Tylenol   #Activity: PT OT    CV   #-200mmHg on cheryl   daily troponin   #TTE grossly normal cardiac function   sinus bradycardia change cheryl to levophed     P   extubated 05/18  RA     R   #Strict I+Os   #cont NS,  will give 500 ccs bolus for euvolemia  NS 70 ml/hr     GI   #Diet: Puree CCD ( NPO today for possible angio )  #Senna miralax  LBM: 05/19    ID  afebrile   monitor off antibiotics    E  Goal euglycemia (-180)  #a1c 6.1, ISS     H  #DVT ppx:   SCDs  lovenox 40 mg sc qhs   LED no dvt   next LEds on 05/22      #CODE STATUS: FULL CODE     #DISPOSITION: ICU    #CRITICAL     40 minutes

## 2024-05-21 NOTE — PROGRESS NOTE ADULT - ASSESSMENT
Mera Burton  64F, pmh HTN, szs on Keppra, alcoholic cirrhosis, tremors, OA presented from [facility] to OSH w/ generalized weakness found to have b/l sulcal SAH and small occipital IVH. 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 She was recently started on Keflex for UTI. Plts 400, Coags wnl. , AST/ALT = 38/104. WBC 13. Afebrile. Exam on admission: lethargic appearing, EOV, FC, AO3, PERRL, not compliant with EOMs, BUE 5/5, BLE 2/5.    Now s/p 5/17 Angio: severe b/l anterior and posterior cerebral vasospasm (given IA verapamil and milrinone)    f/u labs for vasculitis   [facility] protocol  TCDs daily- no windows/visualization

## 2024-05-21 NOTE — PROGRESS NOTE ADULT - ASSESSMENT
63 y/o F with a PMH of seizures on Keppra, bilateral sulcal SAH and small occipital IVH. 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 MR brain showed bilateral MCA and R PCA infarct. Acute infarct in the R basal ganglia and L cerebellum. On exam patient is following simple 1 step commands. gaze is dysconjugate with L eye medially rotated, able to lift all limbs anti-gravity. She is oriented to self and situation.     Impression: SAH with vasospasm, etiology unclear. Elevated inflammatory markers concern for vasculitis vs RCVS. MR brain with multiple bilateral infarcts. Further infectious work-up is warranted.      Recommendations:  [] Lumbar puncture   [] CSF studies:   - Cell count, glucose (please obtain blood POCT just prior/after LP for comparison), protein, lactate dehydrogenase  - Gram stain, fungal, cryptococcal antigen, PCR, Lin Barr virus by PCR  - Herpes simplex virus 1/2 PCR, AILYN virus DNA, Lyme antibodies CSF, VDRL  - oligoclonal bands  [] Serum studies:  - methylmalonic acid, homocysteine, folate, zinc, copper, Fe, TIBC, TSH, T4, A1c, dsDNA, TIMOTHY, ACE, cyclic citrullinated peptide, UPEP  - Oligoclonal bands  - VDRL serology  - Hep C panel  - HIV 1 & 2   [] continue keppra 750 mg BID    [] PT/OT/ fall precautions      Patient seen on AM rounds with neurology attending Dr Diego

## 2024-05-21 NOTE — SPEECH LANGUAGE PATHOLOGY EVALUATION - SPECIFY REASON(S)
to assess speech production, expressive/receptive language skills, and cognitive-communication skills.
to assess speech production, expressive and receptive language skills, and cognitive-communication skills.
to assess speech production, expressive/receptive language skills, and cognitive-communication skills.

## 2024-05-21 NOTE — SPEECH LANGUAGE PATHOLOGY EVALUATION - SLP PERTINENT HISTORY OF CURRENT PROBLEM
64F pmh Dementia (Polysubstance Abuse, resides in NH) with dyskinesias, HTN, szs on Keppra, alcoholic cirrhosis, tremors, OA presented from [facility] to  w/ AMS & generalized weakness. 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 Started on Keflex for UTI at Nursing Home. Per NH Basline independent in ADLs, conversant but forgetful and confused, wheelchair bound.
64F pmh Dementia (Polysubstance Abuse, resides in NH) with dyskinesias, HTN, szs on Keppra, alcoholic cirrhosis, tremors, OA presented from [facility] to  w/ AMS & generalized weakness. 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 Started on Keflex for UTI at Nursing Home. Per NH Basline independent in ADLs, conversant but forgetful and confused, wheelchair bound.
64F pmh Dementia (Polysubstance Abuse, resides in NH) with dyskinesias, HTN, szs on Keppra, alcoholic cirrhosis, tremors, OA presented from [facility] to  w/ AMS & generalized weakness. 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 Started on Keflex for UTI at Nursing Home. Per NH Basline independent in ADLs, conversant but forgetful and confused, wheelchair bound.

## 2024-05-21 NOTE — PROGRESS NOTE ADULT - SUBJECTIVE AND OBJECTIVE BOX
Neurology Progress Note    SUBJECTIVE/OBJECTIVE/INTERVAL EVENTS: Patient seen and examined at bedside w/ neuro attending and team.     INTERVAL HISTORY:    REVIEW OF SYSTEMS: Otherwise denies fever, chills, headaches, vision changes, nausea, vomiting, hearing change, focal weakness, focal numbness, bowel/ bladder incontinence. Few questions of a 10-system ROS was performed and is negative except for those items noted above and/or in the HPI.    VITALS & EXAMINATION:  Vital Signs Last 24 Hrs  T(C): 36.7 (21 May 2024 03:00), Max: 36.7 (20 May 2024 15:00)  T(F): 98.1 (21 May 2024 03:00), Max: 98.1 (20 May 2024 15:00)  HR: 61 (21 May 2024 08:45) (49 - 105)  BP: 183/92 (21 May 2024 08:45) (159/85 - 183/92)  BP(mean): 126 (21 May 2024 08:45) (116 - 126)  RR: 14 (21 May 2024 08:45) (10 - 43)  SpO2: 99% (21 May 2024 08:45) (91% - 100%)    Parameters below as of 20 May 2024 19:00  Patient On (Oxygen Delivery Method): room air        General:  Constitutional: Female, appears stated age, nontoxic, not in distress,    Head: Normocephalic;   Eyes: clear sclera;   Extremities: No cyanosis;   Resp: breathing comfortably  Neck: no nuchal rigidity  Fundoscopic exam:      Neurological (>12):  MS: Awake, alert.  Oriented person place situation year month (she says it is june). Follows simple commands. Able to ID 3/3 objects. Attends to examiner  Language: Speech is hypophonic, she is able to name some objects and able to answer questions appropriately. Speech is slow.  CNs: PERRL (R 3mm, L 3mm). BTT bilaterally but does not count fingers. Limited gaze when testing horizontally does not fully abduct in either direction. Gaze is dysconjugate with L eye medially rotated on primary gaze, L eye is midline on primary gaze, patient favors the R side. No nystagmus. V1-3 intact LT, No facial asymmetry b/l. Hearing grossly normal b/l. Tongue midline and can move side to side.     Motor - Normal bulk and tone throughout. No pronator drift.    LUE and RUE- able to lift any gravity 4/5 all over, stronger in the R side compared to the L  Able to bend bilateral legs at knee but not able to lift fully AG, offers some resistance bilaterally.      Sensation: Intact to LT b/l x4 extremities. Cortical: Extinction on DSS (neglect): none    no ankle clonus  Coordination:  Ataxia could not be demonstrated  Gait: Normal Romberg. No postural instability. Normal stance. Gait baseline.    LABORATORY:  CBC                       10.7   9.00  )-----------( 398      ( 21 May 2024 10:52 )             31.3     Chem 05-20    138  |  102  |  4<L>  ----------------------------<  117<H>  3.2<L>   |  17<L>  |  0.42<L>    Ca    9.1      20 May 2024 10:29  Phos  2.4     05-20  Mg     1.6     05-20    TPro  7.2  /  Alb  3.5  /  TBili  0.4  /  DBili  x   /  AST  26  /  ALT  59<H>  /  AlkPhos  137<H>  05-20    LFTs LIVER FUNCTIONS - ( 20 May 2024 10:29 )  Alb: 3.5 g/dL / Pro: 7.2 g/dL / ALK PHOS: 137 U/L / ALT: 59 U/L / AST: 26 U/L / GGT: x           Coagulopathy PT/INR - ( 21 May 2024 03:22 )   PT: 14.3 sec;   INR: 1.31 ratio         PTT - ( 21 May 2024 03:22 )  PTT:34.8 sec  Lipid Panel 05-15 Chol 119 LDL -- HDL 28<L> Trig 81  A1c   Cardiac enzymes     U/A Urinalysis Basic - ( 20 May 2024 10:29 )    Color: x / Appearance: x / SG: x / pH: x  Gluc: 117 mg/dL / Ketone: x  / Bili: x / Urobili: x   Blood: x / Protein: x / Nitrite: x   Leuk Esterase: x / RBC: x / WBC x   Sq Epi: x / Non Sq Epi: x / Bacteria: x      STUDIES & IMAGING: (EEG, CT, MR, U/S, TTE/LEON):    MR brain 5/20    IMPRESSION:    1. Cortical diffusion restriction in the bilateral MCA and right PCA distributions as discussed above, right greater than left. Findings are likely compatible with acute infarctions, although similar findings can be seen in a postictal state.  2. Acute infarctions in the right basal ganglia and left cerebellar hemisphere. Given multiple vascular distributions involved, an embolic source should be considered.  3. Subarachnoid hemorrhage along the sulci of the bilateral parietal lobes and right temporal/occipital lobes as well as the right posterior fossa.  4. Layering of blood in the occipital horns of the lateral ventricles.  5. Multiple diminutive intracranial flow voids, a underlying vasospasm is not excluded. See recent CT of the head for further details.    MR c spine      IMPRESSION: Mild to moderate degenerative changes of the cervical spine. No cord compression. Old mild compression deformity T5 without bony retropulsion.    MRA head and neck 5/16    ERIKA 85, RMCA 37, RACA 48, RACA2 42    LICA 80, LMCA 32, LACA 33, LACA2 48    TVA 11, LVA 11, BA 13, RPCA 12, LPCA 23, LPCOM 27 anterior to posterior.    IMPRESSION: Tiny right temporal occipital subdural collection without mass effect. Diffuse hemosiderin staining in the supra and infratentorial regions and occipital horns with slightly prominent ventricles. Markedly diminished intracranial flow due to severe distal vertebral artery stenosis, mid basilar stenosis and bilateral M1 stenosis.     Neurology Progress Note    SUBJECTIVE/OBJECTIVE/INTERVAL EVENTS: Patient seen and examined at bedside w/ neuro attending and team.     INTERVAL HISTORY: Patient is a 65 y/o F with a PMH of seizures on keppra, chronic alcoholic cirrhosis presented from [facility]  to OSH  with SAH and occipital IVH. Patient is overall improved, MR brain showed multiple strokes.     REVIEW OF SYSTEMS: Otherwise denies fever, chills, headaches, vision changes, nausea, vomiting, hearing change, focal weakness, focal numbness, bowel/ bladder incontinence. Few questions of a 10-system ROS was performed and is negative except for those items noted above and/or in the HPI.    VITALS & EXAMINATION:  Vital Signs Last 24 Hrs  T(C): 36.7 (21 May 2024 03:00), Max: 36.7 (20 May 2024 15:00)  T(F): 98.1 (21 May 2024 03:00), Max: 98.1 (20 May 2024 15:00)  HR: 61 (21 May 2024 08:45) (49 - 105)  BP: 183/92 (21 May 2024 08:45) (159/85 - 183/92)  BP(mean): 126 (21 May 2024 08:45) (116 - 126)  RR: 14 (21 May 2024 08:45) (10 - 43)  SpO2: 99% (21 May 2024 08:45) (91% - 100%)    Parameters below as of 20 May 2024 19:00  Patient On (Oxygen Delivery Method): room air        General:  Constitutional: Female, appears stated age, nontoxic, not in distress,    Head: Normocephalic;   Eyes: clear sclera;   Extremities: No cyanosis;   Resp: breathing comfortably  Neck: no nuchal rigidity  Fundoscopic exam:      Neurological (>12):  MS: Awake, alert.  Oriented person place situation year month (she says it is june). Follows simple commands. Able to ID 3/3 objects. Attends to examiner  Language: Speech is hypophonic, she is able to name some objects and able to answer questions appropriately. Speech is slow.  CNs: PERRL (R 3mm, L 3mm). BTT bilaterally but does not count fingers. Limited gaze when testing horizontally does not fully abduct in either direction. Gaze is dysconjugate with L eye medially rotated on primary gaze, L eye is midline on primary gaze, patient favors the R side. No nystagmus. V1-3 intact LT, No facial asymmetry b/l. Hearing grossly normal b/l. Tongue midline and can move side to side.     Motor - Normal bulk and tone throughout. No pronator drift. bilateral full body tremors present at rest and activation in addition to head tremor.     LUE and RUE- able to lift any gravity 4/5 all over, stronger in the R side compared to the L  Able to bend bilateral legs at knee but not able to lift fully AG, offers some resistance bilaterally.      Sensation: Intact to LT b/l x4 extremities. Cortical: Extinction on DSS (neglect): none    no ankle clonus  Coordination:  Ataxia could not be demonstrated  Gait: Normal Romberg. No postural instability. Normal stance. Gait baseline.    LABORATORY:  CBC                       10.7   9.00  )-----------( 398      ( 21 May 2024 10:52 )             31.3     Chem 05-20    138  |  102  |  4<L>  ----------------------------<  117<H>  3.2<L>   |  17<L>  |  0.42<L>    Ca    9.1      20 May 2024 10:29  Phos  2.4     05-20  Mg     1.6     05-20    TPro  7.2  /  Alb  3.5  /  TBili  0.4  /  DBili  x   /  AST  26  /  ALT  59<H>  /  AlkPhos  137<H>  05-20    LFTs LIVER FUNCTIONS - ( 20 May 2024 10:29 )  Alb: 3.5 g/dL / Pro: 7.2 g/dL / ALK PHOS: 137 U/L / ALT: 59 U/L / AST: 26 U/L / GGT: x           Coagulopathy PT/INR - ( 21 May 2024 03:22 )   PT: 14.3 sec;   INR: 1.31 ratio         PTT - ( 21 May 2024 03:22 )  PTT:34.8 sec  Lipid Panel 05-15 Chol 119 LDL -- HDL 28<L> Trig 81  A1c   Cardiac enzymes     U/A Urinalysis Basic - ( 20 May 2024 10:29 )    Color: x / Appearance: x / SG: x / pH: x  Gluc: 117 mg/dL / Ketone: x  / Bili: x / Urobili: x   Blood: x / Protein: x / Nitrite: x   Leuk Esterase: x / RBC: x / WBC x   Sq Epi: x / Non Sq Epi: x / Bacteria: x      STUDIES & IMAGING: (EEG, CT, MR, U/S, TTE/LEON):    MR brain 5/20    IMPRESSION:    1. Cortical diffusion restriction in the bilateral MCA and right PCA distributions as discussed above, right greater than left. Findings are likely compatible with acute infarctions, although similar findings can be seen in a postictal state.  2. Acute infarctions in the right basal ganglia and left cerebellar hemisphere. Given multiple vascular distributions involved, an embolic source should be considered.  3. Subarachnoid hemorrhage along the sulci of the bilateral parietal lobes and right temporal/occipital lobes as well as the right posterior fossa.  4. Layering of blood in the occipital horns of the lateral ventricles.  5. Multiple diminutive intracranial flow voids, a underlying vasospasm is not excluded. See recent CT of the head for further details.    MR c spine      IMPRESSION: Mild to moderate degenerative changes of the cervical spine. No cord compression. Old mild compression deformity T5 without bony retropulsion.    MRA head and neck 5/16    ERIKA 85, RMCA 37, RACA 48, RACA2 42    LICA 80, LMCA 32, LACA 33, LACA2 48    TVA 11, LVA 11, BA 13, RPCA 12, LPCA 23, LPCOM 27 anterior to posterior.    IMPRESSION: Tiny right temporal occipital subdural collection without mass effect. Diffuse hemosiderin staining in the supra and infratentorial regions and occipital horns with slightly prominent ventricles. Markedly diminished intracranial flow due to severe distal vertebral artery stenosis, mid basilar stenosis and bilateral M1 stenosis.    5/17 CT head w/o contrast  CT head:   Large acute infarct in the right posterior temporal and occipital lobes   are reidentified, as seen on prior MRI brain.  No secondary mass effect   or midline shift. Minimal layering hemorrhage in the occipital horns is   reidentified. Previously visualized abnormal signal within thesulci of   the bilateral cerebri, not as well visualized on this exam secondary to   streak artifacts. Additionally, evaluation of the parenchyma of the   superior portion of the bilateral cerebri is limited by streak artifact   from surrounding wires.    CTA brain:  Severe long segment narrowing extending from the terminus of the right   ICA and involving the entire M1 segment of the right MCA, appearing   worsened in the region of the right ICA terminus is compared to the prior   exam of 5/14/2024. Right M2 branches appear unremarkable.    High-grade stenosis of the distal supraclinoid segment of the left ICA   extending into the terminus of the left ICA and into the M1 segment of   the left middle cerebral artery appears worsened in the region of the   distal supraclinoid and terminus of the left ICA as compared to the prior   exam.    Stenoses of the distal left intradural vertebral artery after the left   PICA takeoff.    High-grade stenosis of the distal intradural segment of the right   vertebral artery, extending into the basilar artery with severe long   segment stenosis of the basilar artery.    Bilateral posterior communicating arteries supplying the posterior   cerebral arteries that remain patent, however small in caliber.    CT Perfusion:  10 mL of core infarct in the right posterior temporal lobe and occipital   lobe. Extensive areas of T. Max greater than 6 seconds throughout the   bilateral cerebri in cerebelli, may reflect diffuse hypoperfusion versus   artifact.

## 2024-05-21 NOTE — PROGRESS NOTE ADULT - SUBJECTIVE AND OBJECTIVE BOX
PATIENT SEEN AND EXAMINED BY MYNOR GUAN M.D. ON :- 5/21/24  DATE OF SERVICE:  5/21/24           Interim events noted,Labs ,Radiological studies and Cardiology tests reviewed .    Patient is a 64y old  Female who presents with a chief complaint of SAH (21 May 2024 06:29)      HPI:  Mera Burton  64F, pmh HTN, szs on Keppra, alcoholic cirrhosis, tremors, OA presented from [facility] to OSH w/ generalized weakness found to have b/l sulcal SAH and small occipital IVH. 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 She was recently started on Keflex for UTI. Plts 400, Coags wnl. , AST/ALT = 38/104. WBC 13. Afebrile. Exam: lethargic appearing, EOV, FC, AO3, PERRL, not compliant with EOMs, BUE 5/5, BLE 2/5. (15 May 2024 03:22)      PAST MEDICAL & SURGICAL HISTORY:  Cirrhosis      Seizure          PREVIOUS DIAGNOSTIC TESTING:      ECHO  FINDINGS:    STRESS  FINDINGS:    CATHETERIZATION  FINDINGS:    MEDICATIONS  (STANDING):  brimonidine 0.2% Ophthalmic Solution 1 Drop(s) Both EYES every 12 hours  chlorhexidine 4% Liquid 1 Application(s) Topical <User Schedule>  dexMEDEtomidine Infusion 0.3 MICROgram(s)/kG/Hr (5.6 mL/Hr) IV Continuous <Continuous>  enoxaparin Injectable 40 milliGRAM(s) SubCutaneous <User Schedule>  folic acid 1 milliGRAM(s) Oral daily  insulin lispro (ADMELOG) corrective regimen sliding scale   SubCutaneous three times a day before meals  latanoprost 0.005% Ophthalmic Solution 1 Drop(s) Both EYES at bedtime  levETIRAcetam  IVPB 750 milliGRAM(s) IV Intermittent every 12 hours  mirtazapine 15 milliGRAM(s) Oral at bedtime  multivitamin 1 Tablet(s) Oral daily  norepinephrine Infusion 0.05 MICROgram(s)/kG/Min (7 mL/Hr) IV Continuous <Continuous>  pantoprazole  Injectable 40 milliGRAM(s) IV Push daily  polyethylene glycol 3350 17 Gram(s) Oral every 12 hours  senna 2 Tablet(s) Oral at bedtime  sodium chloride 0.9%. 1000 milliLiter(s) (70 mL/Hr) IV Continuous <Continuous>    MEDICATIONS  (PRN):  sodium chloride 0.9% lock flush 10 milliLiter(s) IV Push every 1 hour PRN Pre/post blood products, medications, blood draw, and to maintain line patency      FAMILY HISTORY:      SOCIAL HISTORY:    CIGARETTES:    ALCOHOL:    REVIEW OF SYSTEMS:  CONSTITUTIONAL: No fever, weight loss, or fatigue  EYES: No eye pain, visual disturbances, or discharge  ENMT:  No difficulty hearing, tinnitus, vertigo; No sinus or throat pain  NECK: No pain or stiffness  RESPIRATORY: No cough, wheezing, chills or hemoptysis; No shortness of breath  CARDIOVASCULAR: No chest pain, palpitations, dizziness, or leg swelling  GASTROINTESTINAL: No abdominal or epigastric pain. No nausea, vomiting, or hematemesis; No diarrhea or constipation. No melena or hematochezia.  GENITOURINARY: No dysuria, frequency, hematuria, or incontinence  NEUROLOGICAL: No headaches, memory loss, loss of strength, numbness, or tremors  SKIN: No itching, burning, rashes, or lesions   LYMPH NODES: No enlarged glands  ENDOCRINE: No heat or cold intolerance; No hair loss  MUSCULOSKELETAL: No joint pain or swelling; No muscle, back, or extremity pain  PSYCHIATRIC: No depression, anxiety, mood swings, or difficulty sleeping  HEME/LYMPH: No easy bruising, or bleeding gums  ALLERY AND IMMUNOLOGIC: No hives or eczema    Vital Signs Last 24 Hrs  T(C): 36.6 (21 May 2024 19:30), Max: 36.7 (21 May 2024 03:00)  T(F): 97.8 (21 May 2024 19:30), Max: 98.1 (21 May 2024 03:00)  HR: 88 (21 May 2024 21:45) (52 - 88)  BP: 143/75 (21 May 2024 18:30) (110/69 - 183/92)  BP(mean): 115 (21 May 2024 18:30) (82 - 127)  RR: 85 (21 May 2024 21:45) (12 - 85)  SpO2: 99% (21 May 2024 21:45) (90% - 100%)    Parameters below as of 21 May 2024 19:30  Patient On (Oxygen Delivery Method): room air          PHYSICAL EXAM:  GENERAL: NAD, well-groomed, well-developed  HEAD:  Atraumatic, Normocephalic  EYES: EOMI, PERRLA, conjunctiva and sclera clear  ENMT: No tonsillar erythema, exudates, or enlargement; Moist mucous membranes, Good dentition, No lesions  NECK: Supple, No JVD, Normal thyroid  NERVOUS SYSTEM:  Alert & Oriented X3, Good concentration; Motor Strength 5/5 B/L upper and lower extremities; DTRs 2+ intact and symmetric  CHEST/LUNG: Clear to percussion bilaterally; No rales, rhonchi, wheezing, or rubs  HEART: Regular rate and rhythm; No murmurs, rubs, or gallops  ABDOMEN: Soft, Nontender, Nondistended; Bowel sounds present  EXTREMITIES:  2+ Peripheral Pulses, No clubbing, cyanosis, or edema  LYMPH: No lymphadenopathy noted  SKIN: No rashes or lesions      INTERPRETATION OF TELEMETRY:    ECG:    CHADSVASC:     LABS:                        10.7   9.00  )-----------( 398      ( 21 May 2024 10:52 )             31.3     05-21    136  |  101  |  <4<L>  ----------------------------<  131<H>  3.2<L>   |  22  |  0.43<L>    Ca    8.7      21 May 2024 10:52  Phos  2.5     05-21  Mg     1.9     05-21    TPro  6.9  /  Alb  3.4  /  TBili  0.4  /  DBili  x   /  AST  23  /  ALT  44  /  AlkPhos  129<H>  05-21        PT/INR - ( 21 May 2024 03:22 )   PT: 14.3 sec;   INR: 1.31 ratio         PTT - ( 21 May 2024 03:22 )  PTT:34.8 sec  Urinalysis Basic - ( 21 May 2024 10:52 )    Color: x / Appearance: x / SG: x / pH: x  Gluc: 131 mg/dL / Ketone: x  / Bili: x / Urobili: x   Blood: x / Protein: x / Nitrite: x   Leuk Esterase: x / RBC: x / WBC x   Sq Epi: x / Non Sq Epi: x / Bacteria: x      Lipid Panel:   I&O's Summary    20 May 2024 07:01  -  21 May 2024 07:00  --------------------------------------------------------  IN: 3228 mL / OUT: 2025 mL / NET: 1203 mL    21 May 2024 07:01  -  21 May 2024 22:11  --------------------------------------------------------  IN: 806.4 mL / OUT: 450 mL / NET: 356.4 mL        RADIOLOGY & ADDITIONAL STUDIES:

## 2024-05-21 NOTE — PROGRESS NOTE ADULT - SUBJECTIVE AND OBJECTIVE BOX
NSCU ATTENDING -- ADDITIONAL PROGRESS NOTE    Nighttime rounds were performed -- please refer to earlier Progress Note for HPI details.    T(C): 36.6 (05-21-24 @ 19:30), Max: 36.7 (05-21-24 @ 03:00)  HR: 88 (05-21-24 @ 21:45) (52 - 88)  BP: 143/75 (05-21-24 @ 18:30) (110/69 - 183/92)  RR: 85 (05-21-24 @ 21:45) (12 - 85)  SpO2: 99% (05-21-24 @ 21:45) (90% - 100%)  Wt(kg): --    Relevant labwork and imaging reviewed.    no EVD.  CTA in AM, possible eval for angio depending on CTA results.  f/u BMP tonight.

## 2024-05-21 NOTE — SPEECH LANGUAGE PATHOLOGY EVALUATION - COMMENTS
HOSPITAL COURSE:  5/15 transferred to [facility] for workup of nontraumatic convexity SAH  - MRI/MRA with and without contrast today - rule out RCVS, rule out vasculitis  - Possible diagnostic cerebral angiogram    5/15 CT HEAD IMPRESSION: Grossly stable biparietal subarachnoid and bilateral posterior horn intraventricular hemorrhage    SWALLOW HISTORY: No reports in [facility] or in PACS prior to this admission.
HOSPITAL COURSE:  5/15 transferred to [facility] for workup of nontraumatic convexity SAH  - MRI/MRA with and without contrast - rule out RCVS, rule out vasculitis; deferred 5/15, plan for 5/16  - Possible diagnostic cerebral angiogram    5/15 CT HEAD IMPRESSION: Grossly stable biparietal subarachnoid and bilateral posterior horn intraventricular hemorrhage    SWALLOW HISTORY: No reports in [facility] or in PACS prior to this admission. This service attempted to see Pt for evaluation x2 on 5/15.
HOSPITAL COURSE:  5/15 transferred to [facility] for workup of nontraumatic convexity SAH  - MRI/MRA with and without contrast - rule out RCVS, rule out vasculitis; deferred 5/15, plan for 5/16  - Possible diagnostic cerebral angiogram    5/15 CT HEAD IMPRESSION: Grossly stable biparietal subarachnoid and bilateral posterior horn intraventricular hemorrhage    SWALLOW HISTORY: No reports in [facility] or in PACS prior to this admission. This service attempted to see Pt for evaluation x2 on 5/15.

## 2024-05-21 NOTE — PROGRESS NOTE ADULT - ASSESSMENT
ASSESSMENT   SAH HH2 MF2 , angio no vascular malformation but diffuse CVS s/p angiographic CCB   unsure etiology of SAH   r/o vasculitis TIMOTHY, antiphospholipid Ab, ANCA, serum C3, C4, cryoglobulin, SPEP, UPEP, quant immunglobulin levels, Ab SSA/SSB, dsDNA  PBD: 4  PLAN   neuro checks q 2 hr   daily TCD   MRI brain post contrast, MRI cervical w contrast   #AIS R temporo occipital   #pmhx seizure cont home keppra 750 mg BID   according brother and sister in law, she might be taking drugs and alcohol abuse   #Pain: Tylenol   #Activity: PT OT    CV   #-200mmHg on cheryl   daily troponin   #TTE grossly normal cardiac function   sinus bradycardia change cheryl to levophed     P   extubated 05/18  RA     R   #Strict I+Os   #cont NS,  will give 1 L ns BOLUS TARGET EUVOLEMIA  NS 70 ml/hr     GI   #Diet: Puree CCD  #Senna miralax  LBM: 05/19    ID  afebrile   monitor off antibiotics    E  Goal euglycemia (-180)  #a1c 6.1, ISS     H  #DVT ppx:   SCDs  lovenox 40 mg sc qhs   LED no dvt       #CODE STATUS: FULL CODE     #DISPOSITION: ICU    #CRITICAL     40 minutes  ASSESSMENT   SAH HH2 MF2 , angio no vascular malformation but diffuse CVS s/p angiographic CCB   unsure etiology of SAH   r/o vasculitis TIMOTHY, antiphospholipid Ab, ANCA, serum C3, C4, cryoglobulin, SPEP, UPEP, quant immunglobulin levels, Ab SSA/SSB, dsDNA  PBD: 4  PLAN   Pre-op for angio today, pending neurosgx reccs  neuro checks q 2 hr   daily TCD (poor windows)  MRI brain: b/l MCA strokes and concern for vasospasm  #AIS R temporo occipital   #pmhx seizure cont home keppra 750 mg BID   according brother and sister in law, she might be taking drugs and alcohol abuse   #Pain: Tylenol   #Activity: PT OT    CV   #-200mmHg on cheryl   daily troponin   #TTE grossly normal cardiac function   sinus bradycardia change cheryl to levophed     P   extubated 05/18  RA     R   #Strict I+Os   #cont NS,  will give 500 ccs bolus for euvolemia  NS 70 ml/hr     GI   #Diet: Puree CCD ( NPO today for possible angio )  #Senna miralax  LBM: 05/19    ID  afebrile   monitor off antibiotics    E  Goal euglycemia (-180)  #a1c 6.1, ISS     H  #DVT ppx:   SCDs  lovenox 40 mg sc qhs   LED no dvt   next LEds on 05/22      #CODE STATUS: FULL CODE     #DISPOSITION: ICU    #CRITICAL     40 minutes

## 2024-05-21 NOTE — SPEECH LANGUAGE PATHOLOGY EVALUATION - SLP DIAGNOSIS
63 y/o F with a PMH of dementia with dyskinesias, seizures on keppra, now with SAH and occipital IVH; MR brain showing multiple strokes. Pt presents with cognitive-linguistic deficits, likely acute on chronic. Pt able to participate in simple conversational speech tasks, follow simple directives, and make concrete wants and needs known; however, breakdown is seen with higher-level language/ cognitive tasks - demonstrates confusion w/ abstract yes/no questions, difficulty following directives for utterance repetition tasks. With more advanced stimuli, Pt becoming overwhelmed/ emotional, stating, "can we slow down" and "I just want to concentrate".

## 2024-05-21 NOTE — PROGRESS NOTE ADULT - SUBJECTIVE AND OBJECTIVE BOX
Patient seen and examined at bedside.    --Anticoagulation--  enoxaparin Injectable 40 milliGRAM(s) SubCutaneous <User Schedule>    T(C): 36.4 (05-20-24 @ 19:00), Max: 37 (05-20-24 @ 03:00)  HR: 61 (05-20-24 @ 19:00) (49 - 105)  BP: --  RR: 14 (05-20-24 @ 19:00) (10 - 50)  SpO2: 100% (05-20-24 @ 19:00) (91% - 100%)  Wt(kg): --    Exam: arousable, Ox2-3 EOS, pupils 3R sluggish, R gaze preference, FC, BL UE AG, BL LE 2/5

## 2024-05-21 NOTE — PROGRESS NOTE ADULT - SUBJECTIVE AND OBJECTIVE BOX
HOSPITAL COURSE: 64F pmh Dementia (Polysubstance Abuse, resides in NH) with dyskinesias, HTN, szs on Keppra, alcoholic cirrhosis, tremors, OA presented from [facility] to  w/ AMS & generalized weakness. 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 Started on Keflex for UTI at Nursing Home. Per NH [facility] independent in ADLs, conversant but forgetful and confused, wheelchair bound.    HOSPITAL COURSE:  5/15 transferred to [facility] for workup of nontraumatic convexity SAH    Admission Scores  GCS: 14 HH: 2 MF: 2 NIHSS: RASS: CAM-ICU: ICH:      REVIEW OF SYSTEMS: [ ] Unable to Assess due to neurologic exam   [ ] All ROS addressed below are non-contributory, except:  Neuro: [ ] Headache [ ] Back pain [ ] Numbness [ ] Weakness [ ] Ataxia [ ] Dizziness [ ] Aphasia [ ] Dysarthria [ ] Visual disturbance  Resp: [ ] Shortness of breath/dyspnea, [ ] Orthopnea [ ] Cough  CV: [ ] Chest pain [ ] Palpitation [ ] Lightheadedness [ ] Syncope  Renal: [ ] Thirst [ ] Edema  GI: [ ] Nausea [ ] Emesis [ ] Abdominal pain [ ] Constipation [ ] Diarrhea  Hem: [ ] Hematemesis [ ] bright red blood per rectum  ID: [ ] Fever [ ] Chills [ ] Dysuria  ENT: [ ] Rhinorrhea      DEVICES:   [ ] Restraints [ ] ET tube [ ] central line [ ] arterial line [ ] lang [ ] NGT/OGT [ ] EVD [ ] LD [ ] STANISLAV/HMV [ ] Trach [ ] PEG [ ] Chest Tube     ICU Vital Signs Last 24 Hrs  T(C): 36.7 (21 May 2024 03:00), Max: 37 (20 May 2024 07:00)  T(F): 98.1 (21 May 2024 03:00), Max: 98.6 (20 May 2024 07:00)  HR: 72 (21 May 2024 06:00) (49 - 105)  BP: --  BP(mean): --  ABP: 159/78 (21 May 2024 06:00) (129/109 - 182/83)  ABP(mean): 107 (21 May 2024 06:00) (93 - 156)  RR: 20 (21 May 2024 06:00) (10 - 50)  SpO2: 96% (21 May 2024 06:00) (91% - 100%)    O2 Parameters below as of 20 May 2024 19:00  Patient On (Oxygen Delivery Method): room air    I&O's Summary    19 May 2024 07:01  -  20 May 2024 07:00  --------------------------------------------------------  IN: 3521 mL / OUT: 3605 mL / NET: -84 mL    20 May 2024 07:01  -  21 May 2024 06:30  --------------------------------------------------------  IN: 3137 mL / OUT: 2025 mL / NET: 1112 mL          Respiratory:    ABG - ( 18 May 2024 13:36 )  pH, Arterial: 7.39  pH, Blood: x     /  pCO2: 38    /  pO2: 199   / HCO3: 23    / Base Excess: -1.7  /  SaO2: 99.9          MEDICATION LEVELS:   Ammonia, Serum: 26 umol/L (05-16 @ 15:24)  Ammonia, Serum: 36 umol/L (05-14 @ 13:30)    IVF FLUIDS/MEDICATIONS:   MEDICATIONS  (STANDING):  brimonidine 0.2% Ophthalmic Solution 1 Drop(s) Both EYES every 12 hours  chlorhexidine 4% Liquid 1 Application(s) Topical <User Schedule>  enoxaparin Injectable 40 milliGRAM(s) SubCutaneous <User Schedule>  folic acid 1 milliGRAM(s) Oral daily  latanoprost 0.005% Ophthalmic Solution 1 Drop(s) Both EYES at bedtime  levETIRAcetam  IVPB 750 milliGRAM(s) IV Intermittent every 12 hours  mirtazapine 15 milliGRAM(s) Oral at bedtime  multivitamin 1 Tablet(s) Oral daily  norepinephrine Infusion 0.05 MICROgram(s)/kG/Min (7 mL/Hr) IV Continuous <Continuous>  pantoprazole  Injectable 40 milliGRAM(s) IV Push daily  polyethylene glycol 3350 17 Gram(s) Oral every 12 hours  senna 2 Tablet(s) Oral at bedtime  sodium chloride 0.9%. 1000 milliLiter(s) (70 mL/Hr) IV Continuous <Continuous>    MEDICATIONS  (PRN):  sodium chloride 0.9% lock flush 10 milliLiter(s) IV Push every 1 hour PRN Pre/post blood products, medications, blood draw, and to maintain line patency        IMAGING:      EXAMINATION:  PHYSICAL EXAM:    Constitutional: No Acute Distress     Neurological: A&O x 2 alert awake, eyes open, gaze midline, UE AG, not following simple commands, LE in plane of bed    Pulmonary: Clear to Auscultation, No rales, No rhonchi, No wheezes     Cardiovascular: S1, S2, Regular rate and rhythm     Gastrointestinal: Soft, Non-tender, Non-distended     Extremities: No calf tenderness     Incision:

## 2024-05-22 LAB
% GAMMA, URINE: 14.5 % — SIGNIFICANT CHANGE UP
ALBUMIN 24H MFR UR ELPH: 24.8 % — SIGNIFICANT CHANGE UP
ALBUMIN SERPL ELPH-MCNC: 3.2 G/DL — LOW (ref 3.3–5)
ALP SERPL-CCNC: 132 U/L — HIGH (ref 40–120)
ALPHA1 GLOB 24H MFR UR ELPH: 28.3 % — SIGNIFICANT CHANGE UP
ALPHA2 GLOB 24H MFR UR ELPH: 19.6 % — SIGNIFICANT CHANGE UP
ALT FLD-CCNC: 45 U/L — SIGNIFICANT CHANGE UP (ref 10–45)
ANION GAP SERPL CALC-SCNC: 15 MMOL/L — SIGNIFICANT CHANGE UP (ref 5–17)
APPEARANCE CSF: CLEAR — SIGNIFICANT CHANGE UP
AST SERPL-CCNC: 29 U/L — SIGNIFICANT CHANGE UP (ref 10–40)
B-GLOBULIN 24H MFR UR ELPH: 12.8 % — SIGNIFICANT CHANGE UP
BILIRUB SERPL-MCNC: 0.4 MG/DL — SIGNIFICANT CHANGE UP (ref 0.2–1.2)
BILIRUB SERPL-MCNC: 0.4 MG/DL — SIGNIFICANT CHANGE UP (ref 0.2–1.2)
BUN SERPL-MCNC: <4 MG/DL — LOW (ref 7–23)
CALCIUM SERPL-MCNC: 9.2 MG/DL — SIGNIFICANT CHANGE UP (ref 8.4–10.5)
CHLORIDE SERPL-SCNC: 104 MMOL/L — SIGNIFICANT CHANGE UP (ref 96–108)
CO2 SERPL-SCNC: 19 MMOL/L — LOW (ref 22–31)
COLLECT DURATION TIME UR: 24 HR — SIGNIFICANT CHANGE UP
COLOR CSF: YELLOW
CREAT SERPL-MCNC: 0.42 MG/DL — LOW (ref 0.5–1.3)
CREAT SERPL-MCNC: 0.42 MG/DL — LOW (ref 0.5–1.3)
CRYOGLOB SERPL-MCNC: NEGATIVE — SIGNIFICANT CHANGE UP
EGFR: 111 ML/MIN/1.73M2 — SIGNIFICANT CHANGE UP
EGFR: 111 ML/MIN/1.73M2 — SIGNIFICANT CHANGE UP
GLUCOSE CSF-MCNC: 55 MG/DL — SIGNIFICANT CHANGE UP (ref 40–70)
GLUCOSE SERPL-MCNC: 134 MG/DL — HIGH (ref 70–99)
GRAM STN FLD: SIGNIFICANT CHANGE UP
INR BLD: 1.3 RATIO — HIGH (ref 0.85–1.18)
INTERPRETATION 24H UR IFE-IMP: SIGNIFICANT CHANGE UP
LACTATE CSF-MCNC: 5 MMOL/L — HIGH (ref 1.1–2.4)
LDH CSF L TO P-CCNC: 88 U/L — SIGNIFICANT CHANGE UP
LDH FLD-CCNC: 88 U/L — SIGNIFICANT CHANGE UP
LYMPHOCYTES # CSF: 80 % — SIGNIFICANT CHANGE UP (ref 40–80)
M PROTEIN 24H UR ELPH-MRATE: SIGNIFICANT CHANGE UP %
M PROTEIN 24H UR ELPH-MRATE: SIGNIFICANT CHANGE UP MG/24HR (ref 0–0)
M PROTEIN 24H UR ELPH-MRATE: SIGNIFICANT CHANGE UP MG/DL
MAGNESIUM SERPL-MCNC: 1.8 MG/DL — SIGNIFICANT CHANGE UP (ref 1.6–2.6)
MELD SCORE WITH DIALYSIS: 24 POINTS — SIGNIFICANT CHANGE UP
MELD SCORE WITHOUT DIALYSIS: 9 POINTS — SIGNIFICANT CHANGE UP
MONOS+MACROS NFR CSF: 16 % — SIGNIFICANT CHANGE UP (ref 15–45)
MYELOPEROXIDASE AB SER-ACNC: <5 UNITS — SIGNIFICANT CHANGE UP
MYELOPEROXIDASE CELLS FLD QL: NEGATIVE — SIGNIFICANT CHANGE UP
NEUTROPHILS # CSF: 4 % — SIGNIFICANT CHANGE UP (ref 0–6)
NRBC NFR CSF: 22 /UL — HIGH (ref 0–5)
PHOSPHATE SERPL-MCNC: 3.2 MG/DL — SIGNIFICANT CHANGE UP (ref 2.5–4.5)
POTASSIUM SERPL-MCNC: 4.1 MMOL/L — SIGNIFICANT CHANGE UP (ref 3.5–5.3)
POTASSIUM SERPL-SCNC: 4.1 MMOL/L — SIGNIFICANT CHANGE UP (ref 3.5–5.3)
PROT ?TM UR-MCNC: 10 MG/DL — SIGNIFICANT CHANGE UP (ref 0–12)
PROT CSF-MCNC: 126 MG/DL — HIGH (ref 15–45)
PROT PATTERN 24H UR ELPH-IMP: SIGNIFICANT CHANGE UP
PROT SERPL-MCNC: 7.1 G/DL — SIGNIFICANT CHANGE UP (ref 6–8.3)
PROTEIN QUANT CALC, URINE: 425 MG/24 H — HIGH (ref 50–100)
PROTHROM AB SERPL-ACNC: 14.2 SEC — HIGH (ref 9.5–13)
RBC # CSF: 142 /UL — HIGH (ref 0–0)
SODIUM SERPL-SCNC: 136 MMOL/L — SIGNIFICANT CHANGE UP (ref 135–145)
SODIUM SERPL-SCNC: 138 MMOL/L — SIGNIFICANT CHANGE UP (ref 135–145)
SPECIMEN SOURCE: SIGNIFICANT CHANGE UP
TOTAL VOLUME - 24 HOUR: 4250 ML — SIGNIFICANT CHANGE UP
TROPONIN T, HIGH SENSITIVITY RESULT: <6 NG/L — SIGNIFICANT CHANGE UP (ref 0–51)
TUBE TYPE: SIGNIFICANT CHANGE UP
URINE CREATININE CALCULATION: 0.7 G/24 H — LOW (ref 0.8–1.8)

## 2024-05-22 PROCEDURE — 70496 CT ANGIOGRAPHY HEAD: CPT | Mod: 26

## 2024-05-22 PROCEDURE — 93970 EXTREMITY STUDY: CPT | Mod: 26

## 2024-05-22 PROCEDURE — 99291 CRITICAL CARE FIRST HOUR: CPT

## 2024-05-22 PROCEDURE — 93010 ELECTROCARDIOGRAM REPORT: CPT

## 2024-05-22 PROCEDURE — 0042T: CPT

## 2024-05-22 PROCEDURE — 62270 DX LMBR SPI PNXR: CPT

## 2024-05-22 PROCEDURE — 99291 CRITICAL CARE FIRST HOUR: CPT | Mod: 25

## 2024-05-22 RX ORDER — SODIUM CHLORIDE 9 MG/ML
30 INJECTION INTRAMUSCULAR; INTRAVENOUS; SUBCUTANEOUS ONCE
Refills: 0 | Status: DISCONTINUED | OUTPATIENT
Start: 2024-05-22 | End: 2024-05-22

## 2024-05-22 RX ORDER — FENTANYL CITRATE 50 UG/ML
50 INJECTION INTRAVENOUS ONCE
Refills: 0 | Status: DISCONTINUED | OUTPATIENT
Start: 2024-05-22 | End: 2024-05-22

## 2024-05-22 RX ORDER — DEXMEDETOMIDINE HYDROCHLORIDE IN 0.9% SODIUM CHLORIDE 4 UG/ML
0.2 INJECTION INTRAVENOUS
Qty: 200 | Refills: 0 | Status: DISCONTINUED | OUTPATIENT
Start: 2024-05-22 | End: 2024-05-23

## 2024-05-22 RX ORDER — LEVETIRACETAM 250 MG/1
750 TABLET, FILM COATED ORAL
Refills: 0 | Status: DISCONTINUED | OUTPATIENT
Start: 2024-05-22 | End: 2024-05-23

## 2024-05-22 RX ORDER — MAGNESIUM SULFATE 500 MG/ML
1 VIAL (ML) INJECTION ONCE
Refills: 0 | Status: COMPLETED | OUTPATIENT
Start: 2024-05-22 | End: 2024-05-22

## 2024-05-22 RX ORDER — FENTANYL CITRATE 50 UG/ML
25 INJECTION INTRAVENOUS ONCE
Refills: 0 | Status: DISCONTINUED | OUTPATIENT
Start: 2024-05-22 | End: 2024-05-22

## 2024-05-22 RX ORDER — ONDANSETRON 8 MG/1
4 TABLET, FILM COATED ORAL ONCE
Refills: 0 | Status: COMPLETED | OUTPATIENT
Start: 2024-05-22 | End: 2024-05-22

## 2024-05-22 RX ADMIN — SODIUM CHLORIDE 70 MILLILITER(S): 9 INJECTION INTRAMUSCULAR; INTRAVENOUS; SUBCUTANEOUS at 07:22

## 2024-05-22 RX ADMIN — LEVETIRACETAM 400 MILLIGRAM(S): 250 TABLET, FILM COATED ORAL at 06:06

## 2024-05-22 RX ADMIN — FENTANYL CITRATE 25 MICROGRAM(S): 50 INJECTION INTRAVENOUS at 17:30

## 2024-05-22 RX ADMIN — LATANOPROST 1 DROP(S): 0.05 SOLUTION/ DROPS OPHTHALMIC; TOPICAL at 23:58

## 2024-05-22 RX ADMIN — LEVETIRACETAM 750 MILLIGRAM(S): 250 TABLET, FILM COATED ORAL at 18:57

## 2024-05-22 RX ADMIN — Medication 1 MILLIGRAM(S): at 11:40

## 2024-05-22 RX ADMIN — POLYETHYLENE GLYCOL 3350 17 GRAM(S): 17 POWDER, FOR SOLUTION ORAL at 18:57

## 2024-05-22 RX ADMIN — FENTANYL CITRATE 50 MICROGRAM(S): 50 INJECTION INTRAVENOUS at 16:05

## 2024-05-22 RX ADMIN — SODIUM CHLORIDE 70 MILLILITER(S): 9 INJECTION INTRAMUSCULAR; INTRAVENOUS; SUBCUTANEOUS at 06:06

## 2024-05-22 RX ADMIN — CHLORHEXIDINE GLUCONATE 1 APPLICATION(S): 213 SOLUTION TOPICAL at 04:07

## 2024-05-22 RX ADMIN — SODIUM CHLORIDE 70 MILLILITER(S): 9 INJECTION INTRAMUSCULAR; INTRAVENOUS; SUBCUTANEOUS at 19:45

## 2024-05-22 RX ADMIN — SENNA PLUS 2 TABLET(S): 8.6 TABLET ORAL at 22:10

## 2024-05-22 RX ADMIN — ONDANSETRON 4 MILLIGRAM(S): 8 TABLET, FILM COATED ORAL at 21:31

## 2024-05-22 RX ADMIN — Medication 7 MICROGRAM(S)/KG/MIN: at 19:45

## 2024-05-22 RX ADMIN — DEXMEDETOMIDINE HYDROCHLORIDE IN 0.9% SODIUM CHLORIDE 3.74 MICROGRAM(S)/KG/HR: 4 INJECTION INTRAVENOUS at 13:55

## 2024-05-22 RX ADMIN — Medication 1 TABLET(S): at 11:40

## 2024-05-22 RX ADMIN — Medication 7 MICROGRAM(S)/KG/MIN: at 07:35

## 2024-05-22 RX ADMIN — ENOXAPARIN SODIUM 40 MILLIGRAM(S): 100 INJECTION SUBCUTANEOUS at 18:56

## 2024-05-22 RX ADMIN — MIRTAZAPINE 15 MILLIGRAM(S): 45 TABLET, ORALLY DISINTEGRATING ORAL at 22:10

## 2024-05-22 RX ADMIN — BRIMONIDINE TARTRATE 1 DROP(S): 2 SOLUTION/ DROPS OPHTHALMIC at 18:56

## 2024-05-22 RX ADMIN — POLYETHYLENE GLYCOL 3350 17 GRAM(S): 17 POWDER, FOR SOLUTION ORAL at 06:07

## 2024-05-22 RX ADMIN — BRIMONIDINE TARTRATE 1 DROP(S): 2 SOLUTION/ DROPS OPHTHALMIC at 06:06

## 2024-05-22 RX ADMIN — PANTOPRAZOLE SODIUM 40 MILLIGRAM(S): 20 TABLET, DELAYED RELEASE ORAL at 11:40

## 2024-05-22 RX ADMIN — Medication 100 GRAM(S): at 01:03

## 2024-05-22 NOTE — PROGRESS NOTE ADULT - ASSESSMENT
ASSESSMENT   SAH HH2 MF2 , angio no vascular malformation but diffuse CVS s/p angiographic CCB   unsure etiology of SAH   r/o vasculitis TIMOTHY, antiphospholipid Ab, ANCA, serum C3, C4, cryoglobulin, SPEP, UPEP, quant immunglobulin levels, Ab SSA/SSB, dsDNA  PBD: 4  PLAN   CTA + CTP today to assess for vasospasm  neuro checks q 2 hr   daily TCD (poor windows)  MRI brain: b/l MCA strokes and concern for vasospasm  #AIS R temporo occipital   #pmhx seizure cont home keppra 750 mg BID   according brother and sister in law, she might be taking drugs and alcohol abuse   #Pain: Tylenol   #Activity: PT OT    CV   #-200mmHg on levo  daily troponin, EKGs  #TTE grossly normal cardiac function     P   extubated 05/18  RA     R   #Strict I+Os   #cont NS 70 ccs/hour    GI   #Diet: Puree CCD  #Senna miralax  LBM: 05/22    ID  afebrile   monitor off antibiotics    E  Goal euglycemia (-180)  #a1c 6.1, ISS     H  #DVT ppx:   SCDs  lovenox 40 mg sc qhs   LED no dvt   next LEds on 05/22      #CODE STATUS: FULL CODE     #DISPOSITION: ICU    #CRITICAL     40 minutes  ASSESSMENT   SAH HH2 MF2 , angio no vascular malformation but diffuse CVS s/p angiographic CCB   unsure etiology of SAH     PBD: 8  PLAN   neuro checks q 2 hr   r/o vasculitis TIMOTHY, antiphospholipid Ab, ANCA, serum C3, C4, cryoglobulin, SPEP, UPEP, quant immunglobulin levels, Ab SSA/SSB, dsDNA  LP done per neurology, follow up CSF   daily TCD (poor windows)  MRI brain: b/l MCA strokes and concern for vasospasm  #AIS R temporo occipital   #pmhx seizure cont home keppra 750 mg BID   according brother and sister in law, she might be taking drugs and alcohol abuse   #Pain: Tylenol   #Activity: PT OT    CV   #-200mmHg on levo  daily troponin, EKGs  #TTE grossly normal cardiac function     P   extubated 05/18  RA   SaO2> 92  encourage IS     R   #Strict I+Os   #cont NS 70 ccs/hour    GI   #Diet: Puree CCD  #Senna miralax for bowel regimen   LBM: 05/22      ID  afebrile   monitor off antibiotics    E  Goal euglycemia (-180)  #a1c 6.1, ISS     H  #DVT ppx:   SCDs  lovenox 40 mg sc qhs   LED no dvt   5/22 LED negative       #CODE STATUS: FULL CODE     #DISPOSITION: ICU    #CRITICAL

## 2024-05-22 NOTE — PROGRESS NOTE ADULT - ASSESSMENT
ASSESSMENT   SAH HH2 MF2 , angio no vascular malformation but diffuse CVS s/p angiographic CCB   unsure etiology of SAH   r/o vasculitis TIMOTHY, antiphospholipid Ab, ANCA, serum C3, C4, cryoglobulin, SPEP, UPEP, quant immunglobulin levels, Ab SSA/SSB, dsDNA  PBD: 4  PLAN   CTA + CTP today to assess for vasospasm  neuro checks q 2 hr   daily TCD (poor windows)  MRI brain: b/l MCA strokes and concern for vasospasm  #AIS R temporo occipital   #pmhx seizure cont home keppra 750 mg BID   according brother and sister in law, she might be taking drugs and alcohol abuse   #Pain: Tylenol   #Activity: PT OT    CV   #-200mmHg on levo  daily troponin, EKGs  #TTE grossly normal cardiac function     P   extubated 05/18  RA     R   #Strict I+Os   #cont NS 70 ccs/hour    GI   #Diet: Puree CCD  #Senna miralax  LBM: 05/22    ID  afebrile   monitor off antibiotics    E  Goal euglycemia (-180)  #a1c 6.1, ISS     H  #DVT ppx:   SCDs  lovenox 40 mg sc qhs   LED no dvt   next LEds on 05/22      #CODE STATUS: FULL CODE     #DISPOSITION: ICU    #CRITICAL     40 minutes

## 2024-05-22 NOTE — PROGRESS NOTE ADULT - ASSESSMENT
ASSESSMENT   SAH HH2 MF2 , angio no vascular malformation but diffuse CVS s/p angiographic CCB   unsure etiology of SAH   r/o vasculitis TIMOTHY, antiphospholipid Ab, ANCA, serum C3, C4, cryoglobulin, SPEP, UPEP, quant immunglobulin levels, Ab SSA/SSB, dsDNA  PBD: 4  PLAN   Pre-op for angio today, pending neurosgx reccs  neuro checks q 2 hr   daily TCD (poor windows)  MRI brain: b/l MCA strokes and concern for vasospasm  #AIS R temporo occipital   #pmhx seizure cont home keppra 750 mg BID   according brother and sister in law, she might be taking drugs and alcohol abuse   #Pain: Tylenol   #Activity: PT OT    CV   #-200mmHg on cheryl   daily troponin   #TTE grossly normal cardiac function   sinus bradycardia change cheryl to levophed     P   extubated 05/18  RA     R   #Strict I+Os   #cont NS,  will give 500 ccs bolus for euvolemia  NS 70 ml/hr     GI   #Diet: Puree CCD ( NPO today for possible angio )  #Senna miralax  LBM: 05/19    ID  afebrile   monitor off antibiotics    E  Goal euglycemia (-180)  #a1c 6.1, ISS     H  #DVT ppx:   SCDs  lovenox 40 mg sc qhs   LED no dvt   next LEds on 05/22      #CODE STATUS: FULL CODE     #DISPOSITION: ICU    #CRITICAL     40 minutes  ASSESSMENT   SAH HH2 MF2 , angio no vascular malformation but diffuse CVS s/p angiographic CCB   unsure etiology of SAH   r/o vasculitis TIMOTHY, antiphospholipid Ab, ANCA, serum C3, C4, cryoglobulin, SPEP, UPEP, quant immunglobulin levels, Ab SSA/SSB, dsDNA  PBD: 4  PLAN   CTA + CTP today to assess for vasospasm  neuro checks q 2 hr   daily TCD (poor windows)  MRI brain: b/l MCA strokes and concern for vasospasm  #AIS R temporo occipital   #pmhx seizure cont home keppra 750 mg BID   according brother and sister in law, she might be taking drugs and alcohol abuse   #Pain: Tylenol   #Activity: PT OT    CV   #-200mmHg on levo  daily troponin, EKGs  #TTE grossly normal cardiac function     P   extubated 05/18  RA     R   #Strict I+Os   #cont NS 70 ccs/hour    GI   #Diet: Puree CCD  #Senna miralax  LBM: 05/22    ID  afebrile   monitor off antibiotics    E  Goal euglycemia (-180)  #a1c 6.1, ISS     H  #DVT ppx:   SCDs  lovenox 40 mg sc qhs   LED no dvt   next LEds on 05/22      #CODE STATUS: FULL CODE     #DISPOSITION: ICU    #CRITICAL     40 minutes

## 2024-05-22 NOTE — PROGRESS NOTE ADULT - ASSESSMENT
Mera Burton  64F, pmh HTN, szs on Keppra, alcoholic cirrhosis, tremors, OA presented from [facility] to OSH w/ generalized weakness found to have b/l sulcal SAH and small occipital IVH. 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 She was recently started on Keflex for UTI. Plts 400, Coags wnl. , AST/ALT = 38/104. WBC 13. Afebrile. Exam on admission: lethargic appearing, EOV, FC, AO3, PERRL, not compliant with EOMs, BUE 5/5, BLE 2/5.    Now s/p 5/17 Angio: severe b/l anterior and posterior cerebral vasospasm (given IA verapamil and milrinone)    CTA in AM  f/u labs for vasculitis   [facility] protocol  TCDs daily- no windows/visualization  LED-p

## 2024-05-22 NOTE — PROGRESS NOTE ADULT - SUBJECTIVE AND OBJECTIVE BOX
PATIENT SEEN AND EXAMINED BY MYNOR GUAN M.D. ON :- 5/22/24  DATE OF SERVICE:  5/22/24           Interim events noted,Labs ,Radiological studies and Cardiology tests reviewed .    Patient is a 64y old  Female who presents with a chief complaint of SAH (22 May 2024 06:34)      HPI:  Mera Burton  64F, pmh HTN, szs on Keppra, alcoholic cirrhosis, tremors, OA presented from [facility] to OSH w/ generalized weakness found to have b/l sulcal SAH and small occipital IVH. 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 She was recently started on Keflex for UTI. Plts 400, Coags wnl. , AST/ALT = 38/104. WBC 13. Afebrile. Exam: lethargic appearing, EOV, FC, AO3, PERRL, not compliant with EOMs, BUE 5/5, BLE 2/5. (15 May 2024 03:22)      PAST MEDICAL & SURGICAL HISTORY:  Cirrhosis      Seizure          PREVIOUS DIAGNOSTIC TESTING:      ECHO  FINDINGS:    STRESS  FINDINGS:    CATHETERIZATION  FINDINGS:    MEDICATIONS  (STANDING):  brimonidine 0.2% Ophthalmic Solution 1 Drop(s) Both EYES every 12 hours  chlorhexidine 4% Liquid 1 Application(s) Topical <User Schedule>  dexMEDEtomidine Infusion 0.2 MICROgram(s)/kG/Hr (3.74 mL/Hr) IV Continuous <Continuous>  enoxaparin Injectable 40 milliGRAM(s) SubCutaneous <User Schedule>  folic acid 1 milliGRAM(s) Oral daily  insulin lispro (ADMELOG) corrective regimen sliding scale   SubCutaneous three times a day before meals  latanoprost 0.005% Ophthalmic Solution 1 Drop(s) Both EYES at bedtime  levETIRAcetam 750 milliGRAM(s) Oral two times a day  mirtazapine 15 milliGRAM(s) Oral at bedtime  multivitamin 1 Tablet(s) Oral daily  norepinephrine Infusion 0.05 MICROgram(s)/kG/Min (7 mL/Hr) IV Continuous <Continuous>  pantoprazole  Injectable 40 milliGRAM(s) IV Push daily  polyethylene glycol 3350 17 Gram(s) Oral every 12 hours  senna 2 Tablet(s) Oral at bedtime  sodium chloride 0.9%. 1000 milliLiter(s) (70 mL/Hr) IV Continuous <Continuous>    MEDICATIONS  (PRN):  sodium chloride 0.9% lock flush 10 milliLiter(s) IV Push every 1 hour PRN Pre/post blood products, medications, blood draw, and to maintain line patency      FAMILY HISTORY:      SOCIAL HISTORY:    CIGARETTES:    ALCOHOL:    REVIEW OF SYSTEMS:  CONSTITUTIONAL: No fever, weight loss, or fatigue  EYES: No eye pain, visual disturbances, or discharge  ENMT:  No difficulty hearing, tinnitus, vertigo; No sinus or throat pain  NECK: No pain or stiffness  RESPIRATORY: No cough, wheezing, chills or hemoptysis; No shortness of breath  CARDIOVASCULAR: No chest pain, palpitations, dizziness, or leg swelling  GASTROINTESTINAL: No abdominal or epigastric pain. No nausea, vomiting, or hematemesis; No diarrhea or constipation. No melena or hematochezia.  GENITOURINARY: No dysuria, frequency, hematuria, or incontinence  NEUROLOGICAL: No headaches, memory loss, loss of strength, numbness, or tremors  SKIN: No itching, burning, rashes, or lesions   LYMPH NODES: No enlarged glands  ENDOCRINE: No heat or cold intolerance; No hair loss  MUSCULOSKELETAL: No joint pain or swelling; No muscle, back, or extremity pain  PSYCHIATRIC: No depression, anxiety, mood swings, or difficulty sleeping  HEME/LYMPH: No easy bruising, or bleeding gums  ALLERY AND IMMUNOLOGIC: No hives or eczema    Vital Signs Last 24 Hrs  T(C): 36.8 (22 May 2024 15:00), Max: 36.8 (22 May 2024 15:00)  T(F): 98.2 (22 May 2024 15:00), Max: 98.2 (22 May 2024 15:00)  HR: 74 (22 May 2024 18:45) (47 - 111)  BP: 149/86 (22 May 2024 18:45) (130/72 - 205/93)  BP(mean): 106 (22 May 2024 18:45) (82 - 145)  RR: 19 (22 May 2024 18:45) (11 - 85)  SpO2: 97% (22 May 2024 18:45) (80% - 100%)    Parameters below as of 22 May 2024 07:00  Patient On (Oxygen Delivery Method): room air          PHYSICAL EXAM:  GENERAL: NAD, well-groomed, well-developed  HEAD:  Atraumatic, Normocephalic  EYES: EOMI, PERRLA, conjunctiva and sclera clear  ENMT: No tonsillar erythema, exudates, or enlargement; Moist mucous membranes, Good dentition, No lesions  NECK: Supple, No JVD, Normal thyroid  NERVOUS SYSTEM:  Alert & Oriented X3, Good concentration; Motor Strength 5/5 B/L upper and lower extremities; DTRs 2+ intact and symmetric  CHEST/LUNG: Clear to percussion bilaterally; No rales, rhonchi, wheezing, or rubs  HEART: Regular rate and rhythm; No murmurs, rubs, or gallops  ABDOMEN: Soft, Nontender, Nondistended; Bowel sounds present  EXTREMITIES:  2+ Peripheral Pulses, No clubbing, cyanosis, or edema  LYMPH: No lymphadenopathy noted  SKIN: No rashes or lesions      INTERPRETATION OF TELEMETRY:    ECG:    SALDSVASC:     LABS:                        11.4   9.38  )-----------( 413      ( 21 May 2024 23:17 )             33.8     05-22    136  |  x   |  x   ----------------------------<  x   x    |  x   |  0.42<L>    Ca    9.2      21 May 2024 23:17  Phos  3.2     05-21  Mg     1.8     05-21    TPro  x   /  Alb  x   /  TBili  0.4  /  DBili  x   /  AST  x   /  ALT  x   /  AlkPhos  x   05-22        PT/INR - ( 22 May 2024 06:25 )   PT: 14.2 sec;   INR: 1.30 ratio         PTT - ( 21 May 2024 03:22 )  PTT:34.8 sec  Urinalysis Basic - ( 21 May 2024 23:17 )    Color: x / Appearance: x / SG: x / pH: x  Gluc: 134 mg/dL / Ketone: x  / Bili: x / Urobili: x   Blood: x / Protein: x / Nitrite: x   Leuk Esterase: x / RBC: x / WBC x   Sq Epi: x / Non Sq Epi: x / Bacteria: x      Lipid Panel:   I&O's Summary    21 May 2024 07:01  -  22 May 2024 07:00  --------------------------------------------------------  IN: 2155.4 mL / OUT: 1750 mL / NET: 405.4 mL    22 May 2024 07:01  -  22 May 2024 21:29  --------------------------------------------------------  IN: 1340 mL / OUT: 1000 mL / NET: 340 mL        RADIOLOGY & ADDITIONAL STUDIES:

## 2024-05-22 NOTE — PROGRESS NOTE ADULT - SUBJECTIVE AND OBJECTIVE BOX
HOSPITAL COURSE: 64F pmh Dementia (Polysubstance Abuse, resides in NH) with dyskinesias, HTN, szs on Keppra, alcoholic cirrhosis, tremors, OA presented from [facility] to  w/ AMS & generalized weakness. 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 Started on Keflex for UTI at Nursing Home. Per NH Basline independent in ADLs, conversant but forgetful and confused, wheelchair bound.    HOSPITAL COURSE:  5/15 transferred to [facility] for workup of nontraumatic convexity SAH    Admission Scores  GCS: 14 HH: 2 MF: 2 NIHSS: RASS: CAM-ICU: ICH:      REVIEW OF SYSTEMS: [ ] Unable to Assess due to neurologic exam   [ ] All ROS addressed below are non-contributory, except:  Neuro: [ ] Headache [ ] Back pain [ ] Numbness [ ] Weakness [ ] Ataxia [ ] Dizziness [ ] Aphasia [ ] Dysarthria [ ] Visual disturbance  Resp: [ ] Shortness of breath/dyspnea, [ ] Orthopnea [ ] Cough  CV: [ ] Chest pain [ ] Palpitation [ ] Lightheadedness [ ] Syncope  Renal: [ ] Thirst [ ] Edema  GI: [ ] Nausea [ ] Emesis [ ] Abdominal pain [ ] Constipation [ ] Diarrhea  Hem: [ ] Hematemesis [ ] bright red blood per rectum  ID: [ ] Fever [ ] Chills [ ] Dysuria  ENT: [ ] Rhinorrhea      DEVICES:   [ ] Restraints [ ] ET tube [ ] central line [ ] arterial line [ ] lang [ ] NGT/OGT [ ] EVD [ ] LD [ ] STANISLAV/HMV [ ] Trach [ ] PEG [ ] Chest Tube     ICU Vital Signs Last 24 Hrs  T(C): 36.7 (21 May 2024 03:00), Max: 37 (20 May 2024 07:00)  T(F): 98.1 (21 May 2024 03:00), Max: 98.6 (20 May 2024 07:00)  HR: 72 (21 May 2024 06:00) (49 - 105)  BP: --  BP(mean): --  ABP: 159/78 (21 May 2024 06:00) (129/109 - 182/83)  ABP(mean): 107 (21 May 2024 06:00) (93 - 156)  RR: 20 (21 May 2024 06:00) (10 - 50)  SpO2: 96% (21 May 2024 06:00) (91% - 100%)    O2 Parameters below as of 20 May 2024 19:00  Patient On (Oxygen Delivery Method): room air    I&O's Summary    19 May 2024 07:01  -  20 May 2024 07:00  --------------------------------------------------------  IN: 3521 mL / OUT: 3605 mL / NET: -84 mL    20 May 2024 07:01  -  21 May 2024 06:30  --------------------------------------------------------  IN: 3137 mL / OUT: 2025 mL / NET: 1112 mL          Respiratory:    ABG - ( 18 May 2024 13:36 )  pH, Arterial: 7.39  pH, Blood: x     /  pCO2: 38    /  pO2: 199   / HCO3: 23    / Base Excess: -1.7  /  SaO2: 99.9          MEDICATION LEVELS:   Ammonia, Serum: 26 umol/L (05-16 @ 15:24)  Ammonia, Serum: 36 umol/L (05-14 @ 13:30)    IVF FLUIDS/MEDICATIONS:   MEDICATIONS  (STANDING):  brimonidine 0.2% Ophthalmic Solution 1 Drop(s) Both EYES every 12 hours  chlorhexidine 4% Liquid 1 Application(s) Topical <User Schedule>  enoxaparin Injectable 40 milliGRAM(s) SubCutaneous <User Schedule>  folic acid 1 milliGRAM(s) Oral daily  latanoprost 0.005% Ophthalmic Solution 1 Drop(s) Both EYES at bedtime  levETIRAcetam  IVPB 750 milliGRAM(s) IV Intermittent every 12 hours  mirtazapine 15 milliGRAM(s) Oral at bedtime  multivitamin 1 Tablet(s) Oral daily  norepinephrine Infusion 0.05 MICROgram(s)/kG/Min (7 mL/Hr) IV Continuous <Continuous>  pantoprazole  Injectable 40 milliGRAM(s) IV Push daily  polyethylene glycol 3350 17 Gram(s) Oral every 12 hours  senna 2 Tablet(s) Oral at bedtime  sodium chloride 0.9%. 1000 milliLiter(s) (70 mL/Hr) IV Continuous <Continuous>    MEDICATIONS  (PRN):  sodium chloride 0.9% lock flush 10 milliLiter(s) IV Push every 1 hour PRN Pre/post blood products, medications, blood draw, and to maintain line patency        IMAGING:      EXAMINATION:  PHYSICAL EXAM:    Constitutional: No Acute Distress     Neurological: A&O x 2 alert awake, eyes open, gaze midline, UE AG, not following simple commands, LE in plane of bed    Pulmonary: Clear to Auscultation, No rales, No rhonchi, No wheezes     Cardiovascular: S1, S2, Regular rate and rhythm     Gastrointestinal: Soft, Non-tender, Non-distended     Extremities: No calf tenderness     Incision:          HOSPITAL COURSE: 64F pmh Dementia (Polysubstance Abuse, resides in NH) with dyskinesias, HTN, szs on Keppra, alcoholic cirrhosis, tremors, OA presented from [facility] to  w/ AMS & generalized weakness. 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 Started on Keflex for UTI at Nursing Home. Per NH Basline independent in ADLs, conversant but forgetful and confused, wheelchair bound.    HOSPITAL COURSE:  5/15 transferred to [facility] for workup of nontraumatic convexity SAH  05/22: CTA today to assess vasospasm     Admission Scores  GCS: 14 HH: 2 MF: 2 NIHSS: RASS: CAM-ICU: ICH:      REVIEW OF SYSTEMS: [ ] Unable to Assess due to neurologic exam   [ ] All ROS addressed below are non-contributory, except:  Neuro: [ ] Headache [ ] Back pain [ ] Numbness [ ] Weakness [ ] Ataxia [ ] Dizziness [ ] Aphasia [ ] Dysarthria [ ] Visual disturbance  Resp: [ ] Shortness of breath/dyspnea, [ ] Orthopnea [ ] Cough  CV: [ ] Chest pain [ ] Palpitation [ ] Lightheadedness [ ] Syncope  Renal: [ ] Thirst [ ] Edema  GI: [ ] Nausea [ ] Emesis [ ] Abdominal pain [ ] Constipation [ ] Diarrhea  Hem: [ ] Hematemesis [ ] bright red blood per rectum  ID: [ ] Fever [ ] Chills [ ] Dysuria  ENT: [ ] Rhinorrhea      DEVICES:   [ ] Restraints [ ] ET tube [ ] central line [ ] arterial line [ ] lang [ ] NGT/OGT [ ] EVD [ ] LD [ ] STANISLAV/HMV [ ] Trach [ ] PEG [ ] Chest Tube     ICU Vital Signs Last 24 Hrs  T(C): 36.7 (21 May 2024 03:00), Max: 37 (20 May 2024 07:00)  T(F): 98.1 (21 May 2024 03:00), Max: 98.6 (20 May 2024 07:00)  HR: 72 (21 May 2024 06:00) (49 - 105)  BP: --  BP(mean): --  ABP: 159/78 (21 May 2024 06:00) (129/109 - 182/83)  ABP(mean): 107 (21 May 2024 06:00) (93 - 156)  RR: 20 (21 May 2024 06:00) (10 - 50)  SpO2: 96% (21 May 2024 06:00) (91% - 100%)    O2 Parameters below as of 20 May 2024 19:00  Patient On (Oxygen Delivery Method): room air    I&O's Summary    19 May 2024 07:01  -  20 May 2024 07:00  --------------------------------------------------------  IN: 3521 mL / OUT: 3605 mL / NET: -84 mL    20 May 2024 07:01  -  21 May 2024 06:30  --------------------------------------------------------  IN: 3137 mL / OUT: 2025 mL / NET: 1112 mL          Respiratory:    ABG - ( 18 May 2024 13:36 )  pH, Arterial: 7.39  pH, Blood: x     /  pCO2: 38    /  pO2: 199   / HCO3: 23    / Base Excess: -1.7  /  SaO2: 99.9          MEDICATION LEVELS:   Ammonia, Serum: 26 umol/L (05-16 @ 15:24)  Ammonia, Serum: 36 umol/L (05-14 @ 13:30)    IVF FLUIDS/MEDICATIONS:   MEDICATIONS  (STANDING):  brimonidine 0.2% Ophthalmic Solution 1 Drop(s) Both EYES every 12 hours  chlorhexidine 4% Liquid 1 Application(s) Topical <User Schedule>  enoxaparin Injectable 40 milliGRAM(s) SubCutaneous <User Schedule>  folic acid 1 milliGRAM(s) Oral daily  latanoprost 0.005% Ophthalmic Solution 1 Drop(s) Both EYES at bedtime  levETIRAcetam  IVPB 750 milliGRAM(s) IV Intermittent every 12 hours  mirtazapine 15 milliGRAM(s) Oral at bedtime  multivitamin 1 Tablet(s) Oral daily  norepinephrine Infusion 0.05 MICROgram(s)/kG/Min (7 mL/Hr) IV Continuous <Continuous>  pantoprazole  Injectable 40 milliGRAM(s) IV Push daily  polyethylene glycol 3350 17 Gram(s) Oral every 12 hours  senna 2 Tablet(s) Oral at bedtime  sodium chloride 0.9%. 1000 milliLiter(s) (70 mL/Hr) IV Continuous <Continuous>    MEDICATIONS  (PRN):  sodium chloride 0.9% lock flush 10 milliLiter(s) IV Push every 1 hour PRN Pre/post blood products, medications, blood draw, and to maintain line patency        IMAGING:      EXAMINATION:  PHYSICAL EXAM:    Constitutional: No Acute Distress     Neurological: A&O x 2 alert awake, eyes open, gaze midline, UE AG, not following simple commands, LE in plane of bed    Pulmonary: Clear to Auscultation, No rales, No rhonchi, No wheezes     Cardiovascular: S1, S2, Regular rate and rhythm     Gastrointestinal: Soft, Non-tender, Non-distended     Extremities: No calf tenderness     Incision:

## 2024-05-22 NOTE — PROGRESS NOTE ADULT - CRITICAL CARE ATTENDING COMMENT
SAH, unknow etiology, angio did not show cerebral aneurysm per neuroIC but  severe vasospasm of bilateral anterior and posterior circulation. Improved after IA verapamil and milrinone , MRI brain showed bilatreal MCA stroke and concern for vasospasm, possible will get CTA/CTP today,  MRI cervical spine unremarkable, poor TCD windows, SBP goal 140-180 mmhg given severe CVS, on NE , if CTA is permissive, mioght lower the BP  goal,  keep euvolemia . RA,  lovenox 40 mg sc qhs , pureed CCD, we discussed with neurology, no need to get LP given that her CVS could be due to drug use, per sister in law she uses different types of drugs including cocaine, and is alcoholic SAH, unknow etiology, angio did not show cerebral aneurysm per neuroIC but  severe vasospasm of bilateral anterior and posterior circulation. Improved after IA verapamil and milrinone , MRI brain showed bilatreal MCA stroke and concern for vasospasm, possible will get CTA/CTP today,  MRI cervical spine unremarkable, poor TCD windows, SBP goal 140-180 mmhg given severe CVS, on NE , if CTA is permissive, mioght lower the BP  goal,  keep euvolemia . RA,  lovenox 40 mg sc qhs , pureed CCD, we discussed with neurology, they would like an LP to r/o vasculitis, or infectious etiology, we will go ahead and perform it

## 2024-05-22 NOTE — PROGRESS NOTE ADULT - SUBJECTIVE AND OBJECTIVE BOX
HOSPITAL COURSE: 64F pmh Dementia (Polysubstance Abuse, resides in NH) with dyskinesias, HTN, szs on Keppra, alcoholic cirrhosis, tremors, OA presented from [facility] to  w/ AMS & generalized weakness. 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 Started on Keflex for UTI at Nursing Home. Per NH Basline independent in ADLs, conversant but forgetful and confused, wheelchair bound.    HOSPITAL COURSE:  5/15 transferred to [facility] for workup of nontraumatic convexity SAH  05/22: CTA today to assess vasospasm     Admission Scores  GCS: 14 HH: 2 MF: 2 NIHSS: RASS: CAM-ICU: ICH:      REVIEW OF SYSTEMS: [ ] Unable to Assess due to neurologic exam   [ ] All ROS addressed below are non-contributory, except:  Neuro: [ ] Headache [ ] Back pain [ ] Numbness [ ] Weakness [ ] Ataxia [ ] Dizziness [ ] Aphasia [ ] Dysarthria [ ] Visual disturbance  Resp: [ ] Shortness of breath/dyspnea, [ ] Orthopnea [ ] Cough  CV: [ ] Chest pain [ ] Palpitation [ ] Lightheadedness [ ] Syncope  Renal: [ ] Thirst [ ] Edema  GI: [ ] Nausea [ ] Emesis [ ] Abdominal pain [ ] Constipation [ ] Diarrhea  Hem: [ ] Hematemesis [ ] bright red blood per rectum  ID: [ ] Fever [ ] Chills [ ] Dysuria  ENT: [ ] Rhinorrhea      DEVICES:   [ ] Restraints [ ] ET tube [ ] central line [ ] arterial line [ ] lang [ ] NGT/OGT [ ] EVD [ ] LD [ ] STANISLAV/HMV [ ] Trach [ ] PEG [ ] Chest Tube     ICU Vital Signs Last 24 Hrs  T(C): 36.7 (21 May 2024 03:00), Max: 37 (20 May 2024 07:00)  T(F): 98.1 (21 May 2024 03:00), Max: 98.6 (20 May 2024 07:00)  HR: 72 (21 May 2024 06:00) (49 - 105)  BP: --  BP(mean): --  ABP: 159/78 (21 May 2024 06:00) (129/109 - 182/83)  ABP(mean): 107 (21 May 2024 06:00) (93 - 156)  RR: 20 (21 May 2024 06:00) (10 - 50)  SpO2: 96% (21 May 2024 06:00) (91% - 100%)    O2 Parameters below as of 20 May 2024 19:00  Patient On (Oxygen Delivery Method): room air    I&O's Summary    19 May 2024 07:01  -  20 May 2024 07:00  --------------------------------------------------------  IN: 3521 mL / OUT: 3605 mL / NET: -84 mL    20 May 2024 07:01  -  21 May 2024 06:30  --------------------------------------------------------  IN: 3137 mL / OUT: 2025 mL / NET: 1112 mL          Respiratory:    ABG - ( 18 May 2024 13:36 )  pH, Arterial: 7.39  pH, Blood: x     /  pCO2: 38    /  pO2: 199   / HCO3: 23    / Base Excess: -1.7  /  SaO2: 99.9          MEDICATION LEVELS:   Ammonia, Serum: 26 umol/L (05-16 @ 15:24)  Ammonia, Serum: 36 umol/L (05-14 @ 13:30)    IVF FLUIDS/MEDICATIONS:   MEDICATIONS  (STANDING):  brimonidine 0.2% Ophthalmic Solution 1 Drop(s) Both EYES every 12 hours  chlorhexidine 4% Liquid 1 Application(s) Topical <User Schedule>  enoxaparin Injectable 40 milliGRAM(s) SubCutaneous <User Schedule>  folic acid 1 milliGRAM(s) Oral daily  latanoprost 0.005% Ophthalmic Solution 1 Drop(s) Both EYES at bedtime  levETIRAcetam  IVPB 750 milliGRAM(s) IV Intermittent every 12 hours  mirtazapine 15 milliGRAM(s) Oral at bedtime  multivitamin 1 Tablet(s) Oral daily  norepinephrine Infusion 0.05 MICROgram(s)/kG/Min (7 mL/Hr) IV Continuous <Continuous>  pantoprazole  Injectable 40 milliGRAM(s) IV Push daily  polyethylene glycol 3350 17 Gram(s) Oral every 12 hours  senna 2 Tablet(s) Oral at bedtime  sodium chloride 0.9%. 1000 milliLiter(s) (70 mL/Hr) IV Continuous <Continuous>    MEDICATIONS  (PRN):  sodium chloride 0.9% lock flush 10 milliLiter(s) IV Push every 1 hour PRN Pre/post blood products, medications, blood draw, and to maintain line patency        IMAGING:      EXAMINATION:  PHYSICAL EXAM:    Constitutional: No Acute Distress     Neurological: A&O x 2 alert awake, eyes open, gaze midline, UE AG, not following simple commands, LE in plane of bed    Pulmonary: Clear to Auscultation, No rales, No rhonchi, No wheezes     Cardiovascular: S1, S2, Regular rate and rhythm     Gastrointestinal: Soft, Non-tender, Non-distended     Extremities: No calf tenderness     Incision:          HOSPITAL COURSE: 64F pmh Dementia (Polysubstance Abuse, resides in NH) with dyskinesias, HTN, szs on Keppra, alcoholic cirrhosis, tremors, OA presented from [facility] to  w/ AMS & generalized weakness. 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 Started on Keflex for UTI at Nursing Home. Per NH Basline independent in ADLs, conversant but forgetful and confused, wheelchair bound.    HOSPITAL COURSE:  5/15 transferred to [facility] for workup of nontraumatic convexity SAH  05/22: CTA today to assess vasospasm     Admission Scores  GCS: 14 HH: 2 MF: 2 NIHSS: RASS: CAM-ICU: ICH:      REVIEW OF SYSTEMS: [ ] Unable to Assess due to neurologic exam   [ ] All ROS addressed below are non-contributory, except:  Neuro: [ ] Headache [ ] Back pain [ ] Numbness [ ] Weakness [ ] Ataxia [ ] Dizziness [ ] Aphasia [ ] Dysarthria [ ] Visual disturbance  Resp: [ ] Shortness of breath/dyspnea, [ ] Orthopnea [ ] Cough  CV: [ ] Chest pain [ ] Palpitation [ ] Lightheadedness [ ] Syncope  Renal: [ ] Thirst [ ] Edema  GI: [ ] Nausea [ ] Emesis [ ] Abdominal pain [ ] Constipation [ ] Diarrhea  Hem: [ ] Hematemesis [ ] bright red blood per rectum  ID: [ ] Fever [ ] Chills [ ] Dysuria  ENT: [ ] Rhinorrhea      DEVICES:   [ ] Restraints [ ] ET tube [ ] central line [ ] arterial line [ ] lang [ ] NGT/OGT [ ] EVD [ ] LD [ ] STANISLAV/HMV [ ] Trach [ ] PEG [ ] Chest Tube     ICU Vital Signs Last 24 Hrs  T(C): 36.7 (21 May 2024 03:00), Max: 37 (20 May 2024 07:00)  T(F): 98.1 (21 May 2024 03:00), Max: 98.6 (20 May 2024 07:00)  HR: 72 (21 May 2024 06:00) (49 - 105)  BP: --  BP(mean): --  ABP: 159/78 (21 May 2024 06:00) (129/109 - 182/83)  ABP(mean): 107 (21 May 2024 06:00) (93 - 156)  RR: 20 (21 May 2024 06:00) (10 - 50)  SpO2: 96% (21 May 2024 06:00) (91% - 100%)    O2 Parameters below as of 20 May 2024 19:00  Patient On (Oxygen Delivery Method): room air    I&O's Summary    21 May 2024 07:01  -  22 May 2024 07:00  --------------------------------------------------------  IN: 2155.4 mL / OUT: 1750 mL / NET: 405.4 mL    22 May 2024 07:01  -  22 May 2024 22:49  --------------------------------------------------------  IN: 1340 mL / OUT: 1000 mL / NET: 340 mL      MEDICATIONS  (STANDING):  brimonidine 0.2% Ophthalmic Solution 1 Drop(s) Both EYES every 12 hours  chlorhexidine 4% Liquid 1 Application(s) Topical <User Schedule>  dexMEDEtomidine Infusion 0.2 MICROgram(s)/kG/Hr (3.74 mL/Hr) IV Continuous <Continuous>  enoxaparin Injectable 40 milliGRAM(s) SubCutaneous <User Schedule>  folic acid 1 milliGRAM(s) Oral daily  insulin lispro (ADMELOG) corrective regimen sliding scale   SubCutaneous three times a day before meals  latanoprost 0.005% Ophthalmic Solution 1 Drop(s) Both EYES at bedtime  levETIRAcetam 750 milliGRAM(s) Oral two times a day  mirtazapine 15 milliGRAM(s) Oral at bedtime  multivitamin 1 Tablet(s) Oral daily  norepinephrine Infusion 0.05 MICROgram(s)/kG/Min (7 mL/Hr) IV Continuous <Continuous>  polyethylene glycol 3350 17 Gram(s) Oral every 12 hours  senna 2 Tablet(s) Oral at bedtime  sodium chloride 0.9%. 1000 milliLiter(s) (70 mL/Hr) IV Continuous <Continuous>    MEDICATIONS  (PRN):  sodium chloride 0.9% lock flush 10 milliLiter(s) IV Push every 1 hour PRN Pre/post blood products, medications, blood draw, and to maintain line patency          IMAGING:  < from: CT Angio Head w/ IV Cont (05.22.24 @ 18:16) >  IMPRESSION:    Acute bilateral frontal parietal lobe and right temporal occipital lobe   infarctions are redemonstrated. Previously seen subarachnoid hemorrhage   is not demonstrated.Negative CTP. Improved vasospasm involving the   bilateral MCAs and vertebrobasilar circulation. No large vessel occlusion.    < end of copied text >      EXAMINATION:  PHYSICAL EXAM:    Constitutional: No Acute Distress     Neurological: A&O x 3 alert awake, eyes open, gaze midline, UE AG, B/L UE tremor. not following simple commands, LE in plane of bed    Pulmonary: Clear to Auscultation, No rales, No rhonchi, No wheezes     Cardiovascular: S1, S2, Regular rate and rhythm     Gastrointestinal: Soft, Non-tender, Non-distended     Extremities: No calf tenderness

## 2024-05-22 NOTE — PROGRESS NOTE ADULT - SUBJECTIVE AND OBJECTIVE BOX
Patient seen and examined at bedside.    --Anticoagulation--  enoxaparin Injectable 40 milliGRAM(s) SubCutaneous <User Schedule>    T(C): 36.2 (05-21-24 @ 23:00), Max: 36.7 (05-21-24 @ 03:00)  HR: 64 (05-21-24 @ 23:30) (51 - 88)  BP: 143/75 (05-21-24 @ 18:30) (110/69 - 183/92)  RR: 85 (05-21-24 @ 23:30) (12 - 85)  SpO2: 99% (05-21-24 @ 23:30) (90% - 100%)  Wt(kg): --    Exam: arousable, Ox2-3 EOS, pupils 3R sluggish, R gaze can come midline gaze, interm FC, BL UE AG, B/l UE tremor, BL LE 2/5

## 2024-05-23 ENCOUNTER — RESULT REVIEW (OUTPATIENT)
Age: 64
End: 2024-05-23

## 2024-05-23 LAB
% ALBUMIN: 45.8 % — SIGNIFICANT CHANGE UP
% ALPHA 1: 7.6 % — SIGNIFICANT CHANGE UP
% ALPHA 2: 15.8 % — SIGNIFICANT CHANGE UP
% BETA: 12.7 % — SIGNIFICANT CHANGE UP
% GAMMA: 18.1 % — SIGNIFICANT CHANGE UP
% M SPIKE: SIGNIFICANT CHANGE UP
ALBUMIN SERPL ELPH-MCNC: 3.1 G/DL — LOW (ref 3.6–5.5)
ALBUMIN SERPL ELPH-MCNC: 3.4 G/DL — SIGNIFICANT CHANGE UP (ref 3.3–5)
ALBUMIN/GLOB SERPL ELPH: 0.8 RATIO — SIGNIFICANT CHANGE UP
ALP SERPL-CCNC: 127 U/L — HIGH (ref 40–120)
ALPHA1 GLOB SERPL ELPH-MCNC: 0.5 G/DL — HIGH (ref 0.1–0.4)
ALPHA2 GLOB SERPL ELPH-MCNC: 1.1 G/DL — HIGH (ref 0.5–1)
ALT FLD-CCNC: 34 U/L — SIGNIFICANT CHANGE UP (ref 10–45)
ANION GAP SERPL CALC-SCNC: 13 MMOL/L — SIGNIFICANT CHANGE UP (ref 5–17)
ANION GAP SERPL CALC-SCNC: 14 MMOL/L — SIGNIFICANT CHANGE UP (ref 5–17)
ANION GAP SERPL CALC-SCNC: 16 MMOL/L — SIGNIFICANT CHANGE UP (ref 5–17)
AST SERPL-CCNC: 47 U/L — HIGH (ref 10–40)
B-GLOBULIN SERPL ELPH-MCNC: 0.9 G/DL — SIGNIFICANT CHANGE UP (ref 0.5–1)
BASOPHILS # BLD AUTO: 0.01 K/UL — SIGNIFICANT CHANGE UP (ref 0–0.2)
BASOPHILS # BLD AUTO: 0.02 K/UL — SIGNIFICANT CHANGE UP (ref 0–0.2)
BASOPHILS NFR BLD AUTO: 0.1 % — SIGNIFICANT CHANGE UP (ref 0–2)
BASOPHILS NFR BLD AUTO: 0.2 % — SIGNIFICANT CHANGE UP (ref 0–2)
BILIRUB DIRECT SERPL-MCNC: <0.1 MG/DL — SIGNIFICANT CHANGE UP (ref 0–0.3)
BILIRUB INDIRECT FLD-MCNC: >0.3 MG/DL — SIGNIFICANT CHANGE UP (ref 0.2–1)
BILIRUB SERPL-MCNC: 0.4 MG/DL — SIGNIFICANT CHANGE UP (ref 0.2–1.2)
BILIRUB SERPL-MCNC: 0.4 MG/DL — SIGNIFICANT CHANGE UP (ref 0.2–1.2)
BUN SERPL-MCNC: 5 MG/DL — LOW (ref 7–23)
BUN SERPL-MCNC: 6 MG/DL — LOW (ref 7–23)
BUN SERPL-MCNC: <4 MG/DL — LOW (ref 7–23)
C NEOFORM RRNA SPEC NAA+PROBE-ACNC: SIGNIFICANT CHANGE UP
CALCIUM SERPL-MCNC: 8.9 MG/DL — SIGNIFICANT CHANGE UP (ref 8.4–10.5)
CALCIUM SERPL-MCNC: 9 MG/DL — SIGNIFICANT CHANGE UP (ref 8.4–10.5)
CALCIUM SERPL-MCNC: 9.6 MG/DL — SIGNIFICANT CHANGE UP (ref 8.4–10.5)
CHLORIDE SERPL-SCNC: 103 MMOL/L — SIGNIFICANT CHANGE UP (ref 96–108)
CHLORIDE SERPL-SCNC: 98 MMOL/L — SIGNIFICANT CHANGE UP (ref 96–108)
CHLORIDE SERPL-SCNC: 99 MMOL/L — SIGNIFICANT CHANGE UP (ref 96–108)
CMV DNA CSF QL NAA+PROBE: SIGNIFICANT CHANGE UP
CO2 SERPL-SCNC: 20 MMOL/L — LOW (ref 22–31)
CO2 SERPL-SCNC: 21 MMOL/L — LOW (ref 22–31)
CO2 SERPL-SCNC: 22 MMOL/L — SIGNIFICANT CHANGE UP (ref 22–31)
CREAT SERPL-MCNC: 0.4 MG/DL — LOW (ref 0.5–1.3)
CREAT SERPL-MCNC: 0.46 MG/DL — LOW (ref 0.5–1.3)
CREAT SERPL-MCNC: 0.48 MG/DL — LOW (ref 0.5–1.3)
CRYPTOC AG CSF-ACNC: NEGATIVE — SIGNIFICANT CHANGE UP
CSF PCR RESULT: SIGNIFICANT CHANGE UP
E COLI K1 DNA CSF QL NAA+NON-PROBE: SIGNIFICANT CHANGE UP
EGFR: 107 ML/MIN/1.73M2 — SIGNIFICANT CHANGE UP
EGFR: 108 ML/MIN/1.73M2 — SIGNIFICANT CHANGE UP
EGFR: 112 ML/MIN/1.73M2 — SIGNIFICANT CHANGE UP
EOSINOPHIL # BLD AUTO: 0.02 K/UL — SIGNIFICANT CHANGE UP (ref 0–0.5)
EOSINOPHIL # BLD AUTO: 0.03 K/UL — SIGNIFICANT CHANGE UP (ref 0–0.5)
EOSINOPHIL NFR BLD AUTO: 0.2 % — SIGNIFICANT CHANGE UP (ref 0–6)
EOSINOPHIL NFR BLD AUTO: 0.2 % — SIGNIFICANT CHANGE UP (ref 0–6)
ESCHERICHIA COLI K1: SIGNIFICANT CHANGE UP
EV RNA CSF QL NAA+PROBE: SIGNIFICANT CHANGE UP
GAMMA GLOBULIN: 1.2 G/DL — SIGNIFICANT CHANGE UP (ref 0.6–1.6)
GAS PNL BLDA: SIGNIFICANT CHANGE UP
GAS PNL BLDA: SIGNIFICANT CHANGE UP
GLUCOSE SERPL-MCNC: 109 MG/DL — HIGH (ref 70–99)
GLUCOSE SERPL-MCNC: 131 MG/DL — HIGH (ref 70–99)
GLUCOSE SERPL-MCNC: 186 MG/DL — HIGH (ref 70–99)
GP B STREP DNA SPEC QL NAA+PROBE: SIGNIFICANT CHANGE UP
HAEM INFLU DNA SPEC QL NAA+PROBE: SIGNIFICANT CHANGE UP
HCT VFR BLD CALC: 31.2 % — LOW (ref 34.5–45)
HCT VFR BLD CALC: 32.2 % — LOW (ref 34.5–45)
HCT VFR BLD CALC: 35.3 % — SIGNIFICANT CHANGE UP (ref 34.5–45)
HGB BLD-MCNC: 10.8 G/DL — LOW (ref 11.5–15.5)
HGB BLD-MCNC: 11.1 G/DL — LOW (ref 11.5–15.5)
HGB BLD-MCNC: 12.1 G/DL — SIGNIFICANT CHANGE UP (ref 11.5–15.5)
HHV6 DNA CSF QL NAA+PROBE: SIGNIFICANT CHANGE UP
HSV1 DNA CSF QL NAA+PROBE: SIGNIFICANT CHANGE UP
HSV2 DNA CSF QL NAA+PROBE: SIGNIFICANT CHANGE UP
IMM GRANULOCYTES NFR BLD AUTO: 0.6 % — SIGNIFICANT CHANGE UP (ref 0–0.9)
IMM GRANULOCYTES NFR BLD AUTO: 0.7 % — SIGNIFICANT CHANGE UP (ref 0–0.9)
INTERPRETATION SERPL IFE-IMP: SIGNIFICANT CHANGE UP
L MONOCYTOG DNA SPEC QL NAA+PROBE: SIGNIFICANT CHANGE UP
LDH SERPL L TO P-CCNC: 370 U/L — HIGH (ref 50–242)
LMWH PPP CHRO-ACNC: 0.66 IU/ML — SIGNIFICANT CHANGE UP (ref 0.5–1.1)
LYMPHOCYTES # BLD AUTO: 1.89 K/UL — SIGNIFICANT CHANGE UP (ref 1–3.3)
LYMPHOCYTES # BLD AUTO: 1.94 K/UL — SIGNIFICANT CHANGE UP (ref 1–3.3)
LYMPHOCYTES # BLD AUTO: 16 % — SIGNIFICANT CHANGE UP (ref 13–44)
LYMPHOCYTES # BLD AUTO: 18.3 % — SIGNIFICANT CHANGE UP (ref 13–44)
M-SPIKE: SIGNIFICANT CHANGE UP (ref 0–0)
MAGNESIUM SERPL-MCNC: 1.7 MG/DL — SIGNIFICANT CHANGE UP (ref 1.6–2.6)
MAGNESIUM SERPL-MCNC: 2 MG/DL — SIGNIFICANT CHANGE UP (ref 1.6–2.6)
MCHC RBC-ENTMCNC: 27.3 PG — SIGNIFICANT CHANGE UP (ref 27–34)
MCHC RBC-ENTMCNC: 27.3 PG — SIGNIFICANT CHANGE UP (ref 27–34)
MCHC RBC-ENTMCNC: 27.6 PG — SIGNIFICANT CHANGE UP (ref 27–34)
MCHC RBC-ENTMCNC: 34.3 GM/DL — SIGNIFICANT CHANGE UP (ref 32–36)
MCHC RBC-ENTMCNC: 34.5 GM/DL — SIGNIFICANT CHANGE UP (ref 32–36)
MCHC RBC-ENTMCNC: 34.6 GM/DL — SIGNIFICANT CHANGE UP (ref 32–36)
MCV RBC AUTO: 78.8 FL — LOW (ref 80–100)
MCV RBC AUTO: 79.3 FL — LOW (ref 80–100)
MCV RBC AUTO: 80.4 FL — SIGNIFICANT CHANGE UP (ref 80–100)
MONOCYTES # BLD AUTO: 0.88 K/UL — SIGNIFICANT CHANGE UP (ref 0–0.9)
MONOCYTES # BLD AUTO: 0.88 K/UL — SIGNIFICANT CHANGE UP (ref 0–0.9)
MONOCYTES NFR BLD AUTO: 7.3 % — SIGNIFICANT CHANGE UP (ref 2–14)
MONOCYTES NFR BLD AUTO: 8.5 % — SIGNIFICANT CHANGE UP (ref 2–14)
N MEN DNA SPEC QL NAA+PROBE: SIGNIFICANT CHANGE UP
NEUTROPHILS # BLD AUTO: 7.47 K/UL — HIGH (ref 1.8–7.4)
NEUTROPHILS # BLD AUTO: 9.18 K/UL — HIGH (ref 1.8–7.4)
NEUTROPHILS NFR BLD AUTO: 72.2 % — SIGNIFICANT CHANGE UP (ref 43–77)
NEUTROPHILS NFR BLD AUTO: 75.7 % — SIGNIFICANT CHANGE UP (ref 43–77)
NRBC # BLD: 0 /100 WBCS — SIGNIFICANT CHANGE UP (ref 0–0)
PARECHOVIRUS A RNA SPEC QL NAA+PROBE: SIGNIFICANT CHANGE UP
PHOSPHATE SERPL-MCNC: 3 MG/DL — SIGNIFICANT CHANGE UP (ref 2.5–4.5)
PHOSPHATE SERPL-MCNC: 3.3 MG/DL — SIGNIFICANT CHANGE UP (ref 2.5–4.5)
PLATELET # BLD AUTO: 322 K/UL — SIGNIFICANT CHANGE UP (ref 150–400)
PLATELET # BLD AUTO: 387 K/UL — SIGNIFICANT CHANGE UP (ref 150–400)
PLATELET # BLD AUTO: 446 K/UL — HIGH (ref 150–400)
POTASSIUM SERPL-MCNC: 3.4 MMOL/L — LOW (ref 3.5–5.3)
POTASSIUM SERPL-MCNC: 3.5 MMOL/L — SIGNIFICANT CHANGE UP (ref 3.5–5.3)
POTASSIUM SERPL-MCNC: 3.9 MMOL/L — SIGNIFICANT CHANGE UP (ref 3.5–5.3)
POTASSIUM SERPL-SCNC: 3.4 MMOL/L — LOW (ref 3.5–5.3)
POTASSIUM SERPL-SCNC: 3.5 MMOL/L — SIGNIFICANT CHANGE UP (ref 3.5–5.3)
POTASSIUM SERPL-SCNC: 3.9 MMOL/L — SIGNIFICANT CHANGE UP (ref 3.5–5.3)
PROT PATTERN SERPL ELPH-IMP: SIGNIFICANT CHANGE UP
PROT SERPL-MCNC: 6.8 G/DL — SIGNIFICANT CHANGE UP (ref 6–8.3)
PROT SERPL-MCNC: 6.8 G/DL — SIGNIFICANT CHANGE UP (ref 6–8.3)
RBC # BLD: 3.96 M/UL — SIGNIFICANT CHANGE UP (ref 3.8–5.2)
RBC # BLD: 4.06 M/UL — SIGNIFICANT CHANGE UP (ref 3.8–5.2)
RBC # BLD: 4.06 M/UL — SIGNIFICANT CHANGE UP (ref 3.8–5.2)
RBC # BLD: 4.39 M/UL — SIGNIFICANT CHANGE UP (ref 3.8–5.2)
RBC # FLD: 12.8 % — SIGNIFICANT CHANGE UP (ref 10.3–14.5)
RBC # FLD: 13.1 % — SIGNIFICANT CHANGE UP (ref 10.3–14.5)
RBC # FLD: 13.2 % — SIGNIFICANT CHANGE UP (ref 10.3–14.5)
RETICS #: 80.4 K/UL — SIGNIFICANT CHANGE UP (ref 25–125)
RETICS/RBC NFR: 2 % — SIGNIFICANT CHANGE UP (ref 0.5–2.5)
S PNEUM DNA SPEC QL NAA+PROBE: SIGNIFICANT CHANGE UP
SODIUM SERPL-SCNC: 133 MMOL/L — LOW (ref 135–145)
SODIUM SERPL-SCNC: 136 MMOL/L — SIGNIFICANT CHANGE UP (ref 135–145)
SODIUM SERPL-SCNC: 137 MMOL/L — SIGNIFICANT CHANGE UP (ref 135–145)
TROPONIN T, HIGH SENSITIVITY RESULT: 206 NG/L — HIGH (ref 0–51)
TROPONIN T, HIGH SENSITIVITY RESULT: 329 NG/L — HIGH (ref 0–51)
TROPONIN T, HIGH SENSITIVITY RESULT: 381 NG/L — HIGH (ref 0–51)
TROPONIN T, HIGH SENSITIVITY RESULT: 462 NG/L — HIGH (ref 0–51)
VZV DNA CSF QL NAA+PROBE: SIGNIFICANT CHANGE UP
WBC # BLD: 10.34 K/UL — SIGNIFICANT CHANGE UP (ref 3.8–10.5)
WBC # BLD: 10.4 K/UL — SIGNIFICANT CHANGE UP (ref 3.8–10.5)
WBC # BLD: 12.12 K/UL — HIGH (ref 3.8–10.5)
WBC # FLD AUTO: 10.34 K/UL — SIGNIFICANT CHANGE UP (ref 3.8–10.5)
WBC # FLD AUTO: 10.4 K/UL — SIGNIFICANT CHANGE UP (ref 3.8–10.5)
WBC # FLD AUTO: 12.12 K/UL — HIGH (ref 3.8–10.5)

## 2024-05-23 PROCEDURE — 99291 CRITICAL CARE FIRST HOUR: CPT

## 2024-05-23 PROCEDURE — 99233 SBSQ HOSP IP/OBS HIGH 50: CPT

## 2024-05-23 PROCEDURE — 71045 X-RAY EXAM CHEST 1 VIEW: CPT | Mod: 26

## 2024-05-23 PROCEDURE — 74018 RADEX ABDOMEN 1 VIEW: CPT | Mod: 26

## 2024-05-23 PROCEDURE — 36620 INSERTION CATHETER ARTERY: CPT

## 2024-05-23 PROCEDURE — 93306 TTE W/DOPPLER COMPLETE: CPT | Mod: 26

## 2024-05-23 PROCEDURE — 71275 CT ANGIOGRAPHY CHEST: CPT | Mod: 26

## 2024-05-23 PROCEDURE — 99291 CRITICAL CARE FIRST HOUR: CPT | Mod: 25

## 2024-05-23 RX ORDER — MAGNESIUM SULFATE 500 MG/ML
1 VIAL (ML) INJECTION ONCE
Refills: 0 | Status: COMPLETED | OUTPATIENT
Start: 2024-05-23 | End: 2024-05-23

## 2024-05-23 RX ORDER — DEXMEDETOMIDINE HYDROCHLORIDE IN 0.9% SODIUM CHLORIDE 4 UG/ML
0.2 INJECTION INTRAVENOUS
Qty: 200 | Refills: 0 | Status: DISCONTINUED | OUTPATIENT
Start: 2024-05-23 | End: 2024-05-23

## 2024-05-23 RX ORDER — METOPROLOL TARTRATE 50 MG
5 TABLET ORAL ONCE
Refills: 0 | Status: COMPLETED | OUTPATIENT
Start: 2024-05-23 | End: 2024-05-23

## 2024-05-23 RX ORDER — DOBUTAMINE HCL 250MG/20ML
2.5 VIAL (ML) INTRAVENOUS
Qty: 500 | Refills: 0 | Status: DISCONTINUED | OUTPATIENT
Start: 2024-05-23 | End: 2024-05-23

## 2024-05-23 RX ORDER — ACETAMINOPHEN 500 MG
1000 TABLET ORAL ONCE
Refills: 0 | Status: COMPLETED | OUTPATIENT
Start: 2024-05-23 | End: 2024-05-23

## 2024-05-23 RX ORDER — NOREPINEPHRINE BITARTRATE/D5W 8 MG/250ML
0.21 PLASTIC BAG, INJECTION (ML) INTRAVENOUS
Qty: 8 | Refills: 0 | Status: DISCONTINUED | OUTPATIENT
Start: 2024-05-23 | End: 2024-05-23

## 2024-05-23 RX ORDER — POTASSIUM CHLORIDE 20 MEQ
10 PACKET (EA) ORAL ONCE
Refills: 0 | Status: COMPLETED | OUTPATIENT
Start: 2024-05-23 | End: 2024-05-24

## 2024-05-23 RX ORDER — METOCLOPRAMIDE HCL 10 MG
10 TABLET ORAL ONCE
Refills: 0 | Status: COMPLETED | OUTPATIENT
Start: 2024-05-23 | End: 2024-05-23

## 2024-05-23 RX ORDER — LEVETIRACETAM 250 MG/1
750 TABLET, FILM COATED ORAL EVERY 12 HOURS
Refills: 0 | Status: DISCONTINUED | OUTPATIENT
Start: 2024-05-23 | End: 2024-05-24

## 2024-05-23 RX ORDER — LEVETIRACETAM 250 MG/1
750 TABLET, FILM COATED ORAL EVERY 12 HOURS
Refills: 0 | Status: DISCONTINUED | OUTPATIENT
Start: 2024-05-23 | End: 2024-05-23

## 2024-05-23 RX ORDER — SODIUM CHLORIDE 9 MG/ML
500 INJECTION, SOLUTION INTRAVENOUS
Refills: 0 | Status: DISCONTINUED | OUTPATIENT
Start: 2024-05-23 | End: 2024-05-23

## 2024-05-23 RX ORDER — SODIUM CHLORIDE 9 MG/ML
2 INJECTION INTRAMUSCULAR; INTRAVENOUS; SUBCUTANEOUS EVERY 8 HOURS
Refills: 0 | Status: DISCONTINUED | OUTPATIENT
Start: 2024-05-23 | End: 2024-06-02

## 2024-05-23 RX ORDER — POTASSIUM CHLORIDE 20 MEQ
40 PACKET (EA) ORAL ONCE
Refills: 0 | Status: COMPLETED | OUTPATIENT
Start: 2024-05-23 | End: 2024-05-24

## 2024-05-23 RX ORDER — ONDANSETRON 8 MG/1
4 TABLET, FILM COATED ORAL ONCE
Refills: 0 | Status: COMPLETED | OUTPATIENT
Start: 2024-05-23 | End: 2024-05-23

## 2024-05-23 RX ORDER — SODIUM CHLORIDE 9 MG/ML
500 INJECTION INTRAMUSCULAR; INTRAVENOUS; SUBCUTANEOUS ONCE
Refills: 0 | Status: COMPLETED | OUTPATIENT
Start: 2024-05-23 | End: 2024-05-23

## 2024-05-23 RX ORDER — PHENYLEPHRINE HYDROCHLORIDE 10 MG/ML
0.1 INJECTION INTRAVENOUS
Qty: 40 | Refills: 0 | Status: DISCONTINUED | OUTPATIENT
Start: 2024-05-23 | End: 2024-05-23

## 2024-05-23 RX ADMIN — BRIMONIDINE TARTRATE 1 DROP(S): 2 SOLUTION/ DROPS OPHTHALMIC at 17:08

## 2024-05-23 RX ADMIN — DEXMEDETOMIDINE HYDROCHLORIDE IN 0.9% SODIUM CHLORIDE 3.74 MICROGRAM(S)/KG/HR: 4 INJECTION INTRAVENOUS at 04:13

## 2024-05-23 RX ADMIN — LATANOPROST 1 DROP(S): 0.05 SOLUTION/ DROPS OPHTHALMIC; TOPICAL at 21:47

## 2024-05-23 RX ADMIN — SODIUM CHLORIDE 500 MILLILITER(S): 9 INJECTION INTRAMUSCULAR; INTRAVENOUS; SUBCUTANEOUS at 01:30

## 2024-05-23 RX ADMIN — Medication 1000 MILLIGRAM(S): at 03:00

## 2024-05-23 RX ADMIN — SODIUM CHLORIDE 70 MILLILITER(S): 9 INJECTION INTRAMUSCULAR; INTRAVENOUS; SUBCUTANEOUS at 07:33

## 2024-05-23 RX ADMIN — ONDANSETRON 4 MILLIGRAM(S): 8 TABLET, FILM COATED ORAL at 09:55

## 2024-05-23 RX ADMIN — Medication 1 MILLIGRAM(S): at 21:10

## 2024-05-23 RX ADMIN — SODIUM CHLORIDE 70 MILLILITER(S): 9 INJECTION INTRAMUSCULAR; INTRAVENOUS; SUBCUTANEOUS at 19:21

## 2024-05-23 RX ADMIN — Medication 5 MILLIGRAM(S): at 03:02

## 2024-05-23 RX ADMIN — Medication 100 GRAM(S): at 04:51

## 2024-05-23 RX ADMIN — Medication 10 MILLIGRAM(S): at 15:18

## 2024-05-23 RX ADMIN — ENOXAPARIN SODIUM 40 MILLIGRAM(S): 100 INJECTION SUBCUTANEOUS at 17:08

## 2024-05-23 RX ADMIN — BRIMONIDINE TARTRATE 1 DROP(S): 2 SOLUTION/ DROPS OPHTHALMIC at 05:30

## 2024-05-23 RX ADMIN — Medication 7 MICROGRAM(S)/KG/MIN: at 07:33

## 2024-05-23 RX ADMIN — LEVETIRACETAM 750 MILLIGRAM(S): 250 TABLET, FILM COATED ORAL at 06:08

## 2024-05-23 RX ADMIN — LEVETIRACETAM 400 MILLIGRAM(S): 250 TABLET, FILM COATED ORAL at 18:10

## 2024-05-23 RX ADMIN — CHLORHEXIDINE GLUCONATE 1 APPLICATION(S): 213 SOLUTION TOPICAL at 05:29

## 2024-05-23 RX ADMIN — Medication 10 MILLIGRAM(S): at 00:49

## 2024-05-23 RX ADMIN — Medication 400 MILLIGRAM(S): at 02:42

## 2024-05-23 RX ADMIN — Medication 1 MILLIGRAM(S): at 18:15

## 2024-05-23 RX ADMIN — POLYETHYLENE GLYCOL 3350 17 GRAM(S): 17 POWDER, FOR SOLUTION ORAL at 05:30

## 2024-05-23 RX ADMIN — Medication 5.6 MICROGRAM(S)/KG/MIN: at 14:35

## 2024-05-23 NOTE — PROCEDURE NOTE - NSPROCDETAILS_GEN_ALL_CORE
location identified, draped/prepped, sterile technique used, needle inserted/introduced
location identified, draped/prepped, sterile technique used, needle inserted/introduced/positive blood return obtained via catheter/connected to a pressurized flush line/sutured in place/hemostasis with direct pressure, dressing applied/Seldinger technique/all materials/supplies accounted for at end of procedure
guidewire recovered/lumen(s) aspirated and flushed/sterile dressing applied/sterile technique, catheter placed/ultrasound guidance with use of sterile gel and probe cove

## 2024-05-23 NOTE — CHART NOTE - NSCHARTNOTEFT_GEN_A_CORE
NUTRITION FOLLOW UP NOTE    PATIENT SEEN FOR: Malnutrition Follow Up    SOURCE: [x] Patient  [x] Current Medical Record  [x] RN  [] Family/support person at bedside  [] Patient unavailable/inappropriate  [x] Other: interdisciplinary medical brianne    CHART REVIEWED/EVENTS NOTED.  [] No changes to nutrition care plan to note  [x] Nutrition Status:  -S/P SAH HH2 MF2, angio with no vascular malformation.   -S/P LP , pending r/o vasculitis.     DIET ORDER:   Diet, Pureed:   Consistent Carbohydrate {No Snacks} (CSTCHO)  Supplement Feeding Modality:  Oral  Ensure Plus High Protein Cans or Servings Per Day:  1       Frequency:  Daily (24)    CURRENT DIET ORDER IS:  [x] Appropriate:  [] Inadequate:  [] Other:    NUTRITION INTAKE/PROVISION:  [x] PO:  [] Enteral Nutrition:  [] Parenteral Nutrition:    ANTHROPOMETRICS:  Drug Dosing Weight  Height (cm): 162.6 (17 May 2024 07:55)  Weight (kg): 74.7 (17 May 2024 07:55)  BMI (kg/m2): 28.3 (17 May 2024 07:55)  BSA (m2): 1.8 (17 May 2024 07:55)  Weights:   Daily Weight in k.7 ()     NUTRITIONALLY PERTINENT MEDICATIONS:  MEDICATIONS  (STANDING):  folic acid  insulin lispro (ADMELOG) corrective regimen sliding scale  lactated ringers.  multivitamin  norepinephrine Infusion  polyethylene glycol 3350  potassium chloride    Tablet ER  senna  sodium chloride 0.9%.     NUTRITIONALLY PERTINENT LABS:   Na136 mmol/L Glu 186 mg/dL<H> K+ 3.9 mmol/L Cr  0.48 mg/dL<L> BUN 5 mg/dL<L>  Phos 3.3 mg/dL  Alb 3.2 g/dL<L> 05-15 Chol 119 mg/dL LDL --    HDL 28 mg/dL<L> Trig 81 mg/dL ALT 45 U/L AST 29 U/L Alkaline Phosphatase 132 U/L<H>  05-15-24 @ 03:51 a1c 6.1    A1C with Estimated Average Glucose Result: 6.1 % (05-15-24 @ 03:51)    Finger Sticks:  POCT Blood Glucose.: 127 mg/dL ( @ 16:51)  POCT Blood Glucose.: 131 mg/dL ( @ 14:55)  POCT Blood Glucose.: 106 mg/dL ( @ 11:49)    NUTRITIONALLY PERTINENT MEDICATIONS/LABS:  [x] Reviewed  [x] Relevant notes on medications/labs:  -Prescribed NaCl 0.9% infusing at 70ml/hr for hydration, electrolyte support. Team trending electrolytes and repleting PRN.   -Prescribed insulin lispro sliding scale to aid in management of BG.  -Prescribed multivitamin, folic acid.  -Prescribed Remeron, which may act as an off-label appetite stimulant.     EDEMA:  [x] Reviewed - 1+ dependent   [] Relevant notes:    GI/ I&O:  [x] Reviewed  [x] Relevant notes: on bowel regimen (senna, Miralax). Last BM .  [] Other:    SKIN:   [x] No pressure injuries documented, per nursing flowsheet  [] Pressure injury previously noted  [] Change in pressure injury documentation:  [] Other:    ESTIMATED NEEDS:  [x] No change:  Energy: 1867-2241kcal (25-30kcal/kg); based on dosing wt 74.7kg  Protein: 76-87g protein (1.4-1.6g protein/kg; based on IBW 54.4kg  Fluid:   ml/day or [x] defer to team    NUTRITION DIAGNOSIS:  [x] Prior Dx: increased nutrient needs; acute moderate protein calorie malnutritoin  [] New Dx:    EDUCATION:  [] Yes:  [x] Not appropriate/warranted    NUTRITION CARE PLAN:  1. Diet:  2. Supplements:  3. Multivitamin/mineral supplementation:  4:     [] Achieved - Continue current nutrition intervention(s)  [] Current medical condition precludes nutrition intervention at this time.    MONITORING AND EVALUATION:   RD remains available upon request and will follow up per protocol.    Name  Available on [facility] TEAMS NUTRITION FOLLOW UP NOTE    PATIENT SEEN FOR: Malnutrition Follow Up    SOURCE: [x] Patient  [x] Current Medical Record  [x] RN  [] Family/support person at bedside  [] Patient unavailable/inappropriate  [x] Other: interdisciplinary medical brianne    CHART REVIEWED/EVENTS NOTED.  [] No changes to nutrition care plan to note  [x] Nutrition Status:  -S/P [facility] HH2 MF2, angio with no vascular malformation.   -S/P LP , pending r/o vasculitis.     DIET ORDER:   Diet, Pureed:   Consistent Carbohydrate {No Snacks} (CSTCHO)  Supplement Feeding Modality:  Oral  Ensure Plus High Protein Cans or Servings Per Day:  1       Frequency:  Daily (24)    CURRENT DIET ORDER IS:  [x] Appropriate:  [] Inadequate:  [] Other:    NUTRITION INTAKE/PROVISION:  [x] PO: Per discussion with RN at bedside, pt with poor appetite/PO intake. Refusing most meals and only drinking juice and sips of Ensure Plus. Pt asleep at time of RD visit. Requires total assistance and encouragement with meals.   [] Enteral Nutrition:  [] Parenteral Nutrition:    ANTHROPOMETRICS:  Drug Dosing Weight  Height (cm): 162.6 (17 May 2024 07:55)  Weight (kg): 74.7 (17 May 2024 07:55)  BMI (kg/m2): 28.3 (17 May 2024 07:55)  BSA (m2): 1.8 (17 May 2024 07:55)  Weights:   Daily Weight in k.7 ()     NUTRITIONALLY PERTINENT MEDICATIONS:  MEDICATIONS  (STANDING):  folic acid  insulin lispro (ADMELOG) corrective regimen sliding scale  lactated ringers.  multivitamin  norepinephrine Infusion  polyethylene glycol 3350  potassium chloride    Tablet ER  senna  sodium chloride 0.9%.     NUTRITIONALLY PERTINENT LABS:   Na136 mmol/L Glu 186 mg/dL<H> K+ 3.9 mmol/L Cr  0.48 mg/dL<L> BUN 5 mg/dL<L>  Phos 3.3 mg/dL  Alb 3.2 g/dL<L> 05-15 Chol 119 mg/dL LDL --    HDL 28 mg/dL<L> Trig 81 mg/dL ALT 45 U/L AST 29 U/L Alkaline Phosphatase 132 U/L<H>  05-15-24 @ 03:51 a1c 6.1    A1C with Estimated Average Glucose Result: 6.1 % (05-15-24 @ 03:51)    Finger Sticks:  POCT Blood Glucose.: 127 mg/dL ( @ 16:51)  POCT Blood Glucose.: 131 mg/dL ( @ 14:55)  POCT Blood Glucose.: 106 mg/dL ( @ 11:49)    NUTRITIONALLY PERTINENT MEDICATIONS/LABS:  [x] Reviewed  [x] Relevant notes on medications/labs:  -Prescribed NaCl 0.9% infusing at 70ml/hr for hydration, electrolyte support. Team trending electrolytes and repleting PRN.   -Prescribed insulin lispro sliding scale to aid in management of BG.  -Prescribed multivitamin, folic acid.  -Prescribed Remeron, which may act as an off-label appetite stimulant.     EDEMA:  [x] Reviewed - 1+ dependent   [] Relevant notes:    GI/ I&O:  [x] Reviewed  [x] Relevant notes: on bowel regimen (senna, Miralax). Last BM .  [] Other:    SKIN:   [x] No pressure injuries documented, per nursing flowsheet  [] Pressure injury previously noted  [] Change in pressure injury documentation:  [] Other:    ESTIMATED NEEDS:  [x] No change:  Energy: 1867-2241kcal (25-30kcal/kg); based on dosing wt 74.7kg  Protein: 76-87g protein (1.4-1.6g protein/kg; based on IBW 54.4kg  Fluid:   ml/day or [x] defer to team    NUTRITION DIAGNOSIS:  [x] Prior Dx: increased nutrient needs; acute moderate protein calorie malnutrition  [] New Dx:    EDUCATION:  [] Yes:  [x] Not appropriate/warranted    NUTRITION CARE PLAN:  1. Diet: Continue PO diet (Consistent Carbohydrate, Pureed) as ordered. Defer consistency to medical team, SLP.   2. Supplements: Continue Ensure Plus High Protein; increase to 2x daily. Continue Mighty Shakes 3x daily.   3. Multivitamin/mineral supplementation: Continue multivitamin, folic acid as prescribed.   4: Consider alternative route of nutrition if within goals of care in setting of poor appetite; consider calorie count to assess calorie/protein intake x 3 days.     MONITORING AND EVALUATION:   RD remains available upon request and will follow up per protocol.    Savi Swan RD, available via TEAMS

## 2024-05-23 NOTE — PROGRESS NOTE ADULT - ASSESSMENT
64F pmh Dementia (Polysubstance Abuse, resides in NH) with dyskinesias, HTN, szs on Keppra, alcoholic cirrhosis, tremors, OA presented from [facility] to  w/ AMS & generalized weakness.   CTH w/ b/l occipital sulcal SAH and small IVH,   CTA with R V4 5mm fusiform aneurysm and severe stenosis of BA and diminutive distal cerebral arteries.   s/p 5/17 Angio: severe b/l anterior and posterior cerebral vasospasm    # SAH  , angio no vascular malformation but diffuse CVS s/p angiographic CCB   s/p 5/17 Angio: severe b/l anterior and posterior cerebral vasospasm (given IA verapamil and milrinone)      # Elevated Troponins  No chest pain or ECG abnormalities nored  TTE preserved LV Function  Likely demand  No further cardiac intervention  at noted

## 2024-05-23 NOTE — PROGRESS NOTE ADULT - ASSESSMENT
Mera Burton  64F, pmh HTN, szs on Keppra, alcoholic cirrhosis, tremors, OA presented from [facility] to OSH w/ generalized weakness found to have b/l sulcal SAH and small occipital IVH. 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 She was recently started on Keflex for UTI. Plts 400, Coags wnl. , AST/ALT = 38/104. WBC 13. Afebrile. Exam on admission: lethargic appearing, EOV, FC, AO3, PERRL, not compliant with EOMs, BUE 5/5, BLE 2/5.    Now s/p 5/17 Angio: severe b/l anterior and posterior cerebral vasospasm (given IA verapamil and milrinone)    LP done,    f/u labs for vasculitis, NGTD   CIWA protocol  TCDs daily- no windows/visualization

## 2024-05-23 NOTE — PROGRESS NOTE ADULT - ASSESSMENT
ASSESSMENT   SAH HH2 MF2 , angio no vascular malformation but diffuse CVS s/p angiographic CCB   unsure etiology of SAH     PBD: 8  PLAN   neuro checks q 2 hr   r/o vasculitis TIMOTHY, antiphospholipid Ab, ANCA, serum C3, C4, cryoglobulin, SPEP, UPEP, quant immunglobulin levels, Ab SSA/SSB, dsDNA  LP done per neurology, follow up CSF   daily TCD (poor windows)  MRI brain: b/l MCA strokes and concern for vasospasm  #AIS R temporo occipital   #pmhx seizure cont home keppra 750 mg BID   according brother and sister in law, she might be taking drugs and alcohol abuse   #Pain: Tylenol   #Activity: PT OT    CV   #-200mmHg on levo  daily troponin, EKGs  #TTE grossly normal cardiac function     P   extubated 05/18  RA   SaO2> 92  encourage IS     R   #Strict I+Os   #cont NS 70 ccs/hour    GI   #Diet: Puree CCD  #Senna miralax for bowel regimen   LBM: 05/22      ID  afebrile   monitor off antibiotics    E  Goal euglycemia (-180)  #a1c 6.1, ISS     H  #DVT ppx:   SCDs  lovenox 40 mg sc qhs   LED no dvt   5/22 LED negative       #CODE STATUS: FULL CODE     #DISPOSITION: ICU    #CRITICAL      ASSESSMENT   SAH HH2 MF2 , angio no vascular malformation but diffuse CVS s/p angiographic CCB   unsure etiology of SAH     PBD: 8  PLAN   neuro checks q 2 hr   r/o vasculitis TIMOTHY, antiphospholipid Ab, ANCA, serum C3, C4, cryoglobulin, SPEP, UPEP, quant immunglobulin levels, Ab SSA/SSB, dsDNA  LP done per neurology, follow up CSF   daily TCD (poor windows)  MRI brain: b/l MCA strokes and concern for vasospasm  #AIS R temporo occipital   #pmhx seizure cont home keppra 750 mg BID   according brother and sister in law, she might be taking drugs and alcohol abuse   #Pain: Tylenol   #Activity: PT OT    CV   #SBP   daily troponin, EKGs  #TTE grossly normal cardiac function     P   extubated 05/18  RA   SaO2> 92  encourage IS     R   #Strict I+Os   #cont NS 70 ccs/hour    GI   #Diet: Puree CCD  #Senna miralax for bowel regimen   LBM: 05/22      ID  afebrile   monitor off antibiotics    E  Goal euglycemia (-180)  #a1c 6.1, ISS     H  #DVT ppx:   SCDs  lovenox 40 mg sc qhs   LED no dvt   5/22 LED negative       #CODE STATUS: FULL CODE     #DISPOSITION: ICU    #CRITICAL

## 2024-05-23 NOTE — PROGRESS NOTE ADULT - SUBJECTIVE AND OBJECTIVE BOX
HOSPITAL COURSE: 64F pmh Dementia (Polysubstance Abuse, resides in NH) with dyskinesias, HTN, szs on Keppra, alcoholic cirrhosis, tremors, OA presented from [facility] to  w/ AMS & generalized weakness. 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 Started on Keflex for UTI at Nursing Home. Per NH Basline independent in ADLs, conversant but forgetful and confused, wheelchair bound.    HOSPITAL COURSE:  5/15 transferred to [facility] for workup of nontraumatic convexity SAH  05/22: CTA today to assess vasospasm     Admission Scores  GCS: 14 HH: 2 MF: 2 NIHSS: RASS: CAM-ICU: ICH:      REVIEW OF SYSTEMS: [ ] Unable to Assess due to neurologic exam   [ ] All ROS addressed below are non-contributory, except:  Neuro: [ ] Headache [ ] Back pain [ ] Numbness [ ] Weakness [ ] Ataxia [ ] Dizziness [ ] Aphasia [ ] Dysarthria [ ] Visual disturbance  Resp: [ ] Shortness of breath/dyspnea, [ ] Orthopnea [ ] Cough  CV: [ ] Chest pain [ ] Palpitation [ ] Lightheadedness [ ] Syncope  Renal: [ ] Thirst [ ] Edema  GI: [ ] Nausea [ ] Emesis [ ] Abdominal pain [ ] Constipation [ ] Diarrhea  Hem: [ ] Hematemesis [ ] bright red blood per rectum  ID: [ ] Fever [ ] Chills [ ] Dysuria  ENT: [ ] Rhinorrhea      DEVICES:   [ ] Restraints [ ] ET tube [ ] central line [ ] arterial line [ ] lang [ ] NGT/OGT [ ] EVD [ ] LD [ ] STANISLAV/HMV [ ] Trach [ ] PEG [ ] Chest Tube     ICU Vital Signs Last 24 Hrs  T(C): 36.7 (21 May 2024 03:00), Max: 37 (20 May 2024 07:00)  T(F): 98.1 (21 May 2024 03:00), Max: 98.6 (20 May 2024 07:00)  HR: 72 (21 May 2024 06:00) (49 - 105)  BP: --  BP(mean): --  ABP: 159/78 (21 May 2024 06:00) (129/109 - 182/83)  ABP(mean): 107 (21 May 2024 06:00) (93 - 156)  RR: 20 (21 May 2024 06:00) (10 - 50)  SpO2: 96% (21 May 2024 06:00) (91% - 100%)    O2 Parameters below as of 20 May 2024 19:00  Patient On (Oxygen Delivery Method): room air    I&O's Summary    21 May 2024 07:01  -  22 May 2024 07:00  --------------------------------------------------------  IN: 2155.4 mL / OUT: 1750 mL / NET: 405.4 mL    22 May 2024 07:01  -  22 May 2024 22:49  --------------------------------------------------------  IN: 1340 mL / OUT: 1000 mL / NET: 340 mL      MEDICATIONS  (STANDING):  brimonidine 0.2% Ophthalmic Solution 1 Drop(s) Both EYES every 12 hours  chlorhexidine 4% Liquid 1 Application(s) Topical <User Schedule>  dexMEDEtomidine Infusion 0.2 MICROgram(s)/kG/Hr (3.74 mL/Hr) IV Continuous <Continuous>  enoxaparin Injectable 40 milliGRAM(s) SubCutaneous <User Schedule>  folic acid 1 milliGRAM(s) Oral daily  insulin lispro (ADMELOG) corrective regimen sliding scale   SubCutaneous three times a day before meals  latanoprost 0.005% Ophthalmic Solution 1 Drop(s) Both EYES at bedtime  levETIRAcetam 750 milliGRAM(s) Oral two times a day  mirtazapine 15 milliGRAM(s) Oral at bedtime  multivitamin 1 Tablet(s) Oral daily  norepinephrine Infusion 0.05 MICROgram(s)/kG/Min (7 mL/Hr) IV Continuous <Continuous>  polyethylene glycol 3350 17 Gram(s) Oral every 12 hours  senna 2 Tablet(s) Oral at bedtime  sodium chloride 0.9%. 1000 milliLiter(s) (70 mL/Hr) IV Continuous <Continuous>    MEDICATIONS  (PRN):  sodium chloride 0.9% lock flush 10 milliLiter(s) IV Push every 1 hour PRN Pre/post blood products, medications, blood draw, and to maintain line patency          IMAGING:  < from: CT Angio Head w/ IV Cont (05.22.24 @ 18:16) >  IMPRESSION:    Acute bilateral frontal parietal lobe and right temporal occipital lobe   infarctions are redemonstrated. Previously seen subarachnoid hemorrhage   is not demonstrated.Negative CTP. Improved vasospasm involving the   bilateral MCAs and vertebrobasilar circulation. No large vessel occlusion.    < end of copied text >      EXAMINATION:  PHYSICAL EXAM:    Constitutional: No Acute Distress     Neurological: A&O x 3 alert awake, eyes open, gaze midline, UE AG, B/L UE tremor. not following simple commands, LE in plane of bed    Pulmonary: Clear to Auscultation, No rales, No rhonchi, No wheezes     Cardiovascular: S1, S2, Regular rate and rhythm     Gastrointestinal: Soft, Non-tender, Non-distended     Extremities: No calf tenderness          HOSPITAL COURSE: 64F pmh Dementia (Polysubstance Abuse, resides in NH) with dyskinesias, HTN, szs on Keppra, alcoholic cirrhosis, tremors, OA presented from [facility] to  w/ AMS & generalized weakness. 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 Started on Keflex for UTI at Nursing Home. Per NH Basline independent in ADLs, conversant but forgetful and confused, wheelchair bound.    HOSPITAL COURSE:  5/15 transferred to [facility] for workup of nontraumatic convexity SAH  05/22: CTA today to assess vasospasm     Admission Scores  GCS: 14 HH: 2 MF: 2 NIHSS: RASS: CAM-ICU: ICH:      REVIEW OF SYSTEMS: [ ] Unable to Assess due to neurologic exam   [ ] All ROS addressed below are non-contributory, except:  Neuro: [ ] Headache [ ] Back pain [ ] Numbness [ ] Weakness [ ] Ataxia [ ] Dizziness [ ] Aphasia [ ] Dysarthria [ ] Visual disturbance  Resp: [ ] Shortness of breath/dyspnea, [ ] Orthopnea [ ] Cough  CV: [ ] Chest pain [ ] Palpitation [ ] Lightheadedness [ ] Syncope  Renal: [ ] Thirst [ ] Edema  GI: [ ] Nausea [ ] Emesis [ ] Abdominal pain [ ] Constipation [ ] Diarrhea  Hem: [ ] Hematemesis [ ] bright red blood per rectum  ID: [ ] Fever [ ] Chills [ ] Dysuria  ENT: [ ] Rhinorrhea      DEVICES:   [ ] Restraints [ ] ET tube [ ] central line [ ] arterial line [ ] lang [ ] NGT/OGT [ ] EVD [ ] LD [ ] STANISLAV/HMV [ ] Trach [ ] PEG [ ] Chest Tube     ICU Vital Signs Last 24 Hrs  T(C): 36.7 (23 May 2024 03:00), Max: 37 (22 May 2024 23:00)  T(F): 98 (23 May 2024 03:00), Max: 98.6 (22 May 2024 23:00)  HR: 118 (23 May 2024 06:45) (47 - 144)  BP: 148/88 (23 May 2024 05:15) (111/72 - 225/112)  BP(mean): 112 (23 May 2024 05:15) (75 - 161)  ABP: 154/103 (23 May 2024 06:45) (127/84 - 158/106)  ABP(mean): 124 (23 May 2024 06:45) (101 - 126)  RR: 19 (23 May 2024 06:45) (11 - 62)  SpO2: 94% (23 May 2024 06:45) (75% - 100%)    O2 Parameters below as of 22 May 2024 19:00  Patient On (Oxygen Delivery Method): room air    I&O's Summary    22 May 2024 07:01  -  23 May 2024 07:00  --------------------------------------------------------  IN: 3796.5 mL / OUT: 2250 mL / NET: 1546.5 mL          MEDICATIONS  (STANDING):  brimonidine 0.2% Ophthalmic Solution 1 Drop(s) Both EYES every 12 hours  chlorhexidine 4% Liquid 1 Application(s) Topical <User Schedule>  dexMEDEtomidine Infusion 0.2 MICROgram(s)/kG/Hr (3.74 mL/Hr) IV Continuous <Continuous>  enoxaparin Injectable 40 milliGRAM(s) SubCutaneous <User Schedule>  folic acid 1 milliGRAM(s) Oral daily  insulin lispro (ADMELOG) corrective regimen sliding scale   SubCutaneous three times a day before meals  latanoprost 0.005% Ophthalmic Solution 1 Drop(s) Both EYES at bedtime  levETIRAcetam 750 milliGRAM(s) Oral two times a day  mirtazapine 15 milliGRAM(s) Oral at bedtime  multivitamin 1 Tablet(s) Oral daily  norepinephrine Infusion 0.05 MICROgram(s)/kG/Min (7 mL/Hr) IV Continuous <Continuous>  polyethylene glycol 3350 17 Gram(s) Oral every 12 hours  senna 2 Tablet(s) Oral at bedtime  sodium chloride 0.9%. 1000 milliLiter(s) (70 mL/Hr) IV Continuous <Continuous>    MEDICATIONS  (PRN):  sodium chloride 0.9% lock flush 10 milliLiter(s) IV Push every 1 hour PRN Pre/post blood products, medications, blood draw, and to maintain line patency          IMAGING:  < from: CT Angio Head w/ IV Cont (05.22.24 @ 18:16) >  IMPRESSION:    Acute bilateral frontal parietal lobe and right temporal occipital lobe   infarctions are redemonstrated. Previously seen subarachnoid hemorrhage   is not demonstrated.Negative CTP. Improved vasospasm involving the   bilateral MCAs and vertebrobasilar circulation. No large vessel occlusion.    < end of copied text >      EXAMINATION:  PHYSICAL EXAM:    Constitutional: No Acute Distress     Neurological: A&O x 3 alert awake, eyes open, gaze midline, UE AG, B/L UE tremor. not following simple commands, b.l LE 2/5     Pulmonary: Clear to Auscultation, No rales, No rhonchi, No wheezes     Cardiovascular: S1, S2, Regular rate and rhythm     Gastrointestinal: Soft, Non-tender, Non-distended     Extremities: No calf tenderness

## 2024-05-23 NOTE — PROCEDURE NOTE - NSINDICATIONS_GEN_A_CORE
critical patient
CSF sampling/subarachnoid hemorrhage
critical illness
critical patient/monitoring purposes

## 2024-05-23 NOTE — PROGRESS NOTE ADULT - SUBJECTIVE AND OBJECTIVE BOX
SUBJECTIVE/INTERVAL HISTORY:    Patient observed at bedside. She feels calm but is demonstrating bilateral upper extremities of varying amplitudes. Able to stop at will.     PAST MEDICAL & SURGICAL HISTORY:  Cirrhosis      Seizure        FAMILY HISTORY:      MEDICATIONS (HOME):  Home Medications:  atorvastatin 40 mg oral tablet: 1 tab(s) orally once a day (at bedtime) (15 May 2024 12:08)  levETIRAcetam 750 mg oral tablet: 1 tab(s) orally 2 times a day (15 May 2024 12:08)  metoprolol succinate 25 mg oral tablet, extended release: 1 tab(s) orally once a day (15 May 2024 12:09)  mirtazapine 15 mg oral tablet: 1 tab(s) orally once a day (at bedtime) (15 May 2024 12:09)  naltrexone 50 mg oral tablet: 1 tab(s) orally once a day (alcohol abuse) (15 May 2024 12:10)    MEDICATIONS  (STANDING):  brimonidine 0.2% Ophthalmic Solution 1 Drop(s) Both EYES every 12 hours  chlorhexidine 4% Liquid 1 Application(s) Topical <User Schedule>  DOBUTamine Infusion 2.5 MICROgram(s)/kG/Min (5.6 mL/Hr) IV Continuous <Continuous>  enoxaparin Injectable 40 milliGRAM(s) SubCutaneous <User Schedule>  folic acid 1 milliGRAM(s) Oral daily  insulin lispro (ADMELOG) corrective regimen sliding scale   SubCutaneous three times a day before meals  lactated ringers. 500 milliLiter(s) (500 mL/Hr) IV Continuous <Continuous>  latanoprost 0.005% Ophthalmic Solution 1 Drop(s) Both EYES at bedtime  levETIRAcetam  IVPB 750 milliGRAM(s) IV Intermittent every 12 hours  metoclopramide Injectable 10 milliGRAM(s) IV Push once  mirtazapine 15 milliGRAM(s) Oral at bedtime  multivitamin 1 Tablet(s) Oral daily  norepinephrine Infusion 0.21 MICROgram(s)/kG/Min (29.4 mL/Hr) IV Continuous <Continuous>  polyethylene glycol 3350 17 Gram(s) Oral every 12 hours  potassium chloride    Tablet ER 40 milliEquivalent(s) Oral once  senna 2 Tablet(s) Oral at bedtime  sodium chloride 0.9%. 1000 milliLiter(s) (70 mL/Hr) IV Continuous <Continuous>    MEDICATIONS  (PRN):  sodium chloride 0.9% lock flush 10 milliLiter(s) IV Push every 1 hour PRN Pre/post blood products, medications, blood draw, and to maintain line patency    ALLERGIES/INTOLERANCES:  Allergies  No Known Allergies    Intolerances    VITALS & EXAMINATION:  Vital Signs Last 24 Hrs  T(C): 36.4 (23 May 2024 15:00), Max: 37 (22 May 2024 23:00)  T(F): 97.6 (23 May 2024 15:00), Max: 98.6 (22 May 2024 23:00)  HR: 119 (23 May 2024 16:30) (63 - 153)  BP: 148/88 (23 May 2024 05:15) (111/72 - 225/112)  BP(mean): 112 (23 May 2024 05:15) (75 - 161)  RR: 17 (23 May 2024 16:30) (12 - 62)  SpO2: 97% (23 May 2024 16:30) (75% - 100%)    Parameters below as of 23 May 2024 15:00  Patient On (Oxygen Delivery Method): nasal cannula  O2 Flow (L/min): 3    GEN: Cooperative   NEURO:     MENTAL STATUS: AAOx3    LANG/SPEECH: Fluent, intact naming, repetition & comprehension    CRANIAL NERVES:    II: Pupils equal and reactive    III, IV, VI: right sided preference able to barely cross midline on left gaze     V: normal    VII: no facial asymmetry    VIII: normal hearing to speech    MOTOR: Bilateral upper and lower extremity tremors of varying amplitudes     REFLEXES: 2/4 throughout, bilateral flexor plantars    SENSORY: Normal to touch, temperature & pin prick in all extremiteis    COORD: Normal finger to nose and heel to shin, no tremor, no dysmetria    LABORATORY:  CBC                       10.8   10.34 )-----------( 387      ( 23 May 2024 11:02 )             31.2     Chem 05-23    137  |  103  |  6<L>  ----------------------------<  131<H>  3.4<L>   |  20<L>  |  0.46<L>    Ca    9.0      23 May 2024 11:02  Phos  3.0     05-23  Mg     2.0     05-23    TPro  x   /  Alb  x   /  TBili  0.4  /  DBili  x   /  AST  x   /  ALT  x   /  AlkPhos  x   05-22    LFTs LIVER FUNCTIONS - ( 21 May 2024 23:17 )  Alb: 3.2 g/dL / Pro: 7.1 g/dL / ALK PHOS: 132 U/L / ALT: 45 U/L / AST: 29 U/L / GGT: x           Coagulopathy PT/INR - ( 22 May 2024 06:25 )   PT: 14.2 sec;   INR: 1.30 ratio           Lipid Panel 05-15 Chol 119 LDL -- HDL 28<L> Trig 81  A1c   Cardiac enzymes     U/A Urinalysis Basic - ( 23 May 2024 11:02 )    Color: x / Appearance: x / SG: x / pH: x  Gluc: 131 mg/dL / Ketone: x  / Bili: x / Urobili: x   Blood: x / Protein: x / Nitrite: x   Leuk Esterase: x / RBC: x / WBC x   Sq Epi: x / Non Sq Epi: x / Bacteria: x        STUDIES & IMAGING:  Studies (EKG, EEG, EMG, etc):     Study Date: 5/17/2024   Start Time: 8:00:16 AM      End Date: 5/18/2024         End Time: 14:34    Study Duration: 26 h 17 m. EEG is off from 08:00-14:00, and 02:23-10:42  Abnormal prolonged EEG study due to Moderate diffuse cerebral dysfunction that is not specific in etiology  No epileptic discharges recorded.  No seizures recorded.    Study Date: 5/17/2024   Start Time: 8:00:16 AM      End Date: 5/18/2024         End Time: 14:34    Study Duration: 26 h 17 m. EEG is off from 08:00-14:00, and 02:23-10:42  EEG Impression / Clinical Correlate:  Abnormal prolonged EEG study due to Moderate diffuse cerebral dysfunction that is not specific in etiology  No epileptic discharges recorded.  No seizures recorded.    Radiology (XR, CT, MR, U/S, TTE/LEON):  CT CAP   *  Small to moderate hiatal hernia.  *  Small groundglass opacity in the right middle lobe, which may reflect a small focus of atelectasis or infectious/inflammatory process.  *  Mild undulation of the liver contour, possibly reflecting changes of cirrhosis.  *  Left breast ill-defined focus of soft tissue with associated coarse calcifications on a background of glandular tissue. This is of uncertain significance, possibly additional glandular tissue. Recommend correlation with mammography.    MR head   1.  Cortical diffusion restriction in the bilateral MCA and right PCA distributions as discussed above, right greaterthan left. Findings are likely compatible with acute infarctions, although similar findings can be seen in a postictal state.  2.  Acute infarctions in the right basal ganglia and left cerebellar hemisphere. Given multiple vascular distributions involved, an embolic source should be considered.  3.  Subarachnoid hemorrhage along the sulci of the bilateral parietal lobes and right temporal/occipital lobes as well as the right posterior fossa.  4.  Layering of blood in the occipital horns of the lateral ventricles.  5.  Multiple diminutive intracranial flow voids, a underlying vasospasm is not excluded. See recent CT of the head for further details.    MR C-spine  Mild to moderate degenerative changes of the cervical spine.   No cord compression. Old mild compression deformity T5 without bony   retropulsion.    [facility] 5/18  The examination is motion limited. Infarcts seen in the right parietal, temporal and occipital lobes on 05/17/2024 are not well visualized on the current exam due to motion artifact.    Angio 5/17: severe b/l anterior and posterior cerebral vasospasm (given IA verapamil and milrinone)    TTE 5/17 1. Technically difficult image quality.   2. Left ventricular cavity is small. There is poor visualization of the endocardial borders to determine the presence of wall motion abnormalities.   3. Hyperdynamic left ventricular systolic function with intra-cavitary gradient of 50 mmHg.   4. The right ventricle is not well visualized.   5. RV systolic function appears hyperdynamic.   6. No prior echocardiogram is available for comparison.    CTH 5/17  Large acute infarct in the right posterior temporal and occipital lobes are reidentified, as seen on prior MRI brain. No secondary mass effect or midline shift. Minimal layering hemorrhage in the occipital horns is reidentified. Previously visualized abnormal signal within the sulci of the bilateral cerebri, not as well visualized on this exam secondary to streak artifacts. Additionally, evaluation of the parenchyma of the superior portion of the bilateral cerebri is limited by streak artifact from surrounding wires.    CTA brain:  Severe long segment narrowing extending from the terminus of the right ICA and involving the entire M1 segment of the right MCA, appearing worsened in the region of the right ICA terminus is compared to the prior exam of 5/14/2024. Right M2 branches appear unremarkable.    High-grade stenosis of the distal supraclinoid segment of the left ICA extending into the terminus of the left ICA and into the M1 segment of the left middle cerebral artery appears worsened in the region of the distal supraclinoid and terminus of the left ICA as compared to the prior exam.    Stenoses of the distal left intradural vertebral artery after the left PICA takeoff.    High-grade stenosis of the distal intradural segment of the right vertebral artery, extending into the basilar artery with severe long segment stenosis of the basilar artery.    Bilateral posterior communicating arteries supplying the posterior cerebral arteries that remain patent, however small in caliber.    CT Perfusion:  10 mL of core infarct in the right posterior temporal lobe and occipital lobe. Extensive areas of T. Max greater than 6 seconds throughout the bilateral cerebri in cerebelli, may reflect diffuse hypoperfusion versus artifact.    MRI/A brain (5/16)  Tiny right temporal occipital subdural collection without mass effect. Diffuse hemosiderin staining in the supra and infratentorial regions and occipital horns with slightly prominent ventricles. Markedly diminished intracranial flow due to severe distal vertebral artery stenosis, mid basilar stenosis and bilateral M1 stenosis.

## 2024-05-23 NOTE — PROGRESS NOTE ADULT - SUBJECTIVE AND OBJECTIVE BOX
HOSPITAL COURSE: 64F pmh Dementia (Polysubstance Abuse, resides in NH) with dyskinesias, HTN, szs on Keppra, alcoholic cirrhosis, tremors, OA presented from [facility] to  w/ AMS & generalized weakness. 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 Started on Keflex for UTI at Nursing Home. Per NH Basline independent in ADLs, conversant but forgetful and confused, wheelchair bound.    HOSPITAL COURSE:  5/15 transferred to [facility] for workup of nontraumatic convexity SAH  05/22: CTA today to assess vasospasm     Admission Scores  GCS: 14 HH: 2 MF: 2 NIHSS: RASS: CAM-ICU: ICH:      REVIEW OF SYSTEMS: [ ] Unable to Assess due to neurologic exam   [ ] All ROS addressed below are non-contributory, except:  Neuro: [ ] Headache [ ] Back pain [ ] Numbness [ ] Weakness [ ] Ataxia [ ] Dizziness [ ] Aphasia [ ] Dysarthria [ ] Visual disturbance  Resp: [ ] Shortness of breath/dyspnea, [ ] Orthopnea [ ] Cough  CV: [ ] Chest pain [ ] Palpitation [ ] Lightheadedness [ ] Syncope  Renal: [ ] Thirst [ ] Edema  GI: [ ] Nausea [ ] Emesis [ ] Abdominal pain [ ] Constipation [ ] Diarrhea  Hem: [ ] Hematemesis [ ] bright red blood per rectum  ID: [ ] Fever [ ] Chills [ ] Dysuria  ENT: [ ] Rhinorrhea      DEVICES:   [ ] Restraints [ ] ET tube [ ] central line [ ] arterial line [ ] lang [ ] NGT/OGT [ ] EVD [ ] LD [ ] STANISLAV/HMV [ ] Trach [ ] PEG [ ] Chest Tube     ICU Vital Signs Last 24 Hrs  T(C): 36.7 (23 May 2024 03:00), Max: 37 (22 May 2024 23:00)  T(F): 98 (23 May 2024 03:00), Max: 98.6 (22 May 2024 23:00)  HR: 118 (23 May 2024 06:45) (47 - 144)  BP: 148/88 (23 May 2024 05:15) (111/72 - 225/112)  BP(mean): 112 (23 May 2024 05:15) (75 - 161)  ABP: 154/103 (23 May 2024 06:45) (127/84 - 158/106)  ABP(mean): 124 (23 May 2024 06:45) (101 - 126)  RR: 19 (23 May 2024 06:45) (11 - 62)  SpO2: 94% (23 May 2024 06:45) (75% - 100%)    O2 Parameters below as of 22 May 2024 19:00  Patient On (Oxygen Delivery Method): room air    I&O's Summary    22 May 2024 07:01  -  23 May 2024 07:00  --------------------------------------------------------  IN: 3796.5 mL / OUT: 2250 mL / NET: 1546.5 mL          MEDICATIONS  (STANDING):  brimonidine 0.2% Ophthalmic Solution 1 Drop(s) Both EYES every 12 hours  chlorhexidine 4% Liquid 1 Application(s) Topical <User Schedule>  dexMEDEtomidine Infusion 0.2 MICROgram(s)/kG/Hr (3.74 mL/Hr) IV Continuous <Continuous>  enoxaparin Injectable 40 milliGRAM(s) SubCutaneous <User Schedule>  folic acid 1 milliGRAM(s) Oral daily  insulin lispro (ADMELOG) corrective regimen sliding scale   SubCutaneous three times a day before meals  latanoprost 0.005% Ophthalmic Solution 1 Drop(s) Both EYES at bedtime  levETIRAcetam 750 milliGRAM(s) Oral two times a day  mirtazapine 15 milliGRAM(s) Oral at bedtime  multivitamin 1 Tablet(s) Oral daily  norepinephrine Infusion 0.05 MICROgram(s)/kG/Min (7 mL/Hr) IV Continuous <Continuous>  polyethylene glycol 3350 17 Gram(s) Oral every 12 hours  senna 2 Tablet(s) Oral at bedtime  sodium chloride 0.9%. 1000 milliLiter(s) (70 mL/Hr) IV Continuous <Continuous>    MEDICATIONS  (PRN):  sodium chloride 0.9% lock flush 10 milliLiter(s) IV Push every 1 hour PRN Pre/post blood products, medications, blood draw, and to maintain line patency          IMAGING:  < from: CT Angio Head w/ IV Cont (05.22.24 @ 18:16) >  IMPRESSION:    Acute bilateral frontal parietal lobe and right temporal occipital lobe   infarctions are redemonstrated. Previously seen subarachnoid hemorrhage   is not demonstrated.Negative CTP. Improved vasospasm involving the   bilateral MCAs and vertebrobasilar circulation. No large vessel occlusion.    < end of copied text >      EXAMINATION:  PHYSICAL EXAM:    Constitutional: No Acute Distress     Neurological: A&O x 3 alert awake, eyes open, gaze midline, UE AG, B/L UE tremor. not following simple commands, b.l LE 2/5     Pulmonary: Clear to Auscultation, No rales, No rhonchi, No wheezes     Cardiovascular: S1, S2, Regular rate and rhythm     Gastrointestinal: Soft, Non-tender, Non-distended     Extremities: No calf tenderness          HOSPITAL COURSE: 64F pmh Dementia (Polysubstance Abuse, resides in NH) with dyskinesias, HTN, szs on Keppra, alcoholic cirrhosis, tremors, OA presented from [facility] to  w/ AMS & generalized weakness. 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 Started on Keflex for UTI at Nursing Home. Per NH Basline independent in ADLs, conversant but forgetful and confused, wheelchair bound.    HOSPITAL COURSE:  5/15 transferred to [facility] for workup of nontraumatic convexity SAH  5/22 CTA today to assess vasospasm, LP done  5/23 Hypotension, tachycardia, desaturated, TTE showed EF 30% suggestion of takotsubo cardiomyopathy, abdominal xray shows increased bowel, NGT placed for decompression    Admission Scores  GCS: 14 HH: 2 MF: 2       REVIEW OF SYSTEMS: [ ] Unable to Assess due to neurologic exam   [ x] All ROS addressed below are non-contributory, except:  Neuro: [ ] Headache [ ] Back pain [ ] Numbness [ ] Weakness [ ] Ataxia [ ] Dizziness [ ] Aphasia [ ] Dysarthria [ ] Visual disturbance  Resp: [ ] Shortness of breath/dyspnea, [ ] Orthopnea [ ] Cough  CV: [ ] Chest pain [ ] Palpitation [ ] Lightheadedness [ ] Syncope  Renal: [ ] Thirst [ ] Edema  GI: [ ] Nausea [ ] Emesis [ ] Abdominal pain [ ] Constipation [ ] Diarrhea  Hem: [ ] Hematemesis [ ] bright red blood per rectum  ID: [ ] Fever [ ] Chills [ ] Dysuria  ENT: [ ] Rhinorrhea      DEVICES:   [ ] Restraints [ ] ET tube [x ] Rt IJ central line [x ] arterial line [ ] lang [x ] NGT/OGT [ ] EVD [ ] LD [ ] STANISLAV/HMV [ ] Trach [ ] PEG [ ] Chest Tube     ICU Vital Signs Last 24 Hrs  T(C): 36.6 (23 May 2024 19:00), Max: 37 (22 May 2024 23:00)  T(F): 97.9 (23 May 2024 19:00), Max: 98.6 (22 May 2024 23:00)  HR: 110 (23 May 2024 19:00) (79 - 153)  BP: 148/88 (23 May 2024 05:15) (111/72 - 225/112)  BP(mean): 112 (23 May 2024 05:15) (87 - 161)  ABP: 102/74 (23 May 2024 19:00) (92/68 - 166/114)  ABP(mean): 85 (23 May 2024 19:00) (78 - 269)  RR: 18 (23 May 2024 19:00) (12 - 62)  SpO2: 99% (23 May 2024 19:00) (91% - 100%)    O2 Parameters below as of 23 May 2024 19:00  Patient On (Oxygen Delivery Method): nasal cannula  O2 Flow (L/min): 3    I&O's Summary    22 May 2024 07:01  -  23 May 2024 07:00  --------------------------------------------------------  IN: 3796.5 mL / OUT: 2250 mL / NET: 1546.5 mL    23 May 2024 07:01  -  23 May 2024 20:49  --------------------------------------------------------  IN: 967.2 mL / OUT: 800 mL / NET: 167.2 mL                            10.8   10.34 )-----------( 387      ( 23 May 2024 11:02 )             31.2   05-23    137  |  103  |  6<L>  ----------------------------<  131<H>  3.4<L>   |  20<L>  |  0.46<L>    Ca    9.0      23 May 2024 11:02  Phos  3.0     05-23  Mg     2.0     05-23    TPro  x   /  Alb  x   /  TBili  0.4  /  DBili  x   /  AST  x   /  ALT  x   /  AlkPhos  x   05-22    MEDICATIONS  (STANDING):  brimonidine 0.2% Ophthalmic Solution 1 Drop(s) Both EYES every 12 hours  chlorhexidine 4% Liquid 1 Application(s) Topical <User Schedule>  DOBUTamine Infusion 2.5 MICROgram(s)/kG/Min (5.6 mL/Hr) IV Continuous <Continuous>  enoxaparin Injectable 40 milliGRAM(s) SubCutaneous <User Schedule>  folic acid 1 milliGRAM(s) Oral daily  insulin lispro (ADMELOG) corrective regimen sliding scale   SubCutaneous three times a day before meals  lactated ringers. 500 milliLiter(s) (500 mL/Hr) IV Continuous <Continuous>  latanoprost 0.005% Ophthalmic Solution 1 Drop(s) Both EYES at bedtime  levETIRAcetam  IVPB 750 milliGRAM(s) IV Intermittent every 12 hours  LORazepam   Injectable 1 milliGRAM(s) IV Push once  mirtazapine 15 milliGRAM(s) Oral at bedtime  multivitamin 1 Tablet(s) Oral daily  norepinephrine Infusion 0.21 MICROgram(s)/kG/Min (29.4 mL/Hr) IV Continuous <Continuous>  polyethylene glycol 3350 17 Gram(s) Oral every 12 hours  potassium chloride    Tablet ER 40 milliEquivalent(s) Oral once  senna 2 Tablet(s) Oral at bedtime  sodium chloride 0.9%. 1000 milliLiter(s) (70 mL/Hr) IV Continuous <Continuous>    MEDICATIONS  (PRN):  sodium chloride 0.9% lock flush 10 milliLiter(s) IV Push every 1 hour PRN Pre/post blood products, medications, blood draw, and to maintain line patency      IMAGING:  < from: CT Angio Head w/ IV Cont (05.22.24 @ 18:16) >  IMPRESSION:    Acute bilateral frontal parietal lobe and right temporal occipital lobe   infarctions are redemonstrated. Previously seen subarachnoid hemorrhage   is not demonstrated.Negative CTP. Improved vasospasm involving the   bilateral MCAs and vertebrobasilar circulation. No large vessel occlusion.    < end of copied text >      EXAMINATION:  PHYSICAL EXAM:    Constitutional: No Acute Distress     Neurological: A&O x 3, eyes open spontaneous, PERRL (3mm reactive) gaze midline, follow commands, B/L UE tremor and antigravity. B/L LE 2/5 and wiggles toes    Pulmonary: Clear to Auscultation, No rales, No rhonchi, No wheezes     Cardiovascular: S1, S2, Regular rate and rhythm     Gastrointestinal: Soft, Non-tender, Non-distended     Extremities: No calf tenderness          HPI:  64F PMH Dementia (Polysubstance Abuse, resides in NH) with dyskinesias, HTN, szs on Keppra, alcoholic cirrhosis, tremors, OA presented from [facility] to  w/ AMS & generalized weakness. 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 Started on Keflex for UTI at Nursing Home. Per NH Baseline independent in ADLs, conversant but forgetful and confused, wheelchair bound.    Admission Scores  GCS: 14 HH: 2 MF: 2     HOSPITAL COURSE:  5/15 transferred to [facility] for workup of nontraumatic convexity SAH  5/17 Angiogram showed severe B/L anterior and posterior cerebral vasospasm  5/18 Extubated  5/22 CTA to assess vasospasm, LP done  5/23 Hypotension, tachycardia, desaturated, TTE suggestion of takotsubo cardiomyopathy, abdominal xray shows increased bowel, NGT placed for decompression      REVIEW OF SYSTEMS: [ ] Unable to Assess due to neurologic exam   [ x] All ROS addressed below are non-contributory, except:  Neuro: [ ] Headache [ ] Back pain [ ] Numbness [ ] Weakness [ ] Ataxia [ ] Dizziness [ ] Aphasia [ ] Dysarthria [ ] Visual disturbance  Resp: [ ] Shortness of breath/dyspnea, [ ] Orthopnea [ ] Cough  CV: [ ] Chest pain [ ] Palpitation [ ] Lightheadedness [ ] Syncope  Renal: [ ] Thirst [ ] Edema  GI: [ ] Nausea [ ] Emesis [ ] Abdominal pain [ ] Constipation [ ] Diarrhea  Hem: [ ] Hematemesis [ ] bright red blood per rectum  ID: [ ] Fever [ ] Chills [ ] Dysuria  ENT: [ ] Rhinorrhea      DEVICES:   [ ] Restraints [ ] ET tube [x ] Rt IJ central line [x ] arterial line [ ] lang [x ] NGT/OGT [ ] EVD [ ] LD [ ] STANISLAV/HMV [ ] Trach [ ] PEG [ ] Chest Tube     ICU Vital Signs Last 24 Hrs  T(C): 36.6 (23 May 2024 19:00), Max: 37 (22 May 2024 23:00)  T(F): 97.9 (23 May 2024 19:00), Max: 98.6 (22 May 2024 23:00)  HR: 110 (23 May 2024 19:00) (79 - 153)  BP: 148/88 (23 May 2024 05:15) (111/72 - 225/112)  BP(mean): 112 (23 May 2024 05:15) (87 - 161)  ABP: 102/74 (23 May 2024 19:00) (92/68 - 166/114)  ABP(mean): 85 (23 May 2024 19:00) (78 - 269)  RR: 18 (23 May 2024 19:00) (12 - 62)  SpO2: 99% (23 May 2024 19:00) (91% - 100%)    O2 Parameters below as of 23 May 2024 19:00  Patient On (Oxygen Delivery Method): nasal cannula  O2 Flow (L/min): 3    I&O's Summary    22 May 2024 07:01  -  23 May 2024 07:00  --------------------------------------------------------  IN: 3796.5 mL / OUT: 2250 mL / NET: 1546.5 mL    23 May 2024 07:01  -  23 May 2024 20:49  --------------------------------------------------------  IN: 967.2 mL / OUT: 800 mL / NET: 167.2 mL                            10.8   10.34 )-----------( 387      ( 23 May 2024 11:02 )             31.2   05-23    137  |  103  |  6<L>  ----------------------------<  131<H>  3.4<L>   |  20<L>  |  0.46<L>    Ca    9.0      23 May 2024 11:02  Phos  3.0     05-23  Mg     2.0     05-23    TPro  x   /  Alb  x   /  TBili  0.4  /  DBili  x   /  AST  x   /  ALT  x   /  AlkPhos  x   05-22    MEDICATIONS  (STANDING):  brimonidine 0.2% Ophthalmic Solution 1 Drop(s) Both EYES every 12 hours  chlorhexidine 4% Liquid 1 Application(s) Topical <User Schedule>  DOBUTamine Infusion 2.5 MICROgram(s)/kG/Min (5.6 mL/Hr) IV Continuous <Continuous>  enoxaparin Injectable 40 milliGRAM(s) SubCutaneous <User Schedule>  folic acid 1 milliGRAM(s) Oral daily  insulin lispro (ADMELOG) corrective regimen sliding scale   SubCutaneous three times a day before meals  lactated ringers. 500 milliLiter(s) (500 mL/Hr) IV Continuous <Continuous>  latanoprost 0.005% Ophthalmic Solution 1 Drop(s) Both EYES at bedtime  levETIRAcetam  IVPB 750 milliGRAM(s) IV Intermittent every 12 hours  LORazepam   Injectable 1 milliGRAM(s) IV Push once  mirtazapine 15 milliGRAM(s) Oral at bedtime  multivitamin 1 Tablet(s) Oral daily  norepinephrine Infusion 0.21 MICROgram(s)/kG/Min (29.4 mL/Hr) IV Continuous <Continuous>  polyethylene glycol 3350 17 Gram(s) Oral every 12 hours  potassium chloride    Tablet ER 40 milliEquivalent(s) Oral once  senna 2 Tablet(s) Oral at bedtime  sodium chloride 0.9%. 1000 milliLiter(s) (70 mL/Hr) IV Continuous <Continuous>    MEDICATIONS  (PRN):  sodium chloride 0.9% lock flush 10 milliLiter(s) IV Push every 1 hour PRN Pre/post blood products, medications, blood draw, and to maintain line patency      IMAGING:  < from: CT Angio Head w/ IV Cont (05.22.24 @ 18:16) >  IMPRESSION:    Acute bilateral frontal parietal lobe and right temporal occipital lobe   infarctions are redemonstrated. Previously seen subarachnoid hemorrhage   is not demonstrated.Negative CTP. Improved vasospasm involving the   bilateral MCAs and vertebrobasilar circulation. No large vessel occlusion.    < end of copied text >        EXAMINATION:  PHYSICAL EXAM:    Constitutional: No Acute Distress     Neurological: A&O x 3, eyes open spontaneous, PERRL (3mm reactive) gaze midline, follow commands, B/L UE tremor and antigravity. B/L LE 2/5 and wiggles toes    Pulmonary: Clear to Auscultation, No rales, No rhonchi, No wheezes     Cardiovascular: S1, S2, Regular rate and rhythm     Gastrointestinal: Soft, Non-tender, Non-distended     Extremities: No calf tenderness

## 2024-05-23 NOTE — PROGRESS NOTE ADULT - SUBJECTIVE AND OBJECTIVE BOX
Patient seen and examined at bedside.    --Anticoagulation--  enoxaparin Injectable 40 milliGRAM(s) SubCutaneous <User Schedule>    T(C): 36.8 (05-22-24 @ 15:00), Max: 36.8 (05-22-24 @ 15:00)  HR: 74 (05-22-24 @ 18:45) (47 - 111)  BP: 149/86 (05-22-24 @ 18:45) (130/72 - 205/93)  RR: 19 (05-22-24 @ 18:45) (11 - 42)  SpO2: 97% (05-22-24 @ 18:45) (90% - 100%)  Wt(kg): --    Exam: arousable, Ox2-3 EOS, pupils 3R sluggish, R gaze can come midline gaze, interm FC, BL UE AG, B/L UE tremor, BL LE 2/5

## 2024-05-23 NOTE — PROGRESS NOTE ADULT - ASSESSMENT
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 MR brain showed bilateral MCA and R PCA infarct. Acute infarct in the R basal ganglia and L cerebellum. On exam patient is following simple 1 step commands. gaze is dysconjugate with L eye medially rotated, able to lift all limbs anti-gravity. She is oriented to self and situation.     Impression: SAH with vasospasm, etiology unclear, inflammation versus infection versus polysubstance abuse MR brain with multiple bilateral infarcts. Pending further labs     Recommendations:  [] Pending rest of LP labs Glucose 55 Protein 126? Lactate 5.0? CSF color Yellow Cell count 22?  [x] TIMOTHY-, ammonia 26, antiphospholipid Ab, ANCA-, serum C3 174?, C4 72?, cryoglobulin -, RF <10, SPEP, UPEP, quant immunglobulin levels, MPO <5, Scleroderma <0.2 SSA52/60 normal, dsDNA <1   [x] continue keppra 750 mg BID  [x] TTE completex, normal cardiac function   [x] Diet Pureed   [x] DVT ppx as per NSCU   [] PT/OT/ fall precautions    Patient seen on AM rounds with neurology attending Dr Diego

## 2024-05-23 NOTE — PROGRESS NOTE ADULT - ASSESSMENT
ASSESSMENT   SAH HH2 MF2 , angio no vascular malformation but diffuse CVS s/p angiographic CCB   unsure etiology of SAH     PBD: 8  PLAN   neuro checks q 2 hr   r/o vasculitis TIMOTHY, antiphospholipid Ab, ANCA, serum C3, C4, cryoglobulin, SPEP, UPEP, quant immunglobulin levels, Ab SSA/SSB, dsDNA  LP done per neurology, follow up CSF   daily TCD (poor windows)  MRI brain: b/l MCA strokes and concern for vasospasm  #AIS R temporo occipital   #pmhx seizure cont home keppra 750 mg BID   according brother and sister in law, she might be taking drugs and alcohol abuse   #Pain: Tylenol   #Activity: PT OT    CV   #SBP   daily troponin, EKGs  #TTE grossly normal cardiac function     P   extubated 05/18  RA   SaO2> 92  encourage IS     R   #Strict I+Os   #cont NS 70 ccs/hour    GI   #Diet: Puree CCD  #Senna miralax for bowel regimen   LBM: 05/22      ID  afebrile   monitor off antibiotics    E  Goal euglycemia (-180)  #a1c 6.1, ISS     H  #DVT ppx:   SCDs  lovenox 40 mg sc qhs   LED no dvt   5/22 LED negative       #CODE STATUS: FULL CODE     #DISPOSITION: ICU    #CRITICAL      ASSESSMENT   SAH HH2 MF2 , angio no vascular malformation but diffuse CVS s/p angiographic CCB   unsure etiology of SAH     PBD: 8  PLAN   neuro checks q 2 hr   r/o vasculitis TIMOTHY, antiphospholipid Ab, ANCA, serum C3, C4, cryoglobulin, SPEP, UPEP, quant immunglobulin levels, Ab SSA/SSB, dsDNA  LP done per neurology, follow up CSF   daily TCD (poor windows)  MRI brain: b/l MCA strokes and concern for vasospasm  #AIS R temporo occipital   #pmhx seizure cont home keppra 750 mg BID   according brother and sister in law, she might be taking drugs and alcohol abuse   #Pain: Tylenol   #Activity: PT OT    CV   #SBP   daily troponin, EKGs  #TTE grossly normal cardiac function     P   extubated 05/18  RA   SaO2> 92  encourage IS   CT PE-pending  3L NC    R   #Strict I+Os   Fluid: IVL    GI   #Diet: NGT- NPO  #Senna miralax for bowel regimen   LBM: 05/19  Abdominal xray: NGT placed for decompression      ID  afebrile   monitor off antibiotics    E  Goal euglycemia (-180)  #a1c 6.1, ISS     H  SCDs  lovenox 40 mg sc qhs   5/22 LED negative       #CODE STATUS: FULL CODE     #DISPOSITION: ICU    #CRITICAL      ASSESSMENT/PLAN: 64F SAH HH2 MF2 S/P angio no vascular malformation but diffuse CVS s/p angiographic CCB   unsure etiology of SAH   PBD:8    NEURO:  Neuro checks q2hrs/ vitals q1hrs  S/P angio no vascular malformation but diffuse CVS s/p angiographic CCB unsure etiology of SAH   Diagnosis: r/o vasculitis TIMOTHY, antiphospholipid Ab, ANCA, serum C3, C4, cryoglobulin, SPEP, UPEP, quant immunglobulin levels, Ab SSA/SSB, dsDNA  MRI brain: b/l MCA strokes and concern for vasospasm  #AIS R temporo occipital   5/22 LP done per neurology; f/u CSF studies  Hx Seizure: Levetiracetam 750mg BID (home dose)  Delayed Cerebral Ischemia Management: Serial screening TCDs (poor windows), euvolemia  Pain: Tylenol and oxy PRN   Activity: [] OOB as tolerated [] Bedrest [x] PT [x] OT [] PMNR    PULM:  3L NC, Sat >92%  Incentive spirometry  5/18 Extubated  5/24 CT PE: prelim negative    CV:  SBP goal   5/23 TTE: EF 30%, no LVOT, suggestion of Takotsubo cardiomyopathy, left ventricular function decreased  Daily trops and EKG    RENAL:  Fluids: IVL  Strict I&Os    GI:  Diet: NGT- NPO  GI prophylaxis [x] not indicated [] PPI [] other:  Bowel regimen [x] senna [] other: miralax  LBM: 5/19 5/23 Abdominal xray completed: f/u final read. NGT placed for decompression    ENDO:   ISS  Goal euglycemia (-180)  A1C 6.1    HEME/ONC:  VTE prophylaxis: [x] SCDs [x] chemoprophylaxis [] hold chemoprophylaxis due to: [] high risk of DVT/PE on admission due to:  SQL 40mg sc daily  5/22 LED: negative    ID:  Afebrile    MISC:    SOCIAL/FAMILY:  [] awaiting [x] updated at bedside [] family meeting    CODE STATUS:  [x] Full Code [] DNR [] DNI [] Palliative/Comfort Care    DISPOSITION:  [x] ICU [] Stroke Unit [] Floor [] EMU [] RCU [] PCU    [x] Patient is at high risk of neurologic deterioration/death due to:

## 2024-05-23 NOTE — PROGRESS NOTE ADULT - SUBJECTIVE AND OBJECTIVE BOX
MR#[id number]  PATIENT NAME:ENDY ASHLEY    DATE OF SERVICE: 05-23-24 @ 08:12  Patient was seen and examined by Jayy Rubin MD on    05-23-24 @ 08:12 .  Interim events noted.Consultant notes ,Labs,Telemetry reviewed by me       HOSPITAL COURSE: HPI:  Mera Ashley  64F, pmh HTN, szs on Keppra, alcoholic cirrhosis, tremors, OA presented from [facility] to OSH w/ generalized weakness found to have b/l sulcal SAH and small occipital IVH. 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 She was recently started on Keflex for UTI. Plts 400, Coags wnl. , AST/ALT = 38/104. WBC 13. Afebrile. Exam: lethargic appearing, EOV, FC, AO3, PERRL, not compliant with EOMs, BUE 5/5, BLE 2/5. (15 May 2024 03:22)      CTH w/ b/l occipital sulcal SAH and small IVH,   CTA with R V4 5mm fusiform aneurysm and severe stenosis of BA and diminutive distal cerebral arteries.   Started on Keflex [facility]TI at Nursing [facility].   Per NH Baseline independent in ADLs, conversant but forgetful and confused, wheelchair bound.        INTERIM EVENTS:Patient seen at bedside ,interim events noted.  5/15 transferred to [facility] for workup of nontraumatic convexity SAH  05/22: CTA  to assess vasospasm   05/23-On Levo to augment BP noted elevated troponins       PMH -reviewed admission note, no change since admission    MEDICATIONS  (STANDING):  brimonidine 0.2% Ophthalmic Solution 1 Drop(s) Both EYES every 12 hours  chlorhexidine 4% Liquid 1 Application(s) Topical <User Schedule>  dexMEDEtomidine Infusion 0.2 MICROgram(s)/kG/Hr (3.74 mL/Hr) IV Continuous <Continuous>  enoxaparin Injectable 40 milliGRAM(s) SubCutaneous <User Schedule>  folic acid 1 milliGRAM(s) Oral daily  insulin lispro (ADMELOG) corrective regimen sliding scale   SubCutaneous three times a day before meals  lactated ringers. 500 milliLiter(s) (500 mL/Hr) IV Continuous <Continuous>  latanoprost 0.005% Ophthalmic Solution 1 Drop(s) Both EYES at bedtime  levETIRAcetam 750 milliGRAM(s) Oral two times a day  mirtazapine 15 milliGRAM(s) Oral at bedtime  multivitamin 1 Tablet(s) Oral daily  phenylephrine    Infusion 0.1 MICROgram(s)/kG/Min (2.8 mL/Hr) IV Continuous <Continuous>  polyethylene glycol 3350 17 Gram(s) Oral every 12 hours  potassium chloride    Tablet ER 40 milliEquivalent(s) Oral once  senna 2 Tablet(s) Oral at bedtime  sodium chloride 0.9%. 1000 milliLiter(s) (70 mL/Hr) IV Continuous <Continuous>    MEDICATIONS  (PRN):  sodium chloride 0.9% lock flush 10 milliLiter(s) IV Push every 1 hour PRN Pre/post blood products, medications, blood draw, and to maintain line patency            REVIEW OF SYSTEMS:  Constitutional: [ ] fever, [ ]weight loss,  [ ]fatigue [ ]weight gain  Eyes: [ ] visual changes  Respiratory: [ ]shortness of breath;  [ ] cough, [ ]wheezing, [ ]chills, [ ]hemoptysis  Cardiovascular: [ ] chest pain, [ ]palpitations, [ ]dizziness,  [ ]leg swelling[ ]orthopnea[ ]PND  Gastrointestinal: [ ] abdominal pain, [ ]nausea, [ ]vomiting,  [ ]diarrhea [ ]Constipation [ ]Melena  Genitourinary: [ ] dysuria, [ ] hematuria [ ]Zamarripa  Neurologic: [ ] headaches [ ] tremors[ ]weakness [ ]Paralysis Right[ ] Left[ ]  Skin: [ ] itching, [ ]burning, [ ] rashes  Endocrine: [ ] heat or cold intolerance  Musculoskeletal: [ ] joint pain or swelling; [ ] muscle, back, or extremity pain  Psychiatric: [ ] depression, [ ]anxiety, [ ]mood swings, or [ ]difficulty sleeping  Hematologic: [ ] easy bruising, [ ] bleeding gums    [ ] All remaining systems negative except as per above.   [ ]Unable to obtain.  [x] No change in ROS since admission      Vital Signs Last 24 Hrs  T(C): 36.7 (23 May 2024 07:00), Max: 37 (22 May 2024 23:00)  T(F): 98 (23 May 2024 07:00), Max: 98.6 (22 May 2024 23:00)  HR: 126 (23 May 2024 07:30) (47 - 144)  BP: 148/88 (23 May 2024 05:15) (111/72 - 225/112)  BP(mean): 112 (23 May 2024 05:15) (75 - 161)  RR: 19 (23 May 2024 07:30) (11 - 62)  SpO2: 94% (23 May 2024 07:30) (75% - 100%)    Parameters below as of 23 May 2024 07:00  Patient On (Oxygen Delivery Method): room air      I&O's Summary    22 May 2024 07:01  -  23 May 2024 07:00  --------------------------------------------------------  IN: 3796.5 mL / OUT: 2250 mL / NET: 1546.5 mL        PHYSICAL EXAM:  General: No acute distress BMI-29  HEENT: EOMI, PERRL  Neck: Supple, [ ] JVD  Lungs: Equal air entry bilaterally; [ ] rales [ ] wheezing [ ] rhonchi  Heart: Regular rate and rhythm; [x ] murmur   2/6 [ x] systolic [ ] diastolic [ ] radiation[ ] rubs [ ]  gallops  Abdomen: Nontender, bowel sounds present  Extremities: No clubbing, cyanosis, [ ] edema [ ]Pulses  equal and intact  Nervous system:  Alert & Oriented X1,   Psychiatric: Normal affect  Skin: No rashes or lesions    LABS:  05-23    136  |  99  |  5<L>  ----------------------------<  186<H>  3.9   |  21<L>  |  0.48<L>    Ca    9.6      23 May 2024 00:51  Phos  3.3     05-23  Mg     1.7     05-23    TPro  x   /  Alb  x   /  TBili  0.4  /  DBili  x   /  AST  x   /  ALT  x   /  AlkPhos  x   05-22    Creatinine Trend: 0.48<--, 0.42<--, 0.42<--, 0.43<--, 0.42<--, 0.40<--                        12.1   12.12 )-----------( 446      ( 23 May 2024 00:51 )             35.3     PT/INR - ( 22 May 2024 06:25 )   PT: 14.2 sec;   INR: 1.30 ratio         Troponin T, High Sensitivity Result: 462 <--329 <--<6 ng/L       TTE W or WO Ultrasound Enhancing Agent (05.17.24 @ 14:30) >  CONCLUSIONS:      1. Technically difficult image quality.   2. Left ventricular cavity is small. There is poor visualization of the endocardial borders to determine the presence of wall motion abnormalities.   3. Hyperdynamic left ventricular systolic function with intra-cavitary gradient of 50 mmHg.   4. The right ventricle is not well visualized.   5. RV systolic function appears hyperdynamic.   6. No prior echocardiogram is available for comparison.         CT Angio Head w/ IV Cont (05.22.24 @ 18:16) >    IMPRESSION:    Acute bilateral frontal parietal lobe and right temporal occipital lobe  infarctions are redemonstrated. Previously seen subarachnoid hemorrhage  is not demonstrated.Negative CTP. Improved vasospasm involving the   bilateral MCAs and vertebrobasilar circulation. No large vessel occlusion.       ECG (05.22.24 @ 09:39) >  NORMAL SINUS RHYTHM  NORMAL ECG

## 2024-05-24 ENCOUNTER — RESULT REVIEW (OUTPATIENT)
Age: 64
End: 2024-05-24

## 2024-05-24 LAB
ALBUMIN SERPL ELPH-MCNC: 3.2 G/DL — LOW (ref 3.3–5)
ALP SERPL-CCNC: 121 U/L — HIGH (ref 40–120)
ALT FLD-CCNC: 29 U/L — SIGNIFICANT CHANGE UP (ref 10–45)
ANION GAP SERPL CALC-SCNC: 10 MMOL/L — SIGNIFICANT CHANGE UP (ref 5–17)
ANION GAP SERPL CALC-SCNC: 12 MMOL/L — SIGNIFICANT CHANGE UP (ref 5–17)
AST SERPL-CCNC: 41 U/L — HIGH (ref 10–40)
BASOPHILS # BLD AUTO: 0.01 K/UL — SIGNIFICANT CHANGE UP (ref 0–0.2)
BASOPHILS # BLD AUTO: 0.03 K/UL — SIGNIFICANT CHANGE UP (ref 0–0.2)
BASOPHILS NFR BLD AUTO: 0.1 % — SIGNIFICANT CHANGE UP (ref 0–2)
BASOPHILS NFR BLD AUTO: 0.4 % — SIGNIFICANT CHANGE UP (ref 0–2)
BILIRUB SERPL-MCNC: 0.4 MG/DL — SIGNIFICANT CHANGE UP (ref 0.2–1.2)
BUN SERPL-MCNC: 5 MG/DL — LOW (ref 7–23)
BUN SERPL-MCNC: 7 MG/DL — SIGNIFICANT CHANGE UP (ref 7–23)
CALCIUM SERPL-MCNC: 9.3 MG/DL — SIGNIFICANT CHANGE UP (ref 8.4–10.5)
CALCIUM SERPL-MCNC: 9.4 MG/DL — SIGNIFICANT CHANGE UP (ref 8.4–10.5)
CHLORIDE SERPL-SCNC: 100 MMOL/L — SIGNIFICANT CHANGE UP (ref 96–108)
CHLORIDE SERPL-SCNC: 100 MMOL/L — SIGNIFICANT CHANGE UP (ref 96–108)
CO2 SERPL-SCNC: 22 MMOL/L — SIGNIFICANT CHANGE UP (ref 22–31)
CO2 SERPL-SCNC: 24 MMOL/L — SIGNIFICANT CHANGE UP (ref 22–31)
CREAT SERPL-MCNC: 0.45 MG/DL — LOW (ref 0.5–1.3)
CREAT SERPL-MCNC: 0.45 MG/DL — LOW (ref 0.5–1.3)
EGFR: 109 ML/MIN/1.73M2 — SIGNIFICANT CHANGE UP
EGFR: 109 ML/MIN/1.73M2 — SIGNIFICANT CHANGE UP
EOSINOPHIL # BLD AUTO: 0.04 K/UL — SIGNIFICANT CHANGE UP (ref 0–0.5)
EOSINOPHIL # BLD AUTO: 0.05 K/UL — SIGNIFICANT CHANGE UP (ref 0–0.5)
EOSINOPHIL NFR BLD AUTO: 0.5 % — SIGNIFICANT CHANGE UP (ref 0–6)
EOSINOPHIL NFR BLD AUTO: 0.7 % — SIGNIFICANT CHANGE UP (ref 0–6)
GLUCOSE SERPL-MCNC: 106 MG/DL — HIGH (ref 70–99)
GLUCOSE SERPL-MCNC: 124 MG/DL — HIGH (ref 70–99)
HAPTOGLOB SERPL-MCNC: 197 MG/DL — SIGNIFICANT CHANGE UP (ref 34–200)
HCT VFR BLD CALC: 32 % — LOW (ref 34.5–45)
HCT VFR BLD CALC: 32.8 % — LOW (ref 34.5–45)
HGB BLD-MCNC: 10.9 G/DL — LOW (ref 11.5–15.5)
HGB BLD-MCNC: 11.1 G/DL — LOW (ref 11.5–15.5)
IMM GRANULOCYTES NFR BLD AUTO: 0.6 % — SIGNIFICANT CHANGE UP (ref 0–0.9)
IMM GRANULOCYTES NFR BLD AUTO: 0.6 % — SIGNIFICANT CHANGE UP (ref 0–0.9)
LYMPHOCYTES # BLD AUTO: 2.01 K/UL — SIGNIFICANT CHANGE UP (ref 1–3.3)
LYMPHOCYTES # BLD AUTO: 2.43 K/UL — SIGNIFICANT CHANGE UP (ref 1–3.3)
LYMPHOCYTES # BLD AUTO: 28 % — SIGNIFICANT CHANGE UP (ref 13–44)
LYMPHOCYTES # BLD AUTO: 28.5 % — SIGNIFICANT CHANGE UP (ref 13–44)
MAGNESIUM SERPL-MCNC: 1.8 MG/DL — SIGNIFICANT CHANGE UP (ref 1.6–2.6)
MAGNESIUM SERPL-MCNC: 1.9 MG/DL — SIGNIFICANT CHANGE UP (ref 1.6–2.6)
MCHC RBC-ENTMCNC: 27.6 PG — SIGNIFICANT CHANGE UP (ref 27–34)
MCHC RBC-ENTMCNC: 27.7 PG — SIGNIFICANT CHANGE UP (ref 27–34)
MCHC RBC-ENTMCNC: 33.8 GM/DL — SIGNIFICANT CHANGE UP (ref 32–36)
MCHC RBC-ENTMCNC: 34.1 GM/DL — SIGNIFICANT CHANGE UP (ref 32–36)
MCV RBC AUTO: 81.2 FL — SIGNIFICANT CHANGE UP (ref 80–100)
MCV RBC AUTO: 81.6 FL — SIGNIFICANT CHANGE UP (ref 80–100)
MONOCYTES # BLD AUTO: 0.7 K/UL — SIGNIFICANT CHANGE UP (ref 0–0.9)
MONOCYTES # BLD AUTO: 0.72 K/UL — SIGNIFICANT CHANGE UP (ref 0–0.9)
MONOCYTES NFR BLD AUTO: 8.5 % — SIGNIFICANT CHANGE UP (ref 2–14)
MONOCYTES NFR BLD AUTO: 9.7 % — SIGNIFICANT CHANGE UP (ref 2–14)
NEUTROPHILS # BLD AUTO: 4.38 K/UL — SIGNIFICANT CHANGE UP (ref 1.8–7.4)
NEUTROPHILS # BLD AUTO: 5.25 K/UL — SIGNIFICANT CHANGE UP (ref 1.8–7.4)
NEUTROPHILS NFR BLD AUTO: 60.9 % — SIGNIFICANT CHANGE UP (ref 43–77)
NEUTROPHILS NFR BLD AUTO: 61.5 % — SIGNIFICANT CHANGE UP (ref 43–77)
NRBC # BLD: 0 /100 WBCS — SIGNIFICANT CHANGE UP (ref 0–0)
NRBC # BLD: 0 /100 WBCS — SIGNIFICANT CHANGE UP (ref 0–0)
PHOSPHATE SERPL-MCNC: 2.2 MG/DL — LOW (ref 2.5–4.5)
PHOSPHATE SERPL-MCNC: 3.1 MG/DL — SIGNIFICANT CHANGE UP (ref 2.5–4.5)
PLATELET # BLD AUTO: 306 K/UL — SIGNIFICANT CHANGE UP (ref 150–400)
PLATELET # BLD AUTO: 316 K/UL — SIGNIFICANT CHANGE UP (ref 150–400)
POTASSIUM SERPL-MCNC: 3.6 MMOL/L — SIGNIFICANT CHANGE UP (ref 3.5–5.3)
POTASSIUM SERPL-MCNC: 3.8 MMOL/L — SIGNIFICANT CHANGE UP (ref 3.5–5.3)
POTASSIUM SERPL-SCNC: 3.6 MMOL/L — SIGNIFICANT CHANGE UP (ref 3.5–5.3)
POTASSIUM SERPL-SCNC: 3.8 MMOL/L — SIGNIFICANT CHANGE UP (ref 3.5–5.3)
PROT SERPL-MCNC: 6.9 G/DL — SIGNIFICANT CHANGE UP (ref 6–8.3)
RBC # BLD: 3.94 M/UL — SIGNIFICANT CHANGE UP (ref 3.8–5.2)
RBC # BLD: 4.02 M/UL — SIGNIFICANT CHANGE UP (ref 3.8–5.2)
RBC # FLD: 13.5 % — SIGNIFICANT CHANGE UP (ref 10.3–14.5)
RBC # FLD: 13.6 % — SIGNIFICANT CHANGE UP (ref 10.3–14.5)
SODIUM SERPL-SCNC: 134 MMOL/L — LOW (ref 135–145)
SODIUM SERPL-SCNC: 134 MMOL/L — LOW (ref 135–145)
TROPONIN T, HIGH SENSITIVITY RESULT: 153 NG/L — HIGH (ref 0–51)
TROPONIN T, HIGH SENSITIVITY RESULT: 190 NG/L — HIGH (ref 0–51)
WBC # BLD: 7.19 K/UL — SIGNIFICANT CHANGE UP (ref 3.8–10.5)
WBC # BLD: 8.52 K/UL — SIGNIFICANT CHANGE UP (ref 3.8–10.5)
WBC # FLD AUTO: 7.19 K/UL — SIGNIFICANT CHANGE UP (ref 3.8–10.5)
WBC # FLD AUTO: 8.52 K/UL — SIGNIFICANT CHANGE UP (ref 3.8–10.5)

## 2024-05-24 PROCEDURE — 74018 RADEX ABDOMEN 1 VIEW: CPT | Mod: 26

## 2024-05-24 PROCEDURE — 93308 TTE F-UP OR LMTD: CPT | Mod: 26

## 2024-05-24 PROCEDURE — 93010 ELECTROCARDIOGRAM REPORT: CPT

## 2024-05-24 PROCEDURE — 99291 CRITICAL CARE FIRST HOUR: CPT

## 2024-05-24 PROCEDURE — 71045 X-RAY EXAM CHEST 1 VIEW: CPT | Mod: 26

## 2024-05-24 RX ORDER — MULTIVIT WITH MIN/MFOLATE/K2 340-15/3 G
296 POWDER (GRAM) ORAL ONCE
Refills: 0 | Status: COMPLETED | OUTPATIENT
Start: 2024-05-24 | End: 2024-05-24

## 2024-05-24 RX ORDER — LEVETIRACETAM 250 MG/1
750 TABLET, FILM COATED ORAL
Refills: 0 | Status: DISCONTINUED | OUTPATIENT
Start: 2024-05-24 | End: 2024-05-31

## 2024-05-24 RX ORDER — POTASSIUM PHOSPHATE, MONOBASIC POTASSIUM PHOSPHATE, DIBASIC 236; 224 MG/ML; MG/ML
30 INJECTION, SOLUTION INTRAVENOUS ONCE
Refills: 0 | Status: COMPLETED | OUTPATIENT
Start: 2024-05-24 | End: 2024-05-24

## 2024-05-24 RX ORDER — CLONAZEPAM 1 MG
0.25 TABLET ORAL
Refills: 0 | Status: DISCONTINUED | OUTPATIENT
Start: 2024-05-24 | End: 2024-05-31

## 2024-05-24 RX ORDER — METOPROLOL TARTRATE 50 MG
2.5 TABLET ORAL ONCE
Refills: 0 | Status: COMPLETED | OUTPATIENT
Start: 2024-05-24 | End: 2024-05-24

## 2024-05-24 RX ORDER — MAGNESIUM SULFATE 500 MG/ML
1 VIAL (ML) INJECTION ONCE
Refills: 0 | Status: COMPLETED | OUTPATIENT
Start: 2024-05-24 | End: 2024-05-25

## 2024-05-24 RX ORDER — CARVEDILOL PHOSPHATE 80 MG/1
3.12 CAPSULE, EXTENDED RELEASE ORAL EVERY 12 HOURS
Refills: 0 | Status: DISCONTINUED | OUTPATIENT
Start: 2024-05-24 | End: 2024-06-02

## 2024-05-24 RX ORDER — POTASSIUM CHLORIDE 20 MEQ
40 PACKET (EA) ORAL ONCE
Refills: 0 | Status: COMPLETED | OUTPATIENT
Start: 2024-05-24 | End: 2024-05-24

## 2024-05-24 RX ORDER — CAPTOPRIL 12.5 MG/1
6.25 TABLET ORAL EVERY 12 HOURS
Refills: 0 | Status: DISCONTINUED | OUTPATIENT
Start: 2024-05-24 | End: 2024-06-02

## 2024-05-24 RX ADMIN — SODIUM CHLORIDE 2 GRAM(S): 9 INJECTION INTRAMUSCULAR; INTRAVENOUS; SUBCUTANEOUS at 21:38

## 2024-05-24 RX ADMIN — Medication 0.25 MILLIGRAM(S): at 17:18

## 2024-05-24 RX ADMIN — Medication 50 MILLIEQUIVALENT(S): at 00:15

## 2024-05-24 RX ADMIN — SODIUM CHLORIDE 2 GRAM(S): 9 INJECTION INTRAMUSCULAR; INTRAVENOUS; SUBCUTANEOUS at 05:04

## 2024-05-24 RX ADMIN — Medication 0.25 MILLIGRAM(S): at 08:29

## 2024-05-24 RX ADMIN — Medication 1 TABLET(S): at 12:37

## 2024-05-24 RX ADMIN — BRIMONIDINE TARTRATE 1 DROP(S): 2 SOLUTION/ DROPS OPHTHALMIC at 05:20

## 2024-05-24 RX ADMIN — Medication 40 MILLIEQUIVALENT(S): at 05:04

## 2024-05-24 RX ADMIN — Medication 40 MILLIEQUIVALENT(S): at 21:37

## 2024-05-24 RX ADMIN — POLYETHYLENE GLYCOL 3350 17 GRAM(S): 17 POWDER, FOR SOLUTION ORAL at 05:04

## 2024-05-24 RX ADMIN — Medication 296 MILLILITER(S): at 16:17

## 2024-05-24 RX ADMIN — ENOXAPARIN SODIUM 40 MILLIGRAM(S): 100 INJECTION SUBCUTANEOUS at 17:18

## 2024-05-24 RX ADMIN — CARVEDILOL PHOSPHATE 3.12 MILLIGRAM(S): 80 CAPSULE, EXTENDED RELEASE ORAL at 05:04

## 2024-05-24 RX ADMIN — Medication 1 MILLIGRAM(S): at 12:37

## 2024-05-24 RX ADMIN — SODIUM CHLORIDE 2 GRAM(S): 9 INJECTION INTRAMUSCULAR; INTRAVENOUS; SUBCUTANEOUS at 13:36

## 2024-05-24 RX ADMIN — Medication 2.5 MILLIGRAM(S): at 02:22

## 2024-05-24 RX ADMIN — MIRTAZAPINE 15 MILLIGRAM(S): 45 TABLET, ORALLY DISINTEGRATING ORAL at 21:38

## 2024-05-24 RX ADMIN — LATANOPROST 1 DROP(S): 0.05 SOLUTION/ DROPS OPHTHALMIC; TOPICAL at 21:39

## 2024-05-24 RX ADMIN — LEVETIRACETAM 750 MILLIGRAM(S): 250 TABLET, FILM COATED ORAL at 17:19

## 2024-05-24 RX ADMIN — LEVETIRACETAM 400 MILLIGRAM(S): 250 TABLET, FILM COATED ORAL at 05:47

## 2024-05-24 RX ADMIN — POLYETHYLENE GLYCOL 3350 17 GRAM(S): 17 POWDER, FOR SOLUTION ORAL at 17:19

## 2024-05-24 RX ADMIN — BRIMONIDINE TARTRATE 1 DROP(S): 2 SOLUTION/ DROPS OPHTHALMIC at 17:19

## 2024-05-24 RX ADMIN — CARVEDILOL PHOSPHATE 3.12 MILLIGRAM(S): 80 CAPSULE, EXTENDED RELEASE ORAL at 17:18

## 2024-05-24 RX ADMIN — POTASSIUM PHOSPHATE, MONOBASIC POTASSIUM PHOSPHATE, DIBASIC 83.33 MILLIMOLE(S): 236; 224 INJECTION, SOLUTION INTRAVENOUS at 21:40

## 2024-05-24 RX ADMIN — SENNA PLUS 2 TABLET(S): 8.6 TABLET ORAL at 21:38

## 2024-05-24 RX ADMIN — CHLORHEXIDINE GLUCONATE 1 APPLICATION(S): 213 SOLUTION TOPICAL at 05:21

## 2024-05-24 NOTE — PROGRESS NOTE ADULT - ASSESSMENT
ASSESSMENT/PLAN: 64F SAH HH2 MF2 S/P angio no vascular malformation but diffuse CVS s/p angiographic CCB   unsure etiology of SAH   PBD:9    NEURO:  Neuro checks q4hrs/ vitals q1hrs  S/P angio no vascular malformation but diffuse CVS s/p angiographic CCB unsure etiology of SAH   Diagnosis: r/o vasculitis TIMOTHY, antiphospholipid Ab, ANCA, serum C3, C4, cryoglobulin, SPEP, UPEP, quant immunglobulin levels, Ab SSA/SSB, dsDNA  MRI brain: b/l MCA strokes and concern for vasospasm  #AIS R temporo occipital   5/22 LP done per neurology; f/u CSF studies  Hx Seizure: Levetiracetam 750mg BID (home dose)  Delayed Cerebral Ischemia Management: Serial screening TCDs (poor windows), euvolemia  Pain: Tylenol and oxy PRN   Activity: [] OOB as tolerated [] Bedrest [x] PT [x] OT [] PMNR    PULM:  3L NC, Sat >92%  Incentive spirometry  5/18 Extubated  5/24 CT PE: prelim negative    CV:  SBP goal   5/23 TTE: EF 30%, no LVOT, suggestion of Takotsubo cardiomyopathy, left ventricular function decreased  Daily trops and EKG    RENAL:  Fluids: IVL  Strict I&Os    GI:  Diet: NGT- NPO  GI prophylaxis [x] not indicated [] PPI [] other:  Bowel regimen [x] senna [] other: miralax  LBM: 5/19 5/23 Abdominal xray completed: f/u final read. NGT placed for decompression, SMOG enema given    ENDO:   ISS  Goal euglycemia (-180)  A1C 6.1    HEME/ONC:  VTE prophylaxis: [x] SCDs [x] chemoprophylaxis [] hold chemoprophylaxis due to: [] high risk of DVT/PE on admission due to:  SQL 40mg sc daily  5/22 LED: negative    ID:  Afebrile    MISC:    SOCIAL/FAMILY:  [] awaiting [x] updated at bedside [] family meeting    CODE STATUS:  [x] Full Code [] DNR [] DNI [] Palliative/Comfort Care    DISPOSITION:  [x] ICU [] Stroke Unit [] Floor [] EMU [] RCU [] PCU    [x] Patient is at high risk of neurologic deterioration/death due to:    ASSESSMENT/PLAN: 64F SAH HH2 MF2 S/P angio no vascular malformation but diffuse CVS s/p angiographic CCB   unsure etiology of SAH   PBD:9    NEURO:  Neuro checks q4hrs/ vitals q1hrs  S/P angio no vascular malformation but diffuse CVS s/p angiographic CCB unsure etiology of SAH   Diagnosis: r/o vasculitis TIMOTHY, antiphospholipid Ab, ANCA, serum C3, C4, cryoglobulin, SPEP, UPEP, quant immunglobulin levels, Ab SSA/SSB, dsDNA  MRI brain: b/l MCA strokes and concern for vasospasm  #AIS R temporo occipital   5/22 LP done per neurology; f/u CSF studies  Hx Seizure: Levetiracetam 750mg BID (home dose)  Delayed Cerebral Ischemia Management: Serial screening TCDs (poor windows), euvolemia  Pain: Tylenol and oxy PRN   Activity: [] OOB as tolerated [] Bedrest [x] PT [x] OT [] PMNR    PULM:  RA, Sat >92%  Incentive spirometry  5/18 Extubated  5/24 CT PE: prelim negative    CV:  SBP goal   5/23 TTE: EF 30%, no LVOT, suggestion of Takotsubo cardiomyopathy, left ventricular function decreased  Daily trops and EKG    RENAL:  Fluids: IVL  Strict I&Os    GI:  Diet: NGT- NPO  GI prophylaxis [x] not indicated [] PPI [] other:  Bowel regimen [x] senna [] other: miralax; SMOG enema given  LBM: 5/19 5/23 Abdominal xray completed: f/u final read. NGT placed for decompression    ENDO:   ISS  Goal euglycemia (-180)  A1C 6.1    HEME/ONC:  VTE prophylaxis: [x] SCDs [x] chemoprophylaxis [] hold chemoprophylaxis due to: [] high risk of DVT/PE on admission due to:  SQL 40mg sc daily  5/22 LED: negative    ID:  Afebrile    MISC:    SOCIAL/FAMILY:  [] awaiting [x] updated at bedside [] family meeting    CODE STATUS:  [x] Full Code [] DNR [] DNI [] Palliative/Comfort Care    DISPOSITION:  [x] ICU [] Stroke Unit [] Floor [] EMU [] RCU [] PCU    [x] Patient is at high risk of neurologic deterioration/death due to:

## 2024-05-24 NOTE — PROGRESS NOTE ADULT - SUBJECTIVE AND OBJECTIVE BOX
HPI:  64F PMH Dementia (Polysubstance Abuse, resides in NH) with dyskinesias, HTN, szs on Keppra, alcoholic cirrhosis, tremors, OA presented from [facility] to  w/ AMS & generalized weakness. 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 Started on Keflex for UTI at Nursing Home. Per NH Baseline independent in ADLs, conversant but forgetful and confused, wheelchair bound.    Admission Scores  GCS: 14 HH: 2 MF: 2     HOSPITAL COURSE:  5/15 transferred to [facility] for workup of nontraumatic convexity SAH  5/17 Angiogram showed severe B/L anterior and posterior cerebral vasospasm  5/18 Extubated  5/22 CTA to assess vasospasm, LP done  5/23 Hypotension, tachycardia, desaturated, TTE suggestion of takotsubo cardiomyopathy, abdominal xray shows increased bowel, NGT placed for decompression  5/24 HD stable, no pressor reuirment      REVIEW OF SYSTEMS: [ ] Unable to Assess due to neurologic exam   [ x] All ROS addressed below are non-contributory, except:  Neuro: [ ] Headache [ ] Back pain [ ] Numbness [ ] Weakness [ ] Ataxia [ ] Dizziness [ ] Aphasia [ ] Dysarthria [ ] Visual disturbance  Resp: [ ] Shortness of breath/dyspnea, [ ] Orthopnea [ ] Cough  CV: [ ] Chest pain [ ] Palpitation [ ] Lightheadedness [ ] Syncope  Renal: [ ] Thirst [ ] Edema  GI: [ ] Nausea [ ] Emesis [ ] Abdominal pain [ ] Constipation [ ] Diarrhea  Hem: [ ] Hematemesis [ ] bright red blood per rectum  ID: [ ] Fever [ ] Chills [ ] Dysuria  ENT: [ ] Rhinorrhea      DEVICES:   [ ] Restraints [ ] ET tube [x ] Rt IJ central line [x ] arterial line [ ] lang [x ] NGT/OGT [ ] EVD [ ] LD [ ] STANISLAV/HMV [ ] Trach [ ] PEG [ ] Chest Tube     Vital Signs Last 24 Hrs  T(C): 36.8 (24 May 2024 15:00), Max: 36.8 (24 May 2024 11:00)  T(F): 98.2 (24 May 2024 15:00), Max: 98.2 (24 May 2024 11:00)  HR: 105 (24 May 2024 18:00) (91 - 128)  BP: --  BP(mean): --  RR: 18 (24 May 2024 18:00) (13 - 25)  SpO2: 97% (24 May 2024 18:00) (94% - 100%)    Parameters below as of 24 May 2024 07:00  Patient On (Oxygen Delivery Method): nasal cannula  O2 Flow (L/min): 2    I&O's Summary    23 May 2024 07:01  -  24 May 2024 07:00  --------------------------------------------------------  IN: 1017.2 mL / OUT: 1500 mL / NET: -482.8 mL    24 May 2024 07:01  -  24 May 2024 19:07  --------------------------------------------------------  IN: 440 mL / OUT: 550 mL / NET: -110 mL      LABS:                        10.9   7.19  )-----------( 316      ( 24 May 2024 18:17 )             32.0   05-24    134<L>  |  100  |  7   ----------------------------<  124<H>  3.8   |  24  |  0.45<L>    Ca    9.4      24 May 2024 18:17  Phos  2.2     05-24  Mg     1.9     05-24    TPro  6.9  /  Alb  3.2<L>  /  TBili  0.4  /  DBili  x   /  AST  41<H>  /  ALT  29  /  AlkPhos  121<H>  05-24      MEDICATIONS  (STANDING):  brimonidine 0.2% Ophthalmic Solution 1 Drop(s) Both EYES every 12 hours  carvedilol 3.125 milliGRAM(s) Oral every 12 hours  chlorhexidine 4% Liquid 1 Application(s) Topical <User Schedule>  clonazePAM  Tablet 0.25 milliGRAM(s) Oral two times a day  enoxaparin Injectable 40 milliGRAM(s) SubCutaneous <User Schedule>  folic acid 1 milliGRAM(s) Oral daily  insulin lispro (ADMELOG) corrective regimen sliding scale   SubCutaneous three times a day before meals  latanoprost 0.005% Ophthalmic Solution 1 Drop(s) Both EYES at bedtime  levETIRAcetam  Solution 750 milliGRAM(s) Oral two times a day  LORazepam   Injectable 1 milliGRAM(s) IV Push once  mirtazapine 15 milliGRAM(s) Oral at bedtime  multivitamin 1 Tablet(s) Oral daily  polyethylene glycol 3350 17 Gram(s) Oral every 12 hours  senna 2 Tablet(s) Oral at bedtime  sodium chloride 2 Gram(s) Oral every 8 hours    MEDICATIONS  (PRN):  sodium chloride 0.9% lock flush 10 milliLiter(s) IV Push every 1 hour PRN Pre/post blood products, medications, blood draw, and to maintain line patency        IMAGING:  < from: CT Angio Head w/ IV Cont (05.22.24 @ 18:16) >  IMPRESSION:    Acute bilateral frontal parietal lobe and right temporal occipital lobe   infarctions are redemonstrated. Previously seen subarachnoid hemorrhage   is not demonstrated.Negative CTP. Improved vasospasm involving the   bilateral MCAs and vertebrobasilar circulation. No large vessel occlusion.    < end of copied text >        EXAMINATION:  PHYSICAL EXAM:    Constitutional: No Acute Distress     Neurological: A&O x 3, eyes open spontaneous, PERRL (3mm reactive) gaze midline, follow commands, B/L UE tremor and antigravity. B/L LE 2/5 and wiggles toes    Pulmonary: Clear to Auscultation, No rales, No rhonchi, No wheezes     Cardiovascular: S1, S2, Regular rate and rhythm     Gastrointestinal: Soft, Non-tender, Non-distended     Extremities: No calf tenderness

## 2024-05-24 NOTE — PROGRESS NOTE ADULT - ASSESSMENT
F/U CSF studies from LP     f/u labs for vasculitis, NGTD     CIWA protocol    TCDs - no windows    Rpt TTE 5/26

## 2024-05-24 NOTE — SWALLOW BEDSIDE ASSESSMENT ADULT - SWALLOW EVAL: DIAGNOSIS
Attempted to see patient for bedside swallow evaluation; patient declining PO trials at this time despite encouragement. Will f/u later today, schedule permitting. 65 y/o F with a PMH of dementia with dyskinesias, seizures on keppra, now with SAH and occipital IVH; MR brain showing multiple strokes. Attempted twice to see patient for bedside swallow evaluation today. Pt only accepting x1 tsp of thin puree across both encounters despite encouragement. Unable to assess swallow evaluation/ make diet recommendations given limited patient participation.

## 2024-05-24 NOTE — PROGRESS NOTE ADULT - SUBJECTIVE AND OBJECTIVE BOX
HPI:  64F PMH Dementia (Polysubstance Abuse, resides in NH) with dyskinesias, HTN, szs on Keppra, alcoholic cirrhosis, tremors, OA presented from [facility] to  w/ AMS & generalized weakness. 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 Started on Keflex for UTI at Nursing Home. Per NH Baseline independent in ADLs, conversant but forgetful and confused, wheelchair bound.    Admission Scores  GCS: 14 HH: 2 MF: 2     HOSPITAL COURSE:  5/15 transferred to [facility] for workup of nontraumatic convexity SAH  5/17 Angiogram showed severe B/L anterior and posterior cerebral vasospasm  5/18 Extubated  5/22 CTA to assess vasospasm, LP done  5/23 Hypotension, tachycardia, desaturated, TTE suggestion of takotsubo cardiomyopathy, abdominal xray shows increased bowel, NGT placed for decompression      REVIEW OF SYSTEMS: [ ] Unable to Assess due to neurologic exam   [ x] All ROS addressed below are non-contributory, except:  Neuro: [ ] Headache [ ] Back pain [ ] Numbness [ ] Weakness [ ] Ataxia [ ] Dizziness [ ] Aphasia [ ] Dysarthria [ ] Visual disturbance  Resp: [ ] Shortness of breath/dyspnea, [ ] Orthopnea [ ] Cough  CV: [ ] Chest pain [ ] Palpitation [ ] Lightheadedness [ ] Syncope  Renal: [ ] Thirst [ ] Edema  GI: [ ] Nausea [ ] Emesis [ ] Abdominal pain [ ] Constipation [ ] Diarrhea  Hem: [ ] Hematemesis [ ] bright red blood per rectum  ID: [ ] Fever [ ] Chills [ ] Dysuria  ENT: [ ] Rhinorrhea      DEVICES:   [ ] Restraints [ ] ET tube [x ] Rt IJ central line [x ] arterial line [ ] lang [x ] NGT/OGT [ ] EVD [ ] LD [ ] STANISLAV/HMV [ ] Trach [ ] PEG [ ] Chest Tube     ICU Vital Signs Last 24 Hrs  T(C): 36.6 (23 May 2024 19:00), Max: 37 (22 May 2024 23:00)  T(F): 97.9 (23 May 2024 19:00), Max: 98.6 (22 May 2024 23:00)  HR: 110 (23 May 2024 19:00) (79 - 153)  BP: 148/88 (23 May 2024 05:15) (111/72 - 225/112)  BP(mean): 112 (23 May 2024 05:15) (87 - 161)  ABP: 102/74 (23 May 2024 19:00) (92/68 - 166/114)  ABP(mean): 85 (23 May 2024 19:00) (78 - 269)  RR: 18 (23 May 2024 19:00) (12 - 62)  SpO2: 99% (23 May 2024 19:00) (91% - 100%)    O2 Parameters below as of 23 May 2024 19:00  Patient On (Oxygen Delivery Method): nasal cannula  O2 Flow (L/min): 3    I&O's Summary    22 May 2024 07:01  -  23 May 2024 07:00  --------------------------------------------------------  IN: 3796.5 mL / OUT: 2250 mL / NET: 1546.5 mL    23 May 2024 07:01  -  23 May 2024 20:49  --------------------------------------------------------  IN: 967.2 mL / OUT: 800 mL / NET: 167.2 mL                            10.8   10.34 )-----------( 387      ( 23 May 2024 11:02 )             31.2   05-23    137  |  103  |  6<L>  ----------------------------<  131<H>  3.4<L>   |  20<L>  |  0.46<L>    Ca    9.0      23 May 2024 11:02  Phos  3.0     05-23  Mg     2.0     05-23    TPro  x   /  Alb  x   /  TBili  0.4  /  DBili  x   /  AST  x   /  ALT  x   /  AlkPhos  x   05-22    MEDICATIONS  (STANDING):  brimonidine 0.2% Ophthalmic Solution 1 Drop(s) Both EYES every 12 hours  chlorhexidine 4% Liquid 1 Application(s) Topical <User Schedule>  DOBUTamine Infusion 2.5 MICROgram(s)/kG/Min (5.6 mL/Hr) IV Continuous <Continuous>  enoxaparin Injectable 40 milliGRAM(s) SubCutaneous <User Schedule>  folic acid 1 milliGRAM(s) Oral daily  insulin lispro (ADMELOG) corrective regimen sliding scale   SubCutaneous three times a day before meals  lactated ringers. 500 milliLiter(s) (500 mL/Hr) IV Continuous <Continuous>  latanoprost 0.005% Ophthalmic Solution 1 Drop(s) Both EYES at bedtime  levETIRAcetam  IVPB 750 milliGRAM(s) IV Intermittent every 12 hours  LORazepam   Injectable 1 milliGRAM(s) IV Push once  mirtazapine 15 milliGRAM(s) Oral at bedtime  multivitamin 1 Tablet(s) Oral daily  norepinephrine Infusion 0.21 MICROgram(s)/kG/Min (29.4 mL/Hr) IV Continuous <Continuous>  polyethylene glycol 3350 17 Gram(s) Oral every 12 hours  potassium chloride    Tablet ER 40 milliEquivalent(s) Oral once  senna 2 Tablet(s) Oral at bedtime  sodium chloride 0.9%. 1000 milliLiter(s) (70 mL/Hr) IV Continuous <Continuous>    MEDICATIONS  (PRN):  sodium chloride 0.9% lock flush 10 milliLiter(s) IV Push every 1 hour PRN Pre/post blood products, medications, blood draw, and to maintain line patency      IMAGING:  < from: CT Angio Head w/ IV Cont (05.22.24 @ 18:16) >  IMPRESSION:    Acute bilateral frontal parietal lobe and right temporal occipital lobe   infarctions are redemonstrated. Previously seen subarachnoid hemorrhage   is not demonstrated.Negative CTP. Improved vasospasm involving the   bilateral MCAs and vertebrobasilar circulation. No large vessel occlusion.    < end of copied text >        EXAMINATION:  PHYSICAL EXAM:    Constitutional: No Acute Distress     Neurological: A&O x 3, eyes open spontaneous, PERRL (3mm reactive) gaze midline, follow commands, B/L UE tremor and antigravity. B/L LE 2/5 and wiggles toes    Pulmonary: Clear to Auscultation, No rales, No rhonchi, No wheezes     Cardiovascular: S1, S2, Regular rate and rhythm     Gastrointestinal: Soft, Non-tender, Non-distended     Extremities: No calf tenderness          HPI:  64F PMH Dementia (Polysubstance Abuse, resides in NH) with dyskinesias, HTN, szs on Keppra, alcoholic cirrhosis, tremors, OA presented from [facility] to  w/ AMS & generalized weakness. 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 Started on Keflex for UTI at Nursing Home. Per NH Baseline independent in ADLs, conversant but forgetful and confused, wheelchair bound.    Admission Scores  GCS: 14 HH: 2 MF: 2     HOSPITAL COURSE:  5/15 transferred to [facility] for workup of nontraumatic convexity SAH  5/17 Angiogram showed severe B/L anterior and posterior cerebral vasospasm  5/18 Extubated  5/22 CTA to assess vasospasm, LP done  5/23 Hypotension, tachycardia, desaturated, TTE suggestion of takotsubo cardiomyopathy, abdominal xray shows increased bowel, NGT placed for decompression  5/24: HD stable, no pressor requirement       REVIEW OF SYSTEMS: [ ] Unable to Assess due to neurologic exam   [ x] All ROS addressed below are non-contributory, except:  Neuro: [ ] Headache [ ] Back pain [ ] Numbness [ ] Weakness [ ] Ataxia [ ] Dizziness [ ] Aphasia [ ] Dysarthria [ ] Visual disturbance  Resp: [ ] Shortness of breath/dyspnea, [ ] Orthopnea [ ] Cough  CV: [ ] Chest pain [ ] Palpitation [ ] Lightheadedness [ ] Syncope  Renal: [ ] Thirst [ ] Edema  GI: [ ] Nausea [ ] Emesis [ ] Abdominal pain [ ] Constipation [ ] Diarrhea  Hem: [ ] Hematemesis [ ] bright red blood per rectum  ID: [ ] Fever [ ] Chills [ ] Dysuria  ENT: [ ] Rhinorrhea      DEVICES:   [ ] Restraints [ ] ET tube [x ] Rt IJ central line [x ] arterial line [ ] lang [x ] NGT/OGT [ ] EVD [ ] LD [ ] STANISLAV/HMV [ ] Trach [ ] PEG [ ] Chest Tube     ICU Vital Signs Last 24 Hrs  T(C): 36.6 (23 May 2024 19:00), Max: 37 (22 May 2024 23:00)  T(F): 97.9 (23 May 2024 19:00), Max: 98.6 (22 May 2024 23:00)  HR: 110 (23 May 2024 19:00) (79 - 153)  BP: 148/88 (23 May 2024 05:15) (111/72 - 225/112)  BP(mean): 112 (23 May 2024 05:15) (87 - 161)  ABP: 102/74 (23 May 2024 19:00) (92/68 - 166/114)  ABP(mean): 85 (23 May 2024 19:00) (78 - 269)  RR: 18 (23 May 2024 19:00) (12 - 62)  SpO2: 99% (23 May 2024 19:00) (91% - 100%)    O2 Parameters below as of 23 May 2024 19:00  Patient On (Oxygen Delivery Method): nasal cannula  O2 Flow (L/min): 3    I&O's Summary    22 May 2024 07:01  -  23 May 2024 07:00  --------------------------------------------------------  IN: 3796.5 mL / OUT: 2250 mL / NET: 1546.5 mL    23 May 2024 07:01  -  23 May 2024 20:49  --------------------------------------------------------  IN: 967.2 mL / OUT: 800 mL / NET: 167.2 mL                            10.8   10.34 )-----------( 387      ( 23 May 2024 11:02 )             31.2   05-23    137  |  103  |  6<L>  ----------------------------<  131<H>  3.4<L>   |  20<L>  |  0.46<L>    Ca    9.0      23 May 2024 11:02  Phos  3.0     05-23  Mg     2.0     05-23    TPro  x   /  Alb  x   /  TBili  0.4  /  DBili  x   /  AST  x   /  ALT  x   /  AlkPhos  x   05-22    MEDICATIONS  (STANDING):  brimonidine 0.2% Ophthalmic Solution 1 Drop(s) Both EYES every 12 hours  chlorhexidine 4% Liquid 1 Application(s) Topical <User Schedule>  DOBUTamine Infusion 2.5 MICROgram(s)/kG/Min (5.6 mL/Hr) IV Continuous <Continuous>  enoxaparin Injectable 40 milliGRAM(s) SubCutaneous <User Schedule>  folic acid 1 milliGRAM(s) Oral daily  insulin lispro (ADMELOG) corrective regimen sliding scale   SubCutaneous three times a day before meals  lactated ringers. 500 milliLiter(s) (500 mL/Hr) IV Continuous <Continuous>  latanoprost 0.005% Ophthalmic Solution 1 Drop(s) Both EYES at bedtime  levETIRAcetam  IVPB 750 milliGRAM(s) IV Intermittent every 12 hours  LORazepam   Injectable 1 milliGRAM(s) IV Push once  mirtazapine 15 milliGRAM(s) Oral at bedtime  multivitamin 1 Tablet(s) Oral daily  norepinephrine Infusion 0.21 MICROgram(s)/kG/Min (29.4 mL/Hr) IV Continuous <Continuous>  polyethylene glycol 3350 17 Gram(s) Oral every 12 hours  potassium chloride    Tablet ER 40 milliEquivalent(s) Oral once  senna 2 Tablet(s) Oral at bedtime  sodium chloride 0.9%. 1000 milliLiter(s) (70 mL/Hr) IV Continuous <Continuous>    MEDICATIONS  (PRN):  sodium chloride 0.9% lock flush 10 milliLiter(s) IV Push every 1 hour PRN Pre/post blood products, medications, blood draw, and to maintain line patency      IMAGING:  < from: CT Angio Head w/ IV Cont (05.22.24 @ 18:16) >  IMPRESSION:    Acute bilateral frontal parietal lobe and right temporal occipital lobe   infarctions are redemonstrated. Previously seen subarachnoid hemorrhage   is not demonstrated.Negative CTP. Improved vasospasm involving the   bilateral MCAs and vertebrobasilar circulation. No large vessel occlusion.    < end of copied text >        EXAMINATION:  PHYSICAL EXAM:    Constitutional: No Acute Distress     Neurological: A&O x 3, eyes open spontaneous, PERRL (3mm reactive) gaze midline, follow commands, B/L UE tremor and antigravity. B/L LE 2/5 and wiggles toes    Pulmonary: Clear to Auscultation, No rales, No rhonchi, No wheezes     Cardiovascular: S1, S2, Regular rate and rhythm     Gastrointestinal: Soft, Non-tender, Non-distended     Extremities: No calf tenderness

## 2024-05-24 NOTE — SWALLOW BEDSIDE ASSESSMENT ADULT - SLP PERTINENT HISTORY OF CURRENT PROBLEM
64F pmh Dementia (Polysubstance Abuse, resides in NH) with dyskinesias, HTN, szs on Keppra, alcoholic cirrhosis, tremors, OA presented from [facility] to  w/ AMS & generalized weakness. 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 Started on Keflex for UTI at Nursing Home. Per NH [place] independent in ADLs, conversant but forgetful and confused, wheelchair bound. MR brain showed bilateral MCA and R PCA infarct. Acute infarct in the R basal ganglia and L cerebellum.

## 2024-05-24 NOTE — PROGRESS NOTE ADULT - ASSESSMENT
64F pmh Dementia (Polysubstance Abuse, resides in NH) with dyskinesias, HTN, szs on Keppra, alcoholic cirrhosis, tremors, OA presented from [facility] to  w/ AMS & generalized weakness.   CTH w/ b/l occipital sulcal SAH and small IVH,   CTA with R V4 5mm fusiform aneurysm and severe stenosis of BA and diminutive distal cerebral arteries.   s/p 5/17 Angio: severe b/l anterior and posterior cerebral vasospasm    # SAH  , angio no vascular malformation but diffuse CVS s/p angiographic CCB   s/p 5/17 Angio: severe b/l anterior and posterior cerebral vasospasm (given IA verapamil and milrinone)  MRI brain: b/l MCA strokes and concern for vasospasm   continue keppra 750 mg BID      # Elevated Troponins  No chest pain or ECG abnormalities nored  TTE preserved LV Function  Likely demand  Repeat TTE-5/23-EF 30%-The entire apex, mid and apical anterior wall, mid and apical anterior septum, mid and apical inferior septum, mid and apical inferior wall, mid inferolateral segment, and mid anterolateral segment are akinetic.  This is consistent with stress induced cardiomyopathy-Takotsubo cardiomyopathy.  No indication for coronary intervention-hopefully  LVEF will recover with supportive therapy clinically not in Heart failure albeit may need IV lasix if any signs of pulmonary congestion  Is off pressors started on carvedilol if BP allows can add Captopril 6.25mg BID

## 2024-05-24 NOTE — SWALLOW BEDSIDE ASSESSMENT ADULT - COMMENTS
HOSPITAL COURSE:  5/15 transferred to [facility] for workup of nontraumatic convexity SAH  5/17 Angiogram showed severe B/L anterior and posterior cerebral vasospasm  5/18 Extubated  5/22 CTA to assess vasospasm, LP done  5/23 Hypotension, tachycardia, desaturated, TTE suggestion of takotsubo cardiomyopathy, abdominal xray shows increased bowel, NGT placed for decompression    Orders received for bedside swallow evaluation; per discussion with KIMBERLEE Ayers pt with episode of vomitting/ trouble swallowing on 5/23, NGT also placed for decompression but now resolved - patient okay to see for swallow evaluation/ eat & drink. HOSPITAL COURSE:  5/15 transferred to [facility] for workup of nontraumatic convexity SAH  5/17 Angiogram showed severe B/L anterior and posterior cerebral vasospasm  5/18 Extubated  5/22 CTA to assess vasospasm, LP done  5/23 Hypotension, tachycardia, desaturated, TTE suggestion of takotsubo cardiomyopathy, abdominal xray shows increased bowel, NGT placed for decompression    Orders received for bedside swallow evaluation; per discussion with KIMBERLEE Ayers pt with episode of vomiting/ trouble swallowing on 5/23, NGT also placed for decompression but now resolved - patient okay to see for swallow evaluation/ eat & drink.

## 2024-05-24 NOTE — PROGRESS NOTE ADULT - SUBJECTIVE AND OBJECTIVE BOX
Patient seen and examined at bedside.    --Anticoagulation--    T(C): 36.6 (05-23-24 @ 23:00), Max: 36.7 (05-23-24 @ 07:00)  HR: 92 (05-24-24 @ 03:00) (79 - 153)  BP: 148/88 (05-23-24 @ 05:15) (148/88 - 166/101)  RR: 13 (05-24-24 @ 03:00) (12 - 22)  SpO2: 100% (05-24-24 @ 03:00) (91% - 100%)  Wt(kg): --    Exam: Arousable, Ox3, EOS, pupils 3R sluggish, R gaze can come midline gaze, FC, BUE AG, BUE tremor, BLE 2/5

## 2024-05-24 NOTE — PROGRESS NOTE ADULT - SUBJECTIVE AND OBJECTIVE BOX
MR#[id number]  PATIENT NAME:ENDY ASHLEY    DATE OF SERVICE: 05-24-24 @ 06:02  Patient was seen and examined by Jayy Rubin MD on    05-24-24 @ 06:02 .  Interim events noted.Consultant notes ,Labs,Telemetry reviewed by me       HOSPITAL COURSE: HPI:  Mera Ashley  64F, pmh HTN, szs on Keppra, alcoholic cirrhosis, tremors, OA presented from [facility] to OSH w/ generalized weakness found to have b/l sulcal SAH and small occipital IVH. 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 She was recently started on Keflex for UTI. Plts 400, Coags wnl. , AST/ALT = 38/104. WBC 13. Afebrile. Exam: lethargic appearing, EOV, FC, AO3, PERRL, not compliant with EOMs, BUE 5/5, BLE 2/5. (15 May 2024 03:22)      CTH w/ b/l occipital sulcal SAH and small IVH,   CTA with R V4 5mm fusiform aneurysm and severe stenosis of BA and diminutive distal cerebral arteries.   Started on Keflex for UTI at Nursing Home.   Per NH Baseline independent in ADLs, conversant but forgetful and confused, wheelchair bound.        INTERIM EVENTS:Patient seen at bedside ,interim events noted.  5/15 transferred to [facility] for workup of nontraumatic convexity SAH  05/22: CTA  to assess vasospasm   05/23-On Levo to augment BP noted elevated troponins TTE -EF 30%  -suggestive of Takotsubo cardiomyopathy.  5/2453-Psyarvyeg-sm off Levo had NGT placed       MEDICATIONS  (STANDING):  brimonidine 0.2% Ophthalmic Solution 1 Drop(s) Both EYES every 12 hours  carvedilol 3.125 milliGRAM(s) Oral every 12 hours  chlorhexidine 4% Liquid 1 Application(s) Topical <User Schedule>  enoxaparin Injectable 40 milliGRAM(s) SubCutaneous <User Schedule>  folic acid 1 milliGRAM(s) Oral daily  insulin lispro (ADMELOG) corrective regimen sliding scale   SubCutaneous three times a day before meals  latanoprost 0.005% Ophthalmic Solution 1 Drop(s) Both EYES at bedtime  levETIRAcetam  IVPB 750 milliGRAM(s) IV Intermittent every 12 hours  LORazepam   Injectable 1 milliGRAM(s) IV Push once  mirtazapine 15 milliGRAM(s) Oral at bedtime  multivitamin 1 Tablet(s) Oral daily  polyethylene glycol 3350 17 Gram(s) Oral every 12 hours  senna 2 Tablet(s) Oral at bedtime  sodium chloride 2 Gram(s) Oral every 8 hours    MEDICATIONS  (PRN):  sodium chloride 0.9% lock flush 10 milliLiter(s) IV Push every 1 hour PRN Pre/post blood products, medications, blood draw, and to maintain line patency            REVIEW OF SYSTEMS:  Constitutional: [ ] fever, [ ]weight loss,  [ ]fatigue [ ]weight gain  Eyes: [ ] visual changes  Respiratory: [ ]shortness of breath;  [ ] cough, [ ]wheezing, [ ]chills, [ ]hemoptysis  Cardiovascular: [ ] chest pain, [ ]palpitations, [ ]dizziness,  [ ]leg swelling[ ]orthopnea[ ]PND  Gastrointestinal: [ ] abdominal pain, [ ]nausea, [ ]vomiting,  [ ]diarrhea [ ]Constipation [ ]Melena  Genitourinary: [ ] dysuria, [ ] hematuria [ ]Zamarripa  Neurologic: [ ] headaches [ ] tremors[ ]weakness [ ]Paralysis Right[ ] Left[ ]  Skin: [ ] itching, [ ]burning, [ ] rashes  Endocrine: [ ] heat or cold intolerance  Musculoskeletal: [ ] joint pain or swelling; [ ] muscle, back, or extremity pain  Psychiatric: [ ] depression, [ ]anxiety, [ ]mood swings, or [ ]difficulty sleeping  Hematologic: [ ] easy bruising, [ ] bleeding gums    [ ] All remaining systems negative except as per above.   [ x]Unable to obtain.  [ ] No change in ROS since admission      Vital Signs Last 24 Hrs  T(C): 36.5 (24 May 2024 03:00), Max: 36.7 (23 May 2024 07:00)  T(F): 97.7 (24 May 2024 03:00), Max: 98 (23 May 2024 07:00)  HR: 101 (24 May 2024 05:00) (92 - 153)  BP: --114/75  RR: 20 (24 May 2024 05:00) (12 - 22)  SpO2: 100% (24 May 2024 05:00) (91% - 100%)    Parameters below as of 24 May 2024 03:00  Patient On (Oxygen Delivery Method): nasal cannula  O2 Flow (L/min): 2    I&O's Summary    22 May 2024 07:01  -  23 May 2024 07:00  --------------------------------------------------------  IN: 3796.5 mL / OUT: 2250 mL / NET: 1546.5 mL    23 May 2024 07:01  -  24 May 2024 06:02  --------------------------------------------------------  IN: 1017.2 mL / OUT: 1500 mL / NET: -482.8 mL        PHYSICAL EXAM:  General: No acute distress BMI-28  HEENT: EOMI, PERRL  Neck: Supple, [ ] JVD  Lungs: Equal air entry bilaterally; [ ] rales [ ] wheezing [ ] rhonchi  Heart: Regular rate and rhythm; [x ] murmur   2/6 [ x] systolic [ ] diastolic [ ] radiation[ ] rubs [ ]  gallops  Abdomen: Nontender, bowel sounds present  Extremities: No clubbing, cyanosis, [ ] edema [ ]Pulses  equal and intact  Nervous system:  Alert & Oriented X0,   Psychiatric: Normal affect  Skin: No rashes or lesions    LABS:  05-24    134<L>  |  100  |  5<L>  ----------------------------<  106<H>  3.6   |  22  |  0.45<L>    Ca    9.3      24 May 2024 05:19  Phos  3.1     05-24  Mg     1.8     05-24    TPro  6.8  /  Alb  3.4  /  TBili  0.4  /  DBili  <0.1  /  AST  47<H>  /  ALT  34  /  AlkPhos  127<H>  05-23    Creatinine Trend: 0.45<--, 0.40<--, 0.46<--, 0.48<--, 0.42<--, 0.42<--                        11.1   8.52  )-----------( 306      ( 24 May 2024 05:19 )             32.8     PT/INR - ( 22 May 2024 06:25 )   PT: 14.2 sec;   INR: 1.30 ratio         TTE W or WO Ultrasound Enhancing Agent (05.23.24 @ 13:58) >  CONCLUSIONS:      1. Left ventricular systolic function is severely decreased with an ejection fraction of 30 % by Sanchez's method of disks.   2. Sigmoid septum noted. No LVOT obstruction.   3. There is no evidence of a left ventricular thrombus.   4. Multiple segmental abnormalities exist.The entire apex, mid and apical anterior wall, mid and apical anterior septum, mid and apical inferior septum, mid and apical inferior wall, mid inferolateral segment, and mid anterolateral segment are akinetic.   5. Findings are suggestive of Takotsubo cardiomyopathy.   6. Normal right ventricular cavity size, with normal wall thickness, and normal systolic function.   7. Mild to moderate tricuspid regurgitation.   8. Moderate mitral regurgitation.   9. No pericardial effusion seen.  10. Compared to the transthoracic echocardiogram performed on 5/17/2024, Left ventricular function is significant decreased. MR and TR are getting worser.. Findings were discussed with Dr. Jonnathan Swan on 5/23/2024.  11. No significant valvular disease.          12 Lead ECG (05.23.24 @ 08:12) >  SINUS TACHYCARDIA  SEPTAL INFARCT (CITED ON OR BEFORE 22-MAY-2024)  ABNORMAL ECG       CT Angio Chest PE Protocol w/ IV Cont (05.23.24 @ 23:12) >  IMPRESSION:  Prelim:  No pulmonary embolism to the level of the segmental pulmonary arteries.        Xray Chest 1 View- PORTABLE-Urgent (Xray Chest 1 View- PORTABLE-Urgent .) (05.23.24 @ 11:38) >  FINDINGS:  Right central venous catheter with tip in SVC.  Increased interstitial lung markings.  No pneumothorax.  Trace bilateral pleural effusions with adjacent passive atelectasis.  The heart is normal in size  The visualized osseous structures demonstrate no acute pathology.    IMPRESSION:  Mild, central pulmonary venous congestion, new  Trace bilateral pleural effusions with adjacent passive atelectasis.

## 2024-05-24 NOTE — SWALLOW BEDSIDE ASSESSMENT ADULT - SLP GENERAL OBSERVATIONS
Pt encountered in bed, awake/alert, on room air. +Dyskinesias. Ox2 (self/place). Large bore NGT in place. Pt following some simple directives.

## 2024-05-24 NOTE — PROGRESS NOTE ADULT - ASSESSMENT
ASSESSMENT/PLAN: 64F SAH HH2 MF2 S/P angio no vascular malformation but diffuse CVS s/p angiographic CCB   unsure etiology of SAH   PBD:8    NEURO:  Neuro checks q2hrs/ vitals q1hrs  S/P angio no vascular malformation but diffuse CVS s/p angiographic CCB unsure etiology of SAH   Diagnosis: r/o vasculitis TIMOTHY, antiphospholipid Ab, ANCA, serum C3, C4, cryoglobulin, SPEP, UPEP, quant immunglobulin levels, Ab SSA/SSB, dsDNA  MRI brain: b/l MCA strokes and concern for vasospasm  #AIS R temporo occipital   5/22 LP done per neurology; f/u CSF studies  Hx Seizure: Levetiracetam 750mg BID (home dose)  Delayed Cerebral Ischemia Management: Serial screening TCDs (poor windows), euvolemia  Pain: Tylenol and oxy PRN   Activity: [] OOB as tolerated [] Bedrest [x] PT [x] OT [] PMNR    PULM:  3L NC, Sat >92%  Incentive spirometry  5/18 Extubated  5/24 CT PE: prelim negative    CV:  SBP goal   5/23 TTE: EF 30%, no LVOT, suggestion of Takotsubo cardiomyopathy, left ventricular function decreased  Daily trops and EKG    RENAL:  Fluids: IVL  Strict I&Os    GI:  Diet: NGT- NPO  GI prophylaxis [x] not indicated [] PPI [] other:  Bowel regimen [x] senna [] other: miralax  LBM: 5/19 5/23 Abdominal xray completed: f/u final read. NGT placed for decompression    ENDO:   ISS  Goal euglycemia (-180)  A1C 6.1    HEME/ONC:  VTE prophylaxis: [x] SCDs [x] chemoprophylaxis [] hold chemoprophylaxis due to: [] high risk of DVT/PE on admission due to:  SQL 40mg sc daily  5/22 LED: negative    ID:  Afebrile    MISC:    SOCIAL/FAMILY:  [] awaiting [x] updated at bedside [] family meeting    CODE STATUS:  [x] Full Code [] DNR [] DNI [] Palliative/Comfort Care    DISPOSITION:  [x] ICU [] Stroke Unit [] Floor [] EMU [] RCU [] PCU    [x] Patient is at high risk of neurologic deterioration/death due to:    ASSESSMENT/PLAN: 64F SAH HH2 MF2 S/P angio no vascular malformation but diffuse CVS s/p angiographic CCB   unsure etiology of SAH   PBD:8    NEURO:  Neuro checks q4hrs/ vitals q1hrs  S/P angio no vascular malformation but diffuse CVS s/p angiographic CCB unsure etiology of SAH   Diagnosis: r/o vasculitis TIMOTHY, antiphospholipid Ab, ANCA, serum C3, C4, cryoglobulin, SPEP, UPEP, quant immunglobulin levels, Ab SSA/SSB, dsDNA  MRI brain: b/l MCA strokes and concern for vasospasm  #AIS R temporo occipital   5/22 LP done per neurology; f/u CSF studies  Hx Seizure: Levetiracetam 750mg BID (home dose)  Delayed Cerebral Ischemia Management: Serial screening TCDs (poor windows), euvolemia  Pain: Tylenol and oxy PRN   Activity: [] OOB as tolerated [] Bedrest [x] PT [x] OT [] PMNR    PULM:  3L NC, Sat >92%, plan to wean off  Incentive spirometry  5/18 Extubated  5/24 CT PE: prelim negative    CV:  SBP goal   5/23 TTE: EF 30%, no LVOT, suggestion of Takotsubo cardiomyopathy, left ventricular function decreased  Daily trops and EKG    RENAL:  Fluids: IVL  Strict I&Os    GI:  Diet: NGT, pending SLP  GI prophylaxis [x] not indicated [] PPI [] other:  Bowel regimen [x] senna [] other: miralax  LBM: 5/19 5/23 Abdominal xray completed: distention. NGT placed for decompression  SMOG enema today    ENDO:   ISS  Goal euglycemia (-180)  A1C 6.1    HEME/ONC:  VTE prophylaxis: [x] SCDs [x] chemoprophylaxis [] hold chemoprophylaxis due to: [] high risk of DVT/PE on admission due to:  SQL 40mg sc daily  5/22 LED: negative    ID:  Afebrile    MISC:    SOCIAL/FAMILY:  [] awaiting [x] updated at bedside [] family meeting    CODE STATUS:  [x] Full Code [] DNR [] DNI [] Palliative/Comfort Care    DISPOSITION:  [x] ICU [] Stroke Unit [] Floor [] EMU [] RCU [] PCU    [x] Patient is at high risk of neurologic deterioration/death due to:

## 2024-05-25 DIAGNOSIS — F03.90 UNSPECIFIED DEMENTIA, UNSPECIFIED SEVERITY, WITHOUT BEHAVIORAL DISTURBANCE, PSYCHOTIC DISTURBANCE, MOOD DISTURBANCE, AND ANXIETY: ICD-10-CM

## 2024-05-25 DIAGNOSIS — F19.10 OTHER PSYCHOACTIVE SUBSTANCE ABUSE, UNCOMPLICATED: ICD-10-CM

## 2024-05-25 DIAGNOSIS — K70.30 ALCOHOLIC CIRRHOSIS OF LIVER WITHOUT ASCITES: ICD-10-CM

## 2024-05-25 DIAGNOSIS — I60.9 NONTRAUMATIC SUBARACHNOID HEMORRHAGE, UNSPECIFIED: ICD-10-CM

## 2024-05-25 DIAGNOSIS — I51.81 TAKOTSUBO SYNDROME: ICD-10-CM

## 2024-05-25 DIAGNOSIS — Z29.9 ENCOUNTER FOR PROPHYLACTIC MEASURES, UNSPECIFIED: ICD-10-CM

## 2024-05-25 LAB
GLUCOSE BLDC GLUCOMTR-MCNC: 105 MG/DL — HIGH (ref 70–99)
GLUCOSE BLDC GLUCOMTR-MCNC: 109 MG/DL — HIGH (ref 70–99)
GLUCOSE BLDC GLUCOMTR-MCNC: 90 MG/DL — SIGNIFICANT CHANGE UP (ref 70–99)
GLUCOSE BLDC GLUCOMTR-MCNC: 92 MG/DL — SIGNIFICANT CHANGE UP (ref 70–99)
TROPONIN T, HIGH SENSITIVITY RESULT: 147 NG/L — HIGH (ref 0–51)

## 2024-05-25 PROCEDURE — 99233 SBSQ HOSP IP/OBS HIGH 50: CPT

## 2024-05-25 PROCEDURE — 93010 ELECTROCARDIOGRAM REPORT: CPT

## 2024-05-25 RX ADMIN — Medication 100 GRAM(S): at 05:18

## 2024-05-25 RX ADMIN — SODIUM CHLORIDE 2 GRAM(S): 9 INJECTION INTRAMUSCULAR; INTRAVENOUS; SUBCUTANEOUS at 21:28

## 2024-05-25 RX ADMIN — Medication 1 MILLIGRAM(S): at 11:39

## 2024-05-25 RX ADMIN — CARVEDILOL PHOSPHATE 3.12 MILLIGRAM(S): 80 CAPSULE, EXTENDED RELEASE ORAL at 05:19

## 2024-05-25 RX ADMIN — CARVEDILOL PHOSPHATE 3.12 MILLIGRAM(S): 80 CAPSULE, EXTENDED RELEASE ORAL at 17:29

## 2024-05-25 RX ADMIN — POLYETHYLENE GLYCOL 3350 17 GRAM(S): 17 POWDER, FOR SOLUTION ORAL at 17:30

## 2024-05-25 RX ADMIN — LEVETIRACETAM 750 MILLIGRAM(S): 250 TABLET, FILM COATED ORAL at 17:30

## 2024-05-25 RX ADMIN — POLYETHYLENE GLYCOL 3350 17 GRAM(S): 17 POWDER, FOR SOLUTION ORAL at 05:20

## 2024-05-25 RX ADMIN — Medication 1 ENEMA: at 08:00

## 2024-05-25 RX ADMIN — CAPTOPRIL 6.25 MILLIGRAM(S): 12.5 TABLET ORAL at 17:29

## 2024-05-25 RX ADMIN — SODIUM CHLORIDE 2 GRAM(S): 9 INJECTION INTRAMUSCULAR; INTRAVENOUS; SUBCUTANEOUS at 13:08

## 2024-05-25 RX ADMIN — Medication 0.25 MILLIGRAM(S): at 05:19

## 2024-05-25 RX ADMIN — Medication 0.25 MILLIGRAM(S): at 17:29

## 2024-05-25 RX ADMIN — LATANOPROST 1 DROP(S): 0.05 SOLUTION/ DROPS OPHTHALMIC; TOPICAL at 21:28

## 2024-05-25 RX ADMIN — BRIMONIDINE TARTRATE 1 DROP(S): 2 SOLUTION/ DROPS OPHTHALMIC at 05:20

## 2024-05-25 RX ADMIN — MIRTAZAPINE 15 MILLIGRAM(S): 45 TABLET, ORALLY DISINTEGRATING ORAL at 21:28

## 2024-05-25 RX ADMIN — SODIUM CHLORIDE 2 GRAM(S): 9 INJECTION INTRAMUSCULAR; INTRAVENOUS; SUBCUTANEOUS at 05:19

## 2024-05-25 RX ADMIN — ENOXAPARIN SODIUM 40 MILLIGRAM(S): 100 INJECTION SUBCUTANEOUS at 17:29

## 2024-05-25 RX ADMIN — CHLORHEXIDINE GLUCONATE 1 APPLICATION(S): 213 SOLUTION TOPICAL at 06:46

## 2024-05-25 RX ADMIN — CAPTOPRIL 6.25 MILLIGRAM(S): 12.5 TABLET ORAL at 05:19

## 2024-05-25 RX ADMIN — BRIMONIDINE TARTRATE 1 DROP(S): 2 SOLUTION/ DROPS OPHTHALMIC at 17:28

## 2024-05-25 RX ADMIN — LEVETIRACETAM 750 MILLIGRAM(S): 250 TABLET, FILM COATED ORAL at 05:20

## 2024-05-25 RX ADMIN — Medication 1 TABLET(S): at 11:39

## 2024-05-25 NOTE — PROGRESS NOTE ADULT - SUBJECTIVE AND OBJECTIVE BOX
HPI:  64F PMH Dementia (Polysubstance Abuse, resides in NH) with dyskinesias, HTN, szs on Keppra, alcoholic cirrhosis, tremors, OA presented from [facility] to  w/ AMS & generalized weakness. 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 Started on Keflex for UTI at Nursing Home. Per NH Baseline independent in ADLs, conversant but forgetful and confused, wheelchair bound.    Admission Scores  GCS: 14 HH: 2 MF: 2     HOSPITAL COURSE:  5/15 transferred to [facility] for workup of nontraumatic convexity SAH  5/17 Angiogram showed severe B/L anterior and posterior cerebral vasospasm  5/18 Extubated  5/22 CTA to assess vasospasm, LP done  5/23 Hypotension, tachycardia, desaturated, TTE suggestion of takotsubo cardiomyopathy, abdominal xray shows increased bowel, NGT placed for decompression  5/24 HD stable, no pressor reuirment      REVIEW OF SYSTEMS: [ ] Unable to Assess due to neurologic exam   [ x] All ROS addressed below are non-contributory, except:  Neuro: [ ] Headache [ ] Back pain [ ] Numbness [ ] Weakness [ ] Ataxia [ ] Dizziness [ ] Aphasia [ ] Dysarthria [ ] Visual disturbance  Resp: [ ] Shortness of breath/dyspnea, [ ] Orthopnea [ ] Cough  CV: [ ] Chest pain [ ] Palpitation [ ] Lightheadedness [ ] Syncope  Renal: [ ] Thirst [ ] Edema  GI: [ ] Nausea [ ] Emesis [ ] Abdominal pain [ ] Constipation [ ] Diarrhea  Hem: [ ] Hematemesis [ ] bright red blood per rectum  ID: [ ] Fever [ ] Chills [ ] Dysuria  ENT: [ ] Rhinorrhea      DEVICES:   [ ] Restraints [ ] ET tube [x ] Rt IJ central line [x ] arterial line [ ] lang [x ] NGT/OGT [ ] EVD [ ] LD [ ] STANISLAV/HMV [ ] Trach [ ] PEG [ ] Chest Tube     Vital Signs Last 24 Hrs  T(C): 36.8 (24 May 2024 15:00), Max: 36.8 (24 May 2024 11:00)  T(F): 98.2 (24 May 2024 15:00), Max: 98.2 (24 May 2024 11:00)  HR: 105 (24 May 2024 18:00) (91 - 128)  BP: --  BP(mean): --  RR: 18 (24 May 2024 18:00) (13 - 25)  SpO2: 97% (24 May 2024 18:00) (94% - 100%)    Parameters below as of 24 May 2024 07:00  Patient On (Oxygen Delivery Method): nasal cannula  O2 Flow (L/min): 2    I&O's Summary    23 May 2024 07:01  -  24 May 2024 07:00  --------------------------------------------------------  IN: 1017.2 mL / OUT: 1500 mL / NET: -482.8 mL    24 May 2024 07:01  -  24 May 2024 19:07  --------------------------------------------------------  IN: 440 mL / OUT: 550 mL / NET: -110 mL      LABS:                        10.9   7.19  )-----------( 316      ( 24 May 2024 18:17 )             32.0   05-24    134<L>  |  100  |  7   ----------------------------<  124<H>  3.8   |  24  |  0.45<L>    Ca    9.4      24 May 2024 18:17  Phos  2.2     05-24  Mg     1.9     05-24    TPro  6.9  /  Alb  3.2<L>  /  TBili  0.4  /  DBili  x   /  AST  41<H>  /  ALT  29  /  AlkPhos  121<H>  05-24      MEDICATIONS  (STANDING):  brimonidine 0.2% Ophthalmic Solution 1 Drop(s) Both EYES every 12 hours  carvedilol 3.125 milliGRAM(s) Oral every 12 hours  chlorhexidine 4% Liquid 1 Application(s) Topical <User Schedule>  clonazePAM  Tablet 0.25 milliGRAM(s) Oral two times a day  enoxaparin Injectable 40 milliGRAM(s) SubCutaneous <User Schedule>  folic acid 1 milliGRAM(s) Oral daily  insulin lispro (ADMELOG) corrective regimen sliding scale   SubCutaneous three times a day before meals  latanoprost 0.005% Ophthalmic Solution 1 Drop(s) Both EYES at bedtime  levETIRAcetam  Solution 750 milliGRAM(s) Oral two times a day  LORazepam   Injectable 1 milliGRAM(s) IV Push once  mirtazapine 15 milliGRAM(s) Oral at bedtime  multivitamin 1 Tablet(s) Oral daily  polyethylene glycol 3350 17 Gram(s) Oral every 12 hours  senna 2 Tablet(s) Oral at bedtime  sodium chloride 2 Gram(s) Oral every 8 hours    MEDICATIONS  (PRN):  sodium chloride 0.9% lock flush 10 milliLiter(s) IV Push every 1 hour PRN Pre/post blood products, medications, blood draw, and to maintain line patency        IMAGING:  < from: CT Angio Head w/ IV Cont (05.22.24 @ 18:16) >  IMPRESSION:    Acute bilateral frontal parietal lobe and right temporal occipital lobe   infarctions are redemonstrated. Previously seen subarachnoid hemorrhage   is not demonstrated.Negative CTP. Improved vasospasm involving the   bilateral MCAs and vertebrobasilar circulation. No large vessel occlusion.    < end of copied text >        EXAMINATION:  PHYSICAL EXAM:    Constitutional: No Acute Distress     Neurological: A&O x 3, eyes open spontaneous, PERRL (3mm reactive) gaze midline, follow commands, B/L UE tremor and antigravity. B/L LE 2/5 and wiggles toes    Pulmonary: Clear to Auscultation, No rales, No rhonchi, No wheezes     Cardiovascular: S1, S2, Regular rate and rhythm     Gastrointestinal: Soft, Non-tender, Non-distended     Extremities: No calf tenderness

## 2024-05-25 NOTE — PROGRESS NOTE ADULT - SUBJECTIVE AND OBJECTIVE BOX
[facility] Division of Hospital Medicine  Sandra Gonzalez MD  MS Teams PREFERRED        SUBJECTIVE / OVERNIGHT EVENTS:  ADDITIONAL REVIEW OF SYSTEMS:    MEDICATIONS  (STANDING):  brimonidine 0.2% Ophthalmic Solution 1 Drop(s) Both EYES every 12 hours  captopril 6.25 milliGRAM(s) Oral every 12 hours  carvedilol 3.125 milliGRAM(s) Oral every 12 hours  clonazePAM  Tablet 0.25 milliGRAM(s) Oral two times a day  enoxaparin Injectable 40 milliGRAM(s) SubCutaneous <User Schedule>  folic acid 1 milliGRAM(s) Oral daily  insulin lispro (ADMELOG) corrective regimen sliding scale   SubCutaneous three times a day before meals  latanoprost 0.005% Ophthalmic Solution 1 Drop(s) Both EYES at bedtime  levETIRAcetam  Solution 750 milliGRAM(s) Oral two times a day  LORazepam   Injectable 1 milliGRAM(s) IV Push once  mirtazapine 15 milliGRAM(s) Oral at bedtime  multivitamin 1 Tablet(s) Oral daily  polyethylene glycol 3350 17 Gram(s) Oral every 12 hours  senna 2 Tablet(s) Oral at bedtime  sodium chloride 2 Gram(s) Oral every 8 hours    MEDICATIONS  (PRN):  sodium chloride 0.9% lock flush 10 milliLiter(s) IV Push every 1 hour PRN Pre/post blood products, medications, blood draw, and to maintain line patency      I&O's Summary    24 May 2024 07:01  -  25 May 2024 07:00  --------------------------------------------------------  IN: 1289.8 mL / OUT: 1250 mL / NET: 39.8 mL    25 May 2024 07:01  -  25 May 2024 16:52  --------------------------------------------------------  IN: 100 mL / OUT: 550 mL / NET: -450 mL        PHYSICAL EXAM:  Vital Signs Last 24 Hrs  T(C): 36.7 (25 May 2024 15:00), Max: 36.7 (25 May 2024 15:00)  T(F): 98 (25 May 2024 15:00), Max: 98 (25 May 2024 15:00)  HR: 98 (25 May 2024 15:00) (88 - 105)  BP: 111/74 (25 May 2024 15:00) (111/74 - 136/83)  BP(mean): 88 (25 May 2024 15:00) (88 - 97)  RR: 20 (25 May 2024 15:00) (13 - 22)  SpO2: 98% (25 May 2024 15:00) (95% - 100%)    Parameters below as of 25 May 2024 07:00  Patient On (Oxygen Delivery Method): room air      CONSTITUTIONAL: NAD, well-developed, well-groomed  EYES: PERRLA; conjunctiva and sclera clear  ENMT: Moist oral mucosa, no pharyngeal injection or exudates; normal dentition  NECK: Supple, no palpable masses; no thyromegaly  RESPIRATORY: Normal respiratory effort; lungs are clear to auscultation bilaterally  CARDIOVASCULAR: Regular rate and rhythm, normal S1 and S2, no murmur/rub/gallop; No lower extremity edema; Peripheral pulses are 2+ bilaterally  ABDOMEN: Nontender to palpation, normoactive bowel sounds, no rebound/guarding; No hepatosplenomegaly  MUSCULOSKELETAL:  Normal gait; no clubbing or cyanosis of digits; no joint swelling or tenderness to palpation  PSYCH: A+O to person, place, and time; affect appropriate  NEUROLOGY: CN 2-12 are intact and symmetric; no gross sensory deficits   SKIN: No rashes; no palpable lesions    LABS:                        10.9   7.19  )-----------( 316      ( 24 May 2024 18:17 )             32.0     05-24    134<L>  |  100  |  7   ----------------------------<  124<H>  3.8   |  24  |  0.45<L>    Ca    9.4      24 May 2024 18:17  Phos  2.2     05-24  Mg     1.9     05-24    TPro  6.9  /  Alb  3.2<L>  /  TBili  0.4  /  DBili  x   /  AST  41<H>  /  ALT  29  /  AlkPhos  121<H>  05-24          Urinalysis Basic - ( 24 May 2024 18:17 )    Color: x / Appearance: x / SG: x / pH: x  Gluc: 124 mg/dL / Ketone: x  / Bili: x / Urobili: x   Blood: x / Protein: x / Nitrite: x   Leuk Esterase: x / RBC: x / WBC x   Sq Epi: x / Non Sq Epi: x / Bacteria: x     [facility] Division of Hospital Medicine  Sandra Gonzalez MD  MS Teams PREFERRED        SUBJECTIVE / OVERNIGHT EVENTS: Seen and examined at bedside. NAD.    MEDICATIONS  (STANDING):  brimonidine 0.2% Ophthalmic Solution 1 Drop(s) Both EYES every 12 hours  captopril 6.25 milliGRAM(s) Oral every 12 hours  carvedilol 3.125 milliGRAM(s) Oral every 12 hours  clonazePAM  Tablet 0.25 milliGRAM(s) Oral two times a day  enoxaparin Injectable 40 milliGRAM(s) SubCutaneous <User Schedule>  folic acid 1 milliGRAM(s) Oral daily  insulin lispro (ADMELOG) corrective regimen sliding scale   SubCutaneous three times a day before meals  latanoprost 0.005% Ophthalmic Solution 1 Drop(s) Both EYES at bedtime  levETIRAcetam  Solution 750 milliGRAM(s) Oral two times a day  LORazepam   Injectable 1 milliGRAM(s) IV Push once  mirtazapine 15 milliGRAM(s) Oral at bedtime  multivitamin 1 Tablet(s) Oral daily  polyethylene glycol 3350 17 Gram(s) Oral every 12 hours  senna 2 Tablet(s) Oral at bedtime  sodium chloride 2 Gram(s) Oral every 8 hours    MEDICATIONS  (PRN):  sodium chloride 0.9% lock flush 10 milliLiter(s) IV Push every 1 hour PRN Pre/post blood products, medications, blood draw, and to maintain line patency      I&O's Summary    24 May 2024 07:01  -  25 May 2024 07:00  --------------------------------------------------------  IN: 1289.8 mL / OUT: 1250 mL / NET: 39.8 mL    25 May 2024 07:01  -  25 May 2024 16:52  --------------------------------------------------------  IN: 100 mL / OUT: 550 mL / NET: -450 mL        PHYSICAL EXAM:  Vital Signs Last 24 Hrs  T(C): 36.7 (25 May 2024 15:00), Max: 36.7 (25 May 2024 15:00)  T(F): 98 (25 May 2024 15:00), Max: 98 (25 May 2024 15:00)  HR: 98 (25 May 2024 15:00) (88 - 105)  BP: 111/74 (25 May 2024 15:00) (111/74 - 136/83)  BP(mean): 88 (25 May 2024 15:00) (88 - 97)  RR: 20 (25 May 2024 15:00) (13 - 22)  SpO2: 98% (25 May 2024 15:00) (95% - 100%)    Parameters below as of 25 May 2024 07:00  Patient On (Oxygen Delivery Method): room air      CONSTITUTIONAL: NAD  EYES: PERRLA; conjunctiva and sclera clear  ENMT: Moist oral mucosa, no pharyngeal injection or exudates;   NECK: Supple, no palpable masses;   RESPIRATORY: Normal respiratory effort; lungs are clear to auscultation bilaterally  CARDIOVASCULAR: Regular rate and rhythm, normal S1 and S2, no murmur/rub/gallop; No lower extremity edema;  ABDOMEN: Nontender to palpation, normoactive bowel sounds, no rebound/guarding;   MUSCULOSKELETAL:  Normal gait; no clubbing or cyanosis of digits; no joint swelling or tenderness to palpation  SKIN: No rashes; no palpable lesions    LABS:                        10.9   7.19  )-----------( 316      ( 24 May 2024 18:17 )             32.0     05-24    134<L>  |  100  |  7   ----------------------------<  124<H>  3.8   |  24  |  0.45<L>    Ca    9.4      24 May 2024 18:17  Phos  2.2     05-24  Mg     1.9     05-24    TPro  6.9  /  Alb  3.2<L>  /  TBili  0.4  /  DBili  x   /  AST  41<H>  /  ALT  29  /  AlkPhos  121<H>  05-24          Urinalysis Basic - ( 24 May 2024 18:17 )    Color: x / Appearance: x / SG: x / pH: x  Gluc: 124 mg/dL / Ketone: x  / Bili: x / Urobili: x   Blood: x / Protein: x / Nitrite: x   Leuk Esterase: x / RBC: x / WBC x   Sq Epi: x / Non Sq Epi: x / Bacteria: x

## 2024-05-25 NOTE — PROGRESS NOTE ADULT - THIS PATIENT HAS THE FOLLOWING CONDITION(S)/DIAGNOSES ON THIS ADMISSION:
SAH
None
SAH
SAH
Encephalopathy/Cerebral Edema
None
SAH
None
SAH

## 2024-05-25 NOTE — PROGRESS NOTE ADULT - ASSESSMENT
ASSESSMENT/PLAN: 64F SAH HH2 MF2 S/P angio no vascular malformation but diffuse CVS s/p angiographic CCB   unsure etiology of SAH   PBD:9    NEURO:  Neuro checks q4hrs/ vitals q1hrs  S/P angio no vascular malformation but diffuse CVS s/p angiographic CCB unsure etiology of SAH   Diagnosis: r/o vasculitis TIMOTHY, antiphospholipid Ab, ANCA, serum C3, C4, cryoglobulin, SPEP, UPEP, quant immunglobulin levels, Ab SSA/SSB, dsDNA  MRI brain: b/l MCA strokes and concern for vasospasm  #AIS R temporo occipital   5/22 LP done per neurology; f/u CSF studies  Hx Seizure: Levetiracetam 750mg BID (home dose)  Delayed Cerebral Ischemia Management: Serial screening TCDs (poor windows), euvolemia  Pain: Tylenol and oxy PRN   Activity: [] OOB as tolerated [] Bedrest [x] PT [x] OT [] PMNR    PULM:  RA, Sat >92%  Incentive spirometry  5/18 Extubated  5/24 CT PE: prelim negative    CV:  SBP goal   5/23 TTE: EF 30%, no LVOT, suggestion of Takotsubo cardiomyopathy, left ventricular function decreased  Daily trops and EKG    RENAL:  Fluids: IVL  Strict I&Os    GI:  Diet: NGT- NPO  GI prophylaxis [x] not indicated [] PPI [] other:  Bowel regimen [x] senna [] other: miralax; SMOG enema given  LBM: 5/19 5/23 Abdominal xray completed: f/u final read. NGT placed for decompression    ENDO:   ISS  Goal euglycemia (-180)  A1C 6.1    HEME/ONC:  VTE prophylaxis: [x] SCDs [x] chemoprophylaxis [] hold chemoprophylaxis due to: [] high risk of DVT/PE on admission due to:  SQL 40mg sc daily  5/22 LED: negative    ID:  Afebrile    MISC:    SOCIAL/FAMILY:  [] awaiting [x] updated at bedside [] family meeting    CODE STATUS:  [x] Full Code [] DNR [] DNI [] Palliative/Comfort Care    DISPOSITION:  [x] ICU [] Stroke Unit [] Floor [] EMU [] RCU [] PCU    [x] Patient is at high risk of neurologic deterioration/death due to:

## 2024-05-25 NOTE — PROGRESS NOTE ADULT - SUBJECTIVE AND OBJECTIVE BOX
PATIENT SEEN AND EXAMINED BY MYNOR GUAN M.D. ON :- 5/25/24  DATE OF SERVICE:  5/25/24           Interim events noted,Labs ,Radiological studies and Cardiology tests reviewed .    Patient is a 64y old  Female who presents with a chief complaint of SAH (25 May 2024 07:10)      HPI:  Mera Burton  64F, pmh HTN, szs on Keppra, alcoholic cirrhosis, tremors, OA presented from [facility] to OSH w/ generalized weakness found to have b/l sulcal SAH and small occipital IVH. 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 She was recently started on Keflex for UTI. Plts 400, Coags wnl. , AST/ALT = 38/104. WBC 13. Afebrile. Exam: lethargic appearing, EOV, FC, AO3, PERRL, not compliant with EOMs, BUE 5/5, BLE 2/5. (15 May 2024 03:22)      PAST MEDICAL & SURGICAL HISTORY:  Cirrhosis      Seizure      No significant past surgical history          PREVIOUS DIAGNOSTIC TESTING:      ECHO  FINDINGS:    STRESS  FINDINGS:    CATHETERIZATION  FINDINGS:    MEDICATIONS  (STANDING):  brimonidine 0.2% Ophthalmic Solution 1 Drop(s) Both EYES every 12 hours  captopril 6.25 milliGRAM(s) Oral every 12 hours  carvedilol 3.125 milliGRAM(s) Oral every 12 hours  clonazePAM  Tablet 0.25 milliGRAM(s) Oral two times a day  enoxaparin Injectable 40 milliGRAM(s) SubCutaneous <User Schedule>  folic acid 1 milliGRAM(s) Oral daily  insulin lispro (ADMELOG) corrective regimen sliding scale   SubCutaneous three times a day before meals  latanoprost 0.005% Ophthalmic Solution 1 Drop(s) Both EYES at bedtime  levETIRAcetam  Solution 750 milliGRAM(s) Oral two times a day  LORazepam   Injectable 1 milliGRAM(s) IV Push once  mirtazapine 15 milliGRAM(s) Oral at bedtime  multivitamin 1 Tablet(s) Oral daily  polyethylene glycol 3350 17 Gram(s) Oral every 12 hours  senna 2 Tablet(s) Oral at bedtime  sodium chloride 2 Gram(s) Oral every 8 hours    MEDICATIONS  (PRN):  sodium chloride 0.9% lock flush 10 milliLiter(s) IV Push every 1 hour PRN Pre/post blood products, medications, blood draw, and to maintain line patency      FAMILY HISTORY:      SOCIAL HISTORY:    CIGARETTES:    ALCOHOL:    REVIEW OF SYSTEMS:  CONSTITUTIONAL: No fever, weight loss, or fatigue  EYES: No eye pain, visual disturbances, or discharge  ENMT:  No difficulty hearing, tinnitus, vertigo; No sinus or throat pain  NECK: No pain or stiffness  RESPIRATORY: No cough, wheezing, chills or hemoptysis; No shortness of breath  CARDIOVASCULAR: No chest pain, palpitations, dizziness, or leg swelling  GASTROINTESTINAL: No abdominal or epigastric pain. No nausea, vomiting, or hematemesis; No diarrhea or constipation. No melena or hematochezia.  GENITOURINARY: No dysuria, frequency, hematuria, or incontinence  NEUROLOGICAL: No headaches, memory loss, loss of strength, numbness, or tremors  SKIN: No itching, burning, rashes, or lesions   LYMPH NODES: No enlarged glands  ENDOCRINE: No heat or cold intolerance; No hair loss  MUSCULOSKELETAL: No joint pain or swelling; No muscle, back, or extremity pain  PSYCHIATRIC: No depression, anxiety, mood swings, or difficulty sleeping  HEME/LYMPH: No easy bruising, or bleeding gums  ALLERY AND IMMUNOLOGIC: No hives or eczema    Vital Signs Last 24 Hrs  T(C): 36.7 (25 May 2024 20:40), Max: 36.8 (25 May 2024 17:15)  T(F): 98.1 (25 May 2024 20:40), Max: 98.2 (25 May 2024 17:15)  HR: 104 (25 May 2024 20:40) (88 - 104)  BP: 92/55 (25 May 2024 20:40) (92/55 - 136/83)  BP(mean): 88 (25 May 2024 15:00) (88 - 97)  RR: 19 (25 May 2024 20:40) (14 - 22)  SpO2: 96% (25 May 2024 20:40) (96% - 100%)    Parameters below as of 25 May 2024 20:40  Patient On (Oxygen Delivery Method): room air          PHYSICAL EXAM:  GENERAL: NAD, well-groomed, well-developed  HEAD:  Atraumatic, Normocephalic  EYES: EOMI, PERRLA, conjunctiva and sclera clear  ENMT: No tonsillar erythema, exudates, or enlargement; Moist mucous membranes, Good dentition, No lesions  NECK: Supple, No JVD, Normal thyroid  NERVOUS SYSTEM:  Alert & Oriented X3, Good concentration; Motor Strength 5/5 B/L upper and lower extremities; DTRs 2+ intact and symmetric  CHEST/LUNG: Clear to percussion bilaterally; No rales, rhonchi, wheezing, or rubs  HEART: Regular rate and rhythm; No murmurs, rubs, or gallops  ABDOMEN: Soft, Nontender, Nondistended; Bowel sounds present  EXTREMITIES:  2+ Peripheral Pulses, No clubbing, cyanosis, or edema  LYMPH: No lymphadenopathy noted  SKIN: No rashes or lesions      INTERPRETATION OF TELEMETRY:    [facility]    [facility]     LABS:                        10.9   7.19  )-----------( 316      ( 24 May 2024 18:17 )             32.0     05-24    134<L>  |  100  |  7   ----------------------------<  124<H>  3.8   |  24  |  0.45<L>    Ca    9.4      24 May 2024 18:17  Phos  2.2     05-24  Mg     1.9     05-24    TPro  6.9  /  Alb  3.2<L>  /  TBili  0.4  /  DBili  x   /  AST  41<H>  /  ALT  29  /  AlkPhos  121<H>  05-24          Urinalysis Basic - ( 24 May 2024 18:17 )    Color: x / Appearance: x / SG: x / pH: x  Gluc: 124 mg/dL / Ketone: x  / Bili: x / Urobili: x   Blood: x / Protein: x / Nitrite: x   Leuk Esterase: x / RBC: x / WBC x   Sq Epi: x / Non Sq Epi: x / Bacteria: x      Lipid Panel:   I&O's Summary    24 May 2024 07:01  -  25 May 2024 07:00  --------------------------------------------------------  IN: 1289.8 mL / OUT: 1250 mL / NET: 39.8 mL    25 May 2024 07:01  -  25 May 2024 21:03  --------------------------------------------------------  IN: 100 mL / OUT: 550 mL / NET: -450 mL        RADIOLOGY & ADDITIONAL STUDIES:

## 2024-05-25 NOTE — CHART NOTE - NSCHARTNOTEFT_GEN_A_CORE
63 y/o F with a PMH of dementia with dyskinesias, seizures on keppra, now with SAH and occipital IVH; MR brain showing multiple strokes.    Pt seen for bedside swallow evaluation on 5/24/24. Pt accepted minimal amount of PO trials. SLP unable to make dietary recommendations. Pt now being seen for reassessment of swallow.     Pt found in bed, sleeping upon encounter. KIMBERLEE Martin at bedside endorses patient to be transferred out of ICU to medicine floor. Pt opens eyes to verbal stimulation. Vocal quality hypophonic, fair intelligibility. +Dyskinesias. Ox2 (self/place). Large bore NGT in place. Pt following some simple directives. Pt on room air. Pt requires encouragement to accept PO feeding trials. Thin puree and moderately thick liquid via tsp provided. +Prolonged oral phase of swallow, minimal manipulation of material, suspected premature spillover and significantly delayed pharyngeal swallows noted. Delayed wet cough noted post swallow suggestive of laryngeal penetration/aspiration. No further PO trials administered.     Impressions: 63 y/o F with a PMH of dementia with dyskinesias, seizures on keppra, now with SAH and occipital IVH; MR brain showing multiple strokes. Pt presents with evidence of an oropharyngeal dysphagia characterized by reduced oral management, delayed pharyngeal swallows and s/s suggestive of laryngeal penetration/aspiration.    Recommendations: Continue NPO, with non-oral nutrition/hydration/medications. Maintain good oral hygiene. This service will continue to follow for dysphagia therapy.     Eva Benjamin Teams/ext [phone]   Speech Pathology    D/W RN and KIMBERLEE Martin.

## 2024-05-25 NOTE — PROGRESS NOTE ADULT - PROBLEM SELECTOR PLAN 2
TTE from 5/23 shows findings consistent with Takostubo, pt was previously in Cardiogenic Shock, off dobutamine and norepinephrine now, hemodynamically stable  repeat TTE 5/24 shows EF improving to 35-40% from 30.  should recover with time, no role for ischemic eval  started coreg and captopril  may need diuresis but currently not in HF  Cardiology on board, recommendations appreciated

## 2024-05-25 NOTE — PROGRESS NOTE ADULT - SUBJECTIVE AND OBJECTIVE BOX
Patient seen and examined at bedside.    --Anticoagulation--    T(C): 36.1 (05-24-24 @ 23:00), Max: 36.8 (05-24-24 @ 11:00)  HR: 96 (05-25-24 @ 00:00) (91 - 121)  BP: --  RR: 19 (05-25-24 @ 00:00) (13 - 25)  SpO2: 98% (05-25-24 @ 00:00) (94% - 100%)  Wt(kg): --    Exam: Arousable, Ox3, EOS, pupils 3R sluggish, R gaze can come midline gaze, FC, BUE AG, BUE tremor, BLE 2/5

## 2024-05-25 NOTE — PROGRESS NOTE ADULT - ASSESSMENT
64F, pmh HTN, szs on Keppra, alcoholic cirrhosis, tremors, OA presented from [facility] to OSH w/ generalized weakness found to have b/l sulcal SAH and small occipital IVH. 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 She was recently started on Keflex for UTI. Plts 400, Coags wnl. , AST/ALT = 38/104. WBC 13. Afebrile. Exam on admission: lethargic appearing, EOV, FC, AO3, PERRL, not compliant with EOMs, BUE 5/5, BLE 2/5.    Now s/p 5/17 Angio: severe b/l anterior and posterior cerebral vasospasm (given IA verapamil and milrinone)

## 2024-05-25 NOTE — PROGRESS NOTE ADULT - ASSESSMENT
Mera Burton  64F, pmh HTN, szs on Keppra, alcoholic cirrhosis, tremors, OA presented from [facility] to OSH w/ generalized weakness found to have b/l sulcal SAH and small occipital IVH. 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 She was recently started on Keflex for UTI. Plts 400, Coags wnl. , AST/ALT = 38/104. WBC 13. Afebrile. Exam on admission: lethargic appearing, EOV, FC, AO3, PERRL, not compliant with EOMs, BUE 5/5, BLE 2/5.    Now s/p 5/17 Angio: severe b/l anterior and posterior cerebral vasospasm (given IA verapamil and milrinone)    Medicine- Dr. Norton to transfer in AM (call [phone] for sign out)  F/U CSF studies from LP   f/u labs for vasculitis, NGTD   CIWA protocol  TCDs - no windows

## 2024-05-25 NOTE — PROGRESS NOTE ADULT - PROBLEM SELECTOR PLAN 1
CTH w/ b/l occipital sulcal SAH and small IVH,   CTA with R V4 5mm fusiform aneurysm and severe stenosis of BA and diminutive distal cerebral arteries.   s/p 5/17 Angio: severe b/l anterior and posterior cerebral vasospasm  BL MCA strokes on MRI    Vasculitis workup ongoing  continue Keppra 750mg PO BID  NSG transferred to medicine

## 2024-05-26 LAB
ALBUMIN SERPL ELPH-MCNC: 2.9 G/DL — LOW (ref 3.3–5)
ALP SERPL-CCNC: 133 U/L — HIGH (ref 40–120)
ALT FLD-CCNC: 41 U/L — SIGNIFICANT CHANGE UP (ref 10–45)
ANION GAP SERPL CALC-SCNC: 13 MMOL/L — SIGNIFICANT CHANGE UP (ref 5–17)
AST SERPL-CCNC: 47 U/L — HIGH (ref 10–40)
BILIRUB SERPL-MCNC: 0.4 MG/DL — SIGNIFICANT CHANGE UP (ref 0.2–1.2)
BUN SERPL-MCNC: 12 MG/DL — SIGNIFICANT CHANGE UP (ref 7–23)
CALCIUM SERPL-MCNC: 9.5 MG/DL — SIGNIFICANT CHANGE UP (ref 8.4–10.5)
CHLORIDE SERPL-SCNC: 104 MMOL/L — SIGNIFICANT CHANGE UP (ref 96–108)
CO2 SERPL-SCNC: 20 MMOL/L — LOW (ref 22–31)
CREAT SERPL-MCNC: 0.53 MG/DL — SIGNIFICANT CHANGE UP (ref 0.5–1.3)
EGFR: 104 ML/MIN/1.73M2 — SIGNIFICANT CHANGE UP
GLUCOSE BLDC GLUCOMTR-MCNC: 101 MG/DL — HIGH (ref 70–99)
GLUCOSE BLDC GLUCOMTR-MCNC: 87 MG/DL — SIGNIFICANT CHANGE UP (ref 70–99)
GLUCOSE BLDC GLUCOMTR-MCNC: 87 MG/DL — SIGNIFICANT CHANGE UP (ref 70–99)
GLUCOSE SERPL-MCNC: 90 MG/DL — SIGNIFICANT CHANGE UP (ref 70–99)
HCT VFR BLD CALC: 35.4 % — SIGNIFICANT CHANGE UP (ref 34.5–45)
HGB BLD-MCNC: 11.4 G/DL — LOW (ref 11.5–15.5)
MAGNESIUM SERPL-MCNC: 2.3 MG/DL — SIGNIFICANT CHANGE UP (ref 1.6–2.6)
MCHC RBC-ENTMCNC: 27.5 PG — SIGNIFICANT CHANGE UP (ref 27–34)
MCHC RBC-ENTMCNC: 32.2 GM/DL — SIGNIFICANT CHANGE UP (ref 32–36)
MCV RBC AUTO: 85.5 FL — SIGNIFICANT CHANGE UP (ref 80–100)
NRBC # BLD: 0 /100 WBCS — SIGNIFICANT CHANGE UP (ref 0–0)
PHOSPHATE SERPL-MCNC: 3.7 MG/DL — SIGNIFICANT CHANGE UP (ref 2.5–4.5)
PLATELET # BLD AUTO: 263 K/UL — SIGNIFICANT CHANGE UP (ref 150–400)
POTASSIUM SERPL-MCNC: 4 MMOL/L — SIGNIFICANT CHANGE UP (ref 3.5–5.3)
POTASSIUM SERPL-SCNC: 4 MMOL/L — SIGNIFICANT CHANGE UP (ref 3.5–5.3)
PROT SERPL-MCNC: 6.7 G/DL — SIGNIFICANT CHANGE UP (ref 6–8.3)
RBC # BLD: 4.14 M/UL — SIGNIFICANT CHANGE UP (ref 3.8–5.2)
RBC # FLD: 13.9 % — SIGNIFICANT CHANGE UP (ref 10.3–14.5)
SODIUM SERPL-SCNC: 137 MMOL/L — SIGNIFICANT CHANGE UP (ref 135–145)
WBC # BLD: 8.3 K/UL — SIGNIFICANT CHANGE UP (ref 3.8–10.5)
WBC # FLD AUTO: 8.3 K/UL — SIGNIFICANT CHANGE UP (ref 3.8–10.5)

## 2024-05-26 PROCEDURE — 99233 SBSQ HOSP IP/OBS HIGH 50: CPT

## 2024-05-26 RX ORDER — INSULIN LISPRO 100/ML
VIAL (ML) SUBCUTANEOUS EVERY 6 HOURS
Refills: 0 | Status: DISCONTINUED | OUTPATIENT
Start: 2024-05-26 | End: 2024-05-30

## 2024-05-26 RX ORDER — DEXTROSE 10 % IN WATER 10 %
125 INTRAVENOUS SOLUTION INTRAVENOUS ONCE
Refills: 0 | Status: DISCONTINUED | OUTPATIENT
Start: 2024-05-26 | End: 2024-06-03

## 2024-05-26 RX ORDER — DEXTROSE 50 % IN WATER 50 %
15 SYRINGE (ML) INTRAVENOUS ONCE
Refills: 0 | Status: DISCONTINUED | OUTPATIENT
Start: 2024-05-26 | End: 2024-06-03

## 2024-05-26 RX ORDER — DEXTROSE 50 % IN WATER 50 %
12.5 SYRINGE (ML) INTRAVENOUS ONCE
Refills: 0 | Status: DISCONTINUED | OUTPATIENT
Start: 2024-05-26 | End: 2024-06-03

## 2024-05-26 RX ORDER — GLUCAGON INJECTION, SOLUTION 0.5 MG/.1ML
1 INJECTION, SOLUTION SUBCUTANEOUS ONCE
Refills: 0 | Status: DISCONTINUED | OUTPATIENT
Start: 2024-05-26 | End: 2024-06-03

## 2024-05-26 RX ORDER — DEXTROSE 50 % IN WATER 50 %
25 SYRINGE (ML) INTRAVENOUS ONCE
Refills: 0 | Status: DISCONTINUED | OUTPATIENT
Start: 2024-05-26 | End: 2024-06-03

## 2024-05-26 RX ORDER — SODIUM CHLORIDE 9 MG/ML
1000 INJECTION, SOLUTION INTRAVENOUS
Refills: 0 | Status: DISCONTINUED | OUTPATIENT
Start: 2024-05-26 | End: 2024-06-03

## 2024-05-26 RX ADMIN — LEVETIRACETAM 750 MILLIGRAM(S): 250 TABLET, FILM COATED ORAL at 05:45

## 2024-05-26 RX ADMIN — CARVEDILOL PHOSPHATE 3.12 MILLIGRAM(S): 80 CAPSULE, EXTENDED RELEASE ORAL at 17:12

## 2024-05-26 RX ADMIN — Medication 1 MILLIGRAM(S): at 13:11

## 2024-05-26 RX ADMIN — CARVEDILOL PHOSPHATE 3.12 MILLIGRAM(S): 80 CAPSULE, EXTENDED RELEASE ORAL at 05:45

## 2024-05-26 RX ADMIN — POLYETHYLENE GLYCOL 3350 17 GRAM(S): 17 POWDER, FOR SOLUTION ORAL at 17:12

## 2024-05-26 RX ADMIN — Medication 1 TABLET(S): at 13:09

## 2024-05-26 RX ADMIN — SODIUM CHLORIDE 2 GRAM(S): 9 INJECTION INTRAMUSCULAR; INTRAVENOUS; SUBCUTANEOUS at 22:06

## 2024-05-26 RX ADMIN — LEVETIRACETAM 750 MILLIGRAM(S): 250 TABLET, FILM COATED ORAL at 17:33

## 2024-05-26 RX ADMIN — SODIUM CHLORIDE 2 GRAM(S): 9 INJECTION INTRAMUSCULAR; INTRAVENOUS; SUBCUTANEOUS at 13:08

## 2024-05-26 RX ADMIN — SENNA PLUS 2 TABLET(S): 8.6 TABLET ORAL at 22:06

## 2024-05-26 RX ADMIN — LATANOPROST 1 DROP(S): 0.05 SOLUTION/ DROPS OPHTHALMIC; TOPICAL at 22:06

## 2024-05-26 RX ADMIN — ENOXAPARIN SODIUM 40 MILLIGRAM(S): 100 INJECTION SUBCUTANEOUS at 17:13

## 2024-05-26 RX ADMIN — BRIMONIDINE TARTRATE 1 DROP(S): 2 SOLUTION/ DROPS OPHTHALMIC at 17:11

## 2024-05-26 RX ADMIN — CAPTOPRIL 6.25 MILLIGRAM(S): 12.5 TABLET ORAL at 17:39

## 2024-05-26 RX ADMIN — SODIUM CHLORIDE 2 GRAM(S): 9 INJECTION INTRAMUSCULAR; INTRAVENOUS; SUBCUTANEOUS at 05:44

## 2024-05-26 RX ADMIN — Medication 0.25 MILLIGRAM(S): at 05:44

## 2024-05-26 RX ADMIN — BRIMONIDINE TARTRATE 1 DROP(S): 2 SOLUTION/ DROPS OPHTHALMIC at 05:44

## 2024-05-26 RX ADMIN — MIRTAZAPINE 15 MILLIGRAM(S): 45 TABLET, ORALLY DISINTEGRATING ORAL at 22:06

## 2024-05-26 RX ADMIN — Medication 0.25 MILLIGRAM(S): at 17:19

## 2024-05-26 RX ADMIN — CAPTOPRIL 6.25 MILLIGRAM(S): 12.5 TABLET ORAL at 05:44

## 2024-05-26 NOTE — PROGRESS NOTE ADULT - SUBJECTIVE AND OBJECTIVE BOX
PATIENT SEEN AND EXAMINED BY MYNOR GUAN M.D. ON :- 5/26/24  DATE OF SERVICE:   5/26/24          Interim events noted,Labs ,Radiological studies and Cardiology tests reviewed .    Patient is a 64y old  Female who presents with a chief complaint of SAH (25 May 2024 07:10)      HPI:  Mera Ashley  64F, pmh HTN, szs on Keppra, alcoholic cirrhosis, tremors, OA presented from [facility] to OSH w/ generalized weakness found to have b/l sulcal SAH and small occipital IVH. 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 She was recently started on Keflex for UTI. Plts 400, Coags wnl. , AST/ALT = 38/104. WBC 13. Afebrile. Exam: lethargic appearing, EOV, FC, AO3, PERRL, not compliant with EOMs, BUE 5/5, BLE 2/5. (15 May 2024 03:22)      PAST MEDICAL & SURGICAL HISTORY:  Cirrhosis      Seizure      No significant past surgical history          PREVIOUS DIAGNOSTIC TESTING:      ECHO  FINDINGS:    STRESS  FINDINGS:    CATHETERIZATION  FINDINGS:    MEDICATIONS  (STANDING):  brimonidine 0.2% Ophthalmic Solution 1 Drop(s) Both EYES every 12 hours  captopril 6.25 milliGRAM(s) Oral every 12 hours  carvedilol 3.125 milliGRAM(s) Oral every 12 hours  clonazePAM  Tablet 0.25 milliGRAM(s) Oral two times a day  dextrose 10% Bolus 125 milliLiter(s) IV Bolus once  dextrose 5%. 1000 milliLiter(s) (50 mL/Hr) IV Continuous <Continuous>  dextrose 5%. 1000 milliLiter(s) (100 mL/Hr) IV Continuous <Continuous>  dextrose 50% Injectable 25 Gram(s) IV Push once  dextrose 50% Injectable 12.5 Gram(s) IV Push once  enoxaparin Injectable 40 milliGRAM(s) SubCutaneous <User Schedule>  folic acid 1 milliGRAM(s) Oral daily  glucagon  Injectable 1 milliGRAM(s) IntraMuscular once  insulin lispro (ADMELOG) corrective regimen sliding scale   SubCutaneous every 6 hours  latanoprost 0.005% Ophthalmic Solution 1 Drop(s) Both EYES at bedtime  levETIRAcetam  Solution 750 milliGRAM(s) Oral two times a day  LORazepam   Injectable 1 milliGRAM(s) IV Push once  mirtazapine 15 milliGRAM(s) Oral at bedtime  multivitamin 1 Tablet(s) Oral daily  polyethylene glycol 3350 17 Gram(s) Oral every 12 hours  senna 2 Tablet(s) Oral at bedtime  sodium chloride 2 Gram(s) Oral every 8 hours    MEDICATIONS  (PRN):  dextrose Oral Gel 15 Gram(s) Oral once PRN Blood Glucose LESS THAN 70 milliGRAM(s)/deciliter      FAMILY HISTORY:      SOCIAL HISTORY:    CIGARETTES:    ALCOHOL:    REVIEW OF SYSTEMS:  CONSTITUTIONAL: No fever, weight loss, or fatigue  EYES: No eye pain, visual disturbances, or discharge  ENMT:  No difficulty hearing, tinnitus, vertigo; No sinus or throat pain  NECK: No pain or stiffness  RESPIRATORY: No cough, wheezing, chills or hemoptysis; No shortness of breath  CARDIOVASCULAR: No chest pain, palpitations, dizziness, or leg swelling  GASTROINTESTINAL: No abdominal or epigastric pain. No nausea, vomiting, or hematemesis; No diarrhea or constipation. No melena or hematochezia.  GENITOURINARY: No dysuria, frequency, hematuria, or incontinence  NEUROLOGICAL: No headaches, memory loss, loss of strength, numbness, or tremors  SKIN: No itching, burning, rashes, or lesions   LYMPH NODES: No enlarged glands  ENDOCRINE: No heat or cold intolerance; No hair loss  MUSCULOSKELETAL: No joint pain or swelling; No muscle, back, or extremity pain  PSYCHIATRIC: No depression, anxiety, mood swings, or difficulty sleeping  HEME/LYMPH: No easy bruising, or bleeding gums  ALLERY AND IMMUNOLOGIC: No hives or eczema    Vital Signs Last 24 Hrs  T(C): 36.9 (26 May 2024 20:14), Max: 36.9 (26 May 2024 08:00)  T(F): 98.4 (26 May 2024 20:14), Max: 98.5 (26 May 2024 11:15)  HR: 87 (26 May 2024 20:14) (62 - 109)  BP: 94/62 (26 May 2024 20:14) (94/62 - 133/65)  BP(mean): --  RR: 18 (26 May 2024 20:14) (17 - 18)  SpO2: 97% (26 May 2024 20:14) (94% - 98%)    Parameters below as of 26 May 2024 20:14  Patient On (Oxygen Delivery Method): room air          PHYSICAL EXAM:  GENERAL: NAD, well-groomed, well-developed  HEAD:  Atraumatic, Normocephalic  EYES: EOMI, PERRLA, conjunctiva and sclera clear  ENMT: No tonsillar erythema, exudates, or enlargement; Moist mucous membranes, Good dentition, No lesions  NECK: Supple, No JVD, Normal thyroid  NERVOUS SYSTEM:  Alert & Oriented X3, Good concentration; Motor Strength 5/5 B/L upper and lower extremities; DTRs 2+ intact and symmetric  CHEST/LUNG: Clear to percussion bilaterally; No rales, rhonchi, wheezing, or rubs  HEART: Regular rate and rhythm; No murmurs, rubs, or gallops  ABDOMEN: Soft, Nontender, Nondistended; Bowel sounds present  EXTREMITIES:  2+ Peripheral Pulses, No clubbing, cyanosis, or edema  LYMPH: No lymphadenopathy noted  SKIN: No rashes or lesions      INTERPRETATION OF TELEMETRY:    ECG:    CHADSVASC:     LABS:                        11.4   8.30  )-----------( 263      ( 26 May 2024 11:38 )             35.4     05-26    137  |  104  |  12  ----------------------------<  90  4.0   |  20<L>  |  0.53    Ca    9.5      26 May 2024 11:37  Phos  3.7     05-26  Mg     2.3     05-26    TPro  6.7  /  Alb  2.9<L>  /  TBili  0.4  /  DBili  x   /  AST  47<H>  /  ALT  41  /  AlkPhos  133<H>  05-26          Urinalysis Basic - ( 26 May 2024 11:37 )    Color: x / Appearance: x / SG: x / pH: x  Gluc: 90 mg/dL / Ketone: x  / Bili: x / Urobili: x   Blood: x / Protein: x / Nitrite: x   Leuk Esterase: x / RBC: x / WBC x   Sq Epi: x / Non Sq Epi: x / Bacteria: x      Lipid Panel:   I&O's Summary    25 May 2024 07:01  -  26 May 2024 07:00  --------------------------------------------------------  IN: 100 mL / OUT: 550 mL / NET: -450 mL    26 May 2024 07:01  -  26 May 2024 21:08  --------------------------------------------------------  IN: 110 mL / OUT: 0 mL / NET: 110 mL        RADIOLOGY & ADDITIONAL STUDIES:    TRANSTHORACIC ECHOCARDIOGRAM REPORT  ________________________________________________________________________________                                      _______       Pt. Name:       MERA ASHLEY Study Date:    5/24/2024  MRN:            [id number]   YOB: 1960  Accession #:    [id number]    Age:           64 years  Account#:       [id number] Gender:        F  Visit ID#  Heart Rate:     108 bpm      Height:        64.00 in (162.56 cm)  Rhythm:                      Weight: 165.00 lb (74.84 kg)  Blood Pressure: 114/79 mmHg  BSA/BMI:       1.80 m² / 28.32 kg/m²  ________________________________________________________________________________________  Referring Physician:    [id number] Marcell Arceo  Interpreting Physician: Lalo Antony M.D.  Primary Sonographer:    Marciano Persaud [facility]    CPT:                ECHO TTE W CON JERMAINE LTD - .m;DEFINITY ECHO CONTRAST PER                      ML WASTED - .m;DEFINITY ECHO CONTRAST PER ML - .m  Indication(s):      Cardiomyopathy, unspecified - I42.9  Procedure:          Limited transthoracic echocardiogram.  [place]  [facility] [place] Status:   Inpatient  Contrast Injection: Verbal consent was obtained for injection of Ultrasonic                      Enhancing Agent following a discussion of risks and                      benefits.                      Endocardial visualization enhanced with 2 ml of Definity                      Ultrasound enhancing agent (Lot#:[id number] Exp.Date:03/01/2025        Discarded Dose:8ml).  UEA Reaction:       Patient had no adverse reaction after injection of                      Ultrasound Enhancing Agent.  Study Information:  Image quality for this study is technically difficult.    _______________________________________________________________________________________     CONCLUSIONS:      1. Left ventricular systolic function is moderately decreased with an ejection fraction visually estimated at 35 to 40 %. Regional wall motion abnormalities present.   2. Multiple segmental abnormalities exist. See findings.   3. No pericardial effusion seen.   4. Compared to the transthoracic echocardiogram performed on 5/23/2024,.   5. Technically difficult image quality.    ________________________________________________________________________________________  FINDINGS:     Left Ventricle:  Left ventricular wall thickness is normal. Left ventricular systolic function is moderately decreased with an ejection fraction visually estimated at 35 to 40%. There are regional wall motion abnormalities present. There is no evidence of a left ventricular thrombus.  LV Wall Scoring: The entire apex, entire inferior septum, mid and apical  anterior septum, mid inferolateral segment, and mid anterolateral segmentare  hypokinetic. All remaining scored segments are normal.          Pericardium:

## 2024-05-26 NOTE — PROGRESS NOTE ADULT - ASSESSMENT
64F, pmh HTN, szs on Keppra, alcoholic cirrhosis, tremors, OA presented from [facility] to OSH w/ generalized weakness found to have b/l sulcal SAH and small occipital IVH. 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 She was recently started on Keflex for UTI. Plts 400, Coags wnl. , AST/ALT = 38/104. WBC 13. Afebrile. Exam on admission: lethargic appearing, EOV, FC, AO3, PERRL, not compliant with EOMs, BUE 5/5, BLE 2/5.    Now s/p 5/17 Angio: severe b/l anterior and posterior cerebral vasospasm (given IA verapamil and milrinone)

## 2024-05-26 NOTE — PROGRESS NOTE ADULT - PROBLEM SELECTOR PLAN 1
Speech swallow eval ongoing  currently NPO  Will start Tube feeding with Jevity 1.5  Start at 10mL, increase by 10mL q4h with rate of 40mL/hr for 24hrs continuous feeding  Nic BID  Dietitian follow up

## 2024-05-26 NOTE — PROGRESS NOTE ADULT - SUBJECTIVE AND OBJECTIVE BOX
[facility] Division of Hospital Medicine  Sandra Gonzalez MD  MS Teams PREFERRED        SUBJECTIVE / OVERNIGHT EVENTS: Seen and examined at bedside this morning. Her mental status waxes and wanes, persistent tremor but responding.    MEDICATIONS  (STANDING):  brimonidine 0.2% Ophthalmic Solution 1 Drop(s) Both EYES every 12 hours  captopril 6.25 milliGRAM(s) Oral every 12 hours  carvedilol 3.125 milliGRAM(s) Oral every 12 hours  clonazePAM  Tablet 0.25 milliGRAM(s) Oral two times a day  dextrose 10% Bolus 125 milliLiter(s) IV Bolus once  dextrose 5%. 1000 milliLiter(s) (50 mL/Hr) IV Continuous <Continuous>  dextrose 5%. 1000 milliLiter(s) (100 mL/Hr) IV Continuous <Continuous>  dextrose 50% Injectable 25 Gram(s) IV Push once  dextrose 50% Injectable 12.5 Gram(s) IV Push once  enoxaparin Injectable 40 milliGRAM(s) SubCutaneous <User Schedule>  folic acid 1 milliGRAM(s) Oral daily  glucagon  Injectable 1 milliGRAM(s) IntraMuscular once  insulin lispro (ADMELOG) corrective regimen sliding scale   SubCutaneous every 6 hours  latanoprost 0.005% Ophthalmic Solution 1 Drop(s) Both EYES at bedtime  levETIRAcetam  Solution 750 milliGRAM(s) Oral two times a day  LORazepam   Injectable 1 milliGRAM(s) IV Push once  mirtazapine 15 milliGRAM(s) Oral at bedtime  multivitamin 1 Tablet(s) Oral daily  polyethylene glycol 3350 17 Gram(s) Oral every 12 hours  senna 2 Tablet(s) Oral at bedtime  sodium chloride 2 Gram(s) Oral every 8 hours    MEDICATIONS  (PRN):  dextrose Oral Gel 15 Gram(s) Oral once PRN Blood Glucose LESS THAN 70 milliGRAM(s)/deciliter      I&O's Summary    25 May 2024 07:01  -  26 May 2024 07:00  --------------------------------------------------------  IN: 100 mL / OUT: 550 mL / NET: -450 mL        PHYSICAL EXAM:  Vital Signs Last 24 Hrs  T(C): 36.9 (26 May 2024 11:15), Max: 36.9 (26 May 2024 08:00)  T(F): 98.5 (26 May 2024 11:15), Max: 98.5 (26 May 2024 11:15)  HR: 93 (26 May 2024 11:15) (91 - 109)  BP: 98/68 (26 May 2024 11:15) (92/55 - 136/83)  BP(mean): 88 (25 May 2024 15:00) (88 - 97)  RR: 18 (26 May 2024 11:15) (17 - 20)  SpO2: 97% (26 May 2024 11:15) (94% - 99%)    Parameters below as of 26 May 2024 11:15  Patient On (Oxygen Delivery Method): room air      CONSTITUTIONAL: NAD, well-developed, well-groomed  EYES: PERRLA; conjunctiva and sclera clear  ENMT: Moist oral mucosa, no pharyngeal injection or exudates; normal dentition  NECK: Supple, no palpable masses; no thyromegaly  RESPIRATORY: Normal respiratory effort; lungs are clear to auscultation bilaterally  CARDIOVASCULAR: Regular rate and rhythm, normal S1 and S2, no murmur/rub/gallop; No lower extremity edema; Peripheral pulses are 2+ bilaterally  ABDOMEN: Nontender to palpation, normoactive bowel sounds, no rebound/guarding; No hepatosplenomegaly  MUSCULOSKELETAL:  Normal gait; no clubbing or cyanosis of digits; no joint swelling or tenderness to palpation  SKIN: No rashes; no palpable lesions    LABS:                        11.4   8.30  )-----------( 263      ( 26 May 2024 11:38 )             35.4     05-26    137  |  104  |  12  ----------------------------<  90  4.0   |  20<L>  |  0.53    Ca    9.5      26 May 2024 11:37  Phos  3.7     05-26  Mg     2.3     05-26    TPro  6.7  /  Alb  2.9<L>  /  TBili  0.4  /  DBili  x   /  AST  47<H>  /  ALT  41  /  AlkPhos  133<H>  05-26          Urinalysis Basic - ( 26 May 2024 11:37 )    Color: x / Appearance: x / SG: x / pH: x  Gluc: 90 mg/dL / Ketone: x  / Bili: x / Urobili: x   Blood: x / Protein: x / Nitrite: x   Leuk Esterase: x / RBC: x / WBC x   Sq Epi: x / Non Sq Epi: x / Bacteria: x          RADIOLOGY & ADDITIONAL TESTS:  Results Reviewed:   Imaging Personally Reviewed:  Electrocardiogram Personally Reviewed:    COORDINATION OF CARE:  Care Discussed with Consultants/Other Providers [Y/N]:  Prior or Outpatient Records Reviewed [Y/N]:

## 2024-05-27 LAB
CULTURE RESULTS: SIGNIFICANT CHANGE UP
GLUCOSE BLDC GLUCOMTR-MCNC: 110 MG/DL — HIGH (ref 70–99)
GLUCOSE BLDC GLUCOMTR-MCNC: 127 MG/DL — HIGH (ref 70–99)
GLUCOSE BLDC GLUCOMTR-MCNC: 140 MG/DL — HIGH (ref 70–99)
GLUCOSE BLDC GLUCOMTR-MCNC: 145 MG/DL — HIGH (ref 70–99)
SPECIMEN SOURCE: SIGNIFICANT CHANGE UP

## 2024-05-27 PROCEDURE — 99233 SBSQ HOSP IP/OBS HIGH 50: CPT

## 2024-05-27 PROCEDURE — 71045 X-RAY EXAM CHEST 1 VIEW: CPT | Mod: 26

## 2024-05-27 RX ADMIN — ENOXAPARIN SODIUM 40 MILLIGRAM(S): 100 INJECTION SUBCUTANEOUS at 17:02

## 2024-05-27 RX ADMIN — CARVEDILOL PHOSPHATE 3.12 MILLIGRAM(S): 80 CAPSULE, EXTENDED RELEASE ORAL at 05:15

## 2024-05-27 RX ADMIN — CAPTOPRIL 6.25 MILLIGRAM(S): 12.5 TABLET ORAL at 05:15

## 2024-05-27 RX ADMIN — SENNA PLUS 2 TABLET(S): 8.6 TABLET ORAL at 21:13

## 2024-05-27 RX ADMIN — SODIUM CHLORIDE 2 GRAM(S): 9 INJECTION INTRAMUSCULAR; INTRAVENOUS; SUBCUTANEOUS at 05:15

## 2024-05-27 RX ADMIN — LATANOPROST 1 DROP(S): 0.05 SOLUTION/ DROPS OPHTHALMIC; TOPICAL at 21:14

## 2024-05-27 RX ADMIN — LEVETIRACETAM 750 MILLIGRAM(S): 250 TABLET, FILM COATED ORAL at 05:14

## 2024-05-27 RX ADMIN — Medication 0.25 MILLIGRAM(S): at 17:02

## 2024-05-27 RX ADMIN — MIRTAZAPINE 15 MILLIGRAM(S): 45 TABLET, ORALLY DISINTEGRATING ORAL at 21:13

## 2024-05-27 RX ADMIN — Medication 1 MILLIGRAM(S): at 13:24

## 2024-05-27 RX ADMIN — Medication 1 TABLET(S): at 13:29

## 2024-05-27 RX ADMIN — BRIMONIDINE TARTRATE 1 DROP(S): 2 SOLUTION/ DROPS OPHTHALMIC at 05:16

## 2024-05-27 RX ADMIN — POLYETHYLENE GLYCOL 3350 17 GRAM(S): 17 POWDER, FOR SOLUTION ORAL at 17:02

## 2024-05-27 RX ADMIN — SODIUM CHLORIDE 2 GRAM(S): 9 INJECTION INTRAMUSCULAR; INTRAVENOUS; SUBCUTANEOUS at 21:13

## 2024-05-27 RX ADMIN — LEVETIRACETAM 750 MILLIGRAM(S): 250 TABLET, FILM COATED ORAL at 17:02

## 2024-05-27 RX ADMIN — Medication 0.25 MILLIGRAM(S): at 05:16

## 2024-05-27 RX ADMIN — BRIMONIDINE TARTRATE 1 DROP(S): 2 SOLUTION/ DROPS OPHTHALMIC at 17:03

## 2024-05-27 RX ADMIN — POLYETHYLENE GLYCOL 3350 17 GRAM(S): 17 POWDER, FOR SOLUTION ORAL at 05:16

## 2024-05-27 RX ADMIN — SODIUM CHLORIDE 2 GRAM(S): 9 INJECTION INTRAMUSCULAR; INTRAVENOUS; SUBCUTANEOUS at 13:23

## 2024-05-27 NOTE — PROGRESS NOTE ADULT - SUBJECTIVE AND OBJECTIVE BOX
[facility] Division of Hospital Medicine  Sandra Gonzalez MD  MS Teams PREFERRED        SUBJECTIVE / OVERNIGHT EVENTS: NAD    MEDICATIONS  (STANDING):  brimonidine 0.2% Ophthalmic Solution 1 Drop(s) Both EYES every 12 hours  captopril 6.25 milliGRAM(s) Oral every 12 hours  carvedilol 3.125 milliGRAM(s) Oral every 12 hours  clonazePAM  Tablet 0.25 milliGRAM(s) Oral two times a day  dextrose 10% Bolus 125 milliLiter(s) IV Bolus once  dextrose 5%. 1000 milliLiter(s) (50 mL/Hr) IV Continuous <Continuous>  dextrose 5%. 1000 milliLiter(s) (100 mL/Hr) IV Continuous <Continuous>  dextrose 50% Injectable 25 Gram(s) IV Push once  dextrose 50% Injectable 12.5 Gram(s) IV Push once  enoxaparin Injectable 40 milliGRAM(s) SubCutaneous <User Schedule>  folic acid 1 milliGRAM(s) Oral daily  glucagon  Injectable 1 milliGRAM(s) IntraMuscular once  insulin lispro (ADMELOG) corrective regimen sliding scale   SubCutaneous every 6 hours  latanoprost 0.005% Ophthalmic Solution 1 Drop(s) Both EYES at bedtime  levETIRAcetam  Solution 750 milliGRAM(s) Oral two times a day  LORazepam   Injectable 1 milliGRAM(s) IV Push once  mirtazapine 15 milliGRAM(s) Oral at bedtime  multivitamin 1 Tablet(s) Oral daily  polyethylene glycol 3350 17 Gram(s) Oral every 12 hours  senna 2 Tablet(s) Oral at bedtime  sodium chloride 2 Gram(s) Oral every 8 hours    MEDICATIONS  (PRN):  dextrose Oral Gel 15 Gram(s) Oral once PRN Blood Glucose LESS THAN 70 milliGRAM(s)/deciliter      I&O's Summary    26 May 2024 07:01  -  27 May 2024 07:00  --------------------------------------------------------  IN: 160 mL / OUT: 100 mL / NET: 60 mL        PHYSICAL EXAM:  Vital Signs Last 24 Hrs  T(C): 37.2 (27 May 2024 12:00), Max: 37.2 (27 May 2024 08:00)  T(F): 99 (27 May 2024 12:00), Max: 99 (27 May 2024 12:00)  HR: 94 (27 May 2024 12:00) (62 - 101)  BP: 94/66 (27 May 2024 12:00) (94/62 - 106/69)  BP(mean): --  RR: 18 (27 May 2024 12:00) (18 - 18)  SpO2: 100% (27 May 2024 12:00) (96% - 100%)    Parameters below as of 27 May 2024 12:00  Patient On (Oxygen Delivery Method): room air      CONSTITUTIONAL: NAD, well-developed, well-groomed  EYES: PERRLA; conjunctiva and sclera clear  ENMT: Moist oral mucosa, no pharyngeal injection or exudates; normal dentition  NECK: Supple, no palpable masses; no thyromegaly  RESPIRATORY: Normal respiratory effort; lungs are clear to auscultation bilaterally  CARDIOVASCULAR: Regular rate and rhythm, normal S1 and S2, no murmur/rub/gallop; No lower extremity edema; Peripheral pulses are 2+ bilaterally  ABDOMEN: Nontender to palpation, normoactive bowel sounds, no rebound/guarding; No hepatosplenomegaly  MUSCULOSKELETAL:  Normal gait; no clubbing or cyanosis of digits; no joint swelling or tenderness to palpation  SKIN: No rashes; no palpable lesions    LABS:                        11.4   8.30  )-----------( 263      ( 26 May 2024 11:38 )             35.4     05-26    137  |  104  |  12  ----------------------------<  90  4.0   |  20<L>  |  0.53    Ca    9.5      26 May 2024 11:37  Phos  3.7     05-26  Mg     2.3     05-26    TPro  6.7  /  Alb  2.9<L>  /  TBili  0.4  /  DBili  x   /  AST  47<H>  /  ALT  41  /  AlkPhos  133<H>  05-26          Urinalysis Basic - ( 26 May 2024 11:37 )    Color: x / Appearance: x / SG: x / pH: x  Gluc: 90 mg/dL / Ketone: x  / Bili: x / Urobili: x   Blood: x / Protein: x / Nitrite: x   Leuk Esterase: x / RBC: x / WBC x   Sq Epi: x / Non Sq Epi: x / Bacteria: x

## 2024-05-27 NOTE — PROGRESS NOTE ADULT - PROBLEM SELECTOR PLAN 1
Speech swallow eval ongoing  currently NPO  Will start Tube feeding with Jevity 1.5  Start at 10mL, increase by 10mL q4h with rate of 40mL/hr for 24hrs continuous feeding  Nic BID  Dietitian follow up Speech swallow eval ongoing  currently NPO  continue  Tube feeding with Jevity 1.5  Start at 10mL, increase by 10mL q4h with rate of 40mL/hr for 24hrs continuous feeding  Nic BID  Dietitian follow up

## 2024-05-27 NOTE — PROGRESS NOTE ADULT - SUBJECTIVE AND OBJECTIVE BOX
PATIENT SEEN AND EXAMINED BY MYNOR GUAN M.D. ON :- 5/27/24  DATE OF SERVICE: 5/27/24            Interim events noted,Labs ,Radiological studies and Cardiology tests reviewed .    Patient is a 64y old  Female who presents with a chief complaint of SAH (25 May 2024 07:10)      HPI:  Mera Burton  64F, pmh HTN, szs on Keppra, alcoholic cirrhosis, tremors, OA presented from [facility] to OSH w/ generalized weakness found to have b/l sulcal SAH and small occipital IVH. 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 She was recently started on Keflex for UTI. Plts 400, Coags wnl. , AST/ALT = 38/104. WBC 13. Afebrile. Exam: lethargic appearing, EOV, FC, AO3, PERRL, not compliant with EOMs, BUE 5/5, BLE 2/5. (15 May 2024 03:22)      PAST MEDICAL & SURGICAL HISTORY:  Cirrhosis      Seizure      No significant past surgical history          PREVIOUS DIAGNOSTIC TESTING:      ECHO  FINDINGS:    STRESS  FINDINGS:    CATHETERIZATION  FINDINGS:    MEDICATIONS  (STANDING):  brimonidine 0.2% Ophthalmic Solution 1 Drop(s) Both EYES every 12 hours  captopril 6.25 milliGRAM(s) Oral every 12 hours  carvedilol 3.125 milliGRAM(s) Oral every 12 hours  clonazePAM  Tablet 0.25 milliGRAM(s) Oral two times a day  dextrose 10% Bolus 125 milliLiter(s) IV Bolus once  dextrose 5%. 1000 milliLiter(s) (50 mL/Hr) IV Continuous <Continuous>  dextrose 5%. 1000 milliLiter(s) (100 mL/Hr) IV Continuous <Continuous>  dextrose 50% Injectable 25 Gram(s) IV Push once  dextrose 50% Injectable 12.5 Gram(s) IV Push once  enoxaparin Injectable 40 milliGRAM(s) SubCutaneous <User Schedule>  folic acid 1 milliGRAM(s) Oral daily  glucagon  Injectable 1 milliGRAM(s) IntraMuscular once  insulin lispro (ADMELOG) corrective regimen sliding scale   SubCutaneous every 6 hours  latanoprost 0.005% Ophthalmic Solution 1 Drop(s) Both EYES at bedtime  levETIRAcetam  Solution 750 milliGRAM(s) Oral two times a day  LORazepam   Injectable 1 milliGRAM(s) IV Push once  mirtazapine 15 milliGRAM(s) Oral at bedtime  multivitamin 1 Tablet(s) Oral daily  polyethylene glycol 3350 17 Gram(s) Oral every 12 hours  senna 2 Tablet(s) Oral at bedtime  sodium chloride 2 Gram(s) Oral every 8 hours    MEDICATIONS  (PRN):  dextrose Oral Gel 15 Gram(s) Oral once PRN Blood Glucose LESS THAN 70 milliGRAM(s)/deciliter      FAMILY HISTORY:      SOCIAL HISTORY:    CIGARETTES:    ALCOHOL:    REVIEW OF SYSTEMS:  CONSTITUTIONAL: No fever, weight loss, or fatigue  EYES: No eye pain, visual disturbances, or discharge  ENMT:  No difficulty hearing, tinnitus, vertigo; No sinus or throat pain  NECK: No pain or stiffness  RESPIRATORY: No cough, wheezing, chills or hemoptysis; No shortness of breath  CARDIOVASCULAR: No chest pain, palpitations, dizziness, or leg swelling  GASTROINTESTINAL: No abdominal or epigastric pain. No nausea, vomiting, or hematemesis; No diarrhea or constipation. No melena or hematochezia.  GENITOURINARY: No dysuria, frequency, hematuria, or incontinence  NEUROLOGICAL: No headaches, memory loss, loss of strength, numbness, or tremors  SKIN: No itching, burning, rashes, or lesions   LYMPH NODES: No enlarged glands  ENDOCRINE: No heat or cold intolerance; No hair loss  MUSCULOSKELETAL: No joint pain or swelling; No muscle, back, or extremity pain  PSYCHIATRIC: No depression, anxiety, mood swings, or difficulty sleeping  HEME/LYMPH: No easy bruising, or bleeding gums  ALLERY AND IMMUNOLOGIC: No hives or eczema    Vital Signs Last 24 Hrs  T(C): 36.5 (27 May 2024 20:39), Max: 37.2 (27 May 2024 08:00)  T(F): 97.7 (27 May 2024 20:39), Max: 99 (27 May 2024 12:00)  HR: 105 (27 May 2024 20:39) (85 - 105)  BP: 103/65 (27 May 2024 20:39) (93/61 - 106/69)  BP(mean): --  RR: 18 (27 May 2024 20:39) (18 - 18)  SpO2: 98% (27 May 2024 16:16) (96% - 100%)    Parameters below as of 27 May 2024 20:39  Patient On (Oxygen Delivery Method): room air          PHYSICAL EXAM:  GENERAL: NAD, well-groomed, well-developed  HEAD:  Atraumatic, Normocephalic  EYES: EOMI, PERRLA, conjunctiva and sclera clear  ENMT: No tonsillar erythema, exudates, or enlargement; Moist mucous membranes, Good dentition, No lesions  NECK: Supple, No JVD, Normal thyroid  NERVOUS SYSTEM:  Alert & Oriented X3, Good concentration; Motor Strength 5/5 B/L upper and lower extremities; DTRs 2+ intact and symmetric  CHEST/LUNG: Clear to percussion bilaterally; No rales, rhonchi, wheezing, or rubs  HEART: Regular rate and rhythm; No murmurs, rubs, or gallops  ABDOMEN: Soft, Nontender, Nondistended; Bowel sounds present  EXTREMITIES:  2+ Peripheral Pulses, No clubbing, cyanosis, or edema  LYMPH: No lymphadenopathy noted  SKIN: No rashes or lesions      INTERPRETATION OF TELEMETRY:    ECG:    CHADSVAS:     LABS:                        11.4   8.30  )-----------( 263      ( 26 May 2024 11:38 )             35.4     05-26    137  |  104  |  12  ----------------------------<  90  4.0   |  20<L>  |  0.53    Ca    9.5      26 May 2024 11:37  Phos  3.7     05-26  Mg     2.3     05-26    TPro  6.7  /  Alb  2.9<L>  /  TBili  0.4  /  DBili  x   /  AST  47<H>  /  ALT  41  /  AlkPhos  133<H>  05-26          Urinalysis Basic - ( 26 May 2024 11:37 )    Color: x / Appearance: x / SG: x / pH: x  Gluc: 90 mg/dL / Ketone: x  / Bili: x / Urobili: x   Blood: x / Protein: x / Nitrite: x   Leuk Esterase: x / RBC: x / WBC x   Sq Epi: x / Non Sq Epi: x / Bacteria: x      Lipid Panel:   I&O's Summary    26 May 2024 07:01  -  27 May 2024 07:00  --------------------------------------------------------  IN: 160 mL / OUT: 100 mL / NET: 60 mL    27 May 2024 07:01  -  27 May 2024 21:06  --------------------------------------------------------  IN: 0 mL / OUT: 250 mL / NET: -250 mL        RADIOLOGY & ADDITIONAL STUDIES:    CONCLUSIONS:      1. Left ventricular systolic function is moderately decreased with an ejection fraction visually estimated at 35 to 40 %. Regional wall motion abnormalities present.   2. Multiple segmental abnormalities exist. See findings.   3. No pericardial effusion seen.   4. Compared to the transthoracic echocardiogram performed on 5/23/2024,.   5. Technically difficult image quality.

## 2024-05-27 NOTE — PROGRESS NOTE ADULT - PROBLEM SELECTOR PLAN 7
DVT ppx: Lovenox  Diet: NPO with tube feeding  Dispo: return to NH when clinically stable DVT ppx: Lovenox  Diet: NPO with tube feeding  Dispo: return to NH when clinically stable    d/w family in detail

## 2024-05-27 NOTE — PROGRESS NOTE ADULT - PROBLEM SELECTOR PLAN 2
Subjective:   Perri Booth is a 50 y.o. female who presents for Knee Injury (L fell on ice on Tuesday )       HPI  Patient presents for evaluation of 5-day history of left knee pain, swelling, and bruising after falling on the ice, and striking her left knee.  Patient has tried Tylenol, Advil, as well as a wrap without noticing relief.  Denies prior injury to left knee.  Denies numbness or tingling.    This visit was completed with an online audiovisual .    ROS  All other systems are negative except as documented above within [facility].    MEDS:   Current Outpatient Medications:     lisinopril (PRINIVIL) 10 MG Tab, Take 10 mg by mouth every day., Disp: , Rfl:     atorvastatin (LIPITOR) 40 MG Tab, Take 40 mg by mouth at bedtime., Disp: , Rfl:   ALLERGIES: Not on File    Patient's PMH, SocHx, SurgHx, FamHx, Drug allergies and medications were reviewed.     Objective:   BP (!) 146/82 (BP Location: Left arm, Patient Position: Standing, BP Cuff Size: Adult)   Pulse (!) 102   Temp 37.2 °C (99 °F) (Temporal)   Resp 16   Ht 1.524 m (5')   Wt 97.2 kg (214 lb 3.2 oz)   SpO2 96%   BMI 41.83 kg/m²     Physical Exam  Vitals and nursing note reviewed.   Constitutional:       General: She is awake.      Appearance: Normal appearance. She is well-developed.   HENT:      Head: Normocephalic and atraumatic.      Right Ear: External ear normal.      Left Ear: External ear normal.      Nose: Nose normal.      Mouth/Throat:      Mouth: Mucous membranes are moist.      Pharynx: Oropharynx is clear.   Eyes:      Extraocular Movements: Extraocular movements intact.      Conjunctiva/sclera: Conjunctivae normal.   Cardiovascular:      Rate and Rhythm: Normal rate and regular rhythm.   Pulmonary:      Effort: Pulmonary effort is normal.      Breath sounds: Normal breath sounds.   Musculoskeletal:      Cervical back: Normal range of motion and neck supple.      Left knee: Ecchymosis present. Decreased range of  motion. Tenderness present over the medial joint line, lateral joint line and patellar tendon.   Skin:     General: Skin is warm and dry.   Neurological:      Mental Status: She is alert and oriented to person, place, and time.   Psychiatric:         Mood and Affect: Mood normal.         Behavior: Behavior normal.         Thought Content: Thought content normal.     DX-KNEE COMPLETE 4+ LEFT    Result Date: 1/7/2023 1/7/2023 11:35 AM HISTORY/REASON FOR EXAM:  Pain/Deformity Following Trauma. Left knee pain, injury TECHNIQUE/EXAM DESCRIPTION AND NUMBER OF VIEWS:  4 views of the LEFT knee. COMPARISON: None FINDINGS: There is no fracture. Alignment is normal. No degenerative changes are present.     Negative left knee series     Assessment/Plan:   Assessment    1. Contusion of left knee, initial encounter  - diclofenac sodium 3 % Gel; Apply 1 Application topically 2 times a day as needed (pain).  Dispense: 100 g; Refill: 0  - diclofenac DR (VOLTAREN) 75 MG Tablet Delayed Response; Take 1 Tablet by mouth 2 times daily with meals as needed (moderate to severe pain).  Dispense: 60 Tablet; Refill: 0    2. Fall due to slipping on ice or snow, initial encounter  - DX-KNEE COMPLETE 4+ LEFT; Future    3. Left lateral knee pain  - DX-KNEE COMPLETE 4+ LEFT; Future    4. Antalgic gait  - DX-KNEE COMPLETE 4+ LEFT; Future    5. Elevated blood pressure reading      Vital signs stable at today's acute urgent care visit.  Begin medications as listed.  Continue wrapping for support.  Recommend range of motion exercises as tolerated.    Advised the patient to follow-up with the primary care provider/urgent care if symptoms persist.  Red flags discussed and indications to immediately call 911 or present to the ED. All questions were encouraged and answered to the patient's satisfaction and understanding, and they agree to the plan of care.     This is an acute problem with uncertain prognosis, medication management and instructions as  well as management options were provided.  I personally reviewed prior external notes and test results pertinent to today and independently reviewed and interpreted all diagnostics, to include POC testing. Time spent evaluating this patient includes preparing for visit, counseling/education, exam, evaluation, obtaining history, and ordering lab/test/procedures.      Please note that this dictation was created using voice recognition software. I have made a reasonable attempt to correct obvious errors, but I expect that there are errors of grammar and possibly content that I did not discover before finalizing the note.       CTH w/ b/l occipital sulcal SAH and small IVH,   CTA with R V4 5mm fusiform aneurysm and severe stenosis of BA and diminutive distal cerebral arteries.   s/p 5/17 Angio: severe b/l anterior and posterior cerebral vasospasm  BL MCA strokes on MRI    Vasculitis workup ongoing  continue Keppra 750mg PO BID  NSG transferred to medicine CTH w/ b/l occipital sulcal SAH and small IVH,   CTA with R V4 5mm fusiform aneurysm and severe stenosis of BA and diminutive distal cerebral arteries.   s/p 5/17 Angio: severe b/l anterior and posterior cerebral vasospasm  BL MCA strokes on MRI    Vasculitis workup ongoing, needs Neuro f/u  continue Keppra 750mg PO BID  NSG transferred to medicine

## 2024-05-27 NOTE — PROGRESS NOTE ADULT - PROBLEM SELECTOR PLAN 3
TTE from 5/23 shows findings consistent with Takostubo, pt was previously in Cardiogenic Shock, off dobutamine and norepinephrine now, hemodynamically stable  repeat TTE 5/24 shows EF improving to 35-40% from 30.  should recover with time, no role for ischemic eval  started coreg and captopril  may need diuresis but currently not in HF  Cardiology on board, recommendations appreciated TTE from 5/23 shows findings consistent with Takostubo, pt was previously in Cardiogenic Shock, off dobutamine and norepinephrine now, hemodynamically stable  repeat TTE 5/24 shows EF improving to 35-40% from 30.  should recover with time, no role for ischemic eval  continue coreg and captopril  may need diuresis but currently not in HF  Cardiology on board, recommendations appreciated

## 2024-05-28 LAB
GLUCOSE BLDC GLUCOMTR-MCNC: 121 MG/DL — HIGH (ref 70–99)
GLUCOSE BLDC GLUCOMTR-MCNC: 123 MG/DL — HIGH (ref 70–99)
GLUCOSE BLDC GLUCOMTR-MCNC: 134 MG/DL — HIGH (ref 70–99)
GLUCOSE BLDC GLUCOMTR-MCNC: 134 MG/DL — HIGH (ref 70–99)
GLUCOSE BLDC GLUCOMTR-MCNC: 136 MG/DL — HIGH (ref 70–99)
VDRL CSF-TITR: SIGNIFICANT CHANGE UP

## 2024-05-28 PROCEDURE — 99232 SBSQ HOSP IP/OBS MODERATE 35: CPT

## 2024-05-28 RX ORDER — CHLORHEXIDINE GLUCONATE 213 G/1000ML
1 SOLUTION TOPICAL DAILY
Refills: 0 | Status: DISCONTINUED | OUTPATIENT
Start: 2024-05-28 | End: 2024-06-03

## 2024-05-28 RX ADMIN — CARVEDILOL PHOSPHATE 3.12 MILLIGRAM(S): 80 CAPSULE, EXTENDED RELEASE ORAL at 18:00

## 2024-05-28 RX ADMIN — SENNA PLUS 2 TABLET(S): 8.6 TABLET ORAL at 21:46

## 2024-05-28 RX ADMIN — SODIUM CHLORIDE 2 GRAM(S): 9 INJECTION INTRAMUSCULAR; INTRAVENOUS; SUBCUTANEOUS at 21:46

## 2024-05-28 RX ADMIN — Medication 0.25 MILLIGRAM(S): at 05:01

## 2024-05-28 RX ADMIN — LEVETIRACETAM 750 MILLIGRAM(S): 250 TABLET, FILM COATED ORAL at 05:02

## 2024-05-28 RX ADMIN — CAPTOPRIL 6.25 MILLIGRAM(S): 12.5 TABLET ORAL at 18:01

## 2024-05-28 RX ADMIN — MIRTAZAPINE 15 MILLIGRAM(S): 45 TABLET, ORALLY DISINTEGRATING ORAL at 21:46

## 2024-05-28 RX ADMIN — Medication 1 TABLET(S): at 11:43

## 2024-05-28 RX ADMIN — Medication 1 MILLIGRAM(S): at 11:44

## 2024-05-28 RX ADMIN — LATANOPROST 1 DROP(S): 0.05 SOLUTION/ DROPS OPHTHALMIC; TOPICAL at 21:47

## 2024-05-28 RX ADMIN — BRIMONIDINE TARTRATE 1 DROP(S): 2 SOLUTION/ DROPS OPHTHALMIC at 05:02

## 2024-05-28 RX ADMIN — Medication 0.25 MILLIGRAM(S): at 18:00

## 2024-05-28 RX ADMIN — CARVEDILOL PHOSPHATE 3.12 MILLIGRAM(S): 80 CAPSULE, EXTENDED RELEASE ORAL at 05:01

## 2024-05-28 RX ADMIN — POLYETHYLENE GLYCOL 3350 17 GRAM(S): 17 POWDER, FOR SOLUTION ORAL at 18:01

## 2024-05-28 RX ADMIN — POLYETHYLENE GLYCOL 3350 17 GRAM(S): 17 POWDER, FOR SOLUTION ORAL at 05:01

## 2024-05-28 RX ADMIN — SODIUM CHLORIDE 2 GRAM(S): 9 INJECTION INTRAMUSCULAR; INTRAVENOUS; SUBCUTANEOUS at 13:48

## 2024-05-28 RX ADMIN — ENOXAPARIN SODIUM 40 MILLIGRAM(S): 100 INJECTION SUBCUTANEOUS at 18:03

## 2024-05-28 RX ADMIN — CHLORHEXIDINE GLUCONATE 1 APPLICATION(S): 213 SOLUTION TOPICAL at 11:43

## 2024-05-28 RX ADMIN — LEVETIRACETAM 750 MILLIGRAM(S): 250 TABLET, FILM COATED ORAL at 18:47

## 2024-05-28 RX ADMIN — CAPTOPRIL 6.25 MILLIGRAM(S): 12.5 TABLET ORAL at 05:01

## 2024-05-28 RX ADMIN — BRIMONIDINE TARTRATE 1 DROP(S): 2 SOLUTION/ DROPS OPHTHALMIC at 18:00

## 2024-05-28 RX ADMIN — SODIUM CHLORIDE 2 GRAM(S): 9 INJECTION INTRAMUSCULAR; INTRAVENOUS; SUBCUTANEOUS at 05:01

## 2024-05-28 NOTE — PROGRESS NOTE ADULT - SUBJECTIVE AND OBJECTIVE BOX
PATIENT SEEN AND EXAMINED BY MYNOR GUAN M.D. ON :- 5/28/24  DATE OF SERVICE:  5/28/24           Interim events noted,Labs ,Radiological studies and Cardiology tests reviewed .    Patient is a 64y old  Female who presents with a chief complaint of SAH (25 May 2024 07:10)      HPI:  Mera Burton  64F, pmh HTN, szs on Keppra, alcoholic cirrhosis, tremors, OA presented from [facility] to OSH w/ generalized weakness found to have b/l sulcal SAH and small occipital IVH. 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 She was recently started on Keflex for UTI. Plts 400, Coags wnl. , AST/ALT = 38/104. WBC 13. Afebrile. Exam: lethargic appearing, EOV, FC, AO3, PERRL, not compliant with EOMs, BUE 5/5, BLE 2/5. (15 May 2024 03:22)      PAST MEDICAL & SURGICAL HISTORY:  Cirrhosis      Seizure      No significant past surgical history          PREVIOUS DIAGNOSTIC TESTING:      ECHO  FINDINGS:    STRESS  FINDINGS:    CATHETERIZATION  FINDINGS:    MEDICATIONS  (STANDING):  brimonidine 0.2% Ophthalmic Solution 1 Drop(s) Both EYES every 12 hours  captopril 6.25 milliGRAM(s) Oral every 12 hours  carvedilol 3.125 milliGRAM(s) Oral every 12 hours  chlorhexidine 2% Cloths 1 Application(s) Topical daily  clonazePAM  Tablet 0.25 milliGRAM(s) Oral two times a day  dextrose 10% Bolus 125 milliLiter(s) IV Bolus once  dextrose 5%. 1000 milliLiter(s) (50 mL/Hr) IV Continuous <Continuous>  dextrose 5%. 1000 milliLiter(s) (100 mL/Hr) IV Continuous <Continuous>  dextrose 50% Injectable 12.5 Gram(s) IV Push once  dextrose 50% Injectable 25 Gram(s) IV Push once  enoxaparin Injectable 40 milliGRAM(s) SubCutaneous <User Schedule>  folic acid 1 milliGRAM(s) Oral daily  glucagon  Injectable 1 milliGRAM(s) IntraMuscular once  insulin lispro (ADMELOG) corrective regimen sliding scale   SubCutaneous every 6 hours  latanoprost 0.005% Ophthalmic Solution 1 Drop(s) Both EYES at bedtime  levETIRAcetam  Solution 750 milliGRAM(s) Oral two times a day  LORazepam   Injectable 1 milliGRAM(s) IV Push once  mirtazapine 15 milliGRAM(s) Oral at bedtime  multivitamin 1 Tablet(s) Oral daily  polyethylene glycol 3350 17 Gram(s) Oral every 12 hours  senna 2 Tablet(s) Oral at bedtime  sodium chloride 2 Gram(s) Oral every 8 hours    MEDICATIONS  (PRN):  dextrose Oral Gel 15 Gram(s) Oral once PRN Blood Glucose LESS THAN 70 milliGRAM(s)/deciliter      FAMILY HISTORY:      SOCIAL HISTORY:    CIGARETTES:    ALCOHOL:    REVIEW OF SYSTEMS:  CONSTITUTIONAL: No fever, weight loss, or fatigue  EYES: No eye pain, visual disturbances, or discharge  ENMT:  No difficulty hearing, tinnitus, vertigo; No sinus or throat pain  NECK: No pain or stiffness  RESPIRATORY: No cough, wheezing, chills or hemoptysis; No shortness of breath  CARDIOVASCULAR: No chest pain, palpitations, dizziness, or leg swelling  GASTROINTESTINAL: No abdominal or epigastric pain. No nausea, vomiting, or hematemesis; No diarrhea or constipation. No melena or hematochezia.  GENITOURINARY: No dysuria, frequency, hematuria, or incontinence  NEUROLOGICAL: No headaches, memory loss, loss of strength, numbness, or tremors  SKIN: No itching, burning, rashes, or lesions   LYMPH NODES: No enlarged glands  ENDOCRINE: No heat or cold intolerance; No hair loss  MUSCULOSKELETAL: No joint pain or swelling; No muscle, back, or extremity pain  PSYCHIATRIC: No depression, anxiety, mood swings, or difficulty sleeping  HEME/LYMPH: No easy bruising, or bleeding gums  ALLERY AND IMMUNOLOGIC: No hives or eczema    Vital Signs Last 24 Hrs  T(C): 37.1 (28 May 2024 20:44), Max: 37.1 (28 May 2024 20:44)  T(F): 98.7 (28 May 2024 20:44), Max: 98.7 (28 May 2024 20:44)  HR: 95 (28 May 2024 20:44) (95 - 107)  BP: 103/66 (28 May 2024 20:44) (98/58 - 110/73)  BP(mean): --  RR: 18 (28 May 2024 20:44) (18 - 18)  SpO2: 98% (28 May 2024 20:44) (95% - 99%)    Parameters below as of 28 May 2024 20:44  Patient On (Oxygen Delivery Method): room air          PHYSICAL EXAM:  GENERAL: NAD, well-groomed, well-developed  HEAD:  Atraumatic, Normocephalic  EYES: EOMI, PERRLA, conjunctiva and sclera clear  ENMT: No tonsillar erythema, exudates, or enlargement; Moist mucous membranes, Good dentition, No lesions  NECK: Supple, No JVD, Normal thyroid  NERVOUS SYSTEM:  Alert & Oriented X3, Good concentration; Motor Strength 5/5 B/L upper and lower extremities; DTRs 2+ intact and symmetric  CHEST/LUNG: Clear to percussion bilaterally; No rales, rhonchi, wheezing, or rubs  HEART: Regular rate and rhythm; No murmurs, rubs, or gallops  ABDOMEN: Soft, Nontender, Nondistended; Bowel sounds present  EXTREMITIES:  2+ Peripheral Pulses, No clubbing, cyanosis, or edema  LYMPH: No lymphadenopathy noted  SKIN: No rashes or lesions      INTERPRETATION OF TELEMETRY:    ECG:    CHADSVAS:     LABS:                  Lipid Panel:   I&O's Summary    27 May 2024 07:01  -  28 May 2024 07:00  --------------------------------------------------------  IN: 0 mL / OUT: 900 mL / NET: -900 mL    28 May 2024 07:01  -  28 May 2024 21:53  --------------------------------------------------------  IN: 0 mL / OUT: 300 mL / NET: -300 mL        RADIOLOGY & ADDITIONAL STUDIES:  CONCLUSIONS:      1. Left ventricular systolic function is moderately decreased with an ejection fraction visually estimated at 35 to 40 %. Regional wall motion abnormalities present.   2. Multiple segmental abnormalities exist. See findings.   3. No pericardial effusion seen.   4. Compared to the transthoracic echocardiogram performed on 5/23/2024,.   5. Technically difficult image quality.

## 2024-05-28 NOTE — PROGRESS NOTE ADULT - PROBLEM SELECTOR PLAN 1
Speech swallow eval ongoing  currently NPO with TF  Dietitian recommendations appreciated and new orders put in

## 2024-05-28 NOTE — CHART NOTE - NSCHARTNOTEFT_GEN_A_CORE
NUTRITION FOLLOW UP NOTE    PATIENT SEEN FOR: nutrition consult received for Tube Feeding    SOURCE: [] Patient  [x] Current Medical Record  [] RN  [] Family/support person at bedside  [x] Patient unavailable/inappropriate  [] Other:    CHART REVIEWED/EVENTS NOTED.  [] No changes to nutrition care plan to note  [x] Nutrition Status:  -NGT placed  for decompression  -Pt NPO - (previously puree with ensure plus high protein 1x/day)  -SLP eval  with recommendations to remain NPO with alternate means of nutrition    DIET ORDER:   Diet, NPO with Tube Feed:   Tube Feeding Modality: Nasogastric  Jevity 1.5 Rodney (JEVITY1.5RTH)  Total Volume for 24 Hours (mL): 960  Continuous  Starting Tube Feed Rate {mL per Hour}: 10  Increase Tube Feed Rate by (mL): 10     Every 4 hours  Until Goal Tube Feed Rate (mL per Hour): 40  Tube Feed Duration (in Hours): 24  Tube Feed Start Time: 13:00  Free Water Flush  Bolus   Total Volume per Flush (mL): 150   Frequency: Every 4 Hours  Nic(7 Gm Arginine/7 Gm Glut/1.2 Gm HMB     Qty per Day:  2 (24)    CURRENT DIET ORDER IS:  [] Appropriate:  [x] Inadequate: does not meet 100% of estimated nutrient needs  [] Other:    NUTRITION INTAKE/PROVISION:  [x] PO: NPO  [x] Enteral Nutrition:  ENTERAL NUTRITION  EN Order Provides: 960 ml formula, 1440 kcal, 61.4 g protein, 729.6 ml free water; meets 19 kcal/kg and 1.1 g protein/kg, based on wt: dosing kcal and IBW protein    Protein Modular(s) Provides: 180 kcal/day, 6 g Pro/day; with nic 2x/day - meets 21.6 kcal/kg and 1.2 g Pro/kg; however not documented as provided on RN flowsheets    Current Pump Rate: 40 mL/hr; no documentation of enteral provision on RN flowsheets     ANTHROPOMETRICS:  Drug Dosing Weight  Height (cm): 162.6 (17 May 2024 07:55)  Weight (kg): 74.7 (17 May 2024 07:55)  BMI (kg/m2): 28.3 (17 May 2024 07:55)  BSA (m2): 1.8 (17 May 2024 07:55)  Weights:   Daily Weight in k.7 ()     MEDICATIONS:  MEDICATIONS  (STANDING):  brimonidine 0.2% Ophthalmic Solution 1 Drop(s) Both EYES every 12 hours  captopril 6.25 milliGRAM(s) Oral every 12 hours  carvedilol 3.125 milliGRAM(s) Oral every 12 hours  clonazePAM  Tablet 0.25 milliGRAM(s) Oral two times a day  dextrose 10% Bolus 125 milliLiter(s) IV Bolus once  dextrose 5%. 1000 milliLiter(s) (50 mL/Hr) IV Continuous <Continuous>  dextrose 5%. 1000 milliLiter(s) (100 mL/Hr) IV Continuous <Continuous>  dextrose 50% Injectable 12.5 Gram(s) IV Push once  dextrose 50% Injectable 25 Gram(s) IV Push once  enoxaparin Injectable 40 milliGRAM(s) SubCutaneous <User Schedule>  folic acid 1 milliGRAM(s) Oral daily  glucagon  Injectable 1 milliGRAM(s) IntraMuscular once  insulin lispro (ADMELOG) corrective regimen sliding scale   SubCutaneous every 6 hours  latanoprost 0.005% Ophthalmic Solution 1 Drop(s) Both EYES at bedtime  levETIRAcetam  Solution 750 milliGRAM(s) Oral two times a day  LORazepam   Injectable 1 milliGRAM(s) IV Push once  mirtazapine 15 milliGRAM(s) Oral at bedtime  multivitamin 1 Tablet(s) Oral daily  polyethylene glycol 3350 17 Gram(s) Oral every 12 hours  senna 2 Tablet(s) Oral at bedtime  sodium chloride 2 Gram(s) Oral every 8 hours    MEDICATIONS  (PRN):  dextrose Oral Gel 15 Gram(s) Oral once PRN Blood Glucose LESS THAN 70 milliGRAM(s)/deciliter    NUTRITIONALLY PERTINENT LABS:   Na137 mmol/L Glu 90 mg/dL K+ 4.0 mmol/L Cr  0.53 mg/dL BUN 12 mg/dL  Phos 3.7 mg/dL  Alb 2.9 g/dL<L> 05-15 Chol 119 mg/dL LDL --    HDL 28 mg/dL<L> Trig 81 mg/dL ALT 41 U/L AST 47 U/L<H> Alkaline Phosphatase 133 U/L<H>  05-15-24 @ 03:51 a1c 6.1    A1C with Estimated Average Glucose Result: 6.1 % (05-15-24 @ 03:51)    Finger Sticks:  POCT Blood Glucose.: 134 mg/dL ( @ 06:16)  POCT Blood Glucose.: 123 mg/dL ( @ 00:30)  POCT Blood Glucose.: 127 mg/dL ( @ 17:06)  POCT Blood Glucose.: 110 mg/dL ( @ 11:35)    NUTRITIONALLY PERTINENT MEDICATIONS/LABS:  [x] Reviewed  [x] Relevant notes on medications/labs:  -on ISS for glycemic control, fingersticks WNL  -receiving NaCl daily    EDEMA:  [x] Reviewed - generalized 1+ edema ()  [] Relevant notes:    GI/ I&O:  [x] Reviewed  [x] Relevant notes: on senna and Miralax for bowel regimen, last BM documented  on RN flowsheets  [] Other:    SKIN:   [x] No pressure injuries documented, per nursing flowsheet  [] Pressure injury previously noted  [] Change in pressure injury documentation:  [] Other:    ESTIMATED NEEDS:  [x] No change:  [] Updated:  Energy: 9797-0186 kcal/day (25-30 kcal/kg)  Protein:  76-87 g/day (1.4-1.6 g/kg)  Fluid:   ml/day or [x] defer to team  Based on: dosing wt 74.7 kg for calories, IBW 54.4 kg for protein    NUTRITION DIAGNOSIS:  [x] Prior Dx: 1) acute moderate malnutrition and 2) increased nutrient needs  [] New Dx:    EDUCATION:  [] Yes:  [x] Not appropriate/warranted    NUTRITION CARE PLAN:  1. Diet: NPO with Jevity 1.5, increase goal rate to 55 mL/hr x 25 hrs via NGT - will provide 1320 mL total volume, 1980 kcal/day (26.5 kcal/kg dosing), 84.5 g Pro/day (1.55 g Pro/kg IBW), and 1003.2 mL water  2. Supplements: d/c Nic 2x/day  3. Multivitamin/mineral supplementation: continue MVI and folic acid daily  4. Maintain aspiration precautions; free water flushes per MD team  5. If unable to resume/tolerate PO diet, recommend nutrition GOC discussion regarding long term nutrition support    [] Achieved - Continue current nutrition intervention(s)  [] Current medical condition precludes nutrition intervention at this time.    MONITORING AND EVALUATION:   RD remains available upon request and will follow up per protocol.    Mikaela Deshpande MPH, RD, CDN   Available on [facility] TEAMS

## 2024-05-28 NOTE — CHART NOTE - NSCHARTNOTEFT_GEN_A_CORE
65 y/o F with a PMH of dementia with dyskinesias, seizures on keppra, now with SAH and occipital IVH; MR brain showing multiple strokes.    Patient actively being followed by this service with previous recommendations for NPO, with non-oral nutrition/hydration/medications. Seen today to determine candidacy for safe oral intake.    Pt encountered awake in bed, on RA, +NGT, +dyskinesias/tremors. +Hypophonic with limited verbal output, A&Ox2, intermittently responded to simple questions - suspect participation in conversation is negatively impacted by uncontrollable movements. Pt was informed that clinician would be providing PO trials. Presented with ice chip via teaspoon and pt turned head away. Pt continued to keep head turned in opposite direction despite encouragement from SLP. Asked multiple times if she would be willing to eat/drink and eventually responded "no." Session discontinued at that time.    Recommendations:  1. Continue NPO, with non-oral nutrition/hydration/medications.  2. Maintain good oral hygiene.  3. Maintain aspiration precautions for secretions and enteral feeds.  4. This service will continue to follow.     Discussed with SHANE Stewart and RN Pat.    Delphine Mcdowell MA, CCC-SLP  Speech Language Pathologist  available on TEAMS or [phone]

## 2024-05-28 NOTE — PROGRESS NOTE ADULT - ASSESSMENT
64F, pmh HTN, szs on Keppra, alcoholic cirrhosis, tremors, OA presented from [facility] to OSH w/ generalized weakness found to have b/l sulcal SAH and small occipital IVH. 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 She was recently started on Keflex for UTI. Plts 400, Coags wnl. , AST/ALT = 38/104. WBC 13. Afebrile. Exam on admission: lethargic appearing, EOV, FC, AO3, PERRL, not compliant with EOMs, BUE 5/5, BLE 2/5.    Now s/p 5/17 Angio: severe b/l anterior and posterior cerebral vasospasm (given IA verapamil and milrinone)

## 2024-05-28 NOTE — PROGRESS NOTE ADULT - PROBLEM SELECTOR PLAN 3
TTE from 5/23 shows findings consistent with Takostubo, pt was previously in Cardiogenic Shock, off dobutamine and norepinephrine now, hemodynamically stable  repeat TTE 5/24 shows EF improving to 35-40% from 30.  should recover with time, no role for ischemic eval  continue coreg and captopril  may need diuresis but currently not in HF  Cardiology on board, recommendations appreciated

## 2024-05-28 NOTE — PROGRESS NOTE ADULT - PROBLEM SELECTOR PLAN 2
CTH w/ b/l occipital sulcal SAH and small IVH,   CTA with R V4 5mm fusiform aneurysm and severe stenosis of BA and diminutive distal cerebral arteries.   s/p 5/17 Angio: severe b/l anterior and posterior cerebral vasospasm  BL MCA strokes on MRI    Vasculitis workup ongoing, needs Neuro f/u today  continue Keppra 750mg PO BID  NSG transferred to medicine

## 2024-05-28 NOTE — PROGRESS NOTE ADULT - SUBJECTIVE AND OBJECTIVE BOX
[facility] Division of Hospital Medicine  Sandra Gonzalez MD  MS Teams PREFERRED        SUBJECTIVE / OVERNIGHT EVENTS: NAD    MEDICATIONS  (STANDING):  brimonidine 0.2% Ophthalmic Solution 1 Drop(s) Both EYES every 12 hours  captopril 6.25 milliGRAM(s) Oral every 12 hours  carvedilol 3.125 milliGRAM(s) Oral every 12 hours  chlorhexidine 2% Cloths 1 Application(s) Topical daily  clonazePAM  Tablet 0.25 milliGRAM(s) Oral two times a day  dextrose 10% Bolus 125 milliLiter(s) IV Bolus once  dextrose 5%. 1000 milliLiter(s) (50 mL/Hr) IV Continuous <Continuous>  dextrose 5%. 1000 milliLiter(s) (100 mL/Hr) IV Continuous <Continuous>  dextrose 50% Injectable 25 Gram(s) IV Push once  dextrose 50% Injectable 12.5 Gram(s) IV Push once  enoxaparin Injectable 40 milliGRAM(s) SubCutaneous <User Schedule>  folic acid 1 milliGRAM(s) Oral daily  glucagon  Injectable 1 milliGRAM(s) IntraMuscular once  insulin lispro (ADMELOG) corrective regimen sliding scale   SubCutaneous every 6 hours  latanoprost 0.005% Ophthalmic Solution 1 Drop(s) Both EYES at bedtime  levETIRAcetam  Solution 750 milliGRAM(s) Oral two times a day  LORazepam   Injectable 1 milliGRAM(s) IV Push once  mirtazapine 15 milliGRAM(s) Oral at bedtime  multivitamin 1 Tablet(s) Oral daily  polyethylene glycol 3350 17 Gram(s) Oral every 12 hours  senna 2 Tablet(s) Oral at bedtime  sodium chloride 2 Gram(s) Oral every 8 hours    MEDICATIONS  (PRN):  dextrose Oral Gel 15 Gram(s) Oral once PRN Blood Glucose LESS THAN 70 milliGRAM(s)/deciliter      I&O's Summary    27 May 2024 07:01  -  28 May 2024 07:00  --------------------------------------------------------  IN: 0 mL / OUT: 900 mL / NET: -900 mL    28 May 2024 07:01  -  28 May 2024 13:23  --------------------------------------------------------  IN: 0 mL / OUT: 0 mL / NET: 0 mL        PHYSICAL EXAM:  Vital Signs Last 24 Hrs  T(C): 36.8 (28 May 2024 11:17), Max: 36.8 (28 May 2024 04:27)  T(F): 98.2 (28 May 2024 11:17), Max: 98.3 (28 May 2024 04:27)  HR: 95 (28 May 2024 11:17) (95 - 107)  BP: 107/72 (28 May 2024 11:17) (93/61 - 110/73)  BP(mean): --  RR: 18 (28 May 2024 11:17) (18 - 18)  SpO2: 98% (28 May 2024 11:17) (97% - 99%)    Parameters below as of 28 May 2024 11:17  Patient On (Oxygen Delivery Method): room air      CONSTITUTIONAL: NAD, NG Tube in place, head tremor  EYES: PERRLA; conjunctiva and sclera clear  ENMT: Moist oral mucosa, no pharyngeal injection or exudates;   NECK: Supple, no palpable masses; no thyromegaly  RESPIRATORY: Normal respiratory effort; lungs are clear to auscultation bilaterally  CARDIOVASCULAR: Regular rate and rhythm, normal S1 and S2, no murmur/rub/gallop; No lower extremity edema;   ABDOMEN: Nontender to palpation, normoactive bowel sounds, no rebound/guarding;   MUSCULOSKELETAL:  Normal gait; no clubbing or cyanosis of digits; no joint swelling or tenderness to palpation  SKIN: No rashes; no palpable lesions

## 2024-05-28 NOTE — PROGRESS NOTE ADULT - PROBLEM SELECTOR PLAN 7
DVT ppx: Lovenox  Diet: NPO with tube feeding  Dispo: return to NH when clinically stable    d/w family in detail

## 2024-05-29 LAB
ANION GAP SERPL CALC-SCNC: 13 MMOL/L — SIGNIFICANT CHANGE UP (ref 5–17)
BUN SERPL-MCNC: 9 MG/DL — SIGNIFICANT CHANGE UP (ref 7–23)
CALCIUM SERPL-MCNC: 9.9 MG/DL — SIGNIFICANT CHANGE UP (ref 8.4–10.5)
CHLORIDE SERPL-SCNC: 101 MMOL/L — SIGNIFICANT CHANGE UP (ref 96–108)
CO2 SERPL-SCNC: 22 MMOL/L — SIGNIFICANT CHANGE UP (ref 22–31)
CREAT SERPL-MCNC: 0.5 MG/DL — SIGNIFICANT CHANGE UP (ref 0.5–1.3)
EBV PCR: SIGNIFICANT CHANGE UP IU/ML
EGFR: 106 ML/MIN/1.73M2 — SIGNIFICANT CHANGE UP
GLUCOSE BLDC GLUCOMTR-MCNC: 126 MG/DL — HIGH (ref 70–99)
GLUCOSE BLDC GLUCOMTR-MCNC: 130 MG/DL — HIGH (ref 70–99)
GLUCOSE BLDC GLUCOMTR-MCNC: 133 MG/DL — HIGH (ref 70–99)
GLUCOSE BLDC GLUCOMTR-MCNC: 135 MG/DL — HIGH (ref 70–99)
GLUCOSE BLDC GLUCOMTR-MCNC: 141 MG/DL — HIGH (ref 70–99)
GLUCOSE SERPL-MCNC: 125 MG/DL — HIGH (ref 70–99)
HCT VFR BLD CALC: 31 % — LOW (ref 34.5–45)
HGB BLD-MCNC: 10.2 G/DL — LOW (ref 11.5–15.5)
JCPYV DNA # CSF NAA+PROBE: SIGNIFICANT CHANGE UP COPIES/ML
LYME IGG LINE BLOT INTERP.: NEGATIVE — SIGNIFICANT CHANGE UP
LYME IGM LINE BLOT INTERP.: NEGATIVE — SIGNIFICANT CHANGE UP
MAGNESIUM SERPL-MCNC: 2 MG/DL — SIGNIFICANT CHANGE UP (ref 1.6–2.6)
MCHC RBC-ENTMCNC: 28.3 PG — SIGNIFICANT CHANGE UP (ref 27–34)
MCHC RBC-ENTMCNC: 32.9 GM/DL — SIGNIFICANT CHANGE UP (ref 32–36)
MCV RBC AUTO: 85.9 FL — SIGNIFICANT CHANGE UP (ref 80–100)
NRBC # BLD: 0 /100 WBCS — SIGNIFICANT CHANGE UP (ref 0–0)
P18 AB. IGG: SIGNIFICANT CHANGE UP
P23 AB. IGG: SIGNIFICANT CHANGE UP
P23 AB. IGM: PRESENT
P28 AB. IGG: SIGNIFICANT CHANGE UP
P30 AB. IGG: SIGNIFICANT CHANGE UP
P39 AB. IGG: SIGNIFICANT CHANGE UP
P39 AB. IGM: SIGNIFICANT CHANGE UP
P41 AB. IGG: SIGNIFICANT CHANGE UP
P41 AB. IGM: SIGNIFICANT CHANGE UP
P45 AB. IGG: SIGNIFICANT CHANGE UP
P58 AB. IGG: SIGNIFICANT CHANGE UP
P66 AB. IGG: SIGNIFICANT CHANGE UP
P93 AB. IGG: SIGNIFICANT CHANGE UP
PHOSPHATE SERPL-MCNC: 3.7 MG/DL — SIGNIFICANT CHANGE UP (ref 2.5–4.5)
PLATELET # BLD AUTO: 321 K/UL — SIGNIFICANT CHANGE UP (ref 150–400)
POTASSIUM SERPL-MCNC: 4.4 MMOL/L — SIGNIFICANT CHANGE UP (ref 3.5–5.3)
POTASSIUM SERPL-SCNC: 4.4 MMOL/L — SIGNIFICANT CHANGE UP (ref 3.5–5.3)
RBC # BLD: 3.61 M/UL — LOW (ref 3.8–5.2)
RBC # FLD: 14.1 % — SIGNIFICANT CHANGE UP (ref 10.3–14.5)
SODIUM SERPL-SCNC: 136 MMOL/L — SIGNIFICANT CHANGE UP (ref 135–145)
WBC # BLD: 9.47 K/UL — SIGNIFICANT CHANGE UP (ref 3.8–10.5)
WBC # FLD AUTO: 9.47 K/UL — SIGNIFICANT CHANGE UP (ref 3.8–10.5)

## 2024-05-29 PROCEDURE — 99233 SBSQ HOSP IP/OBS HIGH 50: CPT

## 2024-05-29 RX ADMIN — Medication 1 MILLIGRAM(S): at 13:10

## 2024-05-29 RX ADMIN — CARVEDILOL PHOSPHATE 3.12 MILLIGRAM(S): 80 CAPSULE, EXTENDED RELEASE ORAL at 17:59

## 2024-05-29 RX ADMIN — Medication 1 TABLET(S): at 13:10

## 2024-05-29 RX ADMIN — LATANOPROST 1 DROP(S): 0.05 SOLUTION/ DROPS OPHTHALMIC; TOPICAL at 21:20

## 2024-05-29 RX ADMIN — MIRTAZAPINE 15 MILLIGRAM(S): 45 TABLET, ORALLY DISINTEGRATING ORAL at 21:20

## 2024-05-29 RX ADMIN — CHLORHEXIDINE GLUCONATE 1 APPLICATION(S): 213 SOLUTION TOPICAL at 13:12

## 2024-05-29 RX ADMIN — SENNA PLUS 2 TABLET(S): 8.6 TABLET ORAL at 21:20

## 2024-05-29 RX ADMIN — CARVEDILOL PHOSPHATE 3.12 MILLIGRAM(S): 80 CAPSULE, EXTENDED RELEASE ORAL at 05:06

## 2024-05-29 RX ADMIN — SODIUM CHLORIDE 2 GRAM(S): 9 INJECTION INTRAMUSCULAR; INTRAVENOUS; SUBCUTANEOUS at 13:12

## 2024-05-29 RX ADMIN — Medication 0.25 MILLIGRAM(S): at 05:06

## 2024-05-29 RX ADMIN — LEVETIRACETAM 750 MILLIGRAM(S): 250 TABLET, FILM COATED ORAL at 17:58

## 2024-05-29 RX ADMIN — POLYETHYLENE GLYCOL 3350 17 GRAM(S): 17 POWDER, FOR SOLUTION ORAL at 17:59

## 2024-05-29 RX ADMIN — BRIMONIDINE TARTRATE 1 DROP(S): 2 SOLUTION/ DROPS OPHTHALMIC at 18:00

## 2024-05-29 RX ADMIN — POLYETHYLENE GLYCOL 3350 17 GRAM(S): 17 POWDER, FOR SOLUTION ORAL at 05:05

## 2024-05-29 RX ADMIN — ENOXAPARIN SODIUM 40 MILLIGRAM(S): 100 INJECTION SUBCUTANEOUS at 17:58

## 2024-05-29 RX ADMIN — LEVETIRACETAM 750 MILLIGRAM(S): 250 TABLET, FILM COATED ORAL at 05:05

## 2024-05-29 RX ADMIN — CAPTOPRIL 6.25 MILLIGRAM(S): 12.5 TABLET ORAL at 17:59

## 2024-05-29 RX ADMIN — BRIMONIDINE TARTRATE 1 DROP(S): 2 SOLUTION/ DROPS OPHTHALMIC at 05:05

## 2024-05-29 RX ADMIN — Medication 0.25 MILLIGRAM(S): at 17:57

## 2024-05-29 RX ADMIN — SODIUM CHLORIDE 2 GRAM(S): 9 INJECTION INTRAMUSCULAR; INTRAVENOUS; SUBCUTANEOUS at 05:06

## 2024-05-29 RX ADMIN — CAPTOPRIL 6.25 MILLIGRAM(S): 12.5 TABLET ORAL at 05:06

## 2024-05-29 RX ADMIN — SODIUM CHLORIDE 2 GRAM(S): 9 INJECTION INTRAMUSCULAR; INTRAVENOUS; SUBCUTANEOUS at 21:20

## 2024-05-29 NOTE — PROGRESS NOTE ADULT - ASSESSMENT
64F, pmh HTN, szs on Keppra, alcoholic cirrhosis, tremors, OA presented from [facility] to OSH w/ generalized weakness found to have b/l sulcal SAH and small occipital IVH. 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 She was recently started on Keflex for UTI. Plts 400, Coags wnl. , AST/ALT = 38/104. WBC 13. Afebrile. Exam on admission: lethargic appearing, EOV, FC, AO3, PERRL, not compliant with EOMs, BUE 5/5, BLE 2/5.    Now s/p 5/17 Angio: severe b/l anterior and posterior cerebral vasospasm (given IA verapamil and milrinone)

## 2024-05-29 NOTE — PROGRESS NOTE ADULT - SUBJECTIVE AND OBJECTIVE BOX
· Recently diagnosed dementia  On Aricept and exelon patch  · Lives at [address] [place] [address] but per CM note the other day pt may not want to return?  CM to follow up  · exelon patch on right shoulder with date 12/29 PATIENT SEEN AND EXAMINED BY MYNOR GUAN M.D. ON :- 5/29/24  DATE OF SERVICE:  5/29/24           Interim events noted,Labs ,Radiological studies and Cardiology tests reviewed .    Patient is a 64y old  Female who presents with a chief complaint of SAH (25 May 2024 07:10)      HPI:  Mera Burton  64F, pmh HTN, szs on Keppra, alcoholic cirrhosis, tremors, OA presented from [facility] to OSH w/ generalized weakness found to have b/l sulcal SAH and small occipital IVH. 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 She was recently started on Keflex for UTI. Plts 400, Coags wnl. , AST/ALT = 38/104. WBC 13. Afebrile. Exam: lethargic appearing, EOV, FC, AO3, PERRL, not compliant with EOMs, BUE 5/5, BLE 2/5. (15 May 2024 03:22)      PAST MEDICAL & SURGICAL HISTORY:  Cirrhosis      Seizure      No significant past surgical history          PREVIOUS DIAGNOSTIC TESTING:      ECHO  FINDINGS:    STRESS  FINDINGS:    CATHETERIZATION  FINDINGS:    MEDICATIONS  (STANDING):  brimonidine 0.2% Ophthalmic Solution 1 Drop(s) Both EYES every 12 hours  captopril 6.25 milliGRAM(s) Oral every 12 hours  carvedilol 3.125 milliGRAM(s) Oral every 12 hours  chlorhexidine 2% Cloths 1 Application(s) Topical daily  clonazePAM  Tablet 0.25 milliGRAM(s) Oral two times a day  dextrose 10% Bolus 125 milliLiter(s) IV Bolus once  dextrose 5%. 1000 milliLiter(s) (50 mL/Hr) IV Continuous <Continuous>  dextrose 5%. 1000 milliLiter(s) (100 mL/Hr) IV Continuous <Continuous>  dextrose 50% Injectable 25 Gram(s) IV Push once  dextrose 50% Injectable 12.5 Gram(s) IV Push once  enoxaparin Injectable 40 milliGRAM(s) SubCutaneous <User Schedule>  folic acid 1 milliGRAM(s) Oral daily  glucagon  Injectable 1 milliGRAM(s) IntraMuscular once  insulin lispro (ADMELOG) corrective regimen sliding scale   SubCutaneous every 6 hours  latanoprost 0.005% Ophthalmic Solution 1 Drop(s) Both EYES at bedtime  levETIRAcetam  Solution 750 milliGRAM(s) Oral two times a day  LORazepam   Injectable 1 milliGRAM(s) IV Push once  mirtazapine 15 milliGRAM(s) Oral at bedtime  multivitamin 1 Tablet(s) Oral daily  polyethylene glycol 3350 17 Gram(s) Oral every 12 hours  senna 2 Tablet(s) Oral at bedtime  sodium chloride 2 Gram(s) Oral every 8 hours    MEDICATIONS  (PRN):  dextrose Oral Gel 15 Gram(s) Oral once PRN Blood Glucose LESS THAN 70 milliGRAM(s)/deciliter      FAMILY HISTORY:      SOCIAL HISTORY:    CIGARETTES:    ALCOHOL:    REVIEW OF SYSTEMS:  CONSTITUTIONAL: No fever, weight loss, or fatigue  EYES: No eye pain, visual disturbances, or discharge  ENMT:  No difficulty hearing, tinnitus, vertigo; No sinus or throat pain  NECK: No pain or stiffness  RESPIRATORY: No cough, wheezing, chills or hemoptysis; No shortness of breath  CARDIOVASCULAR: No chest pain, palpitations, dizziness, or leg swelling  GASTROINTESTINAL: No abdominal or epigastric pain. No nausea, vomiting, or hematemesis; No diarrhea or constipation. No melena or hematochezia.  GENITOURINARY: No dysuria, frequency, hematuria, or incontinence  NEUROLOGICAL: No headaches, memory loss, loss of strength, numbness, or tremors  SKIN: No itching, burning, rashes, or lesions   LYMPH NODES: No enlarged glands  ENDOCRINE: No heat or cold intolerance; No hair loss  MUSCULOSKELETAL: No joint pain or swelling; No muscle, back, or extremity pain  PSYCHIATRIC: No depression, anxiety, mood swings, or difficulty sleeping  HEME/LYMPH: No easy bruising, or bleeding gums  ALLERY AND IMMUNOLOGIC: No hives or eczema    Vital Signs Last 24 Hrs  T(C): 36.7 (29 May 2024 20:11), Max: 37.3 (29 May 2024 04:26)  T(F): 98 (29 May 2024 20:11), Max: 99.1 (29 May 2024 04:26)  HR: 92 (29 May 2024 20:11) (92 - 102)  BP: 105/71 (29 May 2024 20:11) (96/63 - 114/76)  BP(mean): --  RR: 18 (29 May 2024 20:11) (18 - 18)  SpO2: 97% (29 May 2024 20:11) (96% - 100%)    Parameters below as of 29 May 2024 20:11  Patient On (Oxygen Delivery Method): room air          PHYSICAL EXAM:  GENERAL: NAD, well-groomed, well-developed  HEAD:  Atraumatic, Normocephalic  EYES: EOMI, PERRLA, conjunctiva and sclera clear  ENMT: No tonsillar erythema, exudates, or enlargement; Moist mucous membranes, Good dentition, No lesions  NECK: Supple, No JVD, Normal thyroid  NERVOUS SYSTEM:  Alert & Oriented X3, Good concentration; Motor Strength 5/5 B/L upper and lower extremities; DTRs 2+ intact and symmetric  CHEST/LUNG: Clear to percussion bilaterally; No rales, rhonchi, wheezing, or rubs  HEART: Regular rate and rhythm; No murmurs, rubs, or gallops  ABDOMEN: Soft, Nontender, Nondistended; Bowel sounds present  EXTREMITIES:  2+ Peripheral Pulses, No clubbing, cyanosis, or edema  LYMPH: No lymphadenopathy noted  SKIN: No rashes or lesions      INTERPRETATION OF TELEMETRY:    ECG:    SALDSVASC:     LABS:                        10.2   9.47  )-----------( 321      ( 29 May 2024 06:49 )             31.0     05-29    136  |  101  |  9   ----------------------------<  125<H>  4.4   |  22  |  0.50    Ca    9.9      29 May 2024 06:48  Phos  3.7     05-29  Mg     2.0     05-29            Urinalysis Basic - ( 29 May 2024 06:48 )    Color: x / Appearance: x / SG: x / pH: x  Gluc: 125 mg/dL / Ketone: x  / Bili: x / Urobili: x   Blood: x / Protein: x / Nitrite: x   Leuk Esterase: x / RBC: x / WBC x   Sq Epi: x / Non Sq Epi: x / Bacteria: x      Lipid Panel:   I&O's Summary    28 May 2024 07:01  -  29 May 2024 07:00  --------------------------------------------------------  IN: 50 mL / OUT: 600 mL / NET: -550 mL    29 May 2024 07:01  -  29 May 2024 21:03  --------------------------------------------------------  IN: 0 mL / OUT: 225 mL / NET: -225 mL        RADIOLOGY & ADDITIONAL STUDIES:    CONCLUSIONS:      1. Left ventricular systolic function is moderately decreased with an ejection fraction visually estimated at 35 to 40 %. Regional wall motion abnormalities present.   2. Multiple segmental abnormalities exist. See findings.   3. No pericardial effusion seen.   4. Compared to the transthoracic echocardiogram performed on 5/23/2024,.   5. Technically difficult image quality.

## 2024-05-29 NOTE — PROGRESS NOTE ADULT - SUBJECTIVE AND OBJECTIVE BOX
[facility] Division of Hospital Medicine  Sandra Gonzalez MD  MS Teams PREFERRED        SUBJECTIVE / OVERNIGHT EVENTS:  ADDITIONAL REVIEW OF SYSTEMS:    MEDICATIONS  (STANDING):  brimonidine 0.2% Ophthalmic Solution 1 Drop(s) Both EYES every 12 hours  captopril 6.25 milliGRAM(s) Oral every 12 hours  carvedilol 3.125 milliGRAM(s) Oral every 12 hours  chlorhexidine 2% Cloths 1 Application(s) Topical daily  clonazePAM  Tablet 0.25 milliGRAM(s) Oral two times a day  dextrose 10% Bolus 125 milliLiter(s) IV Bolus once  dextrose 5%. 1000 milliLiter(s) (50 mL/Hr) IV Continuous <Continuous>  dextrose 5%. 1000 milliLiter(s) (100 mL/Hr) IV Continuous <Continuous>  dextrose 50% Injectable 25 Gram(s) IV Push once  dextrose 50% Injectable 12.5 Gram(s) IV Push once  enoxaparin Injectable 40 milliGRAM(s) SubCutaneous <User Schedule>  folic acid 1 milliGRAM(s) Oral daily  glucagon  Injectable 1 milliGRAM(s) IntraMuscular once  insulin lispro (ADMELOG) corrective regimen sliding scale   SubCutaneous every 6 hours  latanoprost 0.005% Ophthalmic Solution 1 Drop(s) Both EYES at bedtime  levETIRAcetam  Solution 750 milliGRAM(s) Oral two times a day  LORazepam   Injectable 1 milliGRAM(s) IV Push once  mirtazapine 15 milliGRAM(s) Oral at bedtime  multivitamin 1 Tablet(s) Oral daily  polyethylene glycol 3350 17 Gram(s) Oral every 12 hours  senna 2 Tablet(s) Oral at bedtime  sodium chloride 2 Gram(s) Oral every 8 hours    MEDICATIONS  (PRN):  dextrose Oral Gel 15 Gram(s) Oral once PRN Blood Glucose LESS THAN 70 milliGRAM(s)/deciliter      I&O's Summary    28 May 2024 07:01  -  29 May 2024 07:00  --------------------------------------------------------  IN: 50 mL / OUT: 600 mL / NET: -550 mL    29 May 2024 07:01  -  29 May 2024 11:52  --------------------------------------------------------  IN: 0 mL / OUT: 0 mL / NET: 0 mL        PHYSICAL EXAM:  Vital Signs Last 24 Hrs  T(C): 36.8 (29 May 2024 08:05), Max: 37.3 (29 May 2024 04:26)  T(F): 98.3 (29 May 2024 08:05), Max: 99.1 (29 May 2024 04:26)  HR: 94 (29 May 2024 08:05) (94 - 102)  BP: 114/76 (29 May 2024 08:05) (96/63 - 114/76)  BP(mean): --  RR: 18 (29 May 2024 08:05) (18 - 18)  SpO2: 97% (29 May 2024 08:05) (95% - 100%)    Parameters below as of 29 May 2024 08:05  Patient On (Oxygen Delivery Method): room air      CONSTITUTIONAL: NAD, well-developed, well-groomed  EYES: PERRLA; conjunctiva and sclera clear  ENMT: Moist oral mucosa, no pharyngeal injection or exudates; normal dentition  NECK: Supple, no palpable masses; no thyromegaly  RESPIRATORY: Normal respiratory effort; lungs are clear to auscultation bilaterally  CARDIOVASCULAR: Regular rate and rhythm, normal S1 and S2, no murmur/rub/gallop; No lower extremity edema; Peripheral pulses are 2+ bilaterally  ABDOMEN: Nontender to palpation, normoactive bowel sounds, no rebound/guarding; No hepatosplenomegaly  MUSCULOSKELETAL:  Normal gait; no clubbing or cyanosis of digits; no joint swelling or tenderness to palpation  PSYCH: A+O to person, place, and time; affect appropriate  NEUROLOGY: CN 2-12 are intact and symmetric; no gross sensory deficits   SKIN: No rashes; no palpable lesions    LABS:                        10.2   9.47  )-----------( 321      ( 29 May 2024 06:49 )             31.0     05-29    136  |  101  |  9   ----------------------------<  125<H>  4.4   |  22  |  0.50    Ca    9.9      29 May 2024 06:48  Phos  3.7     05-29  Mg     2.0     05-29            Urinalysis Basic - ( 29 May 2024 06:48 )    Color: x / Appearance: x / SG: x / pH: x  Gluc: 125 mg/dL / Ketone: x  / Bili: x / Urobili: x   Blood: x / Protein: x / Nitrite: x   Leuk Esterase: x / RBC: x / WBC x   Sq Epi: x / Non Sq Epi: x / Bacteria: x          RADIOLOGY & ADDITIONAL TESTS:  Results Reviewed:   Imaging Personally Reviewed:  Electrocardiogram Personally Reviewed:    COORDINATION OF CARE:  Care Discussed with Consultants/Other Providers [Y/N]:  Prior or Outpatient Records Reviewed [Y/N]:   [facility] Division of Hospital Medicine  Sandra Gonzalez MD  MS Teams PREFERRED        SUBJECTIVE / OVERNIGHT EVENTS: NAD still with significant head tremor    MEDICATIONS  (STANDING):  brimonidine 0.2% Ophthalmic Solution 1 Drop(s) Both EYES every 12 hours  captopril 6.25 milliGRAM(s) Oral every 12 hours  carvedilol 3.125 milliGRAM(s) Oral every 12 hours  chlorhexidine 2% Cloths 1 Application(s) Topical daily  clonazePAM  Tablet 0.25 milliGRAM(s) Oral two times a day  dextrose 10% Bolus 125 milliLiter(s) IV Bolus once  dextrose 5%. 1000 milliLiter(s) (50 mL/Hr) IV Continuous <Continuous>  dextrose 5%. 1000 milliLiter(s) (100 mL/Hr) IV Continuous <Continuous>  dextrose 50% Injectable 25 Gram(s) IV Push once  dextrose 50% Injectable 12.5 Gram(s) IV Push once  enoxaparin Injectable 40 milliGRAM(s) SubCutaneous <User Schedule>  folic acid 1 milliGRAM(s) Oral daily  glucagon  Injectable 1 milliGRAM(s) IntraMuscular once  insulin lispro (ADMELOG) corrective regimen sliding scale   SubCutaneous every 6 hours  latanoprost 0.005% Ophthalmic Solution 1 Drop(s) Both EYES at bedtime  levETIRAcetam  Solution 750 milliGRAM(s) Oral two times a day  LORazepam   Injectable 1 milliGRAM(s) IV Push once  mirtazapine 15 milliGRAM(s) Oral at bedtime  multivitamin 1 Tablet(s) Oral daily  polyethylene glycol 3350 17 Gram(s) Oral every 12 hours  senna 2 Tablet(s) Oral at bedtime  sodium chloride 2 Gram(s) Oral every 8 hours    MEDICATIONS  (PRN):  dextrose Oral Gel 15 Gram(s) Oral once PRN Blood Glucose LESS THAN 70 milliGRAM(s)/deciliter      I&O's Summary    28 May 2024 07:01  -  29 May 2024 07:00  --------------------------------------------------------  IN: 50 mL / OUT: 600 mL / NET: -550 mL    29 May 2024 07:01  -  29 May 2024 11:52  --------------------------------------------------------  IN: 0 mL / OUT: 0 mL / NET: 0 mL        PHYSICAL EXAM:  Vital Signs Last 24 Hrs  T(C): 36.8 (29 May 2024 08:05), Max: 37.3 (29 May 2024 04:26)  T(F): 98.3 (29 May 2024 08:05), Max: 99.1 (29 May 2024 04:26)  HR: 94 (29 May 2024 08:05) (94 - 102)  BP: 114/76 (29 May 2024 08:05) (96/63 - 114/76)  BP(mean): --  RR: 18 (29 May 2024 08:05) (18 - 18)  SpO2: 97% (29 May 2024 08:05) (95% - 100%)    Parameters below as of 29 May 2024 08:05  Patient On (Oxygen Delivery Method): room air      CONSTITUTIONAL: NAD, NG Tube in place, head tremor  EYES: PERRLA; conjunctiva and sclera clear  ENMT: Moist oral mucosa, no pharyngeal injection or exudates;   NECK: Supple, no palpable masses; no thyromegaly  RESPIRATORY: Normal respiratory effort; lungs are clear to auscultation bilaterally  CARDIOVASCULAR: Regular rate and rhythm, normal S1 and S2, no murmur/rub/gallop; No lower extremity edema;   ABDOMEN: Nontender to palpation, normoactive bowel sounds, no rebound/guarding;   MUSCULOSKELETAL:  Normal gait; no clubbing or cyanosis of digits; no joint swelling or tenderness to palpation  SKIN: No rashes; no palpable lesions    LABS:                        10.2   9.47  )-----------( 321      ( 29 May 2024 06:49 )             31.0     05-29    136  |  101  |  9   ----------------------------<  125<H>  4.4   |  22  |  0.50    Ca    9.9      29 May 2024 06:48  Phos  3.7     05-29  Mg     2.0     05-29            Urinalysis Basic - ( 29 May 2024 06:48 )    Color: x / Appearance: x / SG: x / pH: x  Gluc: 125 mg/dL / Ketone: x  / Bili: x / Urobili: x   Blood: x / Protein: x / Nitrite: x   Leuk Esterase: x / RBC: x / WBC x   Sq Epi: x / Non Sq Epi: x / Bacteria: x

## 2024-05-29 NOTE — PROGRESS NOTE ADULT - PROBLEM SELECTOR PLAN 7
DVT ppx: Lovenox  Diet: NPO with tube feeding  Dispo: return to NH when clinically stable    d/w family in detail DVT ppx: Lovenox  Diet: NPO with tube feeding  Dispo: return to NH when clinically stable

## 2024-05-29 NOTE — PROGRESS NOTE ADULT - PROBLEM SELECTOR PLAN 2
CTH w/ b/l occipital sulcal SAH and small IVH,   CTA with R V4 5mm fusiform aneurysm and severe stenosis of BA and diminutive distal cerebral arteries.   s/p 5/17 Angio: severe b/l anterior and posterior cerebral vasospasm  BL MCA strokes on MRI    Vasculitis workup ongoing, needs Neuro f/u today  continue Keppra 750mg PO BID  NSG transferred to medicine CTH w/ b/l occipital sulcal SAH and small IVH,   CTA with R V4 5mm fusiform aneurysm and severe stenosis of BA and diminutive distal cerebral arteries.   s/p 5/17 Angio: severe b/l anterior and posterior cerebral vasospasm  BL MCA strokes on MRI    Vasculitis workup negative, likely due to polysubstance abuse  continue Keppra 750mg PO BID  NSG transferred to medicine

## 2024-05-29 NOTE — PROGRESS NOTE ADULT - PROBLEM SELECTOR PLAN 1
Speech swallow eval ongoing  currently NPO with TF  Dietitian recommendations appreciated and new orders put in Speech swallow eval ongoing  currently NPO with TF  Dietitian recommendations appreciated and new orders put in  eval limited due to head tremor, f/u Neuro for dyskinesia management

## 2024-05-30 LAB
ANION GAP SERPL CALC-SCNC: 16 MMOL/L — SIGNIFICANT CHANGE UP (ref 5–17)
BUN SERPL-MCNC: 9 MG/DL — SIGNIFICANT CHANGE UP (ref 7–23)
CALCIUM SERPL-MCNC: 9.7 MG/DL — SIGNIFICANT CHANGE UP (ref 8.4–10.5)
CHLORIDE SERPL-SCNC: 99 MMOL/L — SIGNIFICANT CHANGE UP (ref 96–108)
CO2 SERPL-SCNC: 20 MMOL/L — LOW (ref 22–31)
CREAT SERPL-MCNC: 0.46 MG/DL — LOW (ref 0.5–1.3)
EGFR: 108 ML/MIN/1.73M2 — SIGNIFICANT CHANGE UP
GLUCOSE BLDC GLUCOMTR-MCNC: 102 MG/DL — HIGH (ref 70–99)
GLUCOSE BLDC GLUCOMTR-MCNC: 126 MG/DL — HIGH (ref 70–99)
GLUCOSE BLDC GLUCOMTR-MCNC: 142 MG/DL — HIGH (ref 70–99)
GLUCOSE BLDC GLUCOMTR-MCNC: 96 MG/DL — SIGNIFICANT CHANGE UP (ref 70–99)
GLUCOSE BLDC GLUCOMTR-MCNC: 98 MG/DL — SIGNIFICANT CHANGE UP (ref 70–99)
GLUCOSE SERPL-MCNC: 129 MG/DL — HIGH (ref 70–99)
HCT VFR BLD CALC: 32.2 % — LOW (ref 34.5–45)
HGB BLD-MCNC: 10.2 G/DL — LOW (ref 11.5–15.5)
MCHC RBC-ENTMCNC: 27.3 PG — SIGNIFICANT CHANGE UP (ref 27–34)
MCHC RBC-ENTMCNC: 31.7 GM/DL — LOW (ref 32–36)
MCV RBC AUTO: 86.3 FL — SIGNIFICANT CHANGE UP (ref 80–100)
NRBC # BLD: 0 /100 WBCS — SIGNIFICANT CHANGE UP (ref 0–0)
OLIGOCLONAL BANDS CSF ELPH-IMP: ABNORMAL
PLATELET # BLD AUTO: 297 K/UL — SIGNIFICANT CHANGE UP (ref 150–400)
POTASSIUM SERPL-MCNC: 4.4 MMOL/L — SIGNIFICANT CHANGE UP (ref 3.5–5.3)
POTASSIUM SERPL-SCNC: 4.4 MMOL/L — SIGNIFICANT CHANGE UP (ref 3.5–5.3)
RBC # BLD: 3.73 M/UL — LOW (ref 3.8–5.2)
RBC # FLD: 13.8 % — SIGNIFICANT CHANGE UP (ref 10.3–14.5)
SODIUM SERPL-SCNC: 135 MMOL/L — SIGNIFICANT CHANGE UP (ref 135–145)
WBC # BLD: 7.69 K/UL — SIGNIFICANT CHANGE UP (ref 3.8–10.5)
WBC # FLD AUTO: 7.69 K/UL — SIGNIFICANT CHANGE UP (ref 3.8–10.5)

## 2024-05-30 PROCEDURE — 99232 SBSQ HOSP IP/OBS MODERATE 35: CPT

## 2024-05-30 PROCEDURE — 99233 SBSQ HOSP IP/OBS HIGH 50: CPT | Mod: GC

## 2024-05-30 RX ORDER — INSULIN LISPRO 100/ML
VIAL (ML) SUBCUTANEOUS
Refills: 0 | Status: DISCONTINUED | OUTPATIENT
Start: 2024-05-30 | End: 2024-06-03

## 2024-05-30 RX ORDER — CARBIDOPA AND LEVODOPA 25; 100 MG/1; MG/1
1 TABLET ORAL
Refills: 0 | Status: DISCONTINUED | OUTPATIENT
Start: 2024-05-30 | End: 2024-06-03

## 2024-05-30 RX ADMIN — SODIUM CHLORIDE 2 GRAM(S): 9 INJECTION INTRAMUSCULAR; INTRAVENOUS; SUBCUTANEOUS at 21:41

## 2024-05-30 RX ADMIN — LEVETIRACETAM 750 MILLIGRAM(S): 250 TABLET, FILM COATED ORAL at 06:14

## 2024-05-30 RX ADMIN — CAPTOPRIL 6.25 MILLIGRAM(S): 12.5 TABLET ORAL at 06:12

## 2024-05-30 RX ADMIN — CAPTOPRIL 6.25 MILLIGRAM(S): 12.5 TABLET ORAL at 17:19

## 2024-05-30 RX ADMIN — SENNA PLUS 2 TABLET(S): 8.6 TABLET ORAL at 21:41

## 2024-05-30 RX ADMIN — LEVETIRACETAM 750 MILLIGRAM(S): 250 TABLET, FILM COATED ORAL at 17:20

## 2024-05-30 RX ADMIN — MIRTAZAPINE 15 MILLIGRAM(S): 45 TABLET, ORALLY DISINTEGRATING ORAL at 21:41

## 2024-05-30 RX ADMIN — LATANOPROST 1 DROP(S): 0.05 SOLUTION/ DROPS OPHTHALMIC; TOPICAL at 21:42

## 2024-05-30 RX ADMIN — CHLORHEXIDINE GLUCONATE 1 APPLICATION(S): 213 SOLUTION TOPICAL at 11:02

## 2024-05-30 RX ADMIN — ENOXAPARIN SODIUM 40 MILLIGRAM(S): 100 INJECTION SUBCUTANEOUS at 17:19

## 2024-05-30 RX ADMIN — CARVEDILOL PHOSPHATE 3.12 MILLIGRAM(S): 80 CAPSULE, EXTENDED RELEASE ORAL at 06:10

## 2024-05-30 RX ADMIN — Medication 0.25 MILLIGRAM(S): at 17:19

## 2024-05-30 RX ADMIN — CARBIDOPA AND LEVODOPA 1 TABLET(S): 25; 100 TABLET ORAL at 17:57

## 2024-05-30 RX ADMIN — SODIUM CHLORIDE 2 GRAM(S): 9 INJECTION INTRAMUSCULAR; INTRAVENOUS; SUBCUTANEOUS at 06:13

## 2024-05-30 RX ADMIN — POLYETHYLENE GLYCOL 3350 17 GRAM(S): 17 POWDER, FOR SOLUTION ORAL at 06:14

## 2024-05-30 RX ADMIN — CARVEDILOL PHOSPHATE 3.12 MILLIGRAM(S): 80 CAPSULE, EXTENDED RELEASE ORAL at 17:19

## 2024-05-30 RX ADMIN — BRIMONIDINE TARTRATE 1 DROP(S): 2 SOLUTION/ DROPS OPHTHALMIC at 06:14

## 2024-05-30 RX ADMIN — BRIMONIDINE TARTRATE 1 DROP(S): 2 SOLUTION/ DROPS OPHTHALMIC at 17:19

## 2024-05-30 RX ADMIN — Medication 0.25 MILLIGRAM(S): at 06:10

## 2024-05-30 NOTE — CHART NOTE - NSCHARTNOTESELECT_GEN_ALL_CORE
Neuro IR/Event Note
Nutrition Services
Speech and Swallow
speech and swallow
EEG Prelim
EEG prelim
NEURO IR/Event Note
Nutrition Services
Off Service Note
TCD
VTE Risk Assessment/Event Note
speech and swallow

## 2024-05-30 NOTE — PROGRESS NOTE ADULT - PROBLEM SELECTOR PLAN 1
Speech swallow eval ongoing  currently NPO with TF  Dietitian recommendations appreciated and new orders put in  eval limited due to head tremor, f/u Neuro for dyskinesia management Speech swallow eval ongoing  currently NPO - pt pulled NGT overnight or fell out but very awake and coherent today, will re-attempt swallow eval  Dietitian recommendations appreciated and new orders put in  eval limited due to head tremor, f/u Neuro for dyskinesia management, to see today

## 2024-05-30 NOTE — PROGRESS NOTE ADULT - ASSESSMENT
64F, pmh HTN, szs on Keppra, alcoholic cirrhosis, tremors, OA presented from [facility] to OSH w/ generalized weakness found to have b/l sulcal SAH and small occipital IVH. 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 She was recently started on Keflex for UTI. Plts 400, Coags wnl. , AST/ALT = 38/104. WBC 13. Afebrile. Exam on admission: lethargic appearing, EOV, FC, AO3, PERRL, not compliant with EOMs, BUE 5/5, BLE 2/5.    Now s/p 5/17 Angio: severe b/l anterior and posterior cerebral vasospasm (given IA verapamil and milrinone)

## 2024-05-30 NOTE — CHART NOTE - NSCHARTNOTEFT_GEN_A_CORE
64F pmh Dementia (Polysubstance Abuse, resides in NH) with dyskinesias, HTN, szs on Keppra, alcoholic cirrhosis, tremors, OA presented from [facility] to  w/ AMS & generalized weakness. 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 Started on Keflex for UTI at Nursing Home. MR brain showed bilateral MCA and R PCA infarct. Acute infarct in the R basal ganglia and L cerebellum.   Pt transferred to [facility] for workup of nontraumatic convexity SAH. S/P Angiogram-> severe B/L anterior and posterior cerebral vasospasm. Cardiology following, found to have Takotsubo cardiomyopathy. Pt now transferred to medicine service.     Bedside swallow evaluations completed multiple times. Reduced participation, reduced po intake noted. On 5/25, patient demonstrated s/s consistent with laryngeal penetration/aspiration and NPO recommended.  This service is now following up for reassessment of swallow per request of team as NGT has been removed.     Pt found in bed, crying and requesting water. Eyes closed, open with verbal cues. Pt able to follow simple commands for evaluation purposes with cues, significant upper extremity, head and neck tremors/dyskinesias which appears worsened given patient's emotional state. Emotional support/reinforcement provided, patient able to focus/orient to feeding task, and dyskinesias appeared to decrease with patient's relaxed state. Reduced labial seal/stripping of spoon with anterior leakage (mild in amount), delayed oral transit time and delayed pharyngeal swallows noted on purees and all textures of liquids. Multiple swallows (x2-4) and delayed wet cough noted on thin liquid suggestive of laryngeal penetration/aspiration. Pt inconsistently able to generate adequate intraoral pressure for utilization of straw.     Impressions: Pt presents with evidence of an oropharyngeal dysphagia marked by impaired oral management skills and s/s indicative of laryngeal penetration/aspiration on thin liquid.   Recommendations: Puree and mildly thick liquids. Feed via teaspoon. Medication crushed in applesauce or provide via alternate source. 100% supervision with all meals. Maintain good oral hygiene. Monitor for s/s aspiration/laryngeal penetration. If noted:  D/C p.o. intake, provide non-oral nutrition/hydration/meds, and contact this service @ [phone]     D/W covering ELLIE Stewart and KIMBERLEE Nelson.     [facility] Teams/ext 1990   Speech pathology 64F pmh Dementia (Polysubstance Abuse, resides in NH) with dyskinesias, HTN, szs on Keppra, alcoholic cirrhosis, tremors, OA presented from [facility] to  w/ AMS & generalized weakness. 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 Started on Keflex for UTI at Nursing Home. MR brain showed bilateral MCA and R PCA infarct. Acute infarct in the R basal ganglia and L cerebellum.   Pt transferred to [facility] for workup of nontraumatic convexity SAH. S/P Angiogram-> severe B/L anterior and posterior cerebral vasospasm. Cardiology following, found to have Takotsubo cardiomyopathy. Pt now transferred to medicine service.     Bedside swallow evaluations completed multiple times. Reduced participation, reduced po intake noted. On 5/25, patient demonstrated s/s consistent with laryngeal penetration/aspiration and NPO recommended.  This service is now following up for reassessment of swallow per request of team as NGT has been removed.     Pt found in bed, crying and requesting water. Eyes closed, open with verbal cues. Pt able to follow simple commands for evaluation purposes with cues, significant upper extremity, head and neck tremors/dyskinesias which appears worsened given patient's emotional state. Emotional support/reinforcement provided, patient able to focus/orient to feeding task, and dyskinesias appeared to decrease somewhat with patient's relaxed state. Speech intelligibility is fair-poor, improves with increased inspiratory/expiratory effort and reduced rate of speech. There is reduced labial seal/stripping of spoon with anterior leakage (mild in amount), delayed oral transit time and delayed pharyngeal swallows noted on purees and all textures of liquids. Multiple swallows (x2-4) and delayed wet cough noted on thin liquid suggestive of laryngeal penetration/aspiration. Pt inconsistently able to generate adequate intraoral pressure for utilization of straw.     Impressions: Pt presents with evidence of an oropharyngeal dysphagia marked by impaired oral management skills and s/s indicative of laryngeal penetration/aspiration on thin liquid.   Recommendations: Puree and mildly thick liquids. Feed via teaspoon. Medication crushed in applesauce or provide via alternate source. 100% supervision with all meals. Maintain good oral hygiene. Monitor for s/s aspiration/laryngeal penetration. If noted:  D/C p.o. intake, provide non-oral nutrition/hydration/meds, and contact this service @ [phone]     D/W covering ELLIE Stewart and KIMBERLEE Nelson.     Eva [facility] Teams/ext [phone]   Speech pathology

## 2024-05-30 NOTE — PROGRESS NOTE ADULT - ATTENDING COMMENTS
Agree with above.
63yo woman trasnferred for SAH HH2 MF2 PBD?2 CTA R vert fusiform aneurysm   hx dementia, HTN, szs on Keppra, alcoholic cirrhosis, tremors, OA presented from [facility] to OSH w/ generalized weakness found to have b/l sulcal SAH and small occipital IVH. 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    MRI/MRA pending wholebody xray; sister hasn't seen patient in years  angiogram plan per neurosurgery--tentatively 5/17  Q1 hr checks  will need med rec from nursing home--750keppra Q12 continue now   VSP/DCI ppx for now nimodipine  pain control  no sedation    restless, tachycardic  oriented to self only    2L NC, O2sat>92%  -140; nicardipine gtt prn  EKG NSR  TTE pending   IVF; NS Na goal 135-145  NPO  NGT unable to place, ENT involved, may need GI   LBM PTA  bcx, ucx pending, UA unremarkable, monitor off antibiotics  BLE dopplers pending  off chemoppx for SAH   40min cc
63yo woman trasnferred for SAH HH2 MF2 PBD?3 CTA R vert fusiform aneurysm   hx dementia, HTN, szs on Keppra, alcoholic cirrhosis, tremors, OA presented from [facility] to OSH w/ generalized weakness found to have b/l sulcal SAH and small occipital IVH. 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    overnight, exam worsened, CTH showing hypodensity, c/f vasospasm  MRI done 5/16  angiogram this am showing sevre bilateral vasospasrm, s/p IA milrinone, verapamil  Q1 hr checks  cont 750keppra Q12 continue now   VSP/DCI --euvolemia, eunatremia, -180, TCDs   pain control  no sedation    restless, tachycardic  oriented to self only    2L NC, O2sat>92%  -180  EKG NSR  TTE pending   IVF; NS Na goal 135-145  NGT unable to place even with ENT, GI consult   LBM PTA  bcx, ucx pending, UA unremarkable, monitor off antibiotics  BLE dopplers negative 5/15   d/w neurosurgery to start chemoppx   off chemoppx for SAH
Interval History as per resident note, personally verified by me. Patient previously followed by stroke service for b/l MCA and R PCA infarcts, notably in the R basal ganglia and L cerebellum as well as subarachnoid hemorrhage (SAH) of unclear etiology, possibly substance induced. Ever since the stroke patient has noted tremors of the LUE > head > RUE. She is aware of them and they inhibit her ability to do normal activities. She was connected to EEG and these were verified to NOT be seizures. She is weaker on LEFT > RIGHT sides following strokes but no other acute neurologic deficits or abnormal movements noted.    Neurologic exam as per resident note with additions as below:  AAO x1.75 (hospital but not which, 2024 only for date), speech with scanning quality but no dysarthria noted  CN's II-XII intact except for L > R CN VI palsy, R NLFF  L > R UE and head > LLE tremors noted with some suppressibility. Strength RUE 4+/5, LLE 4/5, RLE 3+/5, LLE 3/5. Cogwheel rigidity noted of LEFT wrist  Sens intact all  FtN intact b/l but limited by movements more on left side  Downgoing b/l plantar response    ESR inc 104, cryoglobulin Ab (-), C3 inc 174, C4 inc 72, NH3 WNL, SPEP - weak gamma migrating, A1C inc 6.1%, TIMOTHY (-), c-ANCA/p-ANCA (-), MPO (-), rheumatoid factor (-), scleroderma (-), dsDNA (-), SSA-52/60 (-)    < from: MR Head No Cont (05.20.24 @ 14:55) >    1.  Cortical diffusion restriction in the bilateral MCA and right PCA   distributions as discussed above, right greaterthan left. Findings are   likely compatible with acute infarctions, although similar findings can   be seen in a postictal state.  2.  Acute infarctions in the right basal ganglia and left cerebellar   hemisphere. Given multiple vascular distributions involved, an embolic   source should be considered.  3.  Subarachnoid hemorrhage along the sulci of the bilateral parietal   lobes and right temporal/occipital lobes as well as the right posterior   fossa.  4.  Layering of blood in the occipital horns of the lateral ventricles.  5.  Multiple diminutive intracranial flow voids, a underlying vasospasm   is not excluded. See recent CT of the head for further details.    < end of copied text >      A/P:  Tremors  Encephalopathy  HTN  Seizure disorder  Alcoholic cirrhosis  Subarachnoid hemorrhage (SAH)  Acute strokes in the b/l MCA, R PCA, right basal ganglia, and L cerebellum    - Patient with likely vascular induced parkinsonism from recent strokes involving basal ganglia, manifesting as L > R sided tremors. Could also have toxic/metabolic component. MRI brain, personally reviewed by me, with strokes in regions stated above but no other acute intracranial findings. EEG captured movements and they are not seizures. Could also have superimposed toxic/metabolic or infectious component  - Will trial treatment with Sinemet 25/100mg PO BID and observe for improvement, increasing as tolerated  - Check labs for additional causes with B12, folate, TSH, free T4, anti-TPO Ab's, anti-thyroglobulin Ab's, Vitamin D 25 OH, B1, B6, copper (RBC), syphilis serology TP, HIV, WNV, Lyme  - PT/OT as tolerated  - Continue to address above medical problems, as you are doing  - Will continue to follow patient with you
65yo woman trasnferred for SAH HH2 MF2 PBD?1  hx dementia, HTN, szs on Keppra, alcoholic cirrhosis, tremors, OA presented from [facility] to OSH w/ generalized weakness found to have b/l sulcal SAH and small occipital IVH. 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    MRI/MRA pending wholebody xray; sister hasn't seen patient in years  angiogram plan per neurosurgery  Q1 hr checks  keppra for seizure ppx  will need med rec from nursing home  VSP/DCI ppx for now nimodipine  pain control  sedation    restless, tachycardic  oriented to self only    2L NC, O2sat>92%  -140; nicardipine gtt prn  EKG  TTE  IVF; NS Na goal 135-140  NPO  NGT placed  LBM PTA  bcx, ucx pending, UA unremarkable, monitor off antibiotics  BLE dopplers pending  off chemoppx for SAH   50min cc
Agree with above.

## 2024-05-30 NOTE — PROGRESS NOTE ADULT - SUBJECTIVE AND OBJECTIVE BOX
[facility] Division of Hospital Medicine  Sandra Gonzalez MD  MS Teams PREFERRED        SUBJECTIVE / OVERNIGHT EVENTS:  ADDITIONAL REVIEW OF SYSTEMS:    MEDICATIONS  (STANDING):  brimonidine 0.2% Ophthalmic Solution 1 Drop(s) Both EYES every 12 hours  captopril 6.25 milliGRAM(s) Oral every 12 hours  carvedilol 3.125 milliGRAM(s) Oral every 12 hours  chlorhexidine 2% Cloths 1 Application(s) Topical daily  clonazePAM  Tablet 0.25 milliGRAM(s) Oral two times a day  dextrose 10% Bolus 125 milliLiter(s) IV Bolus once  dextrose 5%. 1000 milliLiter(s) (50 mL/Hr) IV Continuous <Continuous>  dextrose 5%. 1000 milliLiter(s) (100 mL/Hr) IV Continuous <Continuous>  dextrose 50% Injectable 25 Gram(s) IV Push once  dextrose 50% Injectable 12.5 Gram(s) IV Push once  enoxaparin Injectable 40 milliGRAM(s) SubCutaneous <User Schedule>  folic acid 1 milliGRAM(s) Oral daily  glucagon  Injectable 1 milliGRAM(s) IntraMuscular once  insulin lispro (ADMELOG) corrective regimen sliding scale   SubCutaneous every 6 hours  latanoprost 0.005% Ophthalmic Solution 1 Drop(s) Both EYES at bedtime  levETIRAcetam  Solution 750 milliGRAM(s) Oral two times a day  LORazepam   Injectable 1 milliGRAM(s) IV Push once  mirtazapine 15 milliGRAM(s) Oral at bedtime  multivitamin 1 Tablet(s) Oral daily  polyethylene glycol 3350 17 Gram(s) Oral every 12 hours  senna 2 Tablet(s) Oral at bedtime  sodium chloride 2 Gram(s) Oral every 8 hours    MEDICATIONS  (PRN):  dextrose Oral Gel 15 Gram(s) Oral once PRN Blood Glucose LESS THAN 70 milliGRAM(s)/deciliter      I&O's Summary    29 May 2024 07:01  -  30 May 2024 07:00  --------------------------------------------------------  IN: 0 mL / OUT: 525 mL / NET: -525 mL    30 May 2024 07:01  -  30 May 2024 12:20  --------------------------------------------------------  IN: 0 mL / OUT: 0 mL / NET: 0 mL        PHYSICAL EXAM:  Vital Signs Last 24 Hrs  T(C): 36.9 (30 May 2024 12:01), Max: 36.9 (30 May 2024 08:06)  T(F): 98.4 (30 May 2024 12:01), Max: 98.5 (30 May 2024 08:06)  HR: 87 (30 May 2024 12:01) (75 - 94)  BP: 113/77 (30 May 2024 12:01) (103/72 - 113/77)  BP(mean): --  RR: 18 (30 May 2024 12:01) (18 - 18)  SpO2: 98% (30 May 2024 12:01) (96% - 98%)    Parameters below as of 30 May 2024 12:01  Patient On (Oxygen Delivery Method): room air      CONSTITUTIONAL: NAD, well-developed, well-groomed  EYES: PERRLA; conjunctiva and sclera clear  ENMT: Moist oral mucosa, no pharyngeal injection or exudates; normal dentition  NECK: Supple, no palpable masses; no thyromegaly  RESPIRATORY: Normal respiratory effort; lungs are clear to auscultation bilaterally  CARDIOVASCULAR: Regular rate and rhythm, normal S1 and S2, no murmur/rub/gallop; No lower extremity edema; Peripheral pulses are 2+ bilaterally  ABDOMEN: Nontender to palpation, normoactive bowel sounds, no rebound/guarding; No hepatosplenomegaly  MUSCULOSKELETAL:  Normal gait; no clubbing or cyanosis of digits; no joint swelling or tenderness to palpation  PSYCH: A+O to person, place, and time; affect appropriate  NEUROLOGY: CN 2-12 are intact and symmetric; no gross sensory deficits   SKIN: No rashes; no palpable lesions    LABS:                        10.2   7.69  )-----------( 297      ( 30 May 2024 07:12 )             32.2     05-30    135  |  99  |  9   ----------------------------<  129<H>  4.4   |  20<L>  |  0.46<L>    Ca    9.7      30 May 2024 07:07  Phos  3.7     05-29  Mg     2.0     05-29            Urinalysis Basic - ( 30 May 2024 07:07 )    Color: x / Appearance: x / SG: x / pH: x  Gluc: 129 mg/dL / Ketone: x  / Bili: x / Urobili: x   Blood: x / Protein: x / Nitrite: x   Leuk Esterase: x / RBC: x / WBC x   Sq Epi: x / Non Sq Epi: x / Bacteria: x          RADIOLOGY & ADDITIONAL TESTS:  Results Reviewed:   Imaging Personally Reviewed:  Electrocardiogram Personally Reviewed:    COORDINATION OF CARE:  Care Discussed with Consultants/Other Providers [Y/N]:  Prior or Outpatient Records Reviewed [Y/N]:   [facility] Division of Hospital Medicine  Sandra Gonzalez MD  MS Teams PREFERRED        SUBJECTIVE / OVERNIGHT EVENTS: Seen and examined at bedside this morning. She appears very well: awake, alert, coherent and happy. NG Tube removed - will re-attempt swallow eval.    MEDICATIONS  (STANDING):  brimonidine 0.2% Ophthalmic Solution 1 Drop(s) Both EYES every 12 hours  captopril 6.25 milliGRAM(s) Oral every 12 hours  carvedilol 3.125 milliGRAM(s) Oral every 12 hours  chlorhexidine 2% Cloths 1 Application(s) Topical daily  clonazePAM  Tablet 0.25 milliGRAM(s) Oral two times a day  dextrose 10% Bolus 125 milliLiter(s) IV Bolus once  dextrose 5%. 1000 milliLiter(s) (50 mL/Hr) IV Continuous <Continuous>  dextrose 5%. 1000 milliLiter(s) (100 mL/Hr) IV Continuous <Continuous>  dextrose 50% Injectable 25 Gram(s) IV Push once  dextrose 50% Injectable 12.5 Gram(s) IV Push once  enoxaparin Injectable 40 milliGRAM(s) SubCutaneous <User Schedule>  folic acid 1 milliGRAM(s) Oral daily  glucagon  Injectable 1 milliGRAM(s) IntraMuscular once  insulin lispro (ADMELOG) corrective regimen sliding scale   SubCutaneous every 6 hours  latanoprost 0.005% Ophthalmic Solution 1 Drop(s) Both EYES at bedtime  levETIRAcetam  Solution 750 milliGRAM(s) Oral two times a day  LORazepam   Injectable 1 milliGRAM(s) IV Push once  mirtazapine 15 milliGRAM(s) Oral at bedtime  multivitamin 1 Tablet(s) Oral daily  polyethylene glycol 3350 17 Gram(s) Oral every 12 hours  senna 2 Tablet(s) Oral at bedtime  sodium chloride 2 Gram(s) Oral every 8 hours    MEDICATIONS  (PRN):  dextrose Oral Gel 15 Gram(s) Oral once PRN Blood Glucose LESS THAN 70 milliGRAM(s)/deciliter      I&O's Summary    29 May 2024 07:01  -  30 May 2024 07:00  --------------------------------------------------------  IN: 0 mL / OUT: 525 mL / NET: -525 mL    30 May 2024 07:01  -  30 May 2024 12:20  --------------------------------------------------------  IN: 0 mL / OUT: 0 mL / NET: 0 mL        PHYSICAL EXAM:  Vital Signs Last 24 Hrs  T(C): 36.9 (30 May 2024 12:01), Max: 36.9 (30 May 2024 08:06)  T(F): 98.4 (30 May 2024 12:01), Max: 98.5 (30 May 2024 08:06)  HR: 87 (30 May 2024 12:01) (75 - 94)  BP: 113/77 (30 May 2024 12:01) (103/72 - 113/77)  BP(mean): --  RR: 18 (30 May 2024 12:01) (18 - 18)  SpO2: 98% (30 May 2024 12:01) (96% - 98%)    Parameters below as of 30 May 2024 12:01  Patient On (Oxygen Delivery Method): room air      CONSTITUTIONAL: NAD,  head tremor  EYES: PERRLA; conjunctiva and sclera clear  ENMT: Moist oral mucosa, no pharyngeal injection or exudates;   NECK: Supple, no palpable masses; no thyromegaly  RESPIRATORY: Normal respiratory effort; lungs are clear to auscultation bilaterally  CARDIOVASCULAR: Regular rate and rhythm, normal S1 and S2, no murmur/rub/gallop; No lower extremity edema;   ABDOMEN: Nontender to palpation, normoactive bowel sounds, no rebound/guarding;   MUSCULOSKELETAL:  Normal gait; no clubbing or cyanosis of digits; no joint swelling or tenderness to palpation  SKIN: No rashes; no palpable lesions    LABS:                        10.2   7.69  )-----------( 297      ( 30 May 2024 07:12 )             32.2     05-30    135  |  99  |  9   ----------------------------<  129<H>  4.4   |  20<L>  |  0.46<L>    Ca    9.7      30 May 2024 07:07  Phos  3.7     05-29  Mg     2.0     05-29            Urinalysis Basic - ( 30 May 2024 07:07 )    Color: x / Appearance: x / SG: x / pH: x  Gluc: 129 mg/dL / Ketone: x  / Bili: x / Urobili: x   Blood: x / Protein: x / Nitrite: x   Leuk Esterase: x / RBC: x / WBC x   Sq Epi: x / Non Sq Epi: x / Bacteria: x

## 2024-05-30 NOTE — PROGRESS NOTE ADULT - PROBLEM SELECTOR PLAN 2
CTH w/ b/l occipital sulcal SAH and small IVH,   CTA with R V4 5mm fusiform aneurysm and severe stenosis of BA and diminutive distal cerebral arteries.   s/p 5/17 Angio: severe b/l anterior and posterior cerebral vasospasm  BL MCA strokes on MRI    Vasculitis workup negative, likely due to polysubstance abuse  continue Keppra 750mg PO BID  NSG transferred to medicine

## 2024-05-30 NOTE — PROGRESS NOTE ADULT - ASSESSMENT
63 y/o F with a PMH of seizures on Keppra, bilateral sulcal SAH and small occipital IVH. 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 MR brain showed bilateral MCA and R PCA infarct. Acute infarct in the R basal ganglia and L cerebellum. Patient developed new abnormal repetitive movement, general neurology consulted. Patient is noted to have all extremity tremor worse on left.     Impression: SAH with vasospasm, etiology unclear, inflammation versus infection versus polysubstance abuse MR brain with multiple bilateral infarcts complicated by parkinson's like tremor (L>R) likely induced by stroke in basal ganglia    Recommendations:  [] start sinemet 25-100mg BID for tremor  [] additional serum labs: b12, folate, anti-tpo, thyroglobuin, vit d, b1, b6, syph, hiv, [place], lyme  [x] s/p LP, CSF: Glucose 55 Protein 126? Lactate 5.0? CSF color Yellow Cell count 22?, , Troponins elevating.   [x] TIMOTHY-, ammonia 26, antiphospholipid Ab, ANCA-, serum C3 174?, C4 72?, cryoglobulin -, RF <10, SPEP, UPEP, quant immunglobulin levels, MPO <5, Scleroderma <0.2 SSA52/60 normal, dsDNA <1   [x] TTE completex, normal cardiac function   [x] f/u MRI brain w/ IV contrast + MRI c-spine w/w/o IV contrast  [x] TIMOTHY-, antiphospholipid Ab, ANCA-, serum C3 174?, C4 72?, cryoglobulin -, SPEP, UPEP, quant immunglobulin levels, MPO <5, Scleroderma <0.2 SSA52/60 normal, dsDNA <1   [x] A1C 6.1 LDL 74  [x] continue home keppra 750 mg BID    Patient seen and discussed with Neurology Attending

## 2024-05-30 NOTE — PROGRESS NOTE ADULT - SUBJECTIVE AND OBJECTIVE BOX
Neurology Progress Note    SUBJECTIVE/OBJECTIVE/INTERVAL EVENTS: Patient seen and examined at bedside w/ neuro attending and team. Patient is noted to have all extremity tremor worse on left.     INTERVAL HISTORY: 65 y/o F with a PMH of seizures on Keppra, bilateral sulcal SAH and small occipital IVH. 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 MR brain showed bilateral MCA and R PCA infarct. Acute infarct in the R basal ganglia and L cerebellum. Patient developed new abnormal repetitive movement, general neurology consulted.    MEDICATIONS  (STANDING):  brimonidine 0.2% Ophthalmic Solution 1 Drop(s) Both EYES every 12 hours  captopril 6.25 milliGRAM(s) Oral every 12 hours  carvedilol 3.125 milliGRAM(s) Oral every 12 hours  chlorhexidine 2% Cloths 1 Application(s) Topical daily  clonazePAM  Tablet 0.25 milliGRAM(s) Oral two times a day  dextrose 10% Bolus 125 milliLiter(s) IV Bolus once  dextrose 5%. 1000 milliLiter(s) (50 mL/Hr) IV Continuous <Continuous>  dextrose 5%. 1000 milliLiter(s) (100 mL/Hr) IV Continuous <Continuous>  dextrose 50% Injectable 25 Gram(s) IV Push once  dextrose 50% Injectable 12.5 Gram(s) IV Push once  enoxaparin Injectable 40 milliGRAM(s) SubCutaneous <User Schedule>  folic acid 1 milliGRAM(s) Oral daily  glucagon  Injectable 1 milliGRAM(s) IntraMuscular once  insulin lispro (ADMELOG) corrective regimen sliding scale   SubCutaneous every 6 hours  latanoprost 0.005% Ophthalmic Solution 1 Drop(s) Both EYES at bedtime  levETIRAcetam  Solution 750 milliGRAM(s) Oral two times a day  mirtazapine 15 milliGRAM(s) Oral at bedtime  multivitamin 1 Tablet(s) Oral daily  polyethylene glycol 3350 17 Gram(s) Oral every 12 hours  senna 2 Tablet(s) Oral at bedtime  sodium chloride 2 Gram(s) Oral every 8 hours    MEDICATIONS  (PRN):  dextrose Oral Gel 15 Gram(s) Oral once PRN Blood Glucose LESS THAN 70 milliGRAM(s)/deciliter  LORazepam   Injectable 0.25 milliGRAM(s) IV Push once PRN Anxiety      VITALS & EXAMINATION:  Vital Signs Last 24 Hrs  T(C): 36.9 (30 May 2024 12:01), Max: 36.9 (30 May 2024 08:06)  T(F): 98.4 (30 May 2024 12:01), Max: 98.5 (30 May 2024 08:06)  HR: 107 (30 May 2024 16:15) (75 - 107)  BP: 123/50 (30 May 2024 16:15) (103/72 - 123/50)  BP(mean): --  RR: 18 (30 May 2024 16:15) (18 - 18)  SpO2: 93% (30 May 2024 16:15) (93% - 98%)    Parameters below as of 30 May 2024 16:15  Patient On (Oxygen Delivery Method): room air       Neurological (>12):  MS: Awake, alert, Oriented to self & location (hospital) but not time. Follows simple commands.  Attends to examiner  Language: Speech is clear but scanning with  mprehension of words intacrt  CNs: PERRLA, VFF, b/l CN6 palsy (cannot completely bury the sclera on horizontal left or right gaze). No nystagmus. V1-3 intact LT, No facial asymmetry b/l. Hearing grossly normal b/l. Tongue midline and can move side to side.     Motor - Normal bulk throughout, patient has cogwheel rigidity noted the most in the left wrist.  moving all extremities voluntary against gravity  Sensation: Intact to LT b/l x4 extremities.   Reflexes L/R:  Biceps(C5) 2/2  BR(C6) 2/2   Triceps(C7)  2/2 Patellar(L4)   2/2   Ankles 2/2   Toes: mute b/l  Coordination: No dysmetria to FTN b/l UE  Gait: deferred    LABORATORY:  CBC                       10.2   7.69  )-----------( 297      ( 30 May 2024 07:12 )             32.2     Chem 05-30    135  |  99  |  9   ----------------------------<  129<H>  4.4   |  20<L>  |  0.46<L>    Ca    9.7      30 May 2024 07:07  Phos  3.7     05-29  Mg     2.0     05-29     Lipid Panel 05-15 Chol 119 LDL -- HDL 28<L> Trig 81      STUDIES & IMAGING: (EEG, CT, MR, U/S, TTE/LEON):  TTE W or WO Ultrasound Enhancing Agent (05.23.24 @ 13:58)     1. Left ventricular systolic function is severely decreased with an ejection fraction of 30 % by Sanchez's method of disks.   2. Sigmoid septum noted. No LVOT obstruction.   3. There is no evidence of a left ventricular thrombus.   4. Multiple segmental abnormalities exist.The entire apex, mid and apical anterior wall, mid and apical anterior septum, mid and apical inferior septum, mid and apical inferior wall, mid inferolateral segment, and mid anterolateral segment are akinetic.   5. Findings are suggestive of Takotsubo cardiomyopathy.   6. Normal right ventricular cavity size, with normal wall thickness, and normal systolic function.   7. Mild to moderate tricuspid regurgitation.   8. Moderate mitral regurgitation.   9. No pericardial effusion seen.  10. Compared to the transthoracic echocardiogram performed on 5/17/2024, Left ventricular function is significant decreased. MR and TR are getting worser    CT CAP   *  Small to moderate hiatal hernia.  *  Small groundglass opacity in the right middle lobe, which may reflect a small focus of atelectasis or infectious/inflammatory process.  *  Mild undulation of the liver contour, possibly reflecting changes of cirrhosis.  *  Left breast ill-defined focus of soft tissue with associated coarse calcifications on a background of glandular tissue. This is of uncertain significance, possibly additional glandular tissue. Recommend correlation with mammography.    MR head   1.  Cortical diffusion restriction in the bilateral MCA and right PCA distributions as discussed above, right greaterthan left. Findings are likely compatible with acute infarctions, although similar findings can be seen in a postictal state.  2.  Acute infarctions in the right basal ganglia and left cerebellar hemisphere. Given multiple vascular distributions involved, an embolic source should be considered.  3.  Subarachnoid hemorrhage along the sulci of the bilateral parietal lobes and right temporal/occipital lobes as well as the right posterior fossa.  4.  Layering of blood in the occipital horns of the lateral ventricles.  5.  Multiple diminutive intracranial flow voids, a underlying vasospasm is not excluded. See recent CT of the head for further details.    MR C-spine  Mild to moderate degenerative changes of the cervical spine.   No cord compression. Old mild compression deformity T5 without bony   retropulsion.    Study Date: 5/17/2024   Start Time: 8:00:16 AM      End Date: 5/18/2024         End Time: 14:34    Study Duration: 26 h 17 m. EEG is off from 08:00-14:00, and 02:23-10:42  Abnormal prolonged EEG study due to Moderate diffuse cerebral dysfunction that is not specific in etiology  No epileptic discharges recorded.  No seizures recorded.    Study Date: 5/17/2024   Start Time: 8:00:16 AM      End Date: 5/18/2024         End Time: 14:34    Study Duration: 26 h 17 m. EEG is off from 08:00-14:00, and 02:23-10:42  EEG Impression / Clinical Correlate:  Abnormal prolonged EEG study due to Moderate diffuse cerebral dysfunction that is not specific in etiology  No epileptic discharges recorded.  No seizures recorded.    [facility] 5/18  The examination is motion limited. Infarcts seen in the right parietal, temporal and occipital lobes on 05/17/2024 are not well visualized on the current exam due to motion artifact.    TTE 5/17 1. Technically difficult image quality.   2. Left ventricular cavity is small. There is poor visualization of the endocardial borders to determine the presence of wall motion abnormalities.   3. Hyperdynamic left ventricular systolic function with intra-cavitary gradient of 50 mmHg.   4. The right ventricle is not well visualized.   5. RV systolic function appears hyperdynamic.   6. No prior echocardiogram is available for comparison.    CTH 5/17  Large acute infarct in the right posterior temporal and occipital lobes are reidentified, as seen on prior MRI brain. No secondary mass effect or midline shift. Minimal layering hemorrhage in the occipital horns is reidentified. Previously visualized abnormal signal within the sulci of the bilateral cerebri, not as well visualized on this exam secondary to streak artifacts. Additionally, evaluation of the parenchyma of the superior portion of the bilateral cerebri is limited by streak artifact from surrounding wires.    CTA brain:  Severe long segment narrowing extending from the terminus of the right ICA and involving the entire M1 segment of the right MCA, appearing worsened in the region of the right ICA terminus is compared to the prior exam of 5/14/2024. Right M2 branches appear unremarkable.    High-grade stenosis of the distal supraclinoid segment of the left ICA extending into the terminus of the left ICA and into the M1 segment of the left middle cerebral artery appears worsened in the region of the distal supraclinoid and terminus of the left ICA as compared to the prior exam.    Stenoses of the distal left intradural vertebral artery after the left PICA takeoff.    High-grade stenosis of the distal intradural segment of the right vertebral artery, extending into the basilar artery with severe long segment stenosis of the basilar artery.    Bilateral posterior communicating arteries supplying the posterior cerebral arteries that remain patent, however small in caliber.    CT Perfusion:  10 mL of core infarct in the right posterior temporal lobe and occipital lobe. Extensive areas of T. Max greater than 6 seconds throughout the bilateral cerebri in cerebelli, may reflect diffuse hypoperfusion versus artifact.    MRI/A brain (5/16)  Tiny right temporal occipital subdural collection without mass effect. Diffuse hemosiderin staining in the supra and infratentorial regions and occipital horns with slightly prominent ventricles. Markedly diminished intracranial flow due to severe distal vertebral artery stenosis, mid basilar stenosis and bilateral M1 stenosis.

## 2024-05-30 NOTE — PROGRESS NOTE ADULT - SUBJECTIVE AND OBJECTIVE BOX
PATIENT SEEN AND EXAMINED BY MYNOR GUAN M.D. ON :- 5/30/24  DATE OF SERVICE:  5/30/24           Interim events noted,Labs ,Radiological studies and Cardiology tests reviewed .    Patient is a 64y old  Female who presents with a chief complaint of SAH (25 May 2024 07:10)      HPI:  Mera Burton  64F, pmh HTN, szs on Keppra, alcoholic cirrhosis, tremors, OA presented from [facility] to OSH w/ generalized weakness found to have b/l sulcal SAH and small occipital IVH. 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 She was recently started on Keflex for UTI. Plts 400, Coags wnl. , AST/ALT = 38/104. WBC 13. Afebrile. Exam: lethargic appearing, EOV, FC, AO3, PERRL, not compliant with EOMs, BUE 5/5, BLE 2/5. (15 May 2024 03:22)      PAST MEDICAL & SURGICAL HISTORY:  Cirrhosis      Seizure      No significant past surgical history          PREVIOUS DIAGNOSTIC TESTING:      ECHO  FINDINGS:    STRESS  FINDINGS:    CATHETERIZATION  FINDINGS:    MEDICATIONS  (STANDING):  brimonidine 0.2% Ophthalmic Solution 1 Drop(s) Both EYES every 12 hours  captopril 6.25 milliGRAM(s) Oral every 12 hours  carbidopa/levodopa  25/100 1 Tablet(s) Oral <User Schedule>  carvedilol 3.125 milliGRAM(s) Oral every 12 hours  chlorhexidine 2% Cloths 1 Application(s) Topical daily  clonazePAM  Tablet 0.25 milliGRAM(s) Oral two times a day  dextrose 10% Bolus 125 milliLiter(s) IV Bolus once  dextrose 5%. 1000 milliLiter(s) (50 mL/Hr) IV Continuous <Continuous>  dextrose 5%. 1000 milliLiter(s) (100 mL/Hr) IV Continuous <Continuous>  dextrose 50% Injectable 25 Gram(s) IV Push once  dextrose 50% Injectable 12.5 Gram(s) IV Push once  enoxaparin Injectable 40 milliGRAM(s) SubCutaneous <User Schedule>  folic acid 1 milliGRAM(s) Oral daily  glucagon  Injectable 1 milliGRAM(s) IntraMuscular once  insulin lispro (ADMELOG) corrective regimen sliding scale   SubCutaneous three times a day before meals  latanoprost 0.005% Ophthalmic Solution 1 Drop(s) Both EYES at bedtime  levETIRAcetam  Solution 750 milliGRAM(s) Oral two times a day  mirtazapine 15 milliGRAM(s) Oral at bedtime  multivitamin 1 Tablet(s) Oral daily  polyethylene glycol 3350 17 Gram(s) Oral every 12 hours  senna 2 Tablet(s) Oral at bedtime  sodium chloride 2 Gram(s) Oral every 8 hours    MEDICATIONS  (PRN):  dextrose Oral Gel 15 Gram(s) Oral once PRN Blood Glucose LESS THAN 70 milliGRAM(s)/deciliter  LORazepam   Injectable 0.25 milliGRAM(s) IV Push once PRN Anxiety      FAMILY HISTORY:      SOCIAL HISTORY:    CIGARETTES:    ALCOHOL:    REVIEW OF SYSTEMS:  CONSTITUTIONAL: No fever, weight loss, or fatigue  EYES: No eye pain, visual disturbances, or discharge  ENMT:  No difficulty hearing, tinnitus, vertigo; No sinus or throat pain  NECK: No pain or stiffness  RESPIRATORY: No cough, wheezing, chills or hemoptysis; No shortness of breath  CARDIOVASCULAR: No chest pain, palpitations, dizziness, or leg swelling  GASTROINTESTINAL: No abdominal or epigastric pain. No nausea, vomiting, or hematemesis; No diarrhea or constipation. No melena or hematochezia.  GENITOURINARY: No dysuria, frequency, hematuria, or incontinence  NEUROLOGICAL: No headaches, memory loss, loss of strength, numbness, or tremors  SKIN: No itching, burning, rashes, or lesions   LYMPH NODES: No enlarged glands  ENDOCRINE: No heat or cold intolerance; No hair loss  MUSCULOSKELETAL: No joint pain or swelling; No muscle, back, or extremity pain  PSYCHIATRIC: No depression, anxiety, mood swings, or difficulty sleeping  HEME/LYMPH: No easy bruising, or bleeding gums  ALLERY AND IMMUNOLOGIC: No hives or eczema    Vital Signs Last 24 Hrs  T(C): 36.7 (30 May 2024 20:35), Max: 36.9 (30 May 2024 08:06)  T(F): 98 (30 May 2024 20:35), Max: 98.5 (30 May 2024 08:06)  HR: 84 (30 May 2024 20:35) (75 - 107)  BP: 117/56 (30 May 2024 20:35) (103/72 - 123/50)  BP(mean): --  RR: 18 (30 May 2024 20:35) (18 - 18)  SpO2: 97% (30 May 2024 20:35) (93% - 98%)    Parameters below as of 30 May 2024 20:35  Patient On (Oxygen Delivery Method): room air          PHYSICAL EXAM:  GENERAL: NAD, well-groomed, well-developed  HEAD:  Atraumatic, Normocephalic  EYES: EOMI, PERRLA, conjunctiva and sclera clear  ENMT: No tonsillar erythema, exudates, or enlargement; Moist mucous membranes, Good dentition, No lesions  NECK: Supple, No JVD, Normal thyroid  NERVOUS SYSTEM:  Alert & Oriented X3, Good concentration; Motor Strength 5/5 B/L upper and lower extremities; DTRs 2+ intact and symmetric  CHEST/LUNG: Clear to percussion bilaterally; No rales, rhonchi, wheezing, or rubs  HEART: Regular rate and rhythm; No murmurs, rubs, or gallops  ABDOMEN: Soft, Nontender, Nondistended; Bowel sounds present  EXTREMITIES:  2+ Peripheral Pulses, No clubbing, cyanosis, or edema  LYMPH: No lymphadenopathy noted  SKIN: No rashes or lesions      INTERPRETATION OF TELEMETRY:    ECG:    CHADSVASC:     LABS:                        10.2   7.69  )-----------( 297      ( 30 May 2024 07:12 )             32.2     05-30    135  |  99  |  9   ----------------------------<  129<H>  4.4   |  20<L>  |  0.46<L>    Ca    9.7      30 May 2024 07:07  Phos  3.7     05-29  Mg     2.0     05-29            Urinalysis Basic - ( 30 May 2024 07:07 )    Color: x / Appearance: x / SG: x / pH: x  Gluc: 129 mg/dL / Ketone: x  / Bili: x / Urobili: x   Blood: x / Protein: x / Nitrite: x   Leuk Esterase: x / RBC: x / WBC x   Sq Epi: x / Non Sq Epi: x / Bacteria: x      Lipid Panel:   I&O's Summary    29 May 2024 07:01  -  30 May 2024 07:00  --------------------------------------------------------  IN: 0 mL / OUT: 525 mL / NET: -525 mL    30 May 2024 07:01  -  30 May 2024 21:33  --------------------------------------------------------  IN: 60 mL / OUT: 300 mL / NET: -240 mL        RADIOLOGY & ADDITIONAL STUDIES:  CONCLUSIONS:      1. Left ventricular systolic function is moderately decreased with an ejection fraction visually estimated at 35 to 40 %. Regional wall motion abnormalities present.   2. Multiple segmental abnormalities exist. See findings.   3. No pericardial effusion seen.   4. Compared to the transthoracic echocardiogram performed on 5/23/2024,.   5. Technically difficult image quality.

## 2024-05-31 LAB
24R-OH-CALCIDIOL SERPL-MCNC: 25.8 NG/ML — LOW (ref 30–80)
ANION GAP SERPL CALC-SCNC: 13 MMOL/L — SIGNIFICANT CHANGE UP (ref 5–17)
B BURGDOR C6 AB SER-ACNC: NEGATIVE — SIGNIFICANT CHANGE UP
B BURGDOR IGG+IGM SER-ACNC: 0.45 INDEX — SIGNIFICANT CHANGE UP (ref 0.01–0.89)
BUN SERPL-MCNC: 10 MG/DL — SIGNIFICANT CHANGE UP (ref 7–23)
CALCIUM SERPL-MCNC: 10 MG/DL — SIGNIFICANT CHANGE UP (ref 8.4–10.5)
CHLORIDE SERPL-SCNC: 102 MMOL/L — SIGNIFICANT CHANGE UP (ref 96–108)
CO2 SERPL-SCNC: 23 MMOL/L — SIGNIFICANT CHANGE UP (ref 22–31)
CREAT SERPL-MCNC: 0.48 MG/DL — LOW (ref 0.5–1.3)
EGFR: 107 ML/MIN/1.73M2 — SIGNIFICANT CHANGE UP
FOLATE SERPL-MCNC: 14.8 NG/ML — SIGNIFICANT CHANGE UP
GLUCOSE BLDC GLUCOMTR-MCNC: 76 MG/DL — SIGNIFICANT CHANGE UP (ref 70–99)
GLUCOSE BLDC GLUCOMTR-MCNC: 80 MG/DL — SIGNIFICANT CHANGE UP (ref 70–99)
GLUCOSE BLDC GLUCOMTR-MCNC: 89 MG/DL — SIGNIFICANT CHANGE UP (ref 70–99)
GLUCOSE BLDC GLUCOMTR-MCNC: 90 MG/DL — SIGNIFICANT CHANGE UP (ref 70–99)
GLUCOSE SERPL-MCNC: 93 MG/DL — SIGNIFICANT CHANGE UP (ref 70–99)
HIV 1+2 AB+HIV1 P24 AG SERPL QL IA: SIGNIFICANT CHANGE UP
POTASSIUM SERPL-MCNC: 4.6 MMOL/L — SIGNIFICANT CHANGE UP (ref 3.5–5.3)
POTASSIUM SERPL-SCNC: 4.6 MMOL/L — SIGNIFICANT CHANGE UP (ref 3.5–5.3)
SODIUM SERPL-SCNC: 138 MMOL/L — SIGNIFICANT CHANGE UP (ref 135–145)
T PALLIDUM AB TITR SER: NEGATIVE — SIGNIFICANT CHANGE UP
VIT B12 SERPL-MCNC: 1122 PG/ML — SIGNIFICANT CHANGE UP (ref 232–1245)
VIT D25+D1,25 OH+D1,25 PNL SERPL-MCNC: 29.2 PG/ML — SIGNIFICANT CHANGE UP (ref 19.9–79.3)

## 2024-05-31 PROCEDURE — 99233 SBSQ HOSP IP/OBS HIGH 50: CPT

## 2024-05-31 PROCEDURE — 99222 1ST HOSP IP/OBS MODERATE 55: CPT | Mod: GC

## 2024-05-31 RX ORDER — LEVETIRACETAM 250 MG/1
750 TABLET, FILM COATED ORAL EVERY 12 HOURS
Refills: 0 | Status: DISCONTINUED | OUTPATIENT
Start: 2024-05-31 | End: 2024-06-03

## 2024-05-31 RX ORDER — CLONAZEPAM 1 MG
0.25 TABLET ORAL
Refills: 0 | Status: DISCONTINUED | OUTPATIENT
Start: 2024-05-31 | End: 2024-06-03

## 2024-05-31 RX ADMIN — ENOXAPARIN SODIUM 40 MILLIGRAM(S): 100 INJECTION SUBCUTANEOUS at 17:37

## 2024-05-31 RX ADMIN — Medication 0.25 MILLIGRAM(S): at 17:36

## 2024-05-31 RX ADMIN — Medication 1 TABLET(S): at 12:16

## 2024-05-31 RX ADMIN — CARBIDOPA AND LEVODOPA 1 TABLET(S): 25; 100 TABLET ORAL at 17:39

## 2024-05-31 RX ADMIN — BRIMONIDINE TARTRATE 1 DROP(S): 2 SOLUTION/ DROPS OPHTHALMIC at 17:37

## 2024-05-31 RX ADMIN — LEVETIRACETAM 750 MILLIGRAM(S): 250 TABLET, FILM COATED ORAL at 05:31

## 2024-05-31 RX ADMIN — CAPTOPRIL 6.25 MILLIGRAM(S): 12.5 TABLET ORAL at 05:29

## 2024-05-31 RX ADMIN — CHLORHEXIDINE GLUCONATE 1 APPLICATION(S): 213 SOLUTION TOPICAL at 12:16

## 2024-05-31 RX ADMIN — Medication 1 MILLIGRAM(S): at 12:16

## 2024-05-31 RX ADMIN — BRIMONIDINE TARTRATE 1 DROP(S): 2 SOLUTION/ DROPS OPHTHALMIC at 05:27

## 2024-05-31 RX ADMIN — SODIUM CHLORIDE 2 GRAM(S): 9 INJECTION INTRAMUSCULAR; INTRAVENOUS; SUBCUTANEOUS at 05:29

## 2024-05-31 RX ADMIN — SODIUM CHLORIDE 2 GRAM(S): 9 INJECTION INTRAMUSCULAR; INTRAVENOUS; SUBCUTANEOUS at 15:42

## 2024-05-31 RX ADMIN — CAPTOPRIL 6.25 MILLIGRAM(S): 12.5 TABLET ORAL at 17:38

## 2024-05-31 RX ADMIN — LEVETIRACETAM 400 MILLIGRAM(S): 250 TABLET, FILM COATED ORAL at 20:15

## 2024-05-31 RX ADMIN — Medication 0.25 MILLIGRAM(S): at 05:28

## 2024-05-31 RX ADMIN — CARVEDILOL PHOSPHATE 3.12 MILLIGRAM(S): 80 CAPSULE, EXTENDED RELEASE ORAL at 05:28

## 2024-05-31 RX ADMIN — CARBIDOPA AND LEVODOPA 1 TABLET(S): 25; 100 TABLET ORAL at 05:29

## 2024-05-31 RX ADMIN — POLYETHYLENE GLYCOL 3350 17 GRAM(S): 17 POWDER, FOR SOLUTION ORAL at 05:31

## 2024-05-31 NOTE — PROGRESS NOTE ADULT - PROBLEM SELECTOR PLAN 4
likely multifactorial from substance abuse and vascular issues  lives in [place] likely multifactorial from substance abuse and vascular issues  lives in [place]    Dyskinesias  Sinemet BID started, may need titration over the weekend  Neuro follow up appreciated  f/u TSH, B12, Folate, antiTPO abs

## 2024-05-31 NOTE — PROGRESS NOTE ADULT - ASSESSMENT
64F, pmh HTN, szs on Keppra, alcoholic cirrhosis, tremors, OA presented from [facility] to OSH w/ generalized weakness found to have b/l sulcal SAH and small occipital IVH. 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 She was recently started on Keflex for UTI. Plts 400, Coags wnl. , AST/ALT = 38/104. WBC 13. Afebrile. Exam on admission: lethargic appearing, EOV, FC, AO3, PERRL, not compliant with EOMs, BUE 5/5, BLE 2/5.    Now s/p 5/17 Angio: severe b/l anterior and posterior cerebral vasospasm (given IA verapamil and milrinone)

## 2024-05-31 NOTE — BH CONSULTATION LIAISON ASSESSMENT NOTE - NSBHCHARTREVIEWLAB_PSY_A_CORE FT
10.2   7.69  )-----------( 297      ( 30 May 2024 07:12 )             32.2     05-31    138  |  102  |  10  ----------------------------<  93  4.6   |  23  |  0.48<L>    Ca    10.0      31 May 2024 11:32      Urinalysis Basic - ( 31 May 2024 11:32 )    Color: x / Appearance: x / SG: x / pH: x  Gluc: 93 mg/dL / Ketone: x  / Bili: x / Urobili: x   Blood: x / Protein: x / Nitrite: x   Leuk Esterase: x / RBC: x / WBC x   Sq Epi: x / Non Sq Epi: x / Bacteria: x

## 2024-05-31 NOTE — BH CONSULTATION LIAISON ASSESSMENT NOTE - NSBHCONSULTMEDANXIETY_PSY_A_CORE FT
ativan 0.5mg-1mg q6hr PO/IM for anxiety/agitation ativan 0.5mg-1mg q6hr PO/IM/IV PRN anxiety/agitation

## 2024-05-31 NOTE — BH CONSULTATION LIAISON ASSESSMENT NOTE - NSBHCHARTREVIEWINVESTIGATE_PSY_A_CORE FT
< from: 12 Lead ECG (05.25.24 @ 08:04) >      Ventricular Rate 96 BPM    Atrial Rate 96 BPM    P-R Interval 122 ms    QRS Duration 72 ms    Q-T Interval 414 ms    QTC Calculation(Bazett) 523 ms    P Axis 42 degrees    R Axis 29 degrees    T Axis 182 degrees    Diagnosis Line NORMAL SINUS RHYTHM  NONSPECIFIC T WAVE ABNORMALITY  PROLONGED QT  ABNORMAL ECG  WHEN COMPARED WITH ECG OF 24-MAY-2024 17:04, (UNCONFIRMED)  SIGNIFICANT CHANGES HAVE OCCURRED  - Sinus rate slower  Confirmed by MD Julian, Micheal ([id number]) on 5/25/2024 7:45:41 PM    < end of copied text >

## 2024-05-31 NOTE — BH CONSULTATION LIAISON ASSESSMENT NOTE - NSBHCHARTREVIEWVS_PSY_A_CORE FT
Vital Signs Last 24 Hrs  T(C): 37 (31 May 2024 12:13), Max: 37 (31 May 2024 12:13)  T(F): 98.6 (31 May 2024 12:13), Max: 98.6 (31 May 2024 12:13)  HR: 81 (31 May 2024 12:13) (73 - 107)  BP: 118/82 (31 May 2024 12:13) (100/68 - 123/50)  BP(mean): --  RR: 18 (31 May 2024 12:13) (18 - 18)  SpO2: 96% (31 May 2024 12:13) (93% - 99%)    Parameters below as of 31 May 2024 12:13  Patient On (Oxygen Delivery Method): room air

## 2024-05-31 NOTE — BH CONSULTATION LIAISON ASSESSMENT NOTE - OTHER
We have not seen this patient in 2 years, please call and find out what birth control she is taking and if she usually sees another practice, thanks! briefly cooperative

## 2024-05-31 NOTE — PROGRESS NOTE ADULT - SUBJECTIVE AND OBJECTIVE BOX
PATIENT SEEN AND EXAMINED BY MYNOR GUAN M.D. ON :- 5/31/24  DATE OF SERVICE:   5/31/24          Interim events noted,Labs ,Radiological studies and Cardiology tests reviewed .    Patient is a 64y old  Female who presents with a chief complaint of SAH (25 May 2024 07:10)      HPI:  Mera Burton  64F, pmh HTN, szs on Keppra, alcoholic cirrhosis, tremors, OA presented from [facility] to OSH w/ generalized weakness found to have b/l sulcal SAH and small occipital IVH. 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 She was recently started on Keflex for UTI. Plts 400, Coags wnl. , AST/ALT = 38/104. WBC 13. Afebrile. Exam: lethargic appearing, EOV, FC, AO3, PERRL, not compliant with EOMs, BUE 5/5, BLE 2/5. (15 May 2024 03:22)      PAST MEDICAL & SURGICAL HISTORY:  Cirrhosis      Seizure      Osteoarthritis      No significant past surgical history          PREVIOUS DIAGNOSTIC TESTING:      ECHO  FINDINGS:    STRESS  FINDINGS:    CATHETERIZATION  FINDINGS:    MEDICATIONS  (STANDING):  brimonidine 0.2% Ophthalmic Solution 1 Drop(s) Both EYES every 12 hours  captopril 6.25 milliGRAM(s) Oral every 12 hours  carbidopa/levodopa  25/100 1 Tablet(s) Oral <User Schedule>  carvedilol 3.125 milliGRAM(s) Oral every 12 hours  chlorhexidine 2% Cloths 1 Application(s) Topical daily  clonazePAM  Tablet 0.25 milliGRAM(s) Oral two times a day  dextrose 10% Bolus 125 milliLiter(s) IV Bolus once  dextrose 5%. 1000 milliLiter(s) (50 mL/Hr) IV Continuous <Continuous>  dextrose 5%. 1000 milliLiter(s) (100 mL/Hr) IV Continuous <Continuous>  dextrose 50% Injectable 25 Gram(s) IV Push once  dextrose 50% Injectable 12.5 Gram(s) IV Push once  enoxaparin Injectable 40 milliGRAM(s) SubCutaneous <User Schedule>  folic acid 1 milliGRAM(s) Oral daily  glucagon  Injectable 1 milliGRAM(s) IntraMuscular once  insulin lispro (ADMELOG) corrective regimen sliding scale   SubCutaneous three times a day before meals  latanoprost 0.005% Ophthalmic Solution 1 Drop(s) Both EYES at bedtime  levETIRAcetam  IVPB 750 milliGRAM(s) IV Intermittent every 12 hours  mirtazapine 15 milliGRAM(s) Oral at bedtime  multivitamin 1 Tablet(s) Oral daily  polyethylene glycol 3350 17 Gram(s) Oral every 12 hours  senna 2 Tablet(s) Oral at bedtime  sodium chloride 2 Gram(s) Oral every 8 hours    MEDICATIONS  (PRN):  dextrose Oral Gel 15 Gram(s) Oral once PRN Blood Glucose LESS THAN 70 milliGRAM(s)/deciliter      FAMILY HISTORY:      SOCIAL HISTORY:    CIGARETTES:    ALCOHOL:    REVIEW OF SYSTEMS:  CONSTITUTIONAL: No fever, weight loss, or fatigue  EYES: No eye pain, visual disturbances, or discharge  ENMT:  No difficulty hearing, tinnitus, vertigo; No sinus or throat pain  NECK: No pain or stiffness  RESPIRATORY: No cough, wheezing, chills or hemoptysis; No shortness of breath  CARDIOVASCULAR: No chest pain, palpitations, dizziness, or leg swelling  GASTROINTESTINAL: No abdominal or epigastric pain. No nausea, vomiting, or hematemesis; No diarrhea or constipation. No melena or hematochezia.  GENITOURINARY: No dysuria, frequency, hematuria, or incontinence  NEUROLOGICAL: No headaches, memory loss, loss of strength, numbness, or tremors  SKIN: No itching, burning, rashes, or lesions   LYMPH NODES: No enlarged glands  ENDOCRINE: No heat or cold intolerance; No hair loss  MUSCULOSKELETAL: No joint pain or swelling; No muscle, back, or extremity pain  PSYCHIATRIC: No depression, anxiety, mood swings, or difficulty sleeping  HEME/LYMPH: No easy bruising, or bleeding gums  ALLERY AND IMMUNOLOGIC: No hives or eczema    Vital Signs Last 24 Hrs  T(C): 36.6 (31 May 2024 20:21), Max: 37 (31 May 2024 12:13)  T(F): 97.8 (31 May 2024 20:21), Max: 98.6 (31 May 2024 12:13)  HR: 92 (31 May 2024 20:21) (73 - 103)  BP: 103/69 (31 May 2024 20:21) (99/37 - 118/82)  BP(mean): --  RR: 18 (31 May 2024 20:21) (18 - 18)  SpO2: 96% (31 May 2024 16:42) (95% - 99%)    Parameters below as of 31 May 2024 20:21  Patient On (Oxygen Delivery Method): room air          PHYSICAL EXAM:  GENERAL: NAD, well-groomed, well-developed  HEAD:  Atraumatic, Normocephalic  EYES: EOMI, PERRLA, conjunctiva and sclera clear  ENMT: No tonsillar erythema, exudates, or enlargement; Moist mucous membranes, Good dentition, No lesions  NECK: Supple, No JVD, Normal thyroid  NERVOUS SYSTEM:  Alert & Oriented X3, Good concentration; Motor Strength 5/5 B/L upper and lower extremities; DTRs 2+ intact and symmetric  CHEST/LUNG: Clear to percussion bilaterally; No rales, rhonchi, wheezing, or rubs  HEART: Regular rate and rhythm; No murmurs, rubs, or gallops  ABDOMEN: Soft, Nontender, Nondistended; Bowel sounds present  EXTREMITIES:  2+ Peripheral Pulses, No clubbing, cyanosis, or edema  LYMPH: No lymphadenopathy noted  SKIN: No rashes or lesions      INTERPRETATION OF TELEMETRY:    ECG:    SALDSVAS:     LABS:                        10.2   7.69  )-----------( 297      ( 30 May 2024 07:12 )             32.2     05-31    138  |  102  |  10  ----------------------------<  93  4.6   |  23  |  0.48<L>    Ca    10.0      31 May 2024 11:32            Urinalysis Basic - ( 31 May 2024 11:32 )    Color: x / Appearance: x / SG: x / pH: x  Gluc: 93 mg/dL / Ketone: x  / Bili: x / Urobili: x   Blood: x / Protein: x / Nitrite: x   Leuk Esterase: x / RBC: x / WBC x   Sq Epi: x / Non Sq Epi: x / Bacteria: x      Lipid Panel:   I&O's Summary    30 May 2024 07:01  -  31 May 2024 07:00  --------------------------------------------------------  IN: 140 mL / OUT: 450 mL / NET: -310 mL    31 May 2024 07:01  -  31 May 2024 21:56  --------------------------------------------------------  IN: 0 mL / OUT: 0 mL / NET: 0 mL        RADIOLOGY & ADDITIONAL STUDIES:

## 2024-05-31 NOTE — BH CONSULTATION LIAISON ASSESSMENT NOTE - NSSUICRSKFACTOR_PSY_ALL_CORE
Current and Past Psychiatric Diagnoses/Presenting Symptoms/Activating Events/Stressors Current and Past Psychiatric Diagnoses/Activating Events/Stressors

## 2024-05-31 NOTE — BH CONSULTATION LIAISON ASSESSMENT NOTE - NSBHREFERDETAILS_PSY_A_CORE_FT
97.7 sent from [facility] for generalized weakness with decrease in PO intake, taking cephalexin for UTI.  consulted for anxiety

## 2024-05-31 NOTE — PROGRESS NOTE ADULT - PROBLEM SELECTOR PLAN 1
Speech swallow eval ongoing  currently NPO - pt pulled NGT overnight or fell out but very awake and coherent today, will re-attempt swallow eval  Dietitian recommendations appreciated and new orders put in  eval limited due to head tremor, f/u Neuro for dyskinesia management, to see today Cleared for PO diet by Speech and Swallow

## 2024-05-31 NOTE — PROGRESS NOTE ADULT - SUBJECTIVE AND OBJECTIVE BOX
[facility] Division of Hospital Medicine  Sandra Gonzalez MD  MS Teams PREFERRED        SUBJECTIVE / OVERNIGHT EVENTS:     MEDICATIONS  (STANDING):  brimonidine 0.2% Ophthalmic Solution 1 Drop(s) Both EYES every 12 hours  captopril 6.25 milliGRAM(s) Oral every 12 hours  carbidopa/levodopa  25/100 1 Tablet(s) Oral <User Schedule>  carvedilol 3.125 milliGRAM(s) Oral every 12 hours  chlorhexidine 2% Cloths 1 Application(s) Topical daily  clonazePAM  Tablet 0.25 milliGRAM(s) Oral two times a day  dextrose 10% Bolus 125 milliLiter(s) IV Bolus once  dextrose 5%. 1000 milliLiter(s) (50 mL/Hr) IV Continuous <Continuous>  dextrose 5%. 1000 milliLiter(s) (100 mL/Hr) IV Continuous <Continuous>  dextrose 50% Injectable 12.5 Gram(s) IV Push once  dextrose 50% Injectable 25 Gram(s) IV Push once  enoxaparin Injectable 40 milliGRAM(s) SubCutaneous <User Schedule>  folic acid 1 milliGRAM(s) Oral daily  glucagon  Injectable 1 milliGRAM(s) IntraMuscular once  insulin lispro (ADMELOG) corrective regimen sliding scale   SubCutaneous three times a day before meals  latanoprost 0.005% Ophthalmic Solution 1 Drop(s) Both EYES at bedtime  levETIRAcetam  Solution 750 milliGRAM(s) Oral two times a day  mirtazapine 15 milliGRAM(s) Oral at bedtime  multivitamin 1 Tablet(s) Oral daily  polyethylene glycol 3350 17 Gram(s) Oral every 12 hours  senna 2 Tablet(s) Oral at bedtime  sodium chloride 2 Gram(s) Oral every 8 hours    MEDICATIONS  (PRN):  dextrose Oral Gel 15 Gram(s) Oral once PRN Blood Glucose LESS THAN 70 milliGRAM(s)/deciliter      I&O's Summary    30 May 2024 07:01  -  31 May 2024 07:00  --------------------------------------------------------  IN: 140 mL / OUT: 450 mL / NET: -310 mL    31 May 2024 07:01  -  31 May 2024 12:54  --------------------------------------------------------  IN: 0 mL / OUT: 0 mL / NET: 0 mL        PHYSICAL EXAM:  Vital Signs Last 24 Hrs  T(C): 37 (31 May 2024 12:13), Max: 37 (31 May 2024 12:13)  T(F): 98.6 (31 May 2024 12:13), Max: 98.6 (31 May 2024 12:13)  HR: 81 (31 May 2024 12:13) (73 - 107)  BP: 118/82 (31 May 2024 12:13) (100/68 - 123/50)  BP(mean): --  RR: 18 (31 May 2024 12:13) (18 - 18)  SpO2: 96% (31 May 2024 12:13) (93% - 99%)    Parameters below as of 31 May 2024 12:13  Patient On (Oxygen Delivery Method): room air      CONSTITUTIONAL: NAD, well-developed, well-groomed  EYES: PERRLA; conjunctiva and sclera clear  ENMT: Moist oral mucosa, no pharyngeal injection or exudates; normal dentition  NECK: Supple, no palpable masses; no thyromegaly  RESPIRATORY: Normal respiratory effort; lungs are clear to auscultation bilaterally  CARDIOVASCULAR: Regular rate and rhythm, normal S1 and S2, no murmur/rub/gallop; No lower extremity edema; Peripheral pulses are 2+ bilaterally  ABDOMEN: Nontender to palpation, normoactive bowel sounds, no rebound/guarding; No hepatosplenomegaly  MUSCULOSKELETAL:  Normal gait; no clubbing or cyanosis of digits; no joint swelling or tenderness to palpation  PSYCH: A+O to person, place, and time; affect appropriate  NEUROLOGY: CN 2-12 are intact and symmetric; no gross sensory deficits   SKIN: No rashes; no palpable lesions    LABS:                        10.2   7.69  )-----------( 297      ( 30 May 2024 07:12 )             32.2     05-31    138  |  102  |  10  ----------------------------<  93  4.6   |  23  |  0.48<L>    Ca    10.0      31 May 2024 11:32            Urinalysis Basic - ( 31 May 2024 11:32 )    Color: x / Appearance: x / SG: x / pH: x  Gluc: 93 mg/dL / Ketone: x  / Bili: x / Urobili: x   Blood: x / Protein: x / Nitrite: x   Leuk Esterase: x / RBC: x / WBC x   Sq Epi: x / Non Sq Epi: x / Bacteria: x          RADIOLOGY & ADDITIONAL TESTS:  Results Reviewed:   Imaging Personally Reviewed:  Electrocardiogram Personally Reviewed:    COORDINATION OF CARE:  Care Discussed with Consultants/Other Providers [Y/N]:  Prior or Outpatient Records Reviewed [Y/N]:   [facility] Division of Hospital Medicine  Sandra Gonzalez MD  MS Teams PREFERRED        SUBJECTIVE / OVERNIGHT EVENTS: Seen and examined at bedside. Still with tremor.    MEDICATIONS  (STANDING):  brimonidine 0.2% Ophthalmic Solution 1 Drop(s) Both EYES every 12 hours  captopril 6.25 milliGRAM(s) Oral every 12 hours  carbidopa/levodopa  25/100 1 Tablet(s) Oral <User Schedule>  carvedilol 3.125 milliGRAM(s) Oral every 12 hours  chlorhexidine 2% Cloths 1 Application(s) Topical daily  clonazePAM  Tablet 0.25 milliGRAM(s) Oral two times a day  dextrose 10% Bolus 125 milliLiter(s) IV Bolus once  dextrose 5%. 1000 milliLiter(s) (50 mL/Hr) IV Continuous <Continuous>  dextrose 5%. 1000 milliLiter(s) (100 mL/Hr) IV Continuous <Continuous>  dextrose 50% Injectable 12.5 Gram(s) IV Push once  dextrose 50% Injectable 25 Gram(s) IV Push once  enoxaparin Injectable 40 milliGRAM(s) SubCutaneous <User Schedule>  folic acid 1 milliGRAM(s) Oral daily  glucagon  Injectable 1 milliGRAM(s) IntraMuscular once  insulin lispro (ADMELOG) corrective regimen sliding scale   SubCutaneous three times a day before meals  latanoprost 0.005% Ophthalmic Solution 1 Drop(s) Both EYES at bedtime  levETIRAcetam  Solution 750 milliGRAM(s) Oral two times a day  mirtazapine 15 milliGRAM(s) Oral at bedtime  multivitamin 1 Tablet(s) Oral daily  polyethylene glycol 3350 17 Gram(s) Oral every 12 hours  senna 2 Tablet(s) Oral at bedtime  sodium chloride 2 Gram(s) Oral every 8 hours    MEDICATIONS  (PRN):  dextrose Oral Gel 15 Gram(s) Oral once PRN Blood Glucose LESS THAN 70 milliGRAM(s)/deciliter      I&O's Summary    30 May 2024 07:01  -  31 May 2024 07:00  --------------------------------------------------------  IN: 140 mL / OUT: 450 mL / NET: -310 mL    31 May 2024 07:01  -  31 May 2024 12:54  --------------------------------------------------------  IN: 0 mL / OUT: 0 mL / NET: 0 mL        PHYSICAL EXAM:  Vital Signs Last 24 Hrs  T(C): 37 (31 May 2024 12:13), Max: 37 (31 May 2024 12:13)  T(F): 98.6 (31 May 2024 12:13), Max: 98.6 (31 May 2024 12:13)  HR: 81 (31 May 2024 12:13) (73 - 107)  BP: 118/82 (31 May 2024 12:13) (100/68 - 123/50)  BP(mean): --  RR: 18 (31 May 2024 12:13) (18 - 18)  SpO2: 96% (31 May 2024 12:13) (93% - 99%)    Parameters below as of 31 May 2024 12:13  Patient On (Oxygen Delivery Method): room air      CONSTITUTIONAL: NAD,  head tremor  EYES: PERRLA; conjunctiva and sclera clear  ENMT: Moist oral mucosa, no pharyngeal injection or exudates;   NECK: Supple, no palpable masses; no thyromegaly  RESPIRATORY: Normal respiratory effort; lungs are clear to auscultation bilaterally  CARDIOVASCULAR: Regular rate and rhythm, normal S1 and S2, no murmur/rub/gallop; No lower extremity edema;   ABDOMEN: Nontender to palpation, normoactive bowel sounds, no rebound/guarding;   MUSCULOSKELETAL:  Normal gait; no clubbing or cyanosis of digits; no joint swelling or tenderness to palpation  SKIN: No rashes; no palpable lesions    LABS:                        10.2   7.69  )-----------( 297      ( 30 May 2024 07:12 )             32.2     05-31    138  |  102  |  10  ----------------------------<  93  4.6   |  23  |  0.48<L>    Ca    10.0      31 May 2024 11:32            Urinalysis Basic - ( 31 May 2024 11:32 )    Color: x / Appearance: x / SG: x / pH: x  Gluc: 93 mg/dL / Ketone: x  / Bili: x / Urobili: x   Blood: x / Protein: x / Nitrite: x   Leuk Esterase: x / RBC: x / WBC x   Sq Epi: x / Non Sq Epi: x / Bacteria: x

## 2024-05-31 NOTE — PROGRESS NOTE ADULT - PROBLEM SELECTOR PLAN 7
DVT ppx: Lovenox  Diet: NPO with tube feeding  Dispo: return to NH when clinically stable DVT ppx: Lovenox  Diet: NPO with tube feeding  Dispo: return to NH when tremor improves  called brother and PRIYANK twice to give update but no answer

## 2024-05-31 NOTE — BH CONSULTATION LIAISON ASSESSMENT NOTE - NSICDXBHSECONDARYDX_PSY_ALL_CORE
Dysphagia   R13.10  Dementia   F03.90  Polysubstance abuse   F19.10  SAH (subarachnoid hemorrhage)   I60.9  Takotsubo cardiomyopathy   I51.81  Alcoholic cirrhosis   K70.30

## 2024-05-31 NOTE — PROGRESS NOTE ADULT - PROBLEM SELECTOR PLAN 4
likely multifactorial from substance abuse and vascular issues  lives in NH    Dyskinesias  Sinemet BID started, may need titration over the weekend  Neuro follow up appreciated  f/u TSH, B12, Folate, antiTPO abs

## 2024-05-31 NOTE — BH CONSULTATION LIAISON ASSESSMENT NOTE - RISK ASSESSMENT
Risk factors: poor health, unable to care for self due to chronic medical condition, acute delirium, affective dysregulation  Protective factors: domiciled    Given limited interview, risk assessment is limited at this time. Risk factors: chronic medical condition, acute delirium, affective dysregulation  Protective factors: domiciled    Given limited interview, risk assessment is limited at this time.

## 2024-05-31 NOTE — BH CONSULTATION LIAISON ASSESSMENT NOTE - HPI (INCLUDE ILLNESS QUALITY, SEVERITY, DURATION, TIMING, CONTEXT, MODIFYING FACTORS, ASSOCIATED SIGNS AND SYMPTOMS)
Pt is a 64-year-old female, domiciled at [facility]; with PMHx of HTN, seizure disorder on Keppra, alcoholic cirrhosis, tremors, osteoarthritis; who presented to OSH with generalized weakness, decreased PO intake and declining ADLs; found to have b/l sulcal SAH and small occipital IVH. 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 She was recently started on Keflex for UTI. Plts 400, Coags wnl. , AST/ALT = 38/104. WBC 13. Afebrile. Exam on admission: lethargic appearing, EOV, FC, AO3, PERRL, not compliant with EOMs, BUE 5/5, BLE 2/5. Psychiatry consulted to assess for affective lability and anxiety.    Pt seen and assessed at bedtime. Pt labile and irritable, only briefly cooperative with interview. Alert and oriented to self and location (unknown month and day, but correct year; not oriented to situation). Pt unable to elicit much history. States she has minimal social supports, though states she has a sister and brother (Baljinder Barkley and Nicholas Evangelista, who are both listed as emergency contacts). Endorses some anxiety, though unable to elaborate. States she doesn't know why she's in the hospital. States she doesn't normally have full body shakes. Reports poor appetite. Denies depression and SI, replying with "that's a stupid question." Becomes increasingly irritable with further questioning and loudly states "I don't know. Leave me alone." Denies pain or physical complaints.

## 2024-05-31 NOTE — BH CONSULTATION LIAISON ASSESSMENT NOTE - NSICDXPASTMEDICALHX_GEN_ALL_CORE_FT
This is a Subsequent Medicare Annual Wellness Visit providing Personalized Prevention Plan Services (PPPS) (Performed 12 months after initial AWV and PPPS )  I have reviewed the patient's medical history in detail and updated the computerized patient record. History     Past Medical History:   Diagnosis Date    Anxiety 5/20/2010    Arthritis     DM (diabetes mellitus) ([facility] [address] 5/20/2010    GERD (gastroesophageal reflux disease)     HTN (hypertension) 5/20/2010    Hypercholesteremia 5/20/2010    IBS (irritable bowel syndrome) 5/20/2010    Obese 5/20/2010    Thromboembolus ([facility] [address] 1965    right ankle    Unspecified adverse effect of anesthesia     slow to awaken      Past Surgical History:   Procedure Laterality Date    DILATION/CURETTAGE,DIAGNOSTIC      HX GYN      hysterectomy partial    HX ORTHOPAEDIC  2007    right rotator cuff repair    HX ORTHOPAEDIC      R TKR    HX TUBAL LIGATION      HI VAG HYST,UTERUS >250 GMS,REM TUBE/OVARY       Current Outpatient Prescriptions   Medication Sig Dispense Refill    esomeprazole (NEXIUM) 40 mg capsule Take 1 Cap by mouth two (2) times a day.  180 Cap 3    valsartan (DIOVAN) 320 mg tablet TAKE 1 TABLET DAILY 90 Tab 1    simvastatin (ZOCOR) 40 mg tablet TAKE 1 TABLET NIGHTLY 90 Tab 3    citalopram (CELEXA) 20 mg tablet TAKE 1 TABLET DAILY 90 Tab 3    triamterene-hydroCHLOROthiazide (DYAZIDE) 37.5-25 mg per capsule TAKE 1 CAPSULES DAILY 180 Cap 2     Allergies   Allergen Reactions    Iodine Contact Dermatitis     \"burned\" my skin    Amaryl [Glimepiride] Hives    Iodides Not Reported This Time     Family History   Problem Relation Age of Onset    Diabetes Mother     Lung Disease Father     Heart Disease Father     Cancer Sister 68     leukemia    Stroke Brother     Lung Disease Brother     Malignant Hyperthermia Neg Hx     Pseudocholinesterase Deficiency Neg Hx     Delayed Awakening Neg Hx     Post-op Nausea/Vomiting Neg Hx     Emergence Delirium Neg Hx     Post-op Cognitive Dysfunction Neg Hx      Social History   Substance Use Topics    Smoking status: Never Smoker    Smokeless tobacco: Never Used    Alcohol use No     Patient Active Problem List   Diagnosis Code    DM (diabetes mellitus) ([facility] [address] E11.9    HTN (hypertension) I10    Hypercholesteremia E78.00    IBS (irritable bowel syndrome) K58.9    Anxiety F41.9    Obese E66.9       Depression Risk Factor Screening:     PHQ over the last two weeks 1/26/2017   Little interest or pleasure in doing things Several days   Feeling down, depressed or hopeless Several days   Total Score PHQ 2 2     Alcohol Risk Factor Screening: On any occasion during the past 3 months, have you had more than 3 drinks containing alcohol? No    Do you average more than 7 drinks per week? No        Functional Ability and Level of Safety:     Hearing Loss   mild    Activities of Daily Living   Self-care. Requires assistance with: no ADLs    Fall Risk   Fall Risk Assessment, last 12 mths 6/5/2015   Able to walk? Yes   Fall in past 12 months? No     Abuse Screen   Patient is not abused    Review of Systems   A comprehensive review of systems was negative except for that written in the HPI.     Physical Examination     Evaluation of Cognitive Function:  Mood/affect:  happy  Appearance: age appropriate  Family member/caregiver input: none    Visit Vitals    /64    Pulse (!) 57    Temp 97.9 °F (36.6 °C) (Oral)    Resp 16    Ht 5' 5\" (1.651 m)    Wt 160 lb 8 oz (72.8 kg)    SpO2 97%    BMI 26.71 kg/m2     General appearance: alert, cooperative, no distress, appears stated age  Lungs: clear to auscultation bilaterally  Heart: regular rate and rhythm, S1, S2 normal, no murmur, click, rub or gallop  Extremities: extremities normal, atraumatic, no cyanosis or edema    Patient Care Team:  Erika Munroe NP as PCP - General (Family Practice)    Advice/Referrals/Counseling   Education and counseling provided:  Are appropriate based on today's review and evaluation      Assessment/Plan     Encounter Diagnoses   Name Primary?  Well adult exam Yes    Type 2 diabetes mellitus without complication, without long-term current use of insulin (HCC)     Essential hypertension     Hypercholesteremia     Irritable bowel syndrome without diarrhea     Gastroesophageal reflux disease with esophagitis      Orders Placed This Encounter    CBC WITH AUTOMATED DIFF    METABOLIC PANEL, COMPREHENSIVE    HEMOGLOBIN A1C WITH EAG    LIPID PANEL    TSH 3RD GENERATION    MICROALBUMIN, UR, RAND W/ MICROALBUMIN/CREA RATIO    CVD REPORT    CKD REPORT    DIABETES PATIENT EDUCATION    esomeprazole (NEXIUM) 40 mg capsule   . I have discussed the diagnosis with the patient and the intended plan as seen in the above orders. The patient has received an after-visit summary and questions were answered concerning future plans. Patient conveyed understanding of the plan at the time of the visit.     Vern Godinez, MSN, ANP  7/17/2017 PAST MEDICAL HISTORY:  Cirrhosis     Osteoarthritis     Seizure

## 2024-05-31 NOTE — BH CONSULTATION LIAISON ASSESSMENT NOTE - CURRENT MEDICATION
MEDICATIONS  (STANDING):  brimonidine 0.2% Ophthalmic Solution 1 Drop(s) Both EYES every 12 hours  captopril 6.25 milliGRAM(s) Oral every 12 hours  carbidopa/levodopa  25/100 1 Tablet(s) Oral <User Schedule>  carvedilol 3.125 milliGRAM(s) Oral every 12 hours  chlorhexidine 2% Cloths 1 Application(s) Topical daily  clonazePAM  Tablet 0.25 milliGRAM(s) Oral two times a day  dextrose 10% Bolus 125 milliLiter(s) IV Bolus once  dextrose 5%. 1000 milliLiter(s) (50 mL/Hr) IV Continuous <Continuous>  dextrose 5%. 1000 milliLiter(s) (100 mL/Hr) IV Continuous <Continuous>  dextrose 50% Injectable 12.5 Gram(s) IV Push once  dextrose 50% Injectable 25 Gram(s) IV Push once  enoxaparin Injectable 40 milliGRAM(s) SubCutaneous <User Schedule>  folic acid 1 milliGRAM(s) Oral daily  glucagon  Injectable 1 milliGRAM(s) IntraMuscular once  insulin lispro (ADMELOG) corrective regimen sliding scale   SubCutaneous three times a day before meals  latanoprost 0.005% Ophthalmic Solution 1 Drop(s) Both EYES at bedtime  levETIRAcetam  Solution 750 milliGRAM(s) Oral two times a day  mirtazapine 15 milliGRAM(s) Oral at bedtime  multivitamin 1 Tablet(s) Oral daily  polyethylene glycol 3350 17 Gram(s) Oral every 12 hours  senna 2 Tablet(s) Oral at bedtime  sodium chloride 2 Gram(s) Oral every 8 hours    MEDICATIONS  (PRN):  dextrose Oral Gel 15 Gram(s) Oral once PRN Blood Glucose LESS THAN 70 milliGRAM(s)/deciliter

## 2024-05-31 NOTE — BH CONSULTATION LIAISON ASSESSMENT NOTE - NSBHCONSULTRECOMMENDOTHER_PSY_A_CORE FT
- c/w standing klonopin 0.25mg PO BID    Delirium precautions include:  - place patient's bed near window  - optimize sleep-wake cycle, minimize environmental noise  - reorientation techniques and memory cues such as calendar, clocks, family photos  - use verbal redirection as first line  - minimize restraints and lines  - ensure adequate pain control, use opioid sparing regimens when possible  - minimize use of anticholinergic, antihistaminic, and benzodiazepine medications

## 2024-05-31 NOTE — PROGRESS NOTE ADULT - PROBLEM SELECTOR PLAN 7
DVT ppx: Lovenox  Diet: NPO with tube feeding  Dispo: return to NH when tremor improves  called brother and PRIYANK twice to give update but no answer

## 2024-05-31 NOTE — BH CONSULTATION LIAISON ASSESSMENT NOTE - NSBHATTESTCOMMENTATTENDFT_PSY_A_CORE
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  Pt sedated and lethargic on interview, denies SI/HI, oriented to self, being in a hospital, but not the date.  States the month is "december" and does not recall why she is admitted.  Unable to answer questions regarding substance use.  Denies depression or anxiety.  Denies AVH.  Primary team states pt has been labile, fluctuating MS, confused at times, other times panicking and anxious, tearful.  Presentation c/w delirium 2/2 GMC.  QTc prolonged limited use of antipsychotics along with basal ganglia stroke, currently Klonopin.  Dx: delirium 2/2 GMC.  Recs:  c/w Klonopin, Add Ativan 0.5mg-1mg PO/IV/IM q6h PRN agitation.  Agree with resident's assessment and plan as above.

## 2024-05-31 NOTE — PROGRESS NOTE ADULT - ASSESSMENT
64F, pmh HTN, szs on Keppra, alcoholic cirrhosis, tremors, OA presented from [facility] to OSH w/ generalized weakness found to have b/l sulcal SAH and small occipital IVH. 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 She was recently started on Keflex for UTI. Plts 400, Coags wnl. , AST/ALT = 38/104. WBC 13. Afebrile. Exam on admission: lethargic appearing, EOV, FC, AO3, PERRL, not compliant with EOMs, BUE 5/5, BLE 2/5.    Now s/p 5/17 Angio: severe b/l anterior and posterior cerebral vasospasm (given IA verapamil and milrinone)

## 2024-05-31 NOTE — BH CONSULTATION LIAISON ASSESSMENT NOTE - SUMMARY
Pt is a 64-year-old female, domiciled at [facility]; with PMHx of HTN, seizure disorder on Keppra, alcoholic cirrhosis, tremors, osteoarthritis; who presented to OSH with generalized weakness, decreased PO intake and declining ADLs; found to have b/l sulcal SAH and small occipital IVH. 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 She was recently started on Keflex for UTI. Plts 400, Coags wnl. , AST/ALT = 38/104. WBC 13. Afebrile. Exam on admission: lethargic appearing, EOV, FC, AO3, PERRL, not compliant with EOMs, BUE 5/5, BLE 2/5. Psychiatry consulted to assess for affective lability and anxiety.    Pt interview limited at this time due to irritability. Pt likely acutely delirious in the setting of her medical condition and extended hospital stay. Given ongoing neuro workup and apparent full body tremors, hesitant to recommend antipsychotic use at this time due to increased risk for EPS.

## 2024-05-31 NOTE — BH CONSULTATION LIAISON ASSESSMENT NOTE - NS ED BHA MED ROS PSYCHIATRIC
Writer called pt and reviewed the Zyprexa and clonidine doses. Pt identified understanding and requested that hydroxyzine be discontinued due to his not taking it. Writer d/c'ed the med.     See HPI

## 2024-06-01 LAB
GLUCOSE BLDC GLUCOMTR-MCNC: 122 MG/DL — HIGH (ref 70–99)
GLUCOSE BLDC GLUCOMTR-MCNC: 76 MG/DL — SIGNIFICANT CHANGE UP (ref 70–99)
GLUCOSE BLDC GLUCOMTR-MCNC: 86 MG/DL — SIGNIFICANT CHANGE UP (ref 70–99)
GLUCOSE BLDC GLUCOMTR-MCNC: 92 MG/DL — SIGNIFICANT CHANGE UP (ref 70–99)
GLUCOSE BLDC GLUCOMTR-MCNC: 93 MG/DL — SIGNIFICANT CHANGE UP (ref 70–99)
GLUCOSE BLDC GLUCOMTR-MCNC: 93 MG/DL — SIGNIFICANT CHANGE UP (ref 70–99)
SARS-COV-2 RNA SPEC QL NAA+PROBE: SIGNIFICANT CHANGE UP
THYROGLOB AB FLD IA-ACNC: <20 IU/ML — SIGNIFICANT CHANGE UP
THYROGLOB AB SERPL-ACNC: <20 IU/ML — SIGNIFICANT CHANGE UP
THYROGLOB SERPL-MCNC: 20.1 NG/ML — SIGNIFICANT CHANGE UP (ref 1.6–59.9)
THYROPEROXIDASE AB SERPL-ACNC: <10 IU/ML — SIGNIFICANT CHANGE UP

## 2024-06-01 PROCEDURE — 99232 SBSQ HOSP IP/OBS MODERATE 35: CPT

## 2024-06-01 RX ORDER — SODIUM CHLORIDE 9 MG/ML
1000 INJECTION, SOLUTION INTRAVENOUS
Refills: 0 | Status: DISCONTINUED | OUTPATIENT
Start: 2024-06-01 | End: 2024-06-03

## 2024-06-01 RX ORDER — CHOLECALCIFEROL (VITAMIN D3) 125 MCG
2000 CAPSULE ORAL DAILY
Refills: 0 | Status: DISCONTINUED | OUTPATIENT
Start: 2024-06-01 | End: 2024-06-03

## 2024-06-01 RX ORDER — SODIUM CHLORIDE 9 MG/ML
1000 INJECTION, SOLUTION INTRAVENOUS
Refills: 0 | Status: DISCONTINUED | OUTPATIENT
Start: 2024-06-01 | End: 2024-06-01

## 2024-06-01 RX ADMIN — Medication 0.25 MILLIGRAM(S): at 17:21

## 2024-06-01 RX ADMIN — CHLORHEXIDINE GLUCONATE 1 APPLICATION(S): 213 SOLUTION TOPICAL at 11:24

## 2024-06-01 RX ADMIN — SODIUM CHLORIDE 50 MILLILITER(S): 9 INJECTION, SOLUTION INTRAVENOUS at 18:42

## 2024-06-01 RX ADMIN — BRIMONIDINE TARTRATE 1 DROP(S): 2 SOLUTION/ DROPS OPHTHALMIC at 17:21

## 2024-06-01 RX ADMIN — Medication 0.25 MILLIGRAM(S): at 06:46

## 2024-06-01 RX ADMIN — CAPTOPRIL 6.25 MILLIGRAM(S): 12.5 TABLET ORAL at 06:47

## 2024-06-01 RX ADMIN — CARBIDOPA AND LEVODOPA 1 TABLET(S): 25; 100 TABLET ORAL at 17:22

## 2024-06-01 RX ADMIN — ENOXAPARIN SODIUM 40 MILLIGRAM(S): 100 INJECTION SUBCUTANEOUS at 17:21

## 2024-06-01 RX ADMIN — CARVEDILOL PHOSPHATE 3.12 MILLIGRAM(S): 80 CAPSULE, EXTENDED RELEASE ORAL at 06:48

## 2024-06-01 RX ADMIN — CARVEDILOL PHOSPHATE 3.12 MILLIGRAM(S): 80 CAPSULE, EXTENDED RELEASE ORAL at 17:23

## 2024-06-01 RX ADMIN — LEVETIRACETAM 400 MILLIGRAM(S): 250 TABLET, FILM COATED ORAL at 07:00

## 2024-06-01 RX ADMIN — SODIUM CHLORIDE 2 GRAM(S): 9 INJECTION INTRAMUSCULAR; INTRAVENOUS; SUBCUTANEOUS at 21:17

## 2024-06-01 RX ADMIN — Medication 2000 UNIT(S): at 11:16

## 2024-06-01 RX ADMIN — SODIUM CHLORIDE 2 GRAM(S): 9 INJECTION INTRAMUSCULAR; INTRAVENOUS; SUBCUTANEOUS at 13:01

## 2024-06-01 RX ADMIN — MIRTAZAPINE 15 MILLIGRAM(S): 45 TABLET, ORALLY DISINTEGRATING ORAL at 21:17

## 2024-06-01 RX ADMIN — CARBIDOPA AND LEVODOPA 1 TABLET(S): 25; 100 TABLET ORAL at 06:47

## 2024-06-01 RX ADMIN — SODIUM CHLORIDE 50 MILLILITER(S): 9 INJECTION, SOLUTION INTRAVENOUS at 18:36

## 2024-06-01 RX ADMIN — LEVETIRACETAM 400 MILLIGRAM(S): 250 TABLET, FILM COATED ORAL at 17:22

## 2024-06-01 RX ADMIN — SENNA PLUS 2 TABLET(S): 8.6 TABLET ORAL at 21:17

## 2024-06-01 RX ADMIN — SODIUM CHLORIDE 2 GRAM(S): 9 INJECTION INTRAMUSCULAR; INTRAVENOUS; SUBCUTANEOUS at 06:48

## 2024-06-01 RX ADMIN — Medication 1 TABLET(S): at 11:17

## 2024-06-01 RX ADMIN — LATANOPROST 1 DROP(S): 0.05 SOLUTION/ DROPS OPHTHALMIC; TOPICAL at 21:18

## 2024-06-01 RX ADMIN — POLYETHYLENE GLYCOL 3350 17 GRAM(S): 17 POWDER, FOR SOLUTION ORAL at 17:21

## 2024-06-01 RX ADMIN — Medication 1 MILLIGRAM(S): at 11:16

## 2024-06-01 RX ADMIN — CAPTOPRIL 6.25 MILLIGRAM(S): 12.5 TABLET ORAL at 17:23

## 2024-06-01 RX ADMIN — BRIMONIDINE TARTRATE 1 DROP(S): 2 SOLUTION/ DROPS OPHTHALMIC at 06:49

## 2024-06-01 NOTE — PROGRESS NOTE ADULT - SUBJECTIVE AND OBJECTIVE BOX
Nancy Lanza MD  Division of Hospital Medicine  Reachable on MS Teams    PROGRESS NOTE:     Patient is a 64y old  Female who presents with a chief complaint of SAH (25 May 2024 07:10)      SUBJECTIVE / OVERNIGHT EVENTS: No acute events overnight, seen this AM, still having head tremor but patient states that it is improving compared to prior. Does not have appetite but states she will try to eat.     ADDITIONAL REVIEW OF SYSTEMS:    MEDICATIONS  (STANDING):  brimonidine 0.2% Ophthalmic Solution 1 Drop(s) Both EYES every 12 hours  captopril 6.25 milliGRAM(s) Oral every 12 hours  carbidopa/levodopa  25/100 1 Tablet(s) Oral <User Schedule>  carvedilol 3.125 milliGRAM(s) Oral every 12 hours  chlorhexidine 2% Cloths 1 Application(s) Topical daily  cholecalciferol 2000 Unit(s) Oral daily  clonazePAM  Tablet 0.25 milliGRAM(s) Oral two times a day  dextrose 10% Bolus 125 milliLiter(s) IV Bolus once  dextrose 5% + lactated ringers. 1000 milliLiter(s) (50 mL/Hr) IV Continuous <Continuous>  dextrose 5%. 1000 milliLiter(s) (100 mL/Hr) IV Continuous <Continuous>  dextrose 5%. 1000 milliLiter(s) (50 mL/Hr) IV Continuous <Continuous>  dextrose 50% Injectable 25 Gram(s) IV Push once  dextrose 50% Injectable 12.5 Gram(s) IV Push once  enoxaparin Injectable 40 milliGRAM(s) SubCutaneous <User Schedule>  folic acid 1 milliGRAM(s) Oral daily  glucagon  Injectable 1 milliGRAM(s) IntraMuscular once  insulin lispro (ADMELOG) corrective regimen sliding scale   SubCutaneous three times a day before meals  latanoprost 0.005% Ophthalmic Solution 1 Drop(s) Both EYES at bedtime  levETIRAcetam  IVPB 750 milliGRAM(s) IV Intermittent every 12 hours  mirtazapine 15 milliGRAM(s) Oral at bedtime  multivitamin 1 Tablet(s) Oral daily  polyethylene glycol 3350 17 Gram(s) Oral every 12 hours  senna 2 Tablet(s) Oral at bedtime  sodium chloride 2 Gram(s) Oral every 8 hours    MEDICATIONS  (PRN):  dextrose Oral Gel 15 Gram(s) Oral once PRN Blood Glucose LESS THAN 70 milliGRAM(s)/deciliter      CAPILLARY BLOOD GLUCOSE      POCT Blood Glucose.: 93 mg/dL (01 Jun 2024 11:19)  POCT Blood Glucose.: 93 mg/dL (01 Jun 2024 07:22)  POCT Blood Glucose.: 86 mg/dL (01 Jun 2024 06:29)  POCT Blood Glucose.: 76 mg/dL (01 Jun 2024 00:34)  POCT Blood Glucose.: 76 mg/dL (31 May 2024 21:22)  POCT Blood Glucose.: 80 mg/dL (31 May 2024 17:04)    I&O's Summary    31 May 2024 07:01  -  01 Jun 2024 07:00  --------------------------------------------------------  IN: 0 mL / OUT: 0 mL / NET: 0 mL    01 Jun 2024 07:01  -  01 Jun 2024 15:44  --------------------------------------------------------  IN: 0 mL / OUT: 150 mL / NET: -150 mL        PHYSICAL EXAM:  Vital Signs Last 24 Hrs  T(C): 36.7 (01 Jun 2024 11:00), Max: 36.9 (01 Jun 2024 08:00)  T(F): 98 (01 Jun 2024 11:00), Max: 98.5 (01 Jun 2024 08:00)  HR: 91 (01 Jun 2024 11:00) (84 - 98)  BP: 106/70 (01 Jun 2024 11:00) (99/37 - 109/72)  BP(mean): --  RR: 18 (01 Jun 2024 11:00) (17 - 18)  SpO2: 94% (01 Jun 2024 11:00) (94% - 97%)    Parameters below as of 01 Jun 2024 11:00  Patient On (Oxygen Delivery Method): room air      CONSTITUTIONAL: NAD,  head tremor  EYES: PERRLA; conjunctiva and sclera clear  ENMT: Moist oral mucosa, no pharyngeal injection or exudates;   NECK: Supple, no palpable masses; no thyromegaly  RESPIRATORY: Normal respiratory effort; lungs are clear to auscultation bilaterally  CARDIOVASCULAR: Regular rate and rhythm, normal S1 and S2, no murmur/rub/gallop; No lower extremity edema;   ABDOMEN: Nontender to palpation, normoactive bowel sounds, no rebound/guarding;   SKIN: No rashes; no palpable lesions      LABS:    05-31    138  |  102  |  10  ----------------------------<  93  4.6   |  23  |  0.48<L>    Ca    10.0      31 May 2024 11:32            Urinalysis Basic - ( 31 May 2024 11:32 )    Color: x / Appearance: x / SG: x / pH: x  Gluc: 93 mg/dL / Ketone: x  / Bili: x / Urobili: x   Blood: x / Protein: x / Nitrite: x   Leuk Esterase: x / RBC: x / WBC x   Sq Epi: x / Non Sq Epi: x / Bacteria: x          RADIOLOGY & ADDITIONAL TESTS:  Results Reviewed:   Imaging Personally Reviewed:  Electrocardiogram Personally Reviewed:    COORDINATION OF CARE:  Care Discussed with Consultants/Other Providers [Y/N]:  Prior or Outpatient Records Reviewed [Y/N]:

## 2024-06-01 NOTE — PROVIDER CONTACT NOTE (OTHER) - BACKGROUND
adm- SAH, alcoholic cirrhosis, dysphagia phx- tremors
(Principal DC/DX) Subarachnoid hemorrhage; Alcoholic cirrhosis; Dementia; Dysphagia.
PMH alcoholic cirrhosis, tremors, b/l sulcal SAH and small occipital IVH. CTA shows fusiform aneurysm DX: nontraumatic subarachnoid hemorrhage. Continuous NGT feeds was started 5/26
Dx: Nontraumatic SAH  PMH: cirrhosis, seizure
patient admitted for nontraumatic subarachnoid hemorrhage, PMHx cirrhosis, seizures, alcoholic cirrhosis, and takotsubo cardiomyopathy.

## 2024-06-01 NOTE — PROGRESS NOTE ADULT - PROBLEM SELECTOR PLAN 1
Cleared for PO diet by Speech and Swallow  pt didn't eat breakfast or lunch despite encouragement, gentle IVF for now

## 2024-06-01 NOTE — PROVIDER CONTACT NOTE (MEDICATION) - ASSESSMENT
Patient is lethargic. Easily arousable. Oriented to name. Moving all extremities.
Pt A&O 2-3, VSS. States, "I don't need that."

## 2024-06-01 NOTE — PROVIDER CONTACT NOTE (MEDICATION) - SITUATION
Pt refused night time meds (latanprost 0.005% Opthalmic solution, 15mG mirtazapine, and 2 Grams of sodium chloride

## 2024-06-01 NOTE — PROGRESS NOTE ADULT - ASSESSMENT
64F, pmh HTN, szs on Keppra, alcoholic cirrhosis, tremors, OA presented from [facility] to OSH w/ generalized weakness found to have b/l sulcal SAH and small occipital IVH. 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 She was recently started on Keflex for UTI. Plts 400, Coags wnl. , AST/ALT = 38/104. WBC 13. Afebrile. Exam on admission: lethargic appearing, EOV, FC, AO3, PERRL, not compliant with EOMs, BUE 5/5, BLE 2/5.    Now s/p 5/17 Angio: severe b/l anterior and posterior cerebral vasospasm (given IA verapamil and milrinone)

## 2024-06-01 NOTE — PROGRESS NOTE ADULT - PROBLEM SELECTOR PLAN 4
likely multifactorial from substance abuse and vascular issues  lives in NH    Dyskinesias  Sinemet BID started with improvement in tremor per pt  Neuro follow up appreciated  f/u TSH, B12, Folate, antiTPO abs

## 2024-06-01 NOTE — PROVIDER CONTACT NOTE (MEDICATION) - BACKGROUND
Patient CTH showed bilateral sucal SAH and IVH and right V4 aneurysm and severe basilar stenosis.
(Principal DC/DX) Subarachnoid hemorrhage; Alcoholic cirrhosis; Dementia; Dysphagia

## 2024-06-01 NOTE — PROVIDER CONTACT NOTE (OTHER) - REASON
poor PO intake
Dislodged NGT placement
Poor PO intake
Mucus coming out of right nostril where NG tube is placed.
patient pulled out NGT

## 2024-06-01 NOTE — PROGRESS NOTE ADULT - ASSESSMENT
64F, pmh HTN, szs on Keppra, alcoholic cirrhosis, tremors, OA presented from [facility] to OSH w/ generalized weakness found to have b/l sulcal SAH and small occipital IVH. 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 She was recently started on Keflex for UTI. Plts 400, Coags wnl. , AST/ALT = 38/104. WBC 13. Afebrile. Exam on admission: lethargic appearing, EOV, FC, AO3, PERRL, not compliant with EOMs, BUE 5/5, BLE 2/5.    Now s/p 5/17 Angio: severe b/l anterior and posterior cerebral vasospasm (given IA verapamil and milrinone)

## 2024-06-01 NOTE — PROGRESS NOTE ADULT - PROBLEM SELECTOR PLAN 7
DVT ppx: Lovenox  Diet: pureed with mildly thick liquids  Dispo: return to NH when tremor improves  called brother and PRIYANK twice to give update but no answer

## 2024-06-01 NOTE — PROGRESS NOTE ADULT - SUBJECTIVE AND OBJECTIVE BOX
PATIENT SEEN AND EXAMINED BY MYNOR GUAN M.D. ON :- 6/1/24  DATE OF SERVICE:      6/1/24       Interim events noted,Labs ,Radiological studies and Cardiology tests reviewed .    Patient is a 64y old  Female who presents with a chief complaint of SAH (01 Jun 2024 15:43)      HPI:  Mera Burton  64F, pmh HTN, szs on Keppra, alcoholic cirrhosis, tremors, OA presented from [facility] to OSH w/ generalized weakness found to have b/l sulcal SAH and small occipital IVH. 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 She was recently started on Keflex for UTI. Plts 400, Coags wnl. , AST/ALT = 38/104. WBC 13. Afebrile. Exam: lethargic appearing, EOV, FC, AO3, PERRL, not compliant with EOMs, BUE 5/5, BLE 2/5. (15 May 2024 03:22)      PAST MEDICAL & SURGICAL HISTORY:  Cirrhosis      Seizure      Osteoarthritis      No significant past surgical history          PREVIOUS DIAGNOSTIC TESTING:      ECHO  FINDINGS:    STRESS  FINDINGS:    CATHETERIZATION  FINDINGS:    MEDICATIONS  (STANDING):  brimonidine 0.2% Ophthalmic Solution 1 Drop(s) Both EYES every 12 hours  captopril 6.25 milliGRAM(s) Oral every 12 hours  carbidopa/levodopa  25/100 1 Tablet(s) Oral <User Schedule>  carvedilol 3.125 milliGRAM(s) Oral every 12 hours  chlorhexidine 2% Cloths 1 Application(s) Topical daily  cholecalciferol 2000 Unit(s) Oral daily  clonazePAM  Tablet 0.25 milliGRAM(s) Oral two times a day  dextrose 10% Bolus 125 milliLiter(s) IV Bolus once  dextrose 5%. 1000 milliLiter(s) (50 mL/Hr) IV Continuous <Continuous>  dextrose 5%. 1000 milliLiter(s) (100 mL/Hr) IV Continuous <Continuous>  dextrose 5%. 1000 milliLiter(s) (50 mL/Hr) IV Continuous <Continuous>  dextrose 50% Injectable 25 Gram(s) IV Push once  dextrose 50% Injectable 12.5 Gram(s) IV Push once  enoxaparin Injectable 40 milliGRAM(s) SubCutaneous <User Schedule>  folic acid 1 milliGRAM(s) Oral daily  glucagon  Injectable 1 milliGRAM(s) IntraMuscular once  insulin lispro (ADMELOG) corrective regimen sliding scale   SubCutaneous three times a day before meals  latanoprost 0.005% Ophthalmic Solution 1 Drop(s) Both EYES at bedtime  levETIRAcetam  IVPB 750 milliGRAM(s) IV Intermittent every 12 hours  mirtazapine 15 milliGRAM(s) Oral at bedtime  multivitamin 1 Tablet(s) Oral daily  polyethylene glycol 3350 17 Gram(s) Oral every 12 hours  senna 2 Tablet(s) Oral at bedtime  sodium chloride 2 Gram(s) Oral every 8 hours    MEDICATIONS  (PRN):  dextrose Oral Gel 15 Gram(s) Oral once PRN Blood Glucose LESS THAN 70 milliGRAM(s)/deciliter      FAMILY HISTORY:      SOCIAL HISTORY:    CIGARETTES:    ALCOHOL:    REVIEW OF SYSTEMS:  CONSTITUTIONAL: No fever, weight loss, or fatigue  EYES: No eye pain, visual disturbances, or discharge  ENMT:  No difficulty hearing, tinnitus, vertigo; No sinus or throat pain  NECK: No pain or stiffness  RESPIRATORY: No cough, wheezing, chills or hemoptysis; No shortness of breath  CARDIOVASCULAR: No chest pain, palpitations, dizziness, or leg swelling  GASTROINTESTINAL: No abdominal or epigastric pain. No nausea, vomiting, or hematemesis; No diarrhea or constipation. No melena or hematochezia.  GENITOURINARY: No dysuria, frequency, hematuria, or incontinence  NEUROLOGICAL: No headaches, memory loss, loss of strength, numbness, or tremors  SKIN: No itching, burning, rashes, or lesions   LYMPH NODES: No enlarged glands  ENDOCRINE: No heat or cold intolerance; No hair loss  MUSCULOSKELETAL: No joint pain or swelling; No muscle, back, or extremity pain  PSYCHIATRIC: No depression, anxiety, mood swings, or difficulty sleeping  HEME/LYMPH: No easy bruising, or bleeding gums  ALLERY AND IMMUNOLOGIC: No hives or eczema    Vital Signs Last 24 Hrs  T(C): 36.9 (01 Jun 2024 20:14), Max: 36.9 (01 Jun 2024 08:00)  T(F): 98.4 (01 Jun 2024 20:14), Max: 98.5 (01 Jun 2024 08:00)  HR: 89 (01 Jun 2024 20:14) (89 - 98)  BP: 107/75 (01 Jun 2024 17:20) (99/68 - 109/72)  BP(mean): --  RR: 18 (01 Jun 2024 20:14) (17 - 18)  SpO2: 95% (01 Jun 2024 20:14) (94% - 97%)    Parameters below as of 01 Jun 2024 20:14  Patient On (Oxygen Delivery Method): room air          PHYSICAL EXAM:  GENERAL: NAD, well-groomed, well-developed  HEAD:  Atraumatic, Normocephalic  EYES: EOMI, PERRLA, conjunctiva and sclera clear  ENMT: No tonsillar erythema, exudates, or enlargement; Moist mucous membranes, Good dentition, No lesions  NECK: Supple, No JVD, Normal thyroid  NERVOUS SYSTEM:  Alert & Oriented X3, Good concentration; Motor Strength 5/5 B/L upper and lower extremities; DTRs 2+ intact and symmetric  CHEST/LUNG: Clear to percussion bilaterally; No rales, rhonchi, wheezing, or rubs  HEART: Regular rate and rhythm; No murmurs, rubs, or gallops  ABDOMEN: Soft, Nontender, Nondistended; Bowel sounds present  EXTREMITIES:  2+ Peripheral Pulses, No clubbing, cyanosis, or edema  LYMPH: No lymphadenopathy noted  SKIN: No rashes or lesions      INTERPRETATION OF TELEMETRY:    ECG:    [facility] Health[facility]VAS:     LABS:    05-31    138  |  102  |  10  ----------------------------<  93  4.6   |  23  |  0.48<L>    Ca    10.0      31 May 2024 11:32            Urinalysis Basic - ( 31 May 2024 11:32 )    Color: x / Appearance: x / SG: x / pH: x  Gluc: 93 mg/dL / Ketone: x  / Bili: x / Urobili: x   Blood: x / Protein: x / Nitrite: x   Leuk Esterase: x / RBC: x / WBC x   Sq Epi: x / Non Sq Epi: x / Bacteria: x      Lipid Panel:   I&O's Summary    31 May 2024 07:01  -  01 Jun 2024 07:00  --------------------------------------------------------  IN: 0 mL / OUT: 0 mL / NET: 0 mL    01 Jun 2024 07:01  -  01 Jun 2024 23:08  --------------------------------------------------------  IN: 0 mL / OUT: 350 mL / NET: -350 mL        RADIOLOGY & ADDITIONAL STUDIES:    CONCLUSIONS:      1. Left ventricular systolic function is moderately decreased with an ejection fraction visually estimated at 35 to 40 %. Regional wall motion abnormalities present.   2. Multiple segmental abnormalities exist. See findings.   3. No pericardial effusion seen.   4. Compared to the transthoracic echocardiogram performed on 5/23/2024,.   5. Technically difficult image quality.

## 2024-06-01 NOTE — PROVIDER CONTACT NOTE (OTHER) - ACTION/TREATMENT ORDERED:
KELL Drake notified. Pt safety maintained. Blood glucose via fingerstick to be checked.
KIMBERLEE Ivan aware, CXR to confirm placement ordered and completed. Tube feeds were stopped. Plan of care continues, will endorse the event to day shift pending CXR results.
KIMBERLEE Vergara notified.
Provider made aware and said will consider IV fluids. will keep on monitoring
Notified KIMBERLEE Vergara and came to bedside to assess patient - patient hitting when trying to clean nostril. Night RN aware and will suction patient when patient is calm.

## 2024-06-01 NOTE — PROVIDER CONTACT NOTE (OTHER) - ASSESSMENT
Pt A&Ox2, able to make needs known. Pt asymptomatic. Patient stated throat doesn't hurt her. No discomfort from NG, patient able to speak and make needs known. However patient became combative when after trying to wipe nose.
Patient is A&Ox2-3, patient shows no s/s of any distress. VSS. No SOB noted. Denies any chest pain.
patient a&o2/3, VSS.
Pt A&O 2-3, VSS. Pt would say she would eat and then not. Ate only a few teaspoons of dinner.
Pt alert & oriented x 3. on RA. awake, Denies chest pain, sob, palpitations. doesn't want to eat.

## 2024-06-01 NOTE — PROVIDER CONTACT NOTE (OTHER) - RECOMMENDATIONS
notify KIMBERLEE Vergara
KELL Jimenez Ezring notified and aware
provider notify? encourage PO intake. fluids if pt. not eating?
KIMBERLEE Ivan notified
Notify Provider.

## 2024-06-01 NOTE — PROVIDER CONTACT NOTE (OTHER) - SITUATION
patient pulled out her NGT
poor PO intake
Mucus coming out of right nostril where NG tube is placed.
Poor PO intake (refused dinner, thickened liquid, and nightime snack)
While PCA was cleaning the patient up, he noticed the NGT tape was lower than usual. Patient was sneezing while this occurred.

## 2024-06-01 NOTE — PROVIDER CONTACT NOTE (MEDICATION) - RECOMMENDATIONS
KELL Jimenez Ezring notified and aware
NPO due to unable to obtain oral access. Multiple attempts by RN and ICU team. Patient remains NPO. ENT consult pending.

## 2024-06-01 NOTE — PROVIDER CONTACT NOTE (MEDICATION) - REASON
Pt refused night time meds (latanprost 0.005% Opthalmic solution, 15mG mirtazapine, and 2 Grams of sodium chloride
Nimodipine 60mg 1000, 1400, 1800 unable to administer

## 2024-06-02 LAB
ANION GAP SERPL CALC-SCNC: 16 MMOL/L — SIGNIFICANT CHANGE UP (ref 5–17)
BUN SERPL-MCNC: 10 MG/DL — SIGNIFICANT CHANGE UP (ref 7–23)
CALCIUM SERPL-MCNC: 10.7 MG/DL — HIGH (ref 8.4–10.5)
CHLORIDE SERPL-SCNC: 102 MMOL/L — SIGNIFICANT CHANGE UP (ref 96–108)
CO2 SERPL-SCNC: 20 MMOL/L — LOW (ref 22–31)
CREAT SERPL-MCNC: 0.53 MG/DL — SIGNIFICANT CHANGE UP (ref 0.5–1.3)
EGFR: 104 ML/MIN/1.73M2 — SIGNIFICANT CHANGE UP
GLUCOSE BLDC GLUCOMTR-MCNC: 113 MG/DL — HIGH (ref 70–99)
GLUCOSE BLDC GLUCOMTR-MCNC: 114 MG/DL — HIGH (ref 70–99)
GLUCOSE BLDC GLUCOMTR-MCNC: 117 MG/DL — HIGH (ref 70–99)
GLUCOSE SERPL-MCNC: 107 MG/DL — HIGH (ref 70–99)
HCT VFR BLD CALC: 35.8 % — SIGNIFICANT CHANGE UP (ref 34.5–45)
HGB BLD-MCNC: 11.6 G/DL — SIGNIFICANT CHANGE UP (ref 11.5–15.5)
MCHC RBC-ENTMCNC: 27.5 PG — SIGNIFICANT CHANGE UP (ref 27–34)
MCHC RBC-ENTMCNC: 32.4 GM/DL — SIGNIFICANT CHANGE UP (ref 32–36)
MCV RBC AUTO: 84.8 FL — SIGNIFICANT CHANGE UP (ref 80–100)
NRBC # BLD: 0 /100 WBCS — SIGNIFICANT CHANGE UP (ref 0–0)
PLATELET # BLD AUTO: 412 K/UL — HIGH (ref 150–400)
POTASSIUM SERPL-MCNC: 4.3 MMOL/L — SIGNIFICANT CHANGE UP (ref 3.5–5.3)
POTASSIUM SERPL-SCNC: 4.3 MMOL/L — SIGNIFICANT CHANGE UP (ref 3.5–5.3)
RBC # BLD: 4.22 M/UL — SIGNIFICANT CHANGE UP (ref 3.8–5.2)
RBC # FLD: 13.7 % — SIGNIFICANT CHANGE UP (ref 10.3–14.5)
SODIUM SERPL-SCNC: 138 MMOL/L — SIGNIFICANT CHANGE UP (ref 135–145)
WBC # BLD: 5.13 K/UL — SIGNIFICANT CHANGE UP (ref 3.8–10.5)
WBC # FLD AUTO: 5.13 K/UL — SIGNIFICANT CHANGE UP (ref 3.8–10.5)

## 2024-06-02 PROCEDURE — 99231 SBSQ HOSP IP/OBS SF/LOW 25: CPT

## 2024-06-02 RX ORDER — POLYETHYLENE GLYCOL 3350 17 G/17G
17 POWDER, FOR SOLUTION ORAL EVERY 12 HOURS
Refills: 0 | Status: DISCONTINUED | OUTPATIENT
Start: 2024-06-02 | End: 2024-06-03

## 2024-06-02 RX ORDER — SODIUM CHLORIDE 9 MG/ML
2 INJECTION INTRAMUSCULAR; INTRAVENOUS; SUBCUTANEOUS EVERY 8 HOURS
Refills: 0 | Status: DISCONTINUED | OUTPATIENT
Start: 2024-06-02 | End: 2024-06-03

## 2024-06-02 RX ORDER — FOLIC ACID 0.8 MG
1 TABLET ORAL DAILY
Refills: 0 | Status: DISCONTINUED | OUTPATIENT
Start: 2024-06-02 | End: 2024-06-03

## 2024-06-02 RX ORDER — SENNA PLUS 8.6 MG/1
2 TABLET ORAL AT BEDTIME
Refills: 0 | Status: DISCONTINUED | OUTPATIENT
Start: 2024-06-02 | End: 2024-06-03

## 2024-06-02 RX ORDER — MIRTAZAPINE 45 MG/1
15 TABLET, ORALLY DISINTEGRATING ORAL AT BEDTIME
Refills: 0 | Status: DISCONTINUED | OUTPATIENT
Start: 2024-06-02 | End: 2024-06-03

## 2024-06-02 RX ORDER — CAPTOPRIL 12.5 MG/1
6.25 TABLET ORAL EVERY 12 HOURS
Refills: 0 | Status: DISCONTINUED | OUTPATIENT
Start: 2024-06-02 | End: 2024-06-03

## 2024-06-02 RX ORDER — CARVEDILOL PHOSPHATE 80 MG/1
3.12 CAPSULE, EXTENDED RELEASE ORAL EVERY 12 HOURS
Refills: 0 | Status: DISCONTINUED | OUTPATIENT
Start: 2024-06-02 | End: 2024-06-03

## 2024-06-02 RX ADMIN — Medication 1 TABLET(S): at 11:37

## 2024-06-02 RX ADMIN — CHLORHEXIDINE GLUCONATE 1 APPLICATION(S): 213 SOLUTION TOPICAL at 11:37

## 2024-06-02 RX ADMIN — Medication 0.25 MILLIGRAM(S): at 05:22

## 2024-06-02 RX ADMIN — SODIUM CHLORIDE 2 GRAM(S): 9 INJECTION INTRAMUSCULAR; INTRAVENOUS; SUBCUTANEOUS at 05:22

## 2024-06-02 RX ADMIN — SODIUM CHLORIDE 2 GRAM(S): 9 INJECTION INTRAMUSCULAR; INTRAVENOUS; SUBCUTANEOUS at 14:59

## 2024-06-02 RX ADMIN — CARVEDILOL PHOSPHATE 3.12 MILLIGRAM(S): 80 CAPSULE, EXTENDED RELEASE ORAL at 17:27

## 2024-06-02 RX ADMIN — CARBIDOPA AND LEVODOPA 1 TABLET(S): 25; 100 TABLET ORAL at 05:22

## 2024-06-02 RX ADMIN — CAPTOPRIL 6.25 MILLIGRAM(S): 12.5 TABLET ORAL at 05:22

## 2024-06-02 RX ADMIN — BRIMONIDINE TARTRATE 1 DROP(S): 2 SOLUTION/ DROPS OPHTHALMIC at 05:22

## 2024-06-02 RX ADMIN — LEVETIRACETAM 400 MILLIGRAM(S): 250 TABLET, FILM COATED ORAL at 17:29

## 2024-06-02 RX ADMIN — Medication 0.25 MILLIGRAM(S): at 17:34

## 2024-06-02 RX ADMIN — SODIUM CHLORIDE 2 GRAM(S): 9 INJECTION INTRAMUSCULAR; INTRAVENOUS; SUBCUTANEOUS at 21:34

## 2024-06-02 RX ADMIN — POLYETHYLENE GLYCOL 3350 17 GRAM(S): 17 POWDER, FOR SOLUTION ORAL at 17:27

## 2024-06-02 RX ADMIN — SENNA PLUS 2 TABLET(S): 8.6 TABLET ORAL at 21:34

## 2024-06-02 RX ADMIN — BRIMONIDINE TARTRATE 1 DROP(S): 2 SOLUTION/ DROPS OPHTHALMIC at 17:28

## 2024-06-02 RX ADMIN — ENOXAPARIN SODIUM 40 MILLIGRAM(S): 100 INJECTION SUBCUTANEOUS at 17:27

## 2024-06-02 RX ADMIN — CARBIDOPA AND LEVODOPA 1 TABLET(S): 25; 100 TABLET ORAL at 17:29

## 2024-06-02 RX ADMIN — CARVEDILOL PHOSPHATE 3.12 MILLIGRAM(S): 80 CAPSULE, EXTENDED RELEASE ORAL at 05:22

## 2024-06-02 RX ADMIN — LEVETIRACETAM 400 MILLIGRAM(S): 250 TABLET, FILM COATED ORAL at 05:24

## 2024-06-02 RX ADMIN — CAPTOPRIL 6.25 MILLIGRAM(S): 12.5 TABLET ORAL at 17:28

## 2024-06-02 RX ADMIN — MIRTAZAPINE 15 MILLIGRAM(S): 45 TABLET, ORALLY DISINTEGRATING ORAL at 21:34

## 2024-06-02 RX ADMIN — LATANOPROST 1 DROP(S): 0.05 SOLUTION/ DROPS OPHTHALMIC; TOPICAL at 21:33

## 2024-06-02 RX ADMIN — Medication 2000 UNIT(S): at 11:37

## 2024-06-02 RX ADMIN — Medication 1 MILLIGRAM(S): at 11:37

## 2024-06-02 NOTE — PROGRESS NOTE ADULT - PROBLEM SELECTOR PROBLEM 3
Takotsubo cardiomyopathy
Dysphagia

## 2024-06-02 NOTE — PROGRESS NOTE ADULT - ASSESSMENT
64F, pmh HTN, szs on Keppra, alcoholic cirrhosis, tremors, OA presented from [facility] to OSH w/ generalized weakness found to have b/l sulcal SAH and small occipital IVH. 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 She was recently started on Keflex for UTI. Plts 400, Coags wnl. , AST/ALT = 38/104. WBC 13. Afebrile. Exam on admission: lethargic appearing, EOV, FC, AO3, PERRL, not compliant with EOMs, BUE 5/5, BLE 2/5.    Now s/p 5/17 Angio: severe b/l anterior and posterior cerebral vasospasm (given IA verapamil and milrinone)

## 2024-06-02 NOTE — PROGRESS NOTE ADULT - PROBLEM SELECTOR PLAN 6
LFTs stable  CT Abd - possible liver cirrhosis  appears compensated at this time

## 2024-06-02 NOTE — PROGRESS NOTE ADULT - SUBJECTIVE AND OBJECTIVE BOX
PATIENT SEEN AND EXAMINED BY MYNOR GUAN M.D. ON :- 6/2/24  DATE OF SERVICE:  6/2/24           Interim events noted,Labs ,Radiological studies and Cardiology tests reviewed .    Patient is a 64y old  Female who presents with a chief complaint of SAH (02 Jun 2024 14:51)      HPI:  Mera Burton  64F, pmh HTN, szs on Keppra, alcoholic cirrhosis, tremors, OA presented from [facility] to OSH w/ generalized weakness found to have b/l sulcal SAH and small occipital IVH. 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 She was recently started on Keflex for UTI. Plts 400, Coags wnl. , AST/ALT = 38/104. WBC 13. Afebrile. Exam: lethargic appearing, EOV, FC, AO3, PERRL, not compliant with EOMs, BUE 5/5, BLE 2/5. (15 May 2024 03:22)      PAST MEDICAL & SURGICAL HISTORY:  Cirrhosis      Seizure      Osteoarthritis      No significant past surgical history          PREVIOUS DIAGNOSTIC TESTING:      ECHO  FINDINGS:    STRESS  FINDINGS:    CATHETERIZATION  FINDINGS:    MEDICATIONS  (STANDING):  brimonidine 0.2% Ophthalmic Solution 1 Drop(s) Both EYES every 12 hours  captopril 6.25 milliGRAM(s) Oral every 12 hours  carbidopa/levodopa  25/100 1 Tablet(s) Oral <User Schedule>  carvedilol 3.125 milliGRAM(s) Oral every 12 hours  chlorhexidine 2% Cloths 1 Application(s) Topical daily  cholecalciferol 2000 Unit(s) Oral daily  clonazePAM  Tablet 0.25 milliGRAM(s) Oral two times a day  dextrose 10% Bolus 125 milliLiter(s) IV Bolus once  dextrose 5%. 1000 milliLiter(s) (50 mL/Hr) IV Continuous <Continuous>  dextrose 5%. 1000 milliLiter(s) (100 mL/Hr) IV Continuous <Continuous>  dextrose 5%. 1000 milliLiter(s) (50 mL/Hr) IV Continuous <Continuous>  dextrose 50% Injectable 25 Gram(s) IV Push once  dextrose 50% Injectable 12.5 Gram(s) IV Push once  enoxaparin Injectable 40 milliGRAM(s) SubCutaneous <User Schedule>  folic acid 1 milliGRAM(s) Oral daily  glucagon  Injectable 1 milliGRAM(s) IntraMuscular once  insulin lispro (ADMELOG) corrective regimen sliding scale   SubCutaneous three times a day before meals  latanoprost 0.005% Ophthalmic Solution 1 Drop(s) Both EYES at bedtime  levETIRAcetam  IVPB 750 milliGRAM(s) IV Intermittent every 12 hours  mirtazapine 15 milliGRAM(s) Oral at bedtime  multivitamin 1 Tablet(s) Oral daily  polyethylene glycol 3350 17 Gram(s) Oral every 12 hours  senna 2 Tablet(s) Oral at bedtime  sodium chloride 2 Gram(s) Oral every 8 hours    MEDICATIONS  (PRN):  dextrose Oral Gel 15 Gram(s) Oral once PRN Blood Glucose LESS THAN 70 milliGRAM(s)/deciliter      FAMILY HISTORY:      SOCIAL HISTORY:    CIGARETTES:    ALCOHOL:    REVIEW OF SYSTEMS:  CONSTITUTIONAL: No fever, weight loss, or fatigue  EYES: No eye pain, visual disturbances, or discharge  ENMT:  No difficulty hearing, tinnitus, vertigo; No sinus or throat pain  NECK: No pain or stiffness  RESPIRATORY: No cough, wheezing, chills or hemoptysis; No shortness of breath  CARDIOVASCULAR: No chest pain, palpitations, dizziness, or leg swelling  GASTROINTESTINAL: No abdominal or epigastric pain. No nausea, vomiting, or hematemesis; No diarrhea or constipation. No melena or hematochezia.  GENITOURINARY: No dysuria, frequency, hematuria, or incontinence  NEUROLOGICAL: No headaches, memory loss, loss of strength, numbness, or tremors  SKIN: No itching, burning, rashes, or lesions   LYMPH NODES: No enlarged glands  ENDOCRINE: No heat or cold intolerance; No hair loss  MUSCULOSKELETAL: No joint pain or swelling; No muscle, back, or extremity pain  PSYCHIATRIC: No depression, anxiety, mood swings, or difficulty sleeping  HEME/LYMPH: No easy bruising, or bleeding gums  ALLERY AND IMMUNOLOGIC: No hives or eczema    Vital Signs Last 24 Hrs  T(C): 36.7 (02 Jun 2024 20:16), Max: 36.7 (02 Jun 2024 00:21)  T(F): 98 (02 Jun 2024 20:16), Max: 98 (02 Jun 2024 00:21)  HR: 83 (02 Jun 2024 20:16) (76 - 106)  BP: 98/66 (02 Jun 2024 20:16) (93/66 - 130/66)  BP(mean): --  RR: 18 (02 Jun 2024 20:16) (18 - 18)  SpO2: 92% (02 Jun 2024 20:16) (92% - 99%)    Parameters below as of 02 Jun 2024 20:16  Patient On (Oxygen Delivery Method): room air          PHYSICAL EXAM:  GENERAL: NAD, well-groomed, well-developed  HEAD:  Atraumatic, Normocephalic  EYES: EOMI, PERRLA, conjunctiva and sclera clear  ENMT: No tonsillar erythema, exudates, or enlargement; Moist mucous membranes, Good dentition, No lesions  NECK: Supple, No JVD, Normal thyroid  NERVOUS SYSTEM:  Alert & Oriented X3, Good concentration; Motor Strength 5/5 B/L upper and lower extremities; DTRs 2+ intact and symmetric  CHEST/LUNG: Clear to percussion bilaterally; No rales, rhonchi, wheezing, or rubs  HEART: Regular rate and rhythm; No murmurs, rubs, or gallops  ABDOMEN: Soft, Nontender, Nondistended; Bowel sounds present  EXTREMITIES:  2+ Peripheral Pulses, No clubbing, cyanosis, or edema  LYMPH: No lymphadenopathy noted  SKIN: No rashes or lesions      INTERPRETATION OF TELEMETRY:    ECG:    CHADSVASC:     LABS:                        11.6   5.13  )-----------( 412      ( 02 Jun 2024 06:13 )             35.8     06-02    138  |  102  |  10  ----------------------------<  107<H>  4.3   |  20<L>  |  0.53    Ca    10.7<H>      02 Jun 2024 06:13            Urinalysis Basic - ( 02 Jun 2024 06:13 )    Color: x / Appearance: x / SG: x / pH: x  Gluc: 107 mg/dL / Ketone: x  / Bili: x / Urobili: x   Blood: x / Protein: x / Nitrite: x   Leuk Esterase: x / RBC: x / WBC x   Sq Epi: x / Non Sq Epi: x / Bacteria: x      Lipid Panel:   I&O's Summary    01 Jun 2024 07:01  -  02 Jun 2024 07:00  --------------------------------------------------------  IN: 0 mL / OUT: 600 mL / NET: -600 mL    02 Jun 2024 07:01  -  02 Jun 2024 21:10  --------------------------------------------------------  IN: 0 mL / OUT: 250 mL / NET: -250 mL        RADIOLOGY & ADDITIONAL STUDIES:

## 2024-06-02 NOTE — PROGRESS NOTE ADULT - PROBLEM SELECTOR PLAN 1
Cleared for PO diet by Speech and Swallow  Pt picking at her meals, required IVF yesterday. If not eating lunch then will resume IVF (choice based on FS)

## 2024-06-02 NOTE — PROGRESS NOTE ADULT - PROBLEM SELECTOR PROBLEM 5
Polysubstance abuse

## 2024-06-02 NOTE — PROGRESS NOTE ADULT - PROBLEM SELECTOR PLAN 5
resolved

## 2024-06-02 NOTE — PROGRESS NOTE ADULT - ASSESSMENT
64F, pmh HTN, szs on Keppra, alcoholic cirrhosis, tremors, OA presented from [facility] to OSH w/ generalized weakness found to have b/l sulcal SAH and small occipital IVH. 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 She was recently started on Keflex for UTI. Plts 400, Coags wnl. , AST/ALT = 38/104. WBC 13. Afebrile. Exam on admission: lethargic appearing, EOV, FC, AO3, PERRL, not compliant with EOMs, BUE 5/5, BLE 2/5.    Now s/p 5/17 Angio: severe b/l anterior and posterior cerebral vasospasm (given IA verapamil and milrinone)

## 2024-06-02 NOTE — PROGRESS NOTE ADULT - PROBLEM SELECTOR PROBLEM 6
Alcoholic cirrhosis

## 2024-06-02 NOTE — PROGRESS NOTE ADULT - PROBLEM SELECTOR PROBLEM 1
Dysphagia
SAH (subarachnoid hemorrhage)
Dysphagia

## 2024-06-02 NOTE — PROGRESS NOTE ADULT - PROBLEM SELECTOR PROBLEM 2
SAH (subarachnoid hemorrhage)
Takotsubo cardiomyopathy
SAH (subarachnoid hemorrhage)

## 2024-06-02 NOTE — PROGRESS NOTE ADULT - SUBJECTIVE AND OBJECTIVE BOX
Nancy Lanza MD  Division of Hospital Medicine  Reachable on MS Teams    PROGRESS NOTE:     Patient is a 64y old  Female who presents with a chief complaint of SAH (01 Jun 2024 15:43)      SUBJECTIVE / OVERNIGHT EVENTS: No acute events overnight, seen this AM. States her head tremor continues to improve, speaking in full sentences. States she is eating her meals but per RN patient is picking at food. Encouraged PO intake.     ADDITIONAL REVIEW OF SYSTEMS:    MEDICATIONS  (STANDING):  brimonidine 0.2% Ophthalmic Solution 1 Drop(s) Both EYES every 12 hours  captopril 6.25 milliGRAM(s) Oral every 12 hours  carbidopa/levodopa  25/100 1 Tablet(s) Oral <User Schedule>  carvedilol 3.125 milliGRAM(s) Oral every 12 hours  chlorhexidine 2% Cloths 1 Application(s) Topical daily  cholecalciferol 2000 Unit(s) Oral daily  clonazePAM  Tablet 0.25 milliGRAM(s) Oral two times a day  dextrose 10% Bolus 125 milliLiter(s) IV Bolus once  dextrose 5%. 1000 milliLiter(s) (100 mL/Hr) IV Continuous <Continuous>  dextrose 5%. 1000 milliLiter(s) (50 mL/Hr) IV Continuous <Continuous>  dextrose 5%. 1000 milliLiter(s) (50 mL/Hr) IV Continuous <Continuous>  dextrose 50% Injectable 25 Gram(s) IV Push once  dextrose 50% Injectable 12.5 Gram(s) IV Push once  enoxaparin Injectable 40 milliGRAM(s) SubCutaneous <User Schedule>  folic acid 1 milliGRAM(s) Oral daily  glucagon  Injectable 1 milliGRAM(s) IntraMuscular once  insulin lispro (ADMELOG) corrective regimen sliding scale   SubCutaneous three times a day before meals  latanoprost 0.005% Ophthalmic Solution 1 Drop(s) Both EYES at bedtime  levETIRAcetam  IVPB 750 milliGRAM(s) IV Intermittent every 12 hours  mirtazapine 15 milliGRAM(s) Oral at bedtime  multivitamin 1 Tablet(s) Oral daily  polyethylene glycol 3350 17 Gram(s) Oral every 12 hours  senna 2 Tablet(s) Oral at bedtime  sodium chloride 2 Gram(s) Oral every 8 hours    MEDICATIONS  (PRN):  dextrose Oral Gel 15 Gram(s) Oral once PRN Blood Glucose LESS THAN 70 milliGRAM(s)/deciliter      CAPILLARY BLOOD GLUCOSE      POCT Blood Glucose.: 113 mg/dL (02 Jun 2024 11:31)  POCT Blood Glucose.: 114 mg/dL (02 Jun 2024 05:59)  POCT Blood Glucose.: 122 mg/dL (01 Jun 2024 21:04)  POCT Blood Glucose.: 92 mg/dL (01 Jun 2024 17:18)    I&O's Summary    01 Jun 2024 07:01  -  02 Jun 2024 07:00  --------------------------------------------------------  IN: 0 mL / OUT: 600 mL / NET: -600 mL        PHYSICAL EXAM:  Vital Signs Last 24 Hrs  T(C): 36.4 (02 Jun 2024 11:17), Max: 36.9 (01 Jun 2024 20:14)  T(F): 97.5 (02 Jun 2024 11:17), Max: 98.4 (01 Jun 2024 20:14)  HR: 97 (02 Jun 2024 11:17) (76 - 97)  BP: 105/74 (02 Jun 2024 11:17) (93/66 - 127/75)  BP(mean): --  RR: 18 (02 Jun 2024 11:17) (18 - 18)  SpO2: 94% (02 Jun 2024 11:17) (93% - 99%)    Parameters below as of 02 Jun 2024 11:17  Patient On (Oxygen Delivery Method): room air        CONSTITUTIONAL: NAD,  head tremor  RESPIRATORY: Normal respiratory effort; lungs are clear to auscultation bilaterally  CARDIOVASCULAR: Regular rate and rhythm, normal S1 and S2, no murmur/rub/gallop; No lower extremity edema;   ABDOMEN: Nontender to palpation, normoactive bowel sounds, no rebound/guarding;   SKIN: No rashes; no palpable lesions      LABS:                        11.6   5.13  )-----------( 412      ( 02 Jun 2024 06:13 )             35.8     06-02    138  |  102  |  10  ----------------------------<  107<H>  4.3   |  20<L>  |  0.53    Ca    10.7<H>      02 Jun 2024 06:13            Urinalysis Basic - ( 02 Jun 2024 06:13 )    Color: x / Appearance: x / SG: x / pH: x  Gluc: 107 mg/dL / Ketone: x  / Bili: x / Urobili: x   Blood: x / Protein: x / Nitrite: x   Leuk Esterase: x / RBC: x / WBC x   Sq Epi: x / Non Sq Epi: x / Bacteria: x          RADIOLOGY & ADDITIONAL TESTS:  Results Reviewed:   Imaging Personally Reviewed:  Electrocardiogram Personally Reviewed:    COORDINATION OF CARE:  Care Discussed with Consultants/Other Providers [Y/N]:  Prior or Outpatient Records Reviewed [Y/N]:

## 2024-06-02 NOTE — PROGRESS NOTE ADULT - NUTRITIONAL ASSESSMENT
This patient has been assessed with a concern for Malnutrition and has been determined to have a diagnosis/diagnoses of Moderate protein-calorie malnutrition.    This patient is being managed with:   Diet NPO-  Except Medications  Entered: May 23 2024 10:22AM  
This patient has been assessed with a concern for Malnutrition and has been determined to have a diagnosis/diagnoses of Moderate protein-calorie malnutrition.    This patient is being managed with:   Diet Pureed-  Consistent Carbohydrate {No Snacks} (CSTCHO)  Supplement Feeding Modality:  Oral  Ensure Plus High Protein Cans or Servings Per Day:  1       Frequency:  Daily  Entered: May 22 2024  9:44AM  
This patient has been assessed with a concern for Malnutrition and has been determined to have a diagnosis/diagnoses of Moderate protein-calorie malnutrition.    This patient is being managed with:   Diet Pureed-  Mildly Thick Liquids (MILDTHICKLIQS)  Entered: May 30 2024  3:02PM  
This patient has been assessed with a concern for Malnutrition and has been determined to have a diagnosis/diagnoses of Moderate protein-calorie malnutrition.    This patient is being managed with:   Diet NPO with Tube Feed-  Tube Feeding Modality: Nasogastric  Jevity 1.5 Rodney (JEVITY1.5RTH)  Total Volume for 24 Hours (mL): 1320  Continuous  Starting Tube Feed Rate {mL per Hour}: 40  Increase Tube Feed Rate by (mL): 15     Every 6 hours  Until Goal Tube Feed Rate (mL per Hour): 55  Tube Feed Duration (in Hours): 24  Tube Feed Start Time: 20:00  Tube Feed Start Time: 18:00  Entered: May 28 2024 11:04AM  
This patient has been assessed with a concern for Malnutrition and has been determined to have a diagnosis/diagnoses of Moderate protein-calorie malnutrition.    This patient is being managed with:   Diet NPO-  Except Medications  Entered: May 23 2024 10:22AM  
This patient has been assessed with a concern for Malnutrition and has been determined to have a diagnosis/diagnoses of Moderate protein-calorie malnutrition.    This patient is being managed with:   Diet Pureed-  Mildly Thick Liquids (MILDTHICKLIQS)  Entered: May 30 2024  3:02PM  
This patient has been assessed with a concern for Malnutrition and has been determined to have a diagnosis/diagnoses of Moderate protein-calorie malnutrition.    This patient is being managed with:   Diet NPO-  Except Medications  Entered: May 23 2024 10:22AM  
This patient has been assessed with a concern for Malnutrition and has been determined to have a diagnosis/diagnoses of Moderate protein-calorie malnutrition.    This patient is being managed with:   Diet Pureed-  Consistent Carbohydrate {No Snacks} (CSTCHO)  Entered: May 19 2024 12:16PM  
This patient has been assessed with a concern for Malnutrition and has been determined to have a diagnosis/diagnoses of Moderate protein-calorie malnutrition.    This patient is being managed with:   Diet Pureed-  Consistent Carbohydrate {No Snacks} (CSTCHO)  Supplement Feeding Modality:  Oral  Ensure Plus High Protein Cans or Servings Per Day:  1       Frequency:  Daily  Entered: May 22 2024  9:44AM  
This patient has been assessed with a concern for Malnutrition and has been determined to have a diagnosis/diagnoses of Moderate protein-calorie malnutrition.    This patient is being managed with:   Diet NPO after Midnight-     NPO Start Date: 20-May-2024   NPO Start Time: 23:59  Entered: May 20 2024  9:47PM    Diet Pureed-  Consistent Carbohydrate {No Snacks} (CSTCHO)  Entered: May 19 2024 12:16PM  
This patient has been assessed with a concern for Malnutrition and has been determined to have a diagnosis/diagnoses of Moderate protein-calorie malnutrition.    This patient is being managed with:   Diet NPO-  Except Medications  Entered: May 23 2024 10:22AM  
This patient has been assessed with a concern for Malnutrition and has been determined to have a diagnosis/diagnoses of Moderate protein-calorie malnutrition.    This patient is being managed with:   Diet Pureed-  Consistent Carbohydrate {No Snacks} (CSTCHO)  Supplement Feeding Modality:  Oral  Ensure Plus High Protein Cans or Servings Per Day:  1       Frequency:  Daily  Entered: May 22 2024  9:44AM  
This patient has been assessed with a concern for Malnutrition and has been determined to have a diagnosis/diagnoses of Moderate protein-calorie malnutrition.    This patient is being managed with:   Diet NPO-  Except Medications  Entered: May 23 2024 10:22AM  
This patient has been assessed with a concern for Malnutrition and has been determined to have a diagnosis/diagnoses of Moderate protein-calorie malnutrition.    This patient is being managed with:   Diet NPO-  Except Medications  Entered: May 23 2024 10:22AM  
This patient has been assessed with a concern for Malnutrition and has been determined to have a diagnosis/diagnoses of Moderate protein-calorie malnutrition.    This patient is being managed with:   Diet Pureed-  Mildly Thick Liquids (MILDTHICKLIQS)  Entered: May 30 2024  3:02PM  
This patient has been assessed with a concern for Malnutrition and has been determined to have a diagnosis/diagnoses of Moderate protein-calorie malnutrition.    This patient is being managed with:   Diet NPO with Tube Feed-  Tube Feeding Modality: Nasogastric  Jevity 1.5 Rodney (JEVITY1.5RTH)  Total Volume for 24 Hours (mL): 1320  Continuous  Starting Tube Feed Rate {mL per Hour}: 40  Increase Tube Feed Rate by (mL): 15     Every 6 hours  Until Goal Tube Feed Rate (mL per Hour): 55  Tube Feed Duration (in Hours): 24  Tube Feed Start Time: 20:00  Tube Feed Start Time: 18:00  Entered: May 28 2024 11:04AM

## 2024-06-03 ENCOUNTER — TRANSCRIPTION ENCOUNTER (OUTPATIENT)
Age: 64
End: 2024-06-03

## 2024-06-03 VITALS
OXYGEN SATURATION: 95 % | DIASTOLIC BLOOD PRESSURE: 75 MMHG | TEMPERATURE: 98 F | HEART RATE: 85 BPM | SYSTOLIC BLOOD PRESSURE: 137 MMHG | RESPIRATION RATE: 18 BRPM

## 2024-06-03 LAB
ANION GAP SERPL CALC-SCNC: 14 MMOL/L — SIGNIFICANT CHANGE UP (ref 5–17)
BUN SERPL-MCNC: 9 MG/DL — SIGNIFICANT CHANGE UP (ref 7–23)
CALCIUM SERPL-MCNC: 10.1 MG/DL — SIGNIFICANT CHANGE UP (ref 8.4–10.5)
CHLORIDE SERPL-SCNC: 107 MMOL/L — SIGNIFICANT CHANGE UP (ref 96–108)
CO2 SERPL-SCNC: 19 MMOL/L — LOW (ref 22–31)
CREAT SERPL-MCNC: 0.49 MG/DL — LOW (ref 0.5–1.3)
EGFR: 106 ML/MIN/1.73M2 — SIGNIFICANT CHANGE UP
GLUCOSE BLDC GLUCOMTR-MCNC: 102 MG/DL — HIGH (ref 70–99)
GLUCOSE BLDC GLUCOMTR-MCNC: 105 MG/DL — HIGH (ref 70–99)
GLUCOSE BLDC GLUCOMTR-MCNC: 93 MG/DL — SIGNIFICANT CHANGE UP (ref 70–99)
GLUCOSE SERPL-MCNC: 103 MG/DL — HIGH (ref 70–99)
HCT VFR BLD CALC: 29.8 % — LOW (ref 34.5–45)
HGB BLD-MCNC: 9.8 G/DL — LOW (ref 11.5–15.5)
MAGNESIUM SERPL-MCNC: 2 MG/DL — SIGNIFICANT CHANGE UP (ref 1.6–2.6)
MCHC RBC-ENTMCNC: 27.5 PG — SIGNIFICANT CHANGE UP (ref 27–34)
MCHC RBC-ENTMCNC: 32.9 GM/DL — SIGNIFICANT CHANGE UP (ref 32–36)
MCV RBC AUTO: 83.5 FL — SIGNIFICANT CHANGE UP (ref 80–100)
NRBC # BLD: 0 /100 WBCS — SIGNIFICANT CHANGE UP (ref 0–0)
PLATELET # BLD AUTO: 387 K/UL — SIGNIFICANT CHANGE UP (ref 150–400)
POTASSIUM SERPL-MCNC: 3.7 MMOL/L — SIGNIFICANT CHANGE UP (ref 3.5–5.3)
POTASSIUM SERPL-SCNC: 3.7 MMOL/L — SIGNIFICANT CHANGE UP (ref 3.5–5.3)
RBC # BLD: 3.57 M/UL — LOW (ref 3.8–5.2)
RBC # FLD: 13.8 % — SIGNIFICANT CHANGE UP (ref 10.3–14.5)
SODIUM SERPL-SCNC: 140 MMOL/L — SIGNIFICANT CHANGE UP (ref 135–145)
WBC # BLD: 5.37 K/UL — SIGNIFICANT CHANGE UP (ref 3.8–10.5)
WBC # FLD AUTO: 5.37 K/UL — SIGNIFICANT CHANGE UP (ref 3.8–10.5)

## 2024-06-03 PROCEDURE — C8929: CPT

## 2024-06-03 PROCEDURE — 97530 THERAPEUTIC ACTIVITIES: CPT

## 2024-06-03 PROCEDURE — 70553 MRI BRAIN STEM W/O & W/DYE: CPT | Mod: MC

## 2024-06-03 PROCEDURE — 86788 WEST NILE VIRUS AB IGM: CPT

## 2024-06-03 PROCEDURE — 82652 VIT D 1 25-DIHYDROXY: CPT

## 2024-06-03 PROCEDURE — 82435 ASSAY OF BLOOD CHLORIDE: CPT

## 2024-06-03 PROCEDURE — 80053 COMPREHEN METABOLIC PANEL: CPT

## 2024-06-03 PROCEDURE — 84432 ASSAY OF THYROGLOBULIN: CPT

## 2024-06-03 PROCEDURE — 82803 BLOOD GASES ANY COMBINATION: CPT

## 2024-06-03 PROCEDURE — 83930 ASSAY OF BLOOD OSMOLALITY: CPT

## 2024-06-03 PROCEDURE — 82247 BILIRUBIN TOTAL: CPT

## 2024-06-03 PROCEDURE — 83605 ASSAY OF LACTIC ACID: CPT

## 2024-06-03 PROCEDURE — 86780 TREPONEMA PALLIDUM: CPT

## 2024-06-03 PROCEDURE — 83735 ASSAY OF MAGNESIUM: CPT

## 2024-06-03 PROCEDURE — 87086 URINE CULTURE/COLONY COUNT: CPT

## 2024-06-03 PROCEDURE — 86900 BLOOD TYPING SEROLOGIC ABO: CPT

## 2024-06-03 PROCEDURE — 73590 X-RAY EXAM OF LOWER LEG: CPT

## 2024-06-03 PROCEDURE — 86789 WEST NILE VIRUS ANTIBODY: CPT

## 2024-06-03 PROCEDURE — 85045 AUTOMATED RETICULOCYTE COUNT: CPT

## 2024-06-03 PROCEDURE — 83916 OLIGOCLONAL BANDS: CPT

## 2024-06-03 PROCEDURE — 82746 ASSAY OF FOLIC ACID SERUM: CPT

## 2024-06-03 PROCEDURE — 84300 ASSAY OF URINE SODIUM: CPT

## 2024-06-03 PROCEDURE — 84165 PROTEIN E-PHORESIS SERUM: CPT

## 2024-06-03 PROCEDURE — 82248 BILIRUBIN DIRECT: CPT

## 2024-06-03 PROCEDURE — 86140 C-REACTIVE PROTEIN: CPT

## 2024-06-03 PROCEDURE — 70496 CT ANGIOGRAPHY HEAD: CPT | Mod: MC

## 2024-06-03 PROCEDURE — 82607 VITAMIN B-12: CPT

## 2024-06-03 PROCEDURE — 74018 RADEX ABDOMEN 1 VIEW: CPT

## 2024-06-03 PROCEDURE — 82962 GLUCOSE BLOOD TEST: CPT

## 2024-06-03 PROCEDURE — C1769: CPT

## 2024-06-03 PROCEDURE — 84207 ASSAY OF VITAMIN B-6: CPT

## 2024-06-03 PROCEDURE — 86592 SYPHILIS TEST NON-TREP QUAL: CPT

## 2024-06-03 PROCEDURE — 85018 HEMOGLOBIN: CPT

## 2024-06-03 PROCEDURE — 93005 ELECTROCARDIOGRAM TRACING: CPT

## 2024-06-03 PROCEDURE — 36415 COLL VENOUS BLD VENIPUNCTURE: CPT

## 2024-06-03 PROCEDURE — 61650 EVASC PRLNG ADMN RX AGNT 1ST: CPT

## 2024-06-03 PROCEDURE — 86800 THYROGLOBULIN ANTIBODY: CPT

## 2024-06-03 PROCEDURE — 85610 PROTHROMBIN TIME: CPT

## 2024-06-03 PROCEDURE — 95714 VEEG EA 12-26 HR UNMNTR: CPT

## 2024-06-03 PROCEDURE — 85027 COMPLETE CBC AUTOMATED: CPT

## 2024-06-03 PROCEDURE — 86480 TB TEST CELL IMMUN MEASURE: CPT

## 2024-06-03 PROCEDURE — C1887: CPT

## 2024-06-03 PROCEDURE — 94002 VENT MGMT INPAT INIT DAY: CPT

## 2024-06-03 PROCEDURE — 93886 INTRACRANIAL COMPLETE STUDY: CPT

## 2024-06-03 PROCEDURE — 87040 BLOOD CULTURE FOR BACTERIA: CPT

## 2024-06-03 PROCEDURE — 94003 VENT MGMT INPAT SUBQ DAY: CPT

## 2024-06-03 PROCEDURE — 82570 ASSAY OF URINE CREATININE: CPT

## 2024-06-03 PROCEDURE — 86431 RHEUMATOID FACTOR QUANT: CPT

## 2024-06-03 PROCEDURE — 87102 FUNGUS ISOLATION CULTURE: CPT

## 2024-06-03 PROCEDURE — 89051 BODY FLUID CELL COUNT: CPT

## 2024-06-03 PROCEDURE — 83010 ASSAY OF HAPTOGLOBIN QUANT: CPT

## 2024-06-03 PROCEDURE — 86618 LYME DISEASE ANTIBODY: CPT

## 2024-06-03 PROCEDURE — 85396 CLOTTING ASSAY WHOLE BLOOD: CPT

## 2024-06-03 PROCEDURE — 70544 MR ANGIOGRAPHY HEAD W/O DYE: CPT

## 2024-06-03 PROCEDURE — 92610 EVALUATE SWALLOWING FUNCTION: CPT

## 2024-06-03 PROCEDURE — 82306 VITAMIN D 25 HYDROXY: CPT

## 2024-06-03 PROCEDURE — 84155 ASSAY OF PROTEIN SERUM: CPT

## 2024-06-03 PROCEDURE — 84105 ASSAY OF URINE PHOSPHORUS: CPT

## 2024-06-03 PROCEDURE — 81001 URINALYSIS AUTO W/SCOPE: CPT

## 2024-06-03 PROCEDURE — 82947 ASSAY GLUCOSE BLOOD QUANT: CPT

## 2024-06-03 PROCEDURE — C1760: CPT

## 2024-06-03 PROCEDURE — 80061 LIPID PANEL: CPT

## 2024-06-03 PROCEDURE — 86334 IMMUNOFIX E-PHORESIS SERUM: CPT

## 2024-06-03 PROCEDURE — 84484 ASSAY OF TROPONIN QUANT: CPT

## 2024-06-03 PROCEDURE — 71250 CT THORAX DX C-: CPT | Mod: MC

## 2024-06-03 PROCEDURE — 86403 PARTICLE AGGLUT ANTBDY SCRN: CPT

## 2024-06-03 PROCEDURE — 85025 COMPLETE CBC W/AUTO DIFF WBC: CPT

## 2024-06-03 PROCEDURE — 93970 EXTREMITY STUDY: CPT

## 2024-06-03 PROCEDURE — 84425 ASSAY OF VITAMIN B-1: CPT

## 2024-06-03 PROCEDURE — 80076 HEPATIC FUNCTION PANEL: CPT

## 2024-06-03 PROCEDURE — G0480: CPT

## 2024-06-03 PROCEDURE — 74176 CT ABD & PELVIS W/O CONTRAST: CPT | Mod: MC

## 2024-06-03 PROCEDURE — 99239 HOSP IP/OBS DSCHRG MGMT >30: CPT

## 2024-06-03 PROCEDURE — 83516 IMMUNOASSAY NONANTIBODY: CPT

## 2024-06-03 PROCEDURE — C9399: CPT

## 2024-06-03 PROCEDURE — 73551 X-RAY EXAM OF FEMUR 1: CPT

## 2024-06-03 PROCEDURE — 84166 PROTEIN E-PHORESIS/URINE/CSF: CPT

## 2024-06-03 PROCEDURE — 86160 COMPLEMENT ANTIGEN: CPT

## 2024-06-03 PROCEDURE — C1894: CPT

## 2024-06-03 PROCEDURE — 83036 HEMOGLOBIN GLYCOSYLATED A1C: CPT

## 2024-06-03 PROCEDURE — 92523 SPEECH SOUND LANG COMPREHEN: CPT

## 2024-06-03 PROCEDURE — 85652 RBC SED RATE AUTOMATED: CPT

## 2024-06-03 PROCEDURE — 95700 EEG CONT REC W/VID EEG TECH: CPT

## 2024-06-03 PROCEDURE — 82140 ASSAY OF AMMONIA: CPT

## 2024-06-03 PROCEDURE — 86376 MICROSOMAL ANTIBODY EACH: CPT

## 2024-06-03 PROCEDURE — 85576 BLOOD PLATELET AGGREGATION: CPT

## 2024-06-03 PROCEDURE — 84157 ASSAY OF PROTEIN OTHER: CPT

## 2024-06-03 PROCEDURE — 87483 CNS DNA AMP PROBE TYPE 12-25: CPT

## 2024-06-03 PROCEDURE — 86036 ANCA SCREEN EACH ANTIBODY: CPT

## 2024-06-03 PROCEDURE — 82565 ASSAY OF CREATININE: CPT

## 2024-06-03 PROCEDURE — 84443 ASSAY THYROID STIM HORMONE: CPT

## 2024-06-03 PROCEDURE — 85730 THROMBOPLASTIN TIME PARTIAL: CPT

## 2024-06-03 PROCEDURE — 84132 ASSAY OF SERUM POTASSIUM: CPT

## 2024-06-03 PROCEDURE — 84100 ASSAY OF PHOSPHORUS: CPT

## 2024-06-03 PROCEDURE — 71275 CT ANGIOGRAPHY CHEST: CPT | Mod: MC

## 2024-06-03 PROCEDURE — 86225 DNA ANTIBODY NATIVE: CPT

## 2024-06-03 PROCEDURE — 73090 X-RAY EXAM OF FOREARM: CPT

## 2024-06-03 PROCEDURE — 85014 HEMATOCRIT: CPT

## 2024-06-03 PROCEDURE — 84295 ASSAY OF SERUM SODIUM: CPT

## 2024-06-03 PROCEDURE — 87389 HIV-1 AG W/HIV-1&-2 AB AG IA: CPT

## 2024-06-03 PROCEDURE — 87070 CULTURE OTHR SPECIMN AEROBIC: CPT

## 2024-06-03 PROCEDURE — 72170 X-RAY EXAM OF PELVIS: CPT

## 2024-06-03 PROCEDURE — 86850 RBC ANTIBODY SCREEN: CPT

## 2024-06-03 PROCEDURE — 70450 CT HEAD/BRAIN W/O DYE: CPT

## 2024-06-03 PROCEDURE — 99285 EMERGENCY DEPT VISIT HI MDM: CPT

## 2024-06-03 PROCEDURE — 99233 SBSQ HOSP IP/OBS HIGH 50: CPT

## 2024-06-03 PROCEDURE — 61651 EVASC PRLNG ADMN RX AGNT ADD: CPT

## 2024-06-03 PROCEDURE — 87799 DETECT AGENT NOS DNA QUANT: CPT

## 2024-06-03 PROCEDURE — 84133 ASSAY OF URINE POTASSIUM: CPT

## 2024-06-03 PROCEDURE — 82436 ASSAY OF URINE CHLORIDE: CPT

## 2024-06-03 PROCEDURE — 86235 NUCLEAR ANTIGEN ANTIBODY: CPT

## 2024-06-03 PROCEDURE — 97161 PT EVAL LOW COMPLEX 20 MIN: CPT

## 2024-06-03 PROCEDURE — 82330 ASSAY OF CALCIUM: CPT

## 2024-06-03 PROCEDURE — 84540 ASSAY OF URINE/UREA-N: CPT

## 2024-06-03 PROCEDURE — 73060 X-RAY EXAM OF HUMERUS: CPT

## 2024-06-03 PROCEDURE — 71045 X-RAY EXAM CHEST 1 VIEW: CPT

## 2024-06-03 PROCEDURE — C8924: CPT

## 2024-06-03 PROCEDURE — 97116 GAIT TRAINING THERAPY: CPT

## 2024-06-03 PROCEDURE — 95711 VEEG 2-12 HR UNMONITORED: CPT

## 2024-06-03 PROCEDURE — 92526 ORAL FUNCTION THERAPY: CPT

## 2024-06-03 PROCEDURE — 97165 OT EVAL LOW COMPLEX 30 MIN: CPT

## 2024-06-03 PROCEDURE — 87640 STAPH A DNA AMP PROBE: CPT

## 2024-06-03 PROCEDURE — 85520 HEPARIN ASSAY: CPT

## 2024-06-03 PROCEDURE — 72141 MRI NECK SPINE W/O DYE: CPT | Mod: MC

## 2024-06-03 PROCEDURE — 86617 LYME DISEASE ANTIBODY: CPT

## 2024-06-03 PROCEDURE — 0042T: CPT

## 2024-06-03 PROCEDURE — 70551 MRI BRAIN STEM W/O DYE: CPT

## 2024-06-03 PROCEDURE — 87641 MR-STAPH DNA AMP PROBE: CPT

## 2024-06-03 PROCEDURE — 83615 LACTATE (LD) (LDH) ENZYME: CPT

## 2024-06-03 PROCEDURE — 86901 BLOOD TYPING SEROLOGIC RH(D): CPT

## 2024-06-03 PROCEDURE — 80048 BASIC METABOLIC PNL TOTAL CA: CPT

## 2024-06-03 PROCEDURE — 87205 SMEAR GRAM STAIN: CPT

## 2024-06-03 PROCEDURE — 82595 ASSAY OF CRYOGLOBULIN: CPT

## 2024-06-03 PROCEDURE — 86038 ANTINUCLEAR ANTIBODIES: CPT

## 2024-06-03 PROCEDURE — 97110 THERAPEUTIC EXERCISES: CPT

## 2024-06-03 PROCEDURE — 87635 SARS-COV-2 COVID-19 AMP PRB: CPT

## 2024-06-03 PROCEDURE — 82945 GLUCOSE OTHER FLUID: CPT

## 2024-06-03 RX ORDER — LATANOPROST 0.05 MG/ML
1 SOLUTION/ DROPS OPHTHALMIC; TOPICAL
Qty: 0 | Refills: 0 | DISCHARGE
Start: 2024-06-03

## 2024-06-03 RX ORDER — BRIMONIDINE TARTRATE 2 MG/MG
1 SOLUTION/ DROPS OPHTHALMIC
Qty: 0 | Refills: 0 | DISCHARGE
Start: 2024-06-03

## 2024-06-03 RX ORDER — METOPROLOL TARTRATE 50 MG
1 TABLET ORAL
Refills: 0 | DISCHARGE

## 2024-06-03 RX ORDER — SENNA PLUS 8.6 MG/1
2 TABLET ORAL
Qty: 0 | Refills: 0 | DISCHARGE
Start: 2024-06-03

## 2024-06-03 RX ORDER — CLONAZEPAM 1 MG
1 TABLET ORAL
Qty: 60 | Refills: 0
Start: 2024-06-03 | End: 2024-07-02

## 2024-06-03 RX ORDER — POLYETHYLENE GLYCOL 3350 17 G/17G
17 POWDER, FOR SOLUTION ORAL
Qty: 0 | Refills: 0 | DISCHARGE
Start: 2024-06-03

## 2024-06-03 RX ORDER — CARBIDOPA AND LEVODOPA 25; 100 MG/1; MG/1
1 TABLET ORAL
Qty: 90 | Refills: 0
Start: 2024-06-03 | End: 2024-07-02

## 2024-06-03 RX ORDER — CAPTOPRIL 12.5 MG/1
0.5 TABLET ORAL
Qty: 30 | Refills: 0
Start: 2024-06-03 | End: 2024-07-02

## 2024-06-03 RX ORDER — CHOLECALCIFEROL (VITAMIN D3) 125 MCG
2000 CAPSULE ORAL
Qty: 0 | Refills: 0 | DISCHARGE
Start: 2024-06-03

## 2024-06-03 RX ORDER — FOLIC ACID 0.8 MG
1 TABLET ORAL
Qty: 0 | Refills: 0 | DISCHARGE
Start: 2024-06-03

## 2024-06-03 RX ORDER — CARVEDILOL PHOSPHATE 80 MG/1
1 CAPSULE, EXTENDED RELEASE ORAL
Qty: 60 | Refills: 0
Start: 2024-06-03 | End: 2024-07-02

## 2024-06-03 RX ADMIN — CARVEDILOL PHOSPHATE 3.12 MILLIGRAM(S): 80 CAPSULE, EXTENDED RELEASE ORAL at 05:02

## 2024-06-03 RX ADMIN — Medication 1 MILLIGRAM(S): at 11:46

## 2024-06-03 RX ADMIN — CHLORHEXIDINE GLUCONATE 1 APPLICATION(S): 213 SOLUTION TOPICAL at 11:46

## 2024-06-03 RX ADMIN — SODIUM CHLORIDE 2 GRAM(S): 9 INJECTION INTRAMUSCULAR; INTRAVENOUS; SUBCUTANEOUS at 05:02

## 2024-06-03 RX ADMIN — CAPTOPRIL 6.25 MILLIGRAM(S): 12.5 TABLET ORAL at 05:02

## 2024-06-03 RX ADMIN — Medication 2000 UNIT(S): at 11:46

## 2024-06-03 RX ADMIN — CARBIDOPA AND LEVODOPA 1 TABLET(S): 25; 100 TABLET ORAL at 05:03

## 2024-06-03 RX ADMIN — LEVETIRACETAM 400 MILLIGRAM(S): 250 TABLET, FILM COATED ORAL at 05:02

## 2024-06-03 RX ADMIN — POLYETHYLENE GLYCOL 3350 17 GRAM(S): 17 POWDER, FOR SOLUTION ORAL at 05:02

## 2024-06-03 RX ADMIN — BRIMONIDINE TARTRATE 1 DROP(S): 2 SOLUTION/ DROPS OPHTHALMIC at 05:02

## 2024-06-03 RX ADMIN — Medication 0.25 MILLIGRAM(S): at 05:02

## 2024-06-03 RX ADMIN — Medication 1 TABLET(S): at 11:46

## 2024-06-03 NOTE — PROGRESS NOTE ADULT - ASSESSMENT
Tremors  Encephalopathy  HTN  Seizure disorder  Alcoholic cirrhosis  Subarachnoid hemorrhage (SAH)  Acute strokes in the b/l MCA, R PCA, right basal ganglia, and L cerebellum  Vitamin D deficiency    - Patient with likely vascular induced parkinsonism from recent strokes involving basal ganglia, manifesting as L > R sided tremors. Could also have toxic/metabolic component. MRI brain, personally reviewed by me, with strokes in regions stated above but no other acute intracranial findings. EEG captured movements and they are not seizures. Could also have superimposed toxic/metabolic or infectious component. 6/3 - Tremors improving, mainly in head. Ready for discharge today  - Improving on Sinemet 25/100mg PO BID. Would increase to 1 tab PO TID and observe for improvement, increasing as tolerated  - Await labs for additional causes with TSH, free T4, B1, B6, copper (RBC), WNV  - Vitamin D levels low, would replete with D3 2000 Units PO daily for 3 months and then recheck new levels  - PT/OT as tolerated  - Upon discharge patient should follow-up with neurology resident clinic ([phone]) as she has Medicaid insurance  - Above recommendations discussed with medicine team, who verbalized agreement and understanding  - Continue to address above medical problems, as you are doing  - Will sign off, please call ([phone]) with additional questions or concerns

## 2024-06-03 NOTE — DISCHARGE NOTE PROVIDER - NSDCFUADDAPPT_GEN_ALL_CORE_FT
APPTS ARE READY TO BE MADE: [x] YES    Best Family or Patient Contact (if needed):    Additional Information about above appointments (if needed):    1:   2:   3:     Other comments or requests:    APPTS ARE READY TO BE MADE: [x] YES    Best Family or Patient Contact (if needed):    Additional Information about above appointments (if needed):    1:   2:   3:     Other comments or requests:     Patient is being discharged to LTC. Caregiver will arrange follow up.

## 2024-06-03 NOTE — DISCHARGE NOTE PROVIDER - NSDCCPCAREPLAN_GEN_ALL_CORE_FT
PRINCIPAL DISCHARGE DIAGNOSIS  Diagnosis: Subarachnoid hemorrhage  Assessment and Plan of Treatment: stable , please follow up with neurology and neurosurgery      SECONDARY DISCHARGE DIAGNOSES  Diagnosis: Dysphagia  Assessment and Plan of Treatment: seen by speech and swallow , can eat regualar food     PRINCIPAL DISCHARGE DIAGNOSIS  Diagnosis: Subarachnoid hemorrhage  Assessment and Plan of Treatment: stable , please follow up with neurology and neurosurgery      SECONDARY DISCHARGE DIAGNOSES  Diagnosis: Dysphagia  Assessment and Plan of Treatment: seen by speech and swallow, pureed diet with mildly thick liquids    Diagnosis: Occasional tremors  Assessment and Plan of Treatment: Follow up with neurology  Continue Sinemet as prescribed

## 2024-06-03 NOTE — DISCHARGE NOTE NURSING/CASE MANAGEMENT/SOCIAL WORK - NSDCPEFALRISK_GEN_ALL_CORE
For information on Fall & Injury Prevention, visit: [email or web address] OR  [email or web address] OR  https://www.cdc.gov/steadi/patient.html

## 2024-06-03 NOTE — DISCHARGE NOTE PROVIDER - HOSPITAL COURSE
HPI:  64F, pmh HTN, szs on Keppra, alcoholic cirrhosis, tremors, OA presented from [facility] to OSH w/ generalized weakness found to have b/l sulcal SAH and small occipital IVH. 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 She was recently started on Keflex for UTI. Plts 400, Coags wnl. , AST/ALT = 38/104. WBC 13. Afebrile. Exam on admission: lethargic appearing, EOV, FC, AO3, PERRL, not compliant with EOMs, BUE 5/5, BLE 2/5.    Now s/p 5/17 Angio: severe b/l anterior and posterior cerebral vasospasm (given IA verapamil and milrinone)    Hospital Course:     Problem/Plan - 1:  ·  Problem: Dysphagia.   ·  Plan: Cleared for pureed diet by Speech and Swallow     Problem/Plan - 2:  ·  Problem: SAH (subarachnoid hemorrhage).   ·  Plan: CTH w/ b/l occipital sulcal SAH and small IVH,   CTA with R V4 5mm fusiform aneurysm and severe stenosis of BA and diminutive distal cerebral arteries.   s/p 5/17 Angio: severe b/l anterior and posterior cerebral vasospasm  BL MCA strokes on MRI    Vasculitis workup negative, likely due to polysubstance abuse  continue Keppra 750mg PO BID       Problem/Plan - 3:  ·  Problem: Takotsubo cardiomyopathy.   ·  Plan: TTE from 5/23 shows findings consistent with Takostubo, pt was previously in Cardiogenic Shock, off dobutamine and norepinephrine now, hemodynamically stable  repeat TTE 5/24 shows EF improving to 35-40% from 30.  should recover with time, no role for ischemic eval  continue coreg and captopril  may need diuresis but currently not in HF  Cardiology on board, recommendations appreciated.     Problem/Plan - 4:  ·  Problem: Dementia.   ·  Plan: likely multifactorial from substance abuse and vascular issues  lives in NH    Dyskinesias  Sinemet BID started with improvement in tremor per pt  Neuro follow up appreciated     Problem/Plan - 5:  ·  Problem: Polysubstance abuse.   ·  Plan: resolved.     Problem/Plan - 6:  ·  Problem: Alcoholic cirrhosis.   ·  Plan: LFTs stable  CT Abd - possible liver cirrhosis  appears compensated at this time.        Important Medication Changes and Reason:    Active or Pending Issues Requiring Follow-up: cardiology and neurology    Advanced Directives:   [x] Full code  [ ] DNR  [ ] Hospice    Discharge Diagnoses:  SAH ( subarachnoid hemorrhage)  Liver cirrhosis  Polysubstance abuse  Takotsubo cardiomyopathy             HPI:  64F, pmh HTN, szs on Keppra, alcoholic cirrhosis, tremors, OA presented from [facility] to OSH w/ generalized weakness found to have b/l sulcal SAH and small occipital IVH. 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 She was recently started on Keflex for UTI. Plts 400, Coags wnl. , AST/ALT = 38/104. WBC 13. Afebrile. Exam on admission: lethargic appearing, EOV, FC, AO3, PERRL, not compliant with EOMs, BUE 5/5, BLE 2/5.    Now s/p 5/17 Angio: severe b/l anterior and posterior cerebral vasospasm (given IA verapamil and milrinone)    Hospital Course:    Dysphagia.   ·  Plan: Cleared for pureed diet by Speech and Swallow    SAH (subarachnoid hemorrhage).   ·  Plan: CTH w/ b/l occipital sulcal SAH and small IVH,   CTA with R V4 5mm fusiform aneurysm and severe stenosis of BA and diminutive distal cerebral arteries.   s/p 5/17 Angio: severe b/l anterior and posterior cerebral vasospasm  BL MCA strokes on MRI    Vasculitis workup negative, likely due to polysubstance abuse  continue Keppra 750mg PO BID      Takotsubo cardiomyopathy.   ·  Plan: TTE from 5/23 shows findings consistent with Takostubo, pt was previously in Cardiogenic Shock, off dobutamine and norepinephrine now, hemodynamically stable  repeat TTE 5/24 shows EF improving to 35-40% from 30.  should recover with time, no role for ischemic eval  continue coreg and captopril  may need diuresis but currently not in HF  Cardiology on board, recommendations appreciated.    Dementia.   ·  Plan: likely multifactorial from substance abuse and vascular issues  lives in NH    Dyskinesias  Sinemet BID started with improvement in tremor per pt, increase to TID on DC  Neuro follow up appreciated    Polysubstance abuse.   ·  Plan: resolved.    Alcoholic cirrhosis.   ·  Plan: LFTs stable  CT Abd - possible liver cirrhosis  appears compensated at this time.        Important Medication Changes and Reason:    Active or Pending Issues Requiring Follow-up: cardiology and neurology    Advanced Directives:   [x] Full code  [ ] DNR  [ ] Hospice    Discharge Diagnoses:  SAH ( subarachnoid hemorrhage)  Liver cirrhosis  Polysubstance abuse  Takotsubo cardiomyopathy

## 2024-06-03 NOTE — DISCHARGE NOTE PROVIDER - PROVIDER TOKENS
PROVIDER:[TOKEN:[[id number]],FOLLOWUP:[1 week]],PROVIDER:[TOKEN:[[id number]],FOLLOWUP:[2 weeks]]

## 2024-06-03 NOTE — PROGRESS NOTE ADULT - PROVIDER SPECIALTY LIST ADULT
Cardiology
Cardiology
Hospitalist
Hospitalist
NSICU
Neurosurgery
Cardiology
NSICU
Neurology
Neurosurgery
Cardiology
Cardiology
NSICU
Neurology
Neurosurgery
Cardiology
NSICU
NSICU
Neurosurgery
Cardiology
Cardiology
NSICU
Neurosurgery
Cardiology
Cardiology
Hospitalist
NSICU
Cardiology
Cardiology
Hospitalist
Hospitalist
Cardiology
Hospitalist
Cardiology
Hospitalist

## 2024-06-03 NOTE — DISCHARGE NOTE PROVIDER - ATTENDING DISCHARGE PHYSICAL EXAMINATION:
Seen and examined at bedside this morning. Tremor is improved. D/w Neurology - will increase Sinemet to TID and f/u in clinic outpatient.   CONSTITUTIONAL: NAD,  head tremor less pronounced, BL arm tremor  EYES: PERRLA; conjunctiva and sclera clear  ENMT: Moist oral mucosa, no pharyngeal injection or exudates;   NECK: Supple, no palpable masses; no thyromegaly  RESPIRATORY: Normal respiratory effort; lungs are clear to auscultation bilaterally  CARDIOVASCULAR: Regular rate and rhythm, normal S1 and S2, no murmur/rub/gallop; No lower extremity edema;   ABDOMEN: Nontender to palpation, normoactive bowel sounds, no rebound/guarding;   MUSCULOSKELETAL:  no clubbing or cyanosis of digits; no joint swelling or tenderness to palpation  SKIN: No rashes; no palpable lesions

## 2024-06-03 NOTE — DISCHARGE NOTE NURSING/CASE MANAGEMENT/SOCIAL WORK - PATIENT PORTAL LINK FT
You can access the FollowMyHealth Patient Portal offered by [facility] by registering at the following website: [email or web address]. By joining [employer]’s FollowMyHealth portal, you will also be able to view your health information using other applications (apps) compatible with our system.

## 2024-06-03 NOTE — DISCHARGE NOTE NURSING/CASE MANAGEMENT/SOCIAL WORK - CAREGIVER OBTAIN DETAILS
Progress Note   Hospital Medicine       Team: [facility] MED D Montserrat Lino MD  Admit Date: 2/4/2018  Length of Stay:  LOS: 25 days   ESTUARDO 3/5/2018  Principal Problem:  Septic encephalopathy    Interval hx / overnight events: no new complaints    Areas of concern/ handoff:  Early morning hypoglycemia    Review of Systems: Gen: no fever, no chills, Heart: no chest pain, palpitations; Resp: no SOB, no cough    Temp:  [97.6 °F (36.4 °C)-99.3 °F (37.4 °C)]   Pulse:  [50-69]   Resp:  [16-18]   BP: ()/(41-85)   SpO2:  [95 %-100 %]       Physical Exam:  General appearance: NAD, conversant  Neck: FROM, supple   Lungs: crackles at bases, no accessory muscle use  CV: RRR, no heave  Abdomen: Soft, non-tender; no masses or HSM  Extremities: 1+ pitting edema up to his mid thighs (left)  and 3+ edema to his buttocks,  no digital cyanosis; right AKA site clean and intact with wound vac  Skin: no rash, lesions or ulcers  Psych: Alert and oriented to person, place and time      Recent Labs  Lab 02/27/18  0351 02/27/18  1625 02/28/18  0537   WBC 7.18 8.28 8.54   HGB 7.5* 7.5* 6.4*   HCT 23.9* 24.1* 20.3*   * 135* 118*       Recent Labs  Lab 02/26/18  0433 02/27/18  0351 02/27/18  1625 02/28/18  0537   * 134* 135* 134*   K 5.1 4.3 4.5 4.7    101 102 102   CO2 21* 25 25 25   BUN 35* 25* 27* 32*   CREATININE 4.8* 3.8* 4.1* 4.4*   GLU 60* 63* 95 58*   CALCIUM 8.2* 7.9* 7.6* 7.5*   MG 2.2 2.3  --  2.1   PHOS 4.5 3.8  --  4.2       Recent Labs  Lab 02/25/18  0951  02/27/18  0351 02/27/18  1625 02/28/18  0537   ALKPHOS 101  --   --  97  --    ALT 10  --   --  9*  --    AST 15  --   --  15  --    ALBUMIN 2.0*  < > 1.8* 1.7* 1.6*   PROT 7.6  --   --  6.5  --    BILITOT 0.7  --   --  1.0  --    INR 1.3*  --   --   --   --    < > = values in this interval not displayed.    Recent Labs  Lab 02/28/18  0821 02/28/18  0827 02/28/18  1122 02/28/18  1809 03/01/18  0642 03/01/18  0742   POCTGLUCOSE 65* 65* 108 83 66* 94  "        Assessment and Plan   Overview: 69 yo here for osteomyelitis of right foot. He underwent washout 2/6 and another washout 2/6 and amputation of 4th and 5th digits. Undergoing treatment course of antibiotics.     Problems Addressed today:     Septic encephalopathy, resolved  Patient initially lacked capacity to refuse surgery per psychiatry, consent obtained from his son as proxy. Mental status has improved significantly.      Infected right dorsal foot, ischemic ulceration  Podiatry consulted - s/p I&D and 4th and 5th amputation 2/9  Vascular surgery consulted - ultimately recommended AKA of right leg.   Continued vancomycin 1 g IV per random levels and Zosyn 2.25 g IV BID  Wound culture revealed pan-sensitive CITROBACTER KOSERI and KLEBSIELLA PNEUMONIAE ESBL.  To OR 02/09/18. Consulted ID.Recommended 6 weeks of ciprofloxacin. Will reconsult for need of antibiotics after AKA.   ABIs ordered to rule out need for BKA. Angiogram considered. Per vascular surgery "Unlikely for the patient to heal the wound despite aggressive limb salvage efforts. Due to significant lipodermatosclerosis, BKA is not good option. Patient likely needs AKA." Patient initially refused AKA stating he would rather die of infection then have his leg amputated. One Son and Brother in agreement with patient as this aligns with his preference when in his normal mental state. All aware of the risk of recurrence of sepsis syndrome and death. Continuation of wound care and antibiotics likely futile; consulted Palliative Care. After multiple discussions about hospice, patient stated he wanted to live longer and agreed with AKA. Patient and his eldest Son now wish to proceed with AKA. AKA performed 2/29/2018. Id reconsulted and recommended discontinuing ciprofloxacin as source of infection resolved     Hypervolemia due to non-compliance with dialysis  Hyperkalemia, resolved  Shifted in ER with albuterol, insulin/destrose, bicarbonate. K 7.9 => " 4  No respiratory compromise. Emergent dialysis 2/5/18 early AM and on 2/9  Withhold anti-hypertensives for volume removal. Edema overall improving during stay      DVT  BLE ultrasound  Apixaban 5 mg BID - ? Compliance at home  Apixaban held 2/12/2018 for probable BKA - plan to resume at discharge.  Remains on hold for AKA. Will need to resume when OK with Vascular Surgery post-op.     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 Blood pressure fairly well-controlled during stay without antihypertensives.     DM II hyperlipidemia - atorvastatin 40 mg daily     DM II neuropathy - gabapentin 100 mg TID     ESRF HD TTS - Nephrology consulted for dialysis.  Withholding anti-hypertensives and added midodrine as needed prior to dialysis for volume removal and reduce symptomatic dialysis-associated hypotension.   BP improving.     CKD MBD - continue cinacalcet 30 mg qhs and sevelamer 1600 mg TID daily     Depression - continue citalopram 10 mg daily. Consulted psychiatry. Back to baseline.     acue blood loss anemia - Hgb at 6.4 after two units given pre-operatively and operatively. Given 2 more units post-operatively. CBC tomorrow.     Anemia of CKD - epogen er nephrology    Anemia of folate deficiency - folbic once daily     Anticipated Disposition: Skilled Nursing Facility post-op   ESTUARDO: 3/2/2018     Goals of Care:  General Prognosis: fair   Goals: Curative  Comfort Only: No  Hospice: No  Code Status: Full Code    Montserrat Lino MD     unable to assess due to neuro exam

## 2024-06-03 NOTE — PROGRESS NOTE ADULT - SUBJECTIVE AND OBJECTIVE BOX
NEUROLOGY FOLLOW-UP CONSULT NOTE    RFC: ***    Interval history: No acute neurologic events overnight.    Meds:  MEDICATIONS  (STANDING):  brimonidine 0.2% Ophthalmic Solution 1 Drop(s) Both EYES every 12 hours  captopril 6.25 milliGRAM(s) Oral every 12 hours  carbidopa/levodopa  25/100 1 Tablet(s) Oral <User Schedule>  carvedilol 3.125 milliGRAM(s) Oral every 12 hours  chlorhexidine 2% Cloths 1 Application(s) Topical daily  cholecalciferol 2000 Unit(s) Oral daily  clonazePAM  Tablet 0.25 milliGRAM(s) Oral two times a day  dextrose 10% Bolus 125 milliLiter(s) IV Bolus once  dextrose 5%. 1000 milliLiter(s) (100 mL/Hr) IV Continuous <Continuous>  dextrose 5%. 1000 milliLiter(s) (50 mL/Hr) IV Continuous <Continuous>  dextrose 5%. 1000 milliLiter(s) (50 mL/Hr) IV Continuous <Continuous>  dextrose 50% Injectable 25 Gram(s) IV Push once  dextrose 50% Injectable 12.5 Gram(s) IV Push once  enoxaparin Injectable 40 milliGRAM(s) SubCutaneous <User Schedule>  folic acid 1 milliGRAM(s) Oral daily  glucagon  Injectable 1 milliGRAM(s) IntraMuscular once  insulin lispro (ADMELOG) corrective regimen sliding scale   SubCutaneous three times a day before meals  latanoprost 0.005% Ophthalmic Solution 1 Drop(s) Both EYES at bedtime  levETIRAcetam  IVPB 750 milliGRAM(s) IV Intermittent every 12 hours  mirtazapine 15 milliGRAM(s) Oral at bedtime  multivitamin 1 Tablet(s) Oral daily  polyethylene glycol 3350 17 Gram(s) Oral every 12 hours  senna 2 Tablet(s) Oral at bedtime  sodium chloride 2 Gram(s) Oral every 8 hours    MEDICATIONS  (PRN):  dextrose Oral Gel 15 Gram(s) Oral once PRN Blood Glucose LESS THAN 70 milliGRAM(s)/deciliter      PMHx/PSHx/FHx/SHx:  Nontraumatic subarachnoid hemorrhage    Handoff    MEWS Score    Cirrhosis    Seizure    Osteoarthritis    Acute delirium    Delirium secondary to multiple medical problems    Subarachnoid hemorrhage    Dysphagia    Dementia    Polysubstance abuse    SAH (subarachnoid hemorrhage)    Takotsubo cardiomyopathy    Prophylactic measure    Alcoholic cirrhosis    No significant past surgical history    SAH    Room Service Assist    Room Service Assist    Dysphagia    Occasional tremors    SysAdmin_VisitLink        Allergies:  No Known Allergies      ROS: All systems negative except as documented in Interval history    O:  T(C): 36.7 (06-03-24 @ 11:35), Max: 36.9 (06-03-24 @ 08:18)  HR: 85 (06-03-24 @ 11:35) (81 - 106)  BP: 137/75 (06-03-24 @ 11:35) (98/66 - 137/75)  RR: 18 (06-03-24 @ 11:35) (18 - 18)  SpO2: 95% (06-03-24 @ 11:35) (92% - 96%)    Focused neurologic exam:  MS - AAO x3, speech fluent, rep/naming intact, follows commands, attn/conc/recent and remote memory/fund of knowledge WNL  CN - PERRLA, EOMI, VFF, face sens/str/hearing WNL b/l, tongue/palate midline, trap 5/5 b/l  Motor - Normal bulk/tone, 5/5 all  Sens - LT/temp/vib intact all  DTR's - 2+ all and downgoing b/l plantar response  Coord - FtN intact b/l  Gait and station - Normal casual gait. Able to heel, toe, tandem. Romberg (-)    Pertinent labs/studies:  *** NEUROLOGY FOLLOW-UP CONSULT NOTE    RFC: Strokes, tremor/parkinsonism    Interval history: No acute neurologic events overnight. Patient reports improvement in her tremor since starting the Sinemet, mainly with head movements. She is tolerating medication well without adverse effects, no nausea/vomiting.    Meds:  MEDICATIONS  (STANDING):  brimonidine 0.2% Ophthalmic Solution 1 Drop(s) Both EYES every 12 hours  captopril 6.25 milliGRAM(s) Oral every 12 hours  carbidopa/levodopa  25/100 1 Tablet(s) Oral <User Schedule>  carvedilol 3.125 milliGRAM(s) Oral every 12 hours  chlorhexidine 2% Cloths 1 Application(s) Topical daily  cholecalciferol 2000 Unit(s) Oral daily  clonazePAM  Tablet 0.25 milliGRAM(s) Oral two times a day  dextrose 10% Bolus 125 milliLiter(s) IV Bolus once  dextrose 5%. 1000 milliLiter(s) (100 mL/Hr) IV Continuous <Continuous>  dextrose 5%. 1000 milliLiter(s) (50 mL/Hr) IV Continuous <Continuous>  dextrose 5%. 1000 milliLiter(s) (50 mL/Hr) IV Continuous <Continuous>  dextrose 50% Injectable 25 Gram(s) IV Push once  dextrose 50% Injectable 12.5 Gram(s) IV Push once  enoxaparin Injectable 40 milliGRAM(s) SubCutaneous <User Schedule>  folic acid 1 milliGRAM(s) Oral daily  glucagon  Injectable 1 milliGRAM(s) IntraMuscular once  insulin lispro (ADMELOG) corrective regimen sliding scale   SubCutaneous three times a day before meals  latanoprost 0.005% Ophthalmic Solution 1 Drop(s) Both EYES at bedtime  levETIRAcetam  IVPB 750 milliGRAM(s) IV Intermittent every 12 hours  mirtazapine 15 milliGRAM(s) Oral at bedtime  multivitamin 1 Tablet(s) Oral daily  polyethylene glycol 3350 17 Gram(s) Oral every 12 hours  senna 2 Tablet(s) Oral at bedtime  sodium chloride 2 Gram(s) Oral every 8 hours    MEDICATIONS  (PRN):  dextrose Oral Gel 15 Gram(s) Oral once PRN Blood Glucose LESS THAN 70 milliGRAM(s)/deciliter      PMHx/PSHx/FHx/SHx:  Nontraumatic subarachnoid hemorrhage    Handoff    MEWS Score    Cirrhosis    Seizure    Osteoarthritis    Acute delirium    Delirium secondary to multiple medical problems    Subarachnoid hemorrhage    Dysphagia    Dementia    Polysubstance abuse    SAH (subarachnoid hemorrhage)    Takotsubo cardiomyopathy    Prophylactic measure    Alcoholic cirrhosis    No significant past surgical history    SAH    Room Service Assist    Room Service Assist    Dysphagia    Occasional tremors    SysAdmin_VisitLink        Allergies:  No Known Allergies      ROS: All systems negative except as documented in Interval history    O:  T(C): 36.7 (06-03-24 @ 11:35), Max: 36.9 (06-03-24 @ 08:18)  HR: 85 (06-03-24 @ 11:35) (81 - 106)  BP: 137/75 (06-03-24 @ 11:35) (98/66 - 137/75)  RR: 18 (06-03-24 @ 11:35) (18 - 18)  SpO2: 95% (06-03-24 @ 11:35) (92% - 96%)    Focused neurologic exam:  MS - AAO x1.75 (hospital but not [facility], 2024 only for date), speech with scanning quality and mild dysarthria but otherwise fluent, rep/naming intact, follows commands  CN - PERRL, EOM with L > R CN VI palsy but otherwise intact, VFF, face sens/str/hearing WNL b/l except for R NLFF, tongue/palate midline, trap 5/5 b/l  Motor - Normal bulk/tone. L > R UE and head > LLE tremors noted with some suppressibility. Strength RUE 4+/5, LLE 4/5, RLE 3+/5, LLE 3/5. Cogwheel rigidity noted of LEFT wrist  Sens - LT/temp intact all  DTR's - Downgoing b/l plantar response  Coord - Grossly appropriate for level of strength  Gait and station - Due to fall risk/safety concerns did not assess    Pertinent labs/studies:  CBC with low H/H 10/30 and MCV WNL, otherwise essentially WNL  BMP essentially WNL  Mg WNL  ESR inc 104, cryoglobulin Ab (-), C3 inc 174, C4 inc 72, NH3 WNL, SPEP - weak gamma migrating, A1C inc 6.1%, TIMOTHY (-), c-ANCA/p-ANCA (-), MPO (-), rheumatoid factor (-), scleroderma (-), dsDNA (-), SSA-52/60 (-), B12 WNL, folate WNL, anti-thyroglobulin Ab's (-), Lyme (-), anti-TPO Ab's (-), Vitamin D dec 25.8, syphilis serology TP (-), HIV (-)    < from: MR Head No Cont (05.20.24 @ 14:55) >    1.  Cortical diffusion restriction in the bilateral MCA and right PCA   distributions as discussed above, right greaterthan left. Findings are   likely compatible with acute infarctions, although similar findings can   be seen in a postictal state.  2.  Acute infarctions in the right basal ganglia and left cerebellar   hemisphere. Given multiple vascular distributions involved, an embolic   source should be considered.  3.  Subarachnoid hemorrhage along the sulci of the bilateral parietal   lobes and right temporal/occipital lobes as well as the right posterior   fossa.  4.  Layering of blood in the occipital horns of the lateral ventricles.  5.  Multiple diminutive intracranial flow voids, a underlying vasospasm   is not excluded. See recent CT of the head for further details.    < end of copied text >

## 2024-06-03 NOTE — DISCHARGE NOTE PROVIDER - NSDCMRMEDTOKEN_GEN_ALL_CORE_FT
atorvastatin 40 mg oral tablet: 1 tab(s) orally once a day (at bedtime)  levETIRAcetam 750 mg oral tablet: 1 tab(s) orally 2 times a day  metoprolol succinate 25 mg oral tablet, extended release: 1 tab(s) orally once a day  mirtazapine 15 mg oral tablet: 1 tab(s) orally once a day (at bedtime)  naltrexone 50 mg oral tablet: 1 tab(s) orally once a day (alcohol abuse)   atorvastatin 40 mg oral tablet: 1 tab(s) orally once a day (at bedtime)  brimonidine 0.2% ophthalmic solution: 1 drop(s) to each affected eye every 12 hours  captopril 12.5 mg oral tablet: 0.5 tab(s) orally 2 times a day  carbidopa-levodopa 25 mg-100 mg oral tablet: 1 tab(s) orally 3 times a day  carvedilol 3.125 mg oral tablet: 1 tab(s) orally every 12 hours  cholecalciferol oral tablet: 2000 unit(s) orally once a day  clonazePAM 0.25 mg oral tablet, disintegratin tab(s) orally 2 times a day MDD: 2  folic acid 1 mg oral tablet: 1 tab(s) orally once a day  latanoprost 0.005% ophthalmic solution: 1 drop(s) to each affected eye once a day (at bedtime)  levETIRAcetam 750 mg oral tablet: 1 tab(s) orally 2 times a day  mirtazapine 15 mg oral tablet: 1 tab(s) orally once a day (at bedtime)  Multiple Vitamins oral tablet: 1 tab(s) orally once a day  naltrexone 50 mg oral tablet: 1 tab(s) orally once a day (alcohol abuse)  polyethylene glycol 3350 oral powder for reconstitution: 17 gram(s) orally every 12 hours  senna leaf extract oral tablet: 2 tab(s) orally once a day (at bedtime)

## 2024-06-03 NOTE — DISCHARGE NOTE PROVIDER - CARE PROVIDER_API CALL
Eris Diego  Neurology  [address]  [place] [address]  Phone: [phone]  Fax: [phone]  Follow Up Time: 1 week    Jayy Rubin  Cardiology  [address] [place] [id number]  Phone: [phone]  Fax: [phone]  Follow Up Time: 2 weeks

## 2024-06-03 NOTE — PROGRESS NOTE ADULT - TIME BILLING
- Review of records, telemetry, vital signs and daily labs.   - General and cardiovascular physical examination.  - Generation of cardiovascular treatment plan.  - Coordination of care.      Patient was seen and examined by me on 5/19/24,interim events noted,labs and radiology studies reviewed.  Jayy Rubin MD,FACC.  [address] [place], [place] [id number].  [phone]
Time-based billing (NON-critical care).     The necessity of the time spent during the encounter on this date of service was due to:     - Ordering, reviewing, and interpreting labs, testing, and imaging.  - Independently obtaining a review of systems and performing a physical exam  - Reviewing prior hospitalization and where necessary, outpatient records.  - Counselling and educating patient and family regarding interpretation of aforementioned items and plan of care.
- Review of records, telemetry, vital signs and daily labs.   - General and cardiovascular physical examination.  - Generation of cardiovascular treatment plan.  - Coordination of care.      Patient was seen and examined by me on 5/20/24,interim events noted,labs and radiology studies reviewed.  Jayy Rubin MD,FACC.  [address] [place], [place] [id number].  [phone]
- Review of records, telemetry, vital signs and daily labs.   - General and cardiovascular physical examination.  - Generation of cardiovascular treatment plan.  - Coordination of care.      Patient was seen and examined by me on 5/25/24,interim events noted,labs and radiology studies reviewed.  Jayy Rubin MD,FACC.  [address] [place], [place] [id number].  [phone]
- Review of records, telemetry, vital signs and daily labs.   - General and cardiovascular physical examination.  - Generation of cardiovascular treatment plan.  - Coordination of care.      Patient was seen and examined by me on 5/18/24,interim events noted,labs and radiology studies reviewed.  Jayy Rubin MD,FACC.  [address] [place], [place] [id number].  [phone]
- Review of records, telemetry, vital signs and daily labs.   - General and cardiovascular physical examination.  - Generation of cardiovascular treatment plan.  - Coordination of care.      Patient was seen and examined by me on 05/24/2024,interim events noted,labs and radiology studies reviewed.  Jayy Rubin MD,FACC.  [address] [place], [place] [id number].  [phone]
- Review of records, telemetry, vital signs and daily labs.   - General and cardiovascular physical examination.  - Generation of cardiovascular treatment plan.  - Coordination of care.      Patient was seen and examined by me on 5/22/24,interim events noted,labs and radiology studies reviewed.  Jayy Rubin MD,FACC.  [address] [place], [place] [id number].  [phone]
- Review of records, telemetry, vital signs and daily labs.   - General and cardiovascular physical examination.  - Generation of cardiovascular treatment plan.  - Coordination of care.      Patient was seen and examined by me on 5/27/24,interim events noted,labs and radiology studies reviewed.  Jayy Rubin MD,FACC.  [address] [place], [place] [id number].  [phone]
- Review of records, telemetry, vital signs and daily labs.   - General and cardiovascular physical examination.  - Generation of cardiovascular treatment plan.  - Coordination of care.      Patient was seen and examined by me on 5/28/24,interim events noted,labs and radiology studies reviewed.  Jayy Rubin MD,FACC.  [address] [place], [place] [id number].  [phone]
- Review of records, telemetry, vital signs and daily labs.   - General and cardiovascular physical examination.  - Generation of cardiovascular treatment plan.  - Coordination of care.      Patient was seen and examined by me on 5/29/24,interim events noted,labs and radiology studies reviewed.  Jayy Rubin MD,FACC.  [address] [place], [place] [id number].  [phone]
- Review of records, telemetry, vital signs and daily labs.   - General and cardiovascular physical examination.  - Generation of cardiovascular treatment plan.  - Coordination of care.      Patient was seen and examined by me on 5/17/24,interim events noted,labs and radiology studies reviewed.  Jayy Rubin MD,FACC.  [address] [place], [place] [id number].  [phone]
- Review of records, telemetry, vital signs and daily labs.   - General and cardiovascular physical examination.  - Generation of cardiovascular treatment plan.  - Coordination of care.      Patient was seen and examined by me on 5/21/24,interim events noted,labs and radiology studies reviewed.  Jayy Rubin MD,FACC.  [address] [place], [place] [id number].  [phone]
- Review of records, telemetry, vital signs and daily labs.   - General and cardiovascular physical examination.  - Generation of cardiovascular treatment plan.  - Coordination of care.      Patient was seen and examined by me on 5/30/24,interim events noted,labs and radiology studies reviewed.  Jayy Rubin MD,FACC.  [address] [place], [place] [id number].  [phone]
time spent reviewing prior charts, meds, discussing plan with patient= 44 minutes
- Review of records, telemetry, vital signs and daily labs.   - General and cardiovascular physical examination.  - Generation of cardiovascular treatment plan.  - Coordination of care.      Patient was seen and examined by me on  05/23/2024,interim events noted,labs and radiology studies reviewed.  Jayy Rubin MD,FACC.  [address] [place], [place] [id number].  [phone]
- Review of records, telemetry, vital signs and daily labs.   - General and cardiovascular physical examination.  - Generation of cardiovascular treatment plan.  - Coordination of care.      Patient was seen and examined by me on 5/26/24,interim events noted,labs and radiology studies reviewed.  Jayy Rubin MD,FACC.  [address] [place], [place] [id number].  [phone]
Chart review, exam, counseling, coordination of care
Chart review, exam, counseling, coordination of care
- Review of records, telemetry, vital signs and daily labs.   - General and cardiovascular physical examination.  - Generation of cardiovascular treatment plan.  - Coordination of care.      Patient was seen and examined by me on 5/31/24,interim events noted,labs and radiology studies reviewed.  Jayy Rubin MD,FACC.  [address] [place], [place] [id number].  [phone]
Time-based billing (NON-critical care).     The necessity of the time spent during the encounter on this date of service was due to:     - Ordering, reviewing, and interpreting labs, testing, and imaging.  - Independently obtaining a review of systems and performing a physical exam  - Reviewing prior hospitalization and where necessary, outpatient records.  - Counselling and educating patient and family regarding interpretation of aforementioned items and plan of care.
- Review of records, telemetry, vital signs and daily labs.   - General and cardiovascular physical examination.  - Generation of cardiovascular treatment plan.  - Coordination of care.      Patient was seen and examined by me on 6/2/24,interim events noted,labs and radiology studies reviewed.  Jayy Rubin MD,FACC.  [address] [place], [place] [id number].  [phone]
Time-based billing (NON-critical care).     The necessity of the time spent during the encounter on this date of service was due to:     - Ordering, reviewing, and interpreting labs, testing, and imaging.  - Independently obtaining a review of systems and performing a physical exam  - Reviewing prior hospitalization and where necessary, outpatient records.  - Counselling and educating patient and family regarding interpretation of aforementioned items and plan of care.
- Review of records, telemetry, vital signs and daily labs.   - General and cardiovascular physical examination.  - Generation of cardiovascular treatment plan.  - Coordination of care.      Patient was seen and examined by me on 6/1/24,interim events noted,labs and radiology studies reviewed.  Jayy Rubin MD,FACC.  [address] [place], [place] [id number].  [phone]
Time-based billing (NON-critical care).     The necessity of the time spent during the encounter on this date of service was due to:     - Ordering, reviewing, and interpreting labs, testing, and imaging.  - Independently obtaining a review of systems and performing a physical exam  - Reviewing prior hospitalization and where necessary, outpatient records.  - Counselling and educating patient and family regarding interpretation of aforementioned items and plan of care.

## 2024-06-04 LAB
WNV IGG TITR FLD: NEGATIVE — SIGNIFICANT CHANGE UP
WNV IGM SPEC QL: NEGATIVE — SIGNIFICANT CHANGE UP

## 2024-06-05 LAB
PYRIDOXAL PHOS SERPL-MCNC: 7.2 UG/L — SIGNIFICANT CHANGE UP (ref 3.4–65.2)
VIT B1 SERPL-MCNC: 109 NMOL/L — SIGNIFICANT CHANGE UP (ref 66.5–200)

## 2024-06-19 LAB
CULTURE RESULTS: SIGNIFICANT CHANGE UP
SPECIMEN SOURCE: SIGNIFICANT CHANGE UP

## 2024-07-11 PROBLEM — Z00.00 ENCOUNTER FOR PREVENTIVE HEALTH EXAMINATION: Status: ACTIVE | Noted: 2024-07-11

## 2024-08-09 PROBLEM — M19.90 UNSPECIFIED OSTEOARTHRITIS, UNSPECIFIED SITE: Chronic | Status: ACTIVE | Noted: 2024-05-31

## 2024-08-09 PROBLEM — R56.9 UNSPECIFIED CONVULSIONS: Chronic | Status: ACTIVE | Noted: 2024-05-15

## 2024-08-09 PROBLEM — K74.60 UNSPECIFIED CIRRHOSIS OF LIVER: Chronic | Status: ACTIVE | Noted: 2024-05-15

## 2024-08-13 ENCOUNTER — TRANSCRIPTION ENCOUNTER (OUTPATIENT)
Age: 64
End: 2024-08-13

## 2024-08-14 ENCOUNTER — TRANSCRIPTION ENCOUNTER (OUTPATIENT)
Age: 64
End: 2024-08-14

## 2024-09-12 ENCOUNTER — APPOINTMENT (OUTPATIENT)
Dept: NEUROLOGY | Facility: HOSPITAL | Age: 64
End: 2024-09-12

## 2024-09-12 ENCOUNTER — OUTPATIENT (OUTPATIENT)
Dept: OUTPATIENT SERVICES | Facility: HOSPITAL | Age: 64
LOS: 1 days | End: 2024-09-12
Payer: MEDICAID

## 2024-09-12 DIAGNOSIS — I63.9 CEREBRAL INFARCTION, UNSPECIFIED: ICD-10-CM

## 2024-09-12 DIAGNOSIS — K70.30 ALCOHOLIC CIRRHOSIS OF LIVER W/OUT ASCITES: ICD-10-CM

## 2024-09-12 DIAGNOSIS — R56.9 UNSPECIFIED CONVULSIONS: ICD-10-CM

## 2024-09-12 DIAGNOSIS — E55.9 VITAMIN D DEFICIENCY, UNSPECIFIED: ICD-10-CM

## 2024-09-12 DIAGNOSIS — I10 ESSENTIAL (PRIMARY) HYPERTENSION: ICD-10-CM

## 2024-09-12 DIAGNOSIS — G40.909 EPILEPSY, UNSPECIFIED, NOT INTRACTABLE, W/OUT STATUS EPILEPTICUS: ICD-10-CM

## 2024-09-12 DIAGNOSIS — G20.C PARKINSONISM, UNSPECIFIED: ICD-10-CM

## 2024-09-12 DIAGNOSIS — Z78.9 OTHER SPECIFIED HEALTH STATUS: ICD-10-CM

## 2024-09-12 PROBLEM — I61.9 NONTRAUMATIC INTRACEREBRAL HEMORRHAGE, UNSPECIFIED: Chronic | Status: ACTIVE | Noted: 2024-08-11

## 2024-09-12 PROCEDURE — 99204 OFFICE O/P NEW MOD 45 MIN: CPT

## 2024-09-12 PROCEDURE — 99214 OFFICE O/P EST MOD 30 MIN: CPT | Mod: 1L

## 2024-09-12 PROCEDURE — G0463: CPT

## 2024-09-12 RX ORDER — MIRTAZAPINE 15 MG/1
15 TABLET, FILM COATED ORAL
Refills: 0 | Status: ACTIVE | COMMUNITY

## 2024-09-12 RX ORDER — CARVEDILOL 3.12 MG/1
3.12 TABLET, FILM COATED ORAL TWICE DAILY
Refills: 0 | Status: ACTIVE | COMMUNITY

## 2024-09-12 RX ORDER — CARBIDOPA AND LEVODOPA 25; 100 MG/1; MG/1
25-100 TABLET ORAL 3 TIMES DAILY
Refills: 0 | Status: ACTIVE | COMMUNITY

## 2024-09-12 RX ORDER — ATORVASTATIN CALCIUM 40 MG/1
40 TABLET, FILM COATED ORAL DAILY
Refills: 0 | Status: ACTIVE | COMMUNITY

## 2024-09-12 RX ORDER — SENNOSIDES 8.6 MG TABLETS 8.6 MG/1
8.6 TABLET ORAL DAILY
Refills: 0 | Status: ACTIVE | COMMUNITY

## 2024-09-12 RX ORDER — CAPTOPRIL 12.5 MG/1
12.5 TABLET ORAL
Refills: 0 | Status: ACTIVE | COMMUNITY

## 2024-09-12 RX ORDER — MULTIVIT-MIN/FOLIC/VIT K/LYCOP 400-300MCG
TABLET ORAL DAILY
Refills: 0 | Status: ACTIVE | COMMUNITY

## 2024-09-12 RX ORDER — NALTREXONE HYDROCHLORIDE 50 MG/1
50 TABLET, FILM COATED ORAL DAILY
Refills: 0 | Status: ACTIVE | COMMUNITY

## 2024-09-12 RX ORDER — CLONAZEPAM 0.5 MG/1
0.5 TABLET ORAL
Refills: 0 | Status: ACTIVE | COMMUNITY

## 2024-09-12 RX ORDER — LORATADINE 10 MG
17 TABLET,DISINTEGRATING ORAL DAILY
Refills: 0 | Status: ACTIVE | COMMUNITY

## 2024-09-12 RX ORDER — UBIDECARENONE/VIT E ACET 100MG-5
25 MCG CAPSULE ORAL DAILY
Refills: 0 | Status: ACTIVE | COMMUNITY

## 2024-09-12 RX ORDER — BRIMONIDINE TARTRATE 2 MG/MG
0.2 SOLUTION/ DROPS OPHTHALMIC
Refills: 0 | Status: ACTIVE | COMMUNITY

## 2024-09-12 RX ORDER — LEVETIRACETAM 750 MG/1
750 TABLET, FILM COATED ORAL TWICE DAILY
Refills: 0 | Status: ACTIVE | COMMUNITY

## 2024-09-12 RX ORDER — FOLIC ACID 1 MG/1
1 TABLET ORAL DAILY
Refills: 0 | Status: ACTIVE | COMMUNITY

## 2024-09-12 NOTE — DISCUSSION/SUMMARY
[FreeTextEntry1] : 64y F with Hx CVA, HTN, EtOH cirrhosis, seizures, Parkinsonism, who presents as hospital follow-up for Parkinsonism. Agree with diagnosis and current treatment plan  Recommendations: [] c/w Sinemet 25-100mg TID [] RTC in 6 months  Case discussed and staffed at bedside w/ attending Dr Sanabria

## 2024-09-12 NOTE — PHYSICAL EXAM
I called and left mess to call back to schedule his appt.   [General Appearance - Alert] : alert [General Appearance - In No Acute Distress] : in no acute distress [General Appearance - Well Nourished] : well nourished [General Appearance - Well Developed] : well developed [General Appearance - Well-Appearing] : healthy appearing [] : normal voice and communication [Impaired Insight] : insight and judgment were intact [Affect] : the affect was normal [Mood] : the mood was normal [Memory Recent] : recent memory was not impaired [Memory Remote] : remote memory was not impaired [Oriented to Person] : oriented to person [Oriented to Place] : oriented to place [Oriented to Time] : oriented to time [FreeTextEntry1] : not oriented to month or day, but knows the year [Person] : oriented to person [Place] : oriented to place [Time] : oriented to time [Short Term Intact] : short term memory intact [Remote Intact] : remote memory intact [Registration Intact] : recent registration memory intact [Span Intact] : the attention span was normal [Concentration Intact] : normal concentrating ability [Visual Intact] : visual attention was ~T not ~L decreased [Naming Objects] : no difficulty naming common objects [Repeating Phrases] : no difficulty repeating a phrase [Fluency] : fluency intact [Comprehension] : comprehension intact [Reading] : reading intact [Vocabulary] : adequate range of vocabulary [Cranial Nerves Optic (II)] : visual acuity intact bilaterally,  visual fields full to confrontation, pupils equal round and reactive to light [Cranial Nerves Trigeminal (V)] : facial sensation intact symmetrically [Cranial Nerves Facial (VII)] : face symmetrical [Cranial Nerves Vestibulocochlear (VIII)] : hearing was intact bilaterally [Cranial Nerves Glossopharyngeal (IX)] : tongue and palate midline [Cranial Nerves Accessory (XI - Cranial And Spinal)] : head turning and shoulder shrug symmetric [Cranial Nerves Hypoglossal (XII)] : there was no tongue deviation with protrusion [Motor Strength Lower Extremities Bilaterally] : strength was normal in both lower extremities [Motor Strength Upper Extremities Bilaterally] : strength was normal in both upper extremities [Sensation Tactile Decrease] : light touch was intact [Non-ambulatory] : Non-ambulatory [FreeTextEntry5] : cannot cross midline to look L with b/l eyes, however EOM otherwise intact [FreeTextEntry6] : prominent dyskinesias in b/ UE and head tremor. Cogwheeling rigidity in b/l UE. Able to lift arms, squeeze hands, and wiggle toes b/l

## 2024-09-12 NOTE — HISTORY OF PRESENT ILLNESS
[FreeTextEntry1] : 64y F with Hx CVA, HTN, EtOH cirrhosis, vitamin D deficiency, seizures, who presents for Parkinsonism evaluation. In May 2024, she was found to have acute infarcts in b/l MCA, R PCA, R BG, and L cerebellum. She subsequently was admitted to [facility] in 6/2024 and was found to have tremors/Parkinsonism, thought to be due to vascular etiology. She was followed by neurology (Dr Mclean) at that time. She improved with Sinemet and was discharged to SNF. She is bedbound. She thinks the tremors are about the same. She has no other complaints to offer. NKDA. Only surgery was hysterectomy. Former smoker. No neurological disease in family history (denies CVA, stroke, dementia, PD). Denies focal numbness or blurry vision. She denies any seizures in the interim. She is accompanied by aide from SNF.

## 2024-09-25 NOTE — PROGRESS NOTE ADULT - PROBLEM SELECTOR PLAN 3
home TTE from 5/23 shows findings consistent with Takostubo, pt was previously in Cardiogenic Shock, off dobutamine and norepinephrine now, hemodynamically stable  repeat TTE 5/24 shows EF improving to 35-40% from 30.  should recover with time, no role for ischemic eval  continue coreg and captopril  may need diuresis but currently not in HF  Cardiology on board, recommendations appreciated

## 2024-09-26 DIAGNOSIS — Z78.9 OTHER SPECIFIED HEALTH STATUS: ICD-10-CM

## 2024-09-26 DIAGNOSIS — G40.909 EPILEPSY, UNSPECIFIED, NOT INTRACTABLE, WITHOUT STATUS EPILEPTICUS: ICD-10-CM

## 2024-09-26 DIAGNOSIS — K70.30 ALCOHOLIC CIRRHOSIS OF LIVER WITHOUT ASCITES: ICD-10-CM

## 2024-09-26 DIAGNOSIS — E55.9 VITAMIN D DEFICIENCY, UNSPECIFIED: ICD-10-CM

## 2024-09-26 DIAGNOSIS — I63.9 CEREBRAL INFARCTION, UNSPECIFIED: ICD-10-CM

## 2024-09-26 DIAGNOSIS — G20.C PARKINSONISM, UNSPECIFIED: ICD-10-CM

## 2024-09-26 DIAGNOSIS — I10 ESSENTIAL (PRIMARY) HYPERTENSION: ICD-10-CM

## 2025-02-25 ENCOUNTER — OUTPATIENT (OUTPATIENT)
Dept: OUTPATIENT SERVICES | Facility: HOSPITAL | Age: 65
LOS: 1 days | End: 2025-02-25

## 2025-02-25 ENCOUNTER — APPOINTMENT (OUTPATIENT)
Dept: NEUROLOGY | Facility: HOSPITAL | Age: 65
End: 2025-02-25

## 2025-02-25 VITALS
DIASTOLIC BLOOD PRESSURE: 67 MMHG | SYSTOLIC BLOOD PRESSURE: 103 MMHG | HEART RATE: 56 BPM | OXYGEN SATURATION: 97 % | TEMPERATURE: 98 F | WEIGHT: 157 LBS | RESPIRATION RATE: 14 BRPM

## 2025-02-25 DIAGNOSIS — R51.9 HEADACHE, UNSPECIFIED: ICD-10-CM

## 2025-02-25 PROCEDURE — 99202 OFFICE O/P NEW SF 15 MIN: CPT

## 2025-05-29 ENCOUNTER — APPOINTMENT (OUTPATIENT)
Dept: NEUROLOGY | Facility: HOSPITAL | Age: 65
End: 2025-05-29